# Patient Record
Sex: MALE | Race: WHITE | Employment: OTHER | URBAN - METROPOLITAN AREA
[De-identification: names, ages, dates, MRNs, and addresses within clinical notes are randomized per-mention and may not be internally consistent; named-entity substitution may affect disease eponyms.]

---

## 2018-06-29 ENCOUNTER — HOSPITAL ENCOUNTER (OUTPATIENT)
Dept: SURGERY | Age: 53
Discharge: HOME | End: 2018-06-29
Attending: INTERNAL MEDICINE | Admitting: INTERNAL MEDICINE

## 2018-06-29 VITALS
RESPIRATION RATE: 20 BRPM | HEART RATE: 99 BPM | WEIGHT: 152 LBS | TEMPERATURE: 97 F | HEIGHT: 60 IN | OXYGEN SATURATION: 100 % | SYSTOLIC BLOOD PRESSURE: 152 MMHG | BODY MASS INDEX: 29.84 KG/M2 | DIASTOLIC BLOOD PRESSURE: 69 MMHG

## 2018-06-29 DIAGNOSIS — R19.5 OTHER FECAL ABNORMALITIES: ICD-10-CM

## 2018-06-29 RX ORDER — MORPHINE SULFATE 2 MG/ML
1 INJECTION, SOLUTION INTRAMUSCULAR; INTRAVENOUS EVERY 5 MIN PRN
Status: DISCONTINUED | OUTPATIENT
Start: 2018-06-29 | End: 2018-06-30 | Stop reason: HOSPADM

## 2018-06-29 RX ORDER — MORPHINE SULFATE 2 MG/ML
2 INJECTION, SOLUTION INTRAMUSCULAR; INTRAVENOUS EVERY 5 MIN PRN
Status: DISCONTINUED | OUTPATIENT
Start: 2018-06-29 | End: 2018-06-30 | Stop reason: HOSPADM

## 2018-06-29 RX ORDER — ONDANSETRON 2 MG/ML
4 INJECTION INTRAMUSCULAR; INTRAVENOUS
Status: ACTIVE | OUTPATIENT
Start: 2018-06-29 | End: 2018-06-29

## 2018-06-29 RX ORDER — DIPHENHYDRAMINE HYDROCHLORIDE 50 MG/ML
12.5 INJECTION INTRAMUSCULAR; INTRAVENOUS
Status: ACTIVE | OUTPATIENT
Start: 2018-06-29 | End: 2018-06-29

## 2018-06-29 RX ORDER — LABETALOL HYDROCHLORIDE 5 MG/ML
5 INJECTION, SOLUTION INTRAVENOUS EVERY 10 MIN PRN
Status: DISCONTINUED | OUTPATIENT
Start: 2018-06-29 | End: 2018-06-30 | Stop reason: HOSPADM

## 2018-06-29 RX ORDER — LIDOCAINE HYDROCHLORIDE 10 MG/ML
0.1 INJECTION, SOLUTION EPIDURAL; INFILTRATION; INTRACAUDAL; PERINEURAL ONCE
Status: DISCONTINUED | OUTPATIENT
Start: 2018-06-29 | End: 2018-06-30 | Stop reason: HOSPADM

## 2018-06-29 RX ORDER — MEPERIDINE HYDROCHLORIDE 50 MG/ML
12.5 INJECTION INTRAMUSCULAR; INTRAVENOUS; SUBCUTANEOUS EVERY 5 MIN PRN
Status: DISCONTINUED | OUTPATIENT
Start: 2018-06-29 | End: 2018-06-30 | Stop reason: HOSPADM

## 2018-06-29 RX ORDER — OXYCODONE HYDROCHLORIDE AND ACETAMINOPHEN 5; 325 MG/1; MG/1
2 TABLET ORAL PRN
Status: ACTIVE | OUTPATIENT
Start: 2018-06-29 | End: 2018-06-29

## 2018-06-29 RX ORDER — OXYCODONE HYDROCHLORIDE AND ACETAMINOPHEN 5; 325 MG/1; MG/1
1 TABLET ORAL PRN
Status: ACTIVE | OUTPATIENT
Start: 2018-06-29 | End: 2018-06-29

## 2018-06-29 RX ORDER — SODIUM CHLORIDE, SODIUM LACTATE, POTASSIUM CHLORIDE, CALCIUM CHLORIDE 600; 310; 30; 20 MG/100ML; MG/100ML; MG/100ML; MG/100ML
INJECTION, SOLUTION INTRAVENOUS CONTINUOUS
Status: DISCONTINUED | OUTPATIENT
Start: 2018-06-29 | End: 2018-06-30 | Stop reason: HOSPADM

## 2018-06-29 RX ORDER — IPRATROPIUM BROMIDE AND ALBUTEROL SULFATE 2.5; .5 MG/3ML; MG/3ML
SOLUTION RESPIRATORY (INHALATION)
Status: COMPLETED
Start: 2018-06-29 | End: 2018-06-29

## 2018-06-29 RX ORDER — IPRATROPIUM BROMIDE AND ALBUTEROL SULFATE 2.5; .5 MG/3ML; MG/3ML
1 SOLUTION RESPIRATORY (INHALATION) ONCE
Status: COMPLETED | OUTPATIENT
Start: 2018-06-29 | End: 2018-06-29

## 2018-06-29 RX ORDER — HYDRALAZINE HYDROCHLORIDE 20 MG/ML
5 INJECTION INTRAMUSCULAR; INTRAVENOUS EVERY 10 MIN PRN
Status: DISCONTINUED | OUTPATIENT
Start: 2018-06-29 | End: 2018-06-30 | Stop reason: HOSPADM

## 2018-06-29 RX ORDER — PROMETHAZINE HYDROCHLORIDE 25 MG/ML
6.25 INJECTION, SOLUTION INTRAMUSCULAR; INTRAVENOUS
Status: ACTIVE | OUTPATIENT
Start: 2018-06-29 | End: 2018-06-29

## 2018-06-29 RX ADMIN — SODIUM CHLORIDE, SODIUM LACTATE, POTASSIUM CHLORIDE, CALCIUM CHLORIDE: 600; 310; 30; 20 INJECTION, SOLUTION INTRAVENOUS at 11:17

## 2018-06-29 RX ADMIN — IPRATROPIUM BROMIDE AND ALBUTEROL SULFATE 1 AMPULE: 2.5; .5 SOLUTION RESPIRATORY (INHALATION) at 13:03

## 2018-06-29 ASSESSMENT — PAIN - FUNCTIONAL ASSESSMENT: PAIN_FUNCTIONAL_ASSESSMENT: 0-10

## 2019-01-01 DIAGNOSIS — M89.8X5 PAIN OF LEFT FEMUR: Primary | ICD-10-CM

## 2019-01-01 DIAGNOSIS — M79.605 LEFT LEG PAIN: Primary | ICD-10-CM

## 2019-09-03 ENCOUNTER — HOSPITAL ENCOUNTER (EMERGENCY)
Age: 54
Discharge: ANOTHER HOSPITAL | End: 2019-09-03
Attending: EMERGENCY MEDICINE
Payer: MEDICARE

## 2019-09-03 ENCOUNTER — APPOINTMENT (OUTPATIENT)
Dept: GENERAL RADIOLOGY | Age: 54
End: 2019-09-03
Payer: MEDICARE

## 2019-09-03 ENCOUNTER — HOSPITAL ENCOUNTER (INPATIENT)
Age: 54
LOS: 2 days | Discharge: NURSING, REHAB OR LONG-TERM CARE | DRG: 481 | End: 2019-09-05
Attending: ORTHOPAEDIC SURGERY | Admitting: ORTHOPAEDIC SURGERY
Payer: MEDICARE

## 2019-09-03 ENCOUNTER — ANESTHESIA EVENT (OUTPATIENT)
Dept: OPERATING ROOM | Age: 54
DRG: 481 | End: 2019-09-03
Payer: MEDICARE

## 2019-09-03 ENCOUNTER — ANESTHESIA (OUTPATIENT)
Dept: OPERATING ROOM | Age: 54
DRG: 481 | End: 2019-09-03
Payer: MEDICARE

## 2019-09-03 ENCOUNTER — APPOINTMENT (OUTPATIENT)
Dept: GENERAL RADIOLOGY | Age: 54
DRG: 481 | End: 2019-09-03
Attending: ORTHOPAEDIC SURGERY
Payer: MEDICARE

## 2019-09-03 VITALS
SYSTOLIC BLOOD PRESSURE: 151 MMHG | RESPIRATION RATE: 4 BRPM | OXYGEN SATURATION: 100 % | DIASTOLIC BLOOD PRESSURE: 79 MMHG

## 2019-09-03 VITALS
RESPIRATION RATE: 20 BRPM | DIASTOLIC BLOOD PRESSURE: 74 MMHG | HEIGHT: 60 IN | BODY MASS INDEX: 29.84 KG/M2 | HEART RATE: 121 BPM | WEIGHT: 152 LBS | TEMPERATURE: 97.6 F | OXYGEN SATURATION: 92 % | SYSTOLIC BLOOD PRESSURE: 146 MMHG

## 2019-09-03 DIAGNOSIS — S72.142A CLOSED DISPLACED INTERTROCHANTERIC FRACTURE OF LEFT FEMUR, INITIAL ENCOUNTER (HCC): Primary | ICD-10-CM

## 2019-09-03 LAB
A/G RATIO: 1.1 (ref 1.1–2.2)
ALBUMIN SERPL-MCNC: 4.1 G/DL (ref 3.4–5)
ALP BLD-CCNC: 86 U/L (ref 40–129)
ALT SERPL-CCNC: 32 U/L (ref 10–40)
AMORPHOUS: ABNORMAL /HPF
ANION GAP SERPL CALCULATED.3IONS-SCNC: 11 MMOL/L (ref 3–16)
APTT: 30.4 SEC (ref 26–36)
AST SERPL-CCNC: 26 U/L (ref 15–37)
BASOPHILS ABSOLUTE: 0.1 K/UL (ref 0–0.2)
BASOPHILS RELATIVE PERCENT: 1 %
BILIRUB SERPL-MCNC: 0.3 MG/DL (ref 0–1)
BILIRUBIN URINE: NEGATIVE
BLOOD, URINE: ABNORMAL
BUN BLDV-MCNC: 25 MG/DL (ref 7–20)
CALCIUM SERPL-MCNC: 9.8 MG/DL (ref 8.3–10.6)
CASTS: ABNORMAL /LPF
CHLORIDE BLD-SCNC: 98 MMOL/L (ref 99–110)
CLARITY: CLEAR
CO2: 30 MMOL/L (ref 21–32)
COLOR: YELLOW
CREAT SERPL-MCNC: 0.8 MG/DL (ref 0.9–1.3)
EOSINOPHILS ABSOLUTE: 0 K/UL (ref 0–0.6)
EOSINOPHILS RELATIVE PERCENT: 0 %
EPITHELIAL CELLS, UA: ABNORMAL /HPF
GFR AFRICAN AMERICAN: >60
GFR NON-AFRICAN AMERICAN: >60
GLOBULIN: 3.8 G/DL
GLUCOSE BLD-MCNC: 155 MG/DL (ref 70–99)
GLUCOSE URINE: NEGATIVE MG/DL
HCT VFR BLD CALC: 40.5 % (ref 40.5–52.5)
HEMOGLOBIN: 13.4 G/DL (ref 13.5–17.5)
INR BLD: 1.06 (ref 0.86–1.14)
KETONES, URINE: NEGATIVE MG/DL
LEUKOCYTE ESTERASE, URINE: NEGATIVE
LYMPHOCYTES ABSOLUTE: 0.8 K/UL (ref 1–5.1)
LYMPHOCYTES RELATIVE PERCENT: 5.4 %
MCH RBC QN AUTO: 31.4 PG (ref 26–34)
MCHC RBC AUTO-ENTMCNC: 33.1 G/DL (ref 31–36)
MCV RBC AUTO: 94.9 FL (ref 80–100)
MICROSCOPIC EXAMINATION: YES
MONOCYTES ABSOLUTE: 0.6 K/UL (ref 0–1.3)
MONOCYTES RELATIVE PERCENT: 4.5 %
MUCUS: ABNORMAL /LPF
NEUTROPHILS ABSOLUTE: 12.7 K/UL (ref 1.7–7.7)
NEUTROPHILS RELATIVE PERCENT: 89.1 %
NITRITE, URINE: NEGATIVE
PDW BLD-RTO: 14.4 % (ref 12.4–15.4)
PH UA: 6 (ref 5–8)
PLATELET # BLD: 285 K/UL (ref 135–450)
PMV BLD AUTO: 7.9 FL (ref 5–10.5)
POTASSIUM SERPL-SCNC: 4.5 MMOL/L (ref 3.5–5.1)
PROTEIN UA: ABNORMAL MG/DL
PROTHROMBIN TIME: 12.1 SEC (ref 9.8–13)
RBC # BLD: 4.27 M/UL (ref 4.2–5.9)
RBC UA: ABNORMAL /HPF (ref 0–2)
RENAL EPITHELIAL, UA: ABNORMAL /HPF
SODIUM BLD-SCNC: 139 MMOL/L (ref 136–145)
SPECIFIC GRAVITY UA: 1.02 (ref 1–1.03)
TOTAL PROTEIN: 7.9 G/DL (ref 6.4–8.2)
URINE TYPE: ABNORMAL
UROBILINOGEN, URINE: 0.2 E.U./DL
WBC # BLD: 14.2 K/UL (ref 4–11)
WBC UA: ABNORMAL /HPF (ref 0–5)

## 2019-09-03 PROCEDURE — 2700000000 HC OXYGEN THERAPY PER DAY

## 2019-09-03 PROCEDURE — 6370000000 HC RX 637 (ALT 250 FOR IP): Performed by: ORTHOPAEDIC SURGERY

## 2019-09-03 PROCEDURE — 3600000014 HC SURGERY LEVEL 4 ADDTL 15MIN: Performed by: ORTHOPAEDIC SURGERY

## 2019-09-03 PROCEDURE — 2500000003 HC RX 250 WO HCPCS: Performed by: ORTHOPAEDIC SURGERY

## 2019-09-03 PROCEDURE — 7100000001 HC PACU RECOVERY - ADDTL 15 MIN: Performed by: ORTHOPAEDIC SURGERY

## 2019-09-03 PROCEDURE — 2580000003 HC RX 258: Performed by: ORTHOPAEDIC SURGERY

## 2019-09-03 PROCEDURE — 2709999900 HC NON-CHARGEABLE SUPPLY: Performed by: ORTHOPAEDIC SURGERY

## 2019-09-03 PROCEDURE — 3700000001 HC ADD 15 MINUTES (ANESTHESIA): Performed by: ORTHOPAEDIC SURGERY

## 2019-09-03 PROCEDURE — C1713 ANCHOR/SCREW BN/BN,TIS/BN: HCPCS | Performed by: ORTHOPAEDIC SURGERY

## 2019-09-03 PROCEDURE — 3209999900 FLUORO FOR SURGICAL PROCEDURES

## 2019-09-03 PROCEDURE — 96361 HYDRATE IV INFUSION ADD-ON: CPT

## 2019-09-03 PROCEDURE — 6360000002 HC RX W HCPCS: Performed by: EMERGENCY MEDICINE

## 2019-09-03 PROCEDURE — 6360000002 HC RX W HCPCS: Performed by: ANESTHESIOLOGY

## 2019-09-03 PROCEDURE — 80053 COMPREHEN METABOLIC PANEL: CPT

## 2019-09-03 PROCEDURE — 3600000004 HC SURGERY LEVEL 4 BASE: Performed by: ORTHOPAEDIC SURGERY

## 2019-09-03 PROCEDURE — 2720000010 HC SURG SUPPLY STERILE: Performed by: ORTHOPAEDIC SURGERY

## 2019-09-03 PROCEDURE — 3700000000 HC ANESTHESIA ATTENDED CARE: Performed by: ORTHOPAEDIC SURGERY

## 2019-09-03 PROCEDURE — 81001 URINALYSIS AUTO W/SCOPE: CPT

## 2019-09-03 PROCEDURE — 2580000003 HC RX 258: Performed by: NURSE ANESTHETIST, CERTIFIED REGISTERED

## 2019-09-03 PROCEDURE — 87086 URINE CULTURE/COLONY COUNT: CPT

## 2019-09-03 PROCEDURE — APPSS45 APP SPLIT SHARED TIME 31-45 MINUTES: Performed by: PHYSICIAN ASSISTANT

## 2019-09-03 PROCEDURE — 6360000002 HC RX W HCPCS: Performed by: PHYSICIAN ASSISTANT

## 2019-09-03 PROCEDURE — 94761 N-INVAS EAR/PLS OXIMETRY MLT: CPT

## 2019-09-03 PROCEDURE — 99283 EMERGENCY DEPT VISIT LOW MDM: CPT

## 2019-09-03 PROCEDURE — 85610 PROTHROMBIN TIME: CPT

## 2019-09-03 PROCEDURE — 2500000003 HC RX 250 WO HCPCS: Performed by: NURSE ANESTHETIST, CERTIFIED REGISTERED

## 2019-09-03 PROCEDURE — 85730 THROMBOPLASTIN TIME PARTIAL: CPT

## 2019-09-03 PROCEDURE — 73552 X-RAY EXAM OF FEMUR 2/>: CPT

## 2019-09-03 PROCEDURE — 96374 THER/PROPH/DIAG INJ IV PUSH: CPT

## 2019-09-03 PROCEDURE — 93005 ELECTROCARDIOGRAM TRACING: CPT | Performed by: FAMILY MEDICINE

## 2019-09-03 PROCEDURE — 87040 BLOOD CULTURE FOR BACTERIA: CPT

## 2019-09-03 PROCEDURE — C1769 GUIDE WIRE: HCPCS | Performed by: ORTHOPAEDIC SURGERY

## 2019-09-03 PROCEDURE — 73502 X-RAY EXAM HIP UNI 2-3 VIEWS: CPT

## 2019-09-03 PROCEDURE — 85025 COMPLETE CBC W/AUTO DIFF WBC: CPT

## 2019-09-03 PROCEDURE — 7100000000 HC PACU RECOVERY - FIRST 15 MIN: Performed by: ORTHOPAEDIC SURGERY

## 2019-09-03 PROCEDURE — 0QS934Z REPOSITION LEFT FEMORAL SHAFT WITH INTERNAL FIXATION DEVICE, PERCUTANEOUS APPROACH: ICD-10-PCS | Performed by: ORTHOPAEDIC SURGERY

## 2019-09-03 PROCEDURE — 2580000003 HC RX 258: Performed by: EMERGENCY MEDICINE

## 2019-09-03 PROCEDURE — 6360000002 HC RX W HCPCS: Performed by: NURSE ANESTHETIST, CERTIFIED REGISTERED

## 2019-09-03 PROCEDURE — 1200000000 HC SEMI PRIVATE

## 2019-09-03 DEVICE — SCREW LK TI 5.0MM X 32MM: Type: IMPLANTABLE DEVICE | Status: FUNCTIONAL

## 2019-09-03 DEVICE — IMPLANTABLE DEVICE: Type: IMPLANTABLE DEVICE | Status: FUNCTIONAL

## 2019-09-03 RX ORDER — OXYBUTYNIN CHLORIDE 5 MG/1
2.5 TABLET ORAL NIGHTLY
COMMUNITY

## 2019-09-03 RX ORDER — 0.9 % SODIUM CHLORIDE 0.9 %
1000 INTRAVENOUS SOLUTION INTRAVENOUS ONCE
Status: COMPLETED | OUTPATIENT
Start: 2019-09-03 | End: 2019-09-03

## 2019-09-03 RX ORDER — ACETAMINOPHEN 325 MG/1
650 TABLET ORAL EVERY 4 HOURS PRN
Status: DISCONTINUED | OUTPATIENT
Start: 2019-09-03 | End: 2019-09-05 | Stop reason: HOSPADM

## 2019-09-03 RX ORDER — ONDANSETRON 2 MG/ML
4 INJECTION INTRAMUSCULAR; INTRAVENOUS EVERY 6 HOURS PRN
Status: DISCONTINUED | OUTPATIENT
Start: 2019-09-03 | End: 2019-09-05 | Stop reason: HOSPADM

## 2019-09-03 RX ORDER — MAGNESIUM HYDROXIDE 1200 MG/15ML
LIQUID ORAL CONTINUOUS PRN
Status: COMPLETED | OUTPATIENT
Start: 2019-09-03 | End: 2019-09-03

## 2019-09-03 RX ORDER — HYDRALAZINE HYDROCHLORIDE 20 MG/ML
5 INJECTION INTRAMUSCULAR; INTRAVENOUS EVERY 10 MIN PRN
Status: DISCONTINUED | OUTPATIENT
Start: 2019-09-03 | End: 2019-09-03 | Stop reason: HOSPADM

## 2019-09-03 RX ORDER — IPRATROPIUM BROMIDE AND ALBUTEROL SULFATE 2.5; .5 MG/3ML; MG/3ML
1 SOLUTION RESPIRATORY (INHALATION)
Status: DISCONTINUED | OUTPATIENT
Start: 2019-09-03 | End: 2019-09-04

## 2019-09-03 RX ORDER — POLYETHYLENE GLYCOL 3350 17 G/17G
17 POWDER, FOR SOLUTION ORAL 2 TIMES DAILY
Status: DISCONTINUED | OUTPATIENT
Start: 2019-09-03 | End: 2019-09-05 | Stop reason: HOSPADM

## 2019-09-03 RX ORDER — MORPHINE SULFATE 2 MG/ML
2 INJECTION, SOLUTION INTRAMUSCULAR; INTRAVENOUS EVERY 5 MIN PRN
Status: DISCONTINUED | OUTPATIENT
Start: 2019-09-03 | End: 2019-09-03 | Stop reason: HOSPADM

## 2019-09-03 RX ORDER — SODIUM PHOSPHATE,MONO-DIBASIC 19G-7G/118
1 ENEMA (ML) RECTAL DAILY PRN
Status: DISCONTINUED | OUTPATIENT
Start: 2019-09-03 | End: 2019-09-05 | Stop reason: HOSPADM

## 2019-09-03 RX ORDER — DOCUSATE SODIUM 100 MG/1
100 CAPSULE, LIQUID FILLED ORAL 2 TIMES DAILY
Status: DISCONTINUED | OUTPATIENT
Start: 2019-09-03 | End: 2019-09-05 | Stop reason: HOSPADM

## 2019-09-03 RX ORDER — OXYCODONE HYDROCHLORIDE AND ACETAMINOPHEN 5; 325 MG/1; MG/1
1 TABLET ORAL PRN
Status: DISCONTINUED | OUTPATIENT
Start: 2019-09-03 | End: 2019-09-03 | Stop reason: HOSPADM

## 2019-09-03 RX ORDER — OXYCODONE HYDROCHLORIDE 5 MG/1
10 TABLET ORAL EVERY 4 HOURS PRN
Status: DISCONTINUED | OUTPATIENT
Start: 2019-09-03 | End: 2019-09-05 | Stop reason: HOSPADM

## 2019-09-03 RX ORDER — SODIUM CHLORIDE 0.9 % (FLUSH) 0.9 %
10 SYRINGE (ML) INJECTION EVERY 12 HOURS SCHEDULED
Status: DISCONTINUED | OUTPATIENT
Start: 2019-09-03 | End: 2019-09-05 | Stop reason: HOSPADM

## 2019-09-03 RX ORDER — SUCCINYLCHOLINE/SOD CL,ISO/PF 100 MG/5ML
SYRINGE (ML) INTRAVENOUS PRN
Status: DISCONTINUED | OUTPATIENT
Start: 2019-09-03 | End: 2019-09-03 | Stop reason: SDUPTHER

## 2019-09-03 RX ORDER — FENTANYL CITRATE 50 UG/ML
INJECTION, SOLUTION INTRAMUSCULAR; INTRAVENOUS PRN
Status: DISCONTINUED | OUTPATIENT
Start: 2019-09-03 | End: 2019-09-03 | Stop reason: SDUPTHER

## 2019-09-03 RX ORDER — CLINDAMYCIN PHOSPHATE 10 MG/ML
SOLUTION TOPICAL EVERY 8 HOURS
COMMUNITY

## 2019-09-03 RX ORDER — MEPERIDINE HYDROCHLORIDE 50 MG/ML
12.5 INJECTION INTRAMUSCULAR; INTRAVENOUS; SUBCUTANEOUS EVERY 5 MIN PRN
Status: DISCONTINUED | OUTPATIENT
Start: 2019-09-03 | End: 2019-09-03 | Stop reason: HOSPADM

## 2019-09-03 RX ORDER — FUROSEMIDE 20 MG/1
20 TABLET ORAL 2 TIMES DAILY
Status: DISCONTINUED | OUTPATIENT
Start: 2019-09-03 | End: 2019-09-05 | Stop reason: HOSPADM

## 2019-09-03 RX ORDER — MORPHINE SULFATE 2 MG/ML
1 INJECTION, SOLUTION INTRAMUSCULAR; INTRAVENOUS EVERY 5 MIN PRN
Status: DISCONTINUED | OUTPATIENT
Start: 2019-09-03 | End: 2019-09-03 | Stop reason: HOSPADM

## 2019-09-03 RX ORDER — MORPHINE SULFATE 4 MG/ML
4 INJECTION, SOLUTION INTRAMUSCULAR; INTRAVENOUS ONCE
Status: COMPLETED | OUTPATIENT
Start: 2019-09-03 | End: 2019-09-03

## 2019-09-03 RX ORDER — DIPHENHYDRAMINE HYDROCHLORIDE 50 MG/ML
12.5 INJECTION INTRAMUSCULAR; INTRAVENOUS
Status: DISCONTINUED | OUTPATIENT
Start: 2019-09-03 | End: 2019-09-03 | Stop reason: HOSPADM

## 2019-09-03 RX ORDER — DOXYCYCLINE HYCLATE 100 MG
100 TABLET ORAL 2 TIMES DAILY
Status: DISCONTINUED | OUTPATIENT
Start: 2019-09-03 | End: 2019-09-03

## 2019-09-03 RX ORDER — CALCIUM CARBONATE 200(500)MG
2 TABLET,CHEWABLE ORAL EVERY 6 HOURS PRN
Status: DISCONTINUED | OUTPATIENT
Start: 2019-09-03 | End: 2019-09-05 | Stop reason: HOSPADM

## 2019-09-03 RX ORDER — PROPOFOL 10 MG/ML
INJECTION, EMULSION INTRAVENOUS PRN
Status: DISCONTINUED | OUTPATIENT
Start: 2019-09-03 | End: 2019-09-03 | Stop reason: SDUPTHER

## 2019-09-03 RX ORDER — ONDANSETRON 2 MG/ML
4 INJECTION INTRAMUSCULAR; INTRAVENOUS
Status: DISCONTINUED | OUTPATIENT
Start: 2019-09-03 | End: 2019-09-03 | Stop reason: HOSPADM

## 2019-09-03 RX ORDER — PANTOPRAZOLE SODIUM 40 MG/1
40 TABLET, DELAYED RELEASE ORAL
Status: DISCONTINUED | OUTPATIENT
Start: 2019-09-04 | End: 2019-09-05 | Stop reason: HOSPADM

## 2019-09-03 RX ORDER — MORPHINE SULFATE 4 MG/ML
4 INJECTION, SOLUTION INTRAMUSCULAR; INTRAVENOUS
Status: DISCONTINUED | OUTPATIENT
Start: 2019-09-03 | End: 2019-09-05 | Stop reason: HOSPADM

## 2019-09-03 RX ORDER — IPRATROPIUM BROMIDE AND ALBUTEROL SULFATE 2.5; .5 MG/3ML; MG/3ML
1 SOLUTION RESPIRATORY (INHALATION) 3 TIMES DAILY
Status: DISCONTINUED | OUTPATIENT
Start: 2019-09-03 | End: 2019-09-03

## 2019-09-03 RX ORDER — DOXYCYCLINE HYCLATE 100 MG
100 TABLET ORAL 2 TIMES DAILY
Status: ON HOLD | COMMUNITY
End: 2019-09-03 | Stop reason: ALTCHOICE

## 2019-09-03 RX ORDER — LABETALOL HYDROCHLORIDE 5 MG/ML
5 INJECTION, SOLUTION INTRAVENOUS EVERY 10 MIN PRN
Status: DISCONTINUED | OUTPATIENT
Start: 2019-09-03 | End: 2019-09-03 | Stop reason: HOSPADM

## 2019-09-03 RX ORDER — OXYCODONE HYDROCHLORIDE 5 MG/1
5 TABLET ORAL EVERY 4 HOURS PRN
Status: DISCONTINUED | OUTPATIENT
Start: 2019-09-03 | End: 2019-09-05 | Stop reason: HOSPADM

## 2019-09-03 RX ORDER — SODIUM CHLORIDE 0.9 % (FLUSH) 0.9 %
10 SYRINGE (ML) INJECTION PRN
Status: DISCONTINUED | OUTPATIENT
Start: 2019-09-03 | End: 2019-09-05

## 2019-09-03 RX ORDER — MORPHINE SULFATE 2 MG/ML
2 INJECTION, SOLUTION INTRAMUSCULAR; INTRAVENOUS
Status: DISCONTINUED | OUTPATIENT
Start: 2019-09-03 | End: 2019-09-05 | Stop reason: HOSPADM

## 2019-09-03 RX ORDER — GUAIFENESIN 600 MG/1
600 TABLET, EXTENDED RELEASE ORAL 2 TIMES DAILY
Status: DISCONTINUED | OUTPATIENT
Start: 2019-09-03 | End: 2019-09-05 | Stop reason: HOSPADM

## 2019-09-03 RX ORDER — LISINOPRIL 10 MG/1
10 TABLET ORAL NIGHTLY
Status: DISCONTINUED | OUTPATIENT
Start: 2019-09-03 | End: 2019-09-05 | Stop reason: HOSPADM

## 2019-09-03 RX ORDER — LOPERAMIDE HYDROCHLORIDE 2 MG/1
2 CAPSULE ORAL EVERY 4 HOURS PRN
Status: DISCONTINUED | OUTPATIENT
Start: 2019-09-03 | End: 2019-09-05 | Stop reason: HOSPADM

## 2019-09-03 RX ORDER — BISACODYL 10 MG
10 SUPPOSITORY, RECTAL RECTAL DAILY PRN
Status: DISCONTINUED | OUTPATIENT
Start: 2019-09-03 | End: 2019-09-05 | Stop reason: HOSPADM

## 2019-09-03 RX ORDER — ROCURONIUM BROMIDE 10 MG/ML
INJECTION, SOLUTION INTRAVENOUS PRN
Status: DISCONTINUED | OUTPATIENT
Start: 2019-09-03 | End: 2019-09-03 | Stop reason: SDUPTHER

## 2019-09-03 RX ORDER — IPRATROPIUM BROMIDE AND ALBUTEROL SULFATE 2.5; .5 MG/3ML; MG/3ML
1 SOLUTION RESPIRATORY (INHALATION) EVERY 4 HOURS PRN
Status: DISCONTINUED | OUTPATIENT
Start: 2019-09-03 | End: 2019-09-04

## 2019-09-03 RX ORDER — OXYCODONE HYDROCHLORIDE AND ACETAMINOPHEN 5; 325 MG/1; MG/1
2 TABLET ORAL PRN
Status: DISCONTINUED | OUTPATIENT
Start: 2019-09-03 | End: 2019-09-03 | Stop reason: HOSPADM

## 2019-09-03 RX ORDER — SODIUM CHLORIDE, SODIUM LACTATE, POTASSIUM CHLORIDE, CALCIUM CHLORIDE 600; 310; 30; 20 MG/100ML; MG/100ML; MG/100ML; MG/100ML
INJECTION, SOLUTION INTRAVENOUS CONTINUOUS PRN
Status: DISCONTINUED | OUTPATIENT
Start: 2019-09-03 | End: 2019-09-03 | Stop reason: SDUPTHER

## 2019-09-03 RX ORDER — M-VIT,TX,IRON,MINS/CALC/FOLIC 27MG-0.4MG
1 TABLET ORAL DAILY
Status: DISCONTINUED | OUTPATIENT
Start: 2019-09-03 | End: 2019-09-05 | Stop reason: HOSPADM

## 2019-09-03 RX ORDER — IPRATROPIUM BROMIDE AND ALBUTEROL SULFATE 2.5; .5 MG/3ML; MG/3ML
1 SOLUTION RESPIRATORY (INHALATION) EVERY 4 HOURS PRN
COMMUNITY
End: 2020-01-01 | Stop reason: ALTCHOICE

## 2019-09-03 RX ORDER — OXYBUTYNIN CHLORIDE 5 MG/1
2.5 TABLET ORAL NIGHTLY
Status: DISCONTINUED | OUTPATIENT
Start: 2019-09-03 | End: 2019-09-05 | Stop reason: HOSPADM

## 2019-09-03 RX ORDER — CLONIDINE HYDROCHLORIDE 0.1 MG/1
0.1 TABLET ORAL EVERY 6 HOURS PRN
Status: DISCONTINUED | OUTPATIENT
Start: 2019-09-03 | End: 2019-09-05 | Stop reason: HOSPADM

## 2019-09-03 RX ORDER — LIDOCAINE HYDROCHLORIDE 20 MG/ML
INJECTION, SOLUTION INFILTRATION; PERINEURAL PRN
Status: DISCONTINUED | OUTPATIENT
Start: 2019-09-03 | End: 2019-09-03 | Stop reason: SDUPTHER

## 2019-09-03 RX ORDER — DEXTROSE, SODIUM CHLORIDE, AND POTASSIUM CHLORIDE 5; .45; .15 G/100ML; G/100ML; G/100ML
1000 INJECTION INTRAVENOUS CONTINUOUS
Status: DISPENSED | OUTPATIENT
Start: 2019-09-03 | End: 2019-09-04

## 2019-09-03 RX ORDER — SODIUM CHLORIDE 0.9 % (FLUSH) 0.9 %
10 SYRINGE (ML) INJECTION PRN
Status: DISCONTINUED | OUTPATIENT
Start: 2019-09-03 | End: 2019-09-05 | Stop reason: HOSPADM

## 2019-09-03 RX ADMIN — SODIUM CHLORIDE, POTASSIUM CHLORIDE, SODIUM LACTATE AND CALCIUM CHLORIDE: 600; 310; 30; 20 INJECTION, SOLUTION INTRAVENOUS at 17:53

## 2019-09-03 RX ADMIN — SODIUM CHLORIDE 1000 ML: 9 INJECTION, SOLUTION INTRAVENOUS at 03:45

## 2019-09-03 RX ADMIN — POTASSIUM CHLORIDE, DEXTROSE MONOHYDRATE AND SODIUM CHLORIDE 1000 ML: 150; 5; 450 INJECTION, SOLUTION INTRAVENOUS at 22:45

## 2019-09-03 RX ADMIN — LISINOPRIL 10 MG: 10 TABLET ORAL at 22:40

## 2019-09-03 RX ADMIN — DOCUSATE SODIUM 100 MG: 100 CAPSULE, LIQUID FILLED ORAL at 22:40

## 2019-09-03 RX ADMIN — ROCURONIUM BROMIDE 5 MG: 10 SOLUTION INTRAVENOUS at 18:01

## 2019-09-03 RX ADMIN — MORPHINE SULFATE 4 MG: 4 INJECTION INTRAVENOUS at 03:45

## 2019-09-03 RX ADMIN — HYDROMORPHONE HYDROCHLORIDE 0.5 MG: 1 INJECTION, SOLUTION INTRAMUSCULAR; INTRAVENOUS; SUBCUTANEOUS at 19:47

## 2019-09-03 RX ADMIN — OXYBUTYNIN CHLORIDE 2.5 MG: 5 TABLET ORAL at 22:40

## 2019-09-03 RX ADMIN — GUAIFENESIN 600 MG: 600 TABLET, EXTENDED RELEASE ORAL at 22:40

## 2019-09-03 RX ADMIN — FUROSEMIDE 20 MG: 20 TABLET ORAL at 22:40

## 2019-09-03 RX ADMIN — Medication 2 G: at 18:06

## 2019-09-03 RX ADMIN — ROCURONIUM BROMIDE 45 MG: 10 SOLUTION INTRAVENOUS at 18:08

## 2019-09-03 RX ADMIN — PROPOFOL 130 MG: 10 INJECTION, EMULSION INTRAVENOUS at 18:02

## 2019-09-03 RX ADMIN — Medication 140 MG: at 18:02

## 2019-09-03 RX ADMIN — SUGAMMADEX 200 MG: 100 INJECTION, SOLUTION INTRAVENOUS at 18:43

## 2019-09-03 RX ADMIN — FENTANYL CITRATE 100 MCG: 50 INJECTION INTRAMUSCULAR; INTRAVENOUS at 18:07

## 2019-09-03 RX ADMIN — SODIUM CHLORIDE 1000 ML: 9 INJECTION, SOLUTION INTRAVENOUS at 05:05

## 2019-09-03 RX ADMIN — LIDOCAINE HYDROCHLORIDE 60 MG: 20 INJECTION, SOLUTION INFILTRATION; PERINEURAL at 18:02

## 2019-09-03 ASSESSMENT — PULMONARY FUNCTION TESTS
PIF_VALUE: 1
PIF_VALUE: 1
PIF_VALUE: 25
PIF_VALUE: 32
PIF_VALUE: 34
PIF_VALUE: 32
PIF_VALUE: 1
PIF_VALUE: 38
PIF_VALUE: 33
PIF_VALUE: 33
PIF_VALUE: 32
PIF_VALUE: 36
PIF_VALUE: 3
PIF_VALUE: 32
PIF_VALUE: 36
PIF_VALUE: 25
PIF_VALUE: 2
PIF_VALUE: 1
PIF_VALUE: 1
PIF_VALUE: 33
PIF_VALUE: 32
PIF_VALUE: 36
PIF_VALUE: 0
PIF_VALUE: 35
PIF_VALUE: 3
PIF_VALUE: 3
PIF_VALUE: 32
PIF_VALUE: 1
PIF_VALUE: 25
PIF_VALUE: 1
PIF_VALUE: 36
PIF_VALUE: 34
PIF_VALUE: 2
PIF_VALUE: 32
PIF_VALUE: 1
PIF_VALUE: 1
PIF_VALUE: 32
PIF_VALUE: 33
PIF_VALUE: 38
PIF_VALUE: 32
PIF_VALUE: 1
PIF_VALUE: 25
PIF_VALUE: 33
PIF_VALUE: 2
PIF_VALUE: 1
PIF_VALUE: 2
PIF_VALUE: 38
PIF_VALUE: 3
PIF_VALUE: 3
PIF_VALUE: 32
PIF_VALUE: 32
PIF_VALUE: 36
PIF_VALUE: 5
PIF_VALUE: 33
PIF_VALUE: 33
PIF_VALUE: 25
PIF_VALUE: 32
PIF_VALUE: 34
PIF_VALUE: 32
PIF_VALUE: 5
PIF_VALUE: 32
PIF_VALUE: 8
PIF_VALUE: 32
PIF_VALUE: 10
PIF_VALUE: 0
PIF_VALUE: 34
PIF_VALUE: 5
PIF_VALUE: 32
PIF_VALUE: 33
PIF_VALUE: 36
PIF_VALUE: 25
PIF_VALUE: 25
PIF_VALUE: 1
PIF_VALUE: 1
PIF_VALUE: 25
PIF_VALUE: 25
PIF_VALUE: 3
PIF_VALUE: 36
PIF_VALUE: 32
PIF_VALUE: 38
PIF_VALUE: 1
PIF_VALUE: 24
PIF_VALUE: 1

## 2019-09-03 ASSESSMENT — PAIN DESCRIPTION - PAIN TYPE: TYPE: SURGICAL PAIN

## 2019-09-03 ASSESSMENT — PAIN DESCRIPTION - DESCRIPTORS: DESCRIPTORS: OTHER (COMMENT)

## 2019-09-03 ASSESSMENT — PAIN SCALES - GENERAL
PAINLEVEL_OUTOF10: 0
PAINLEVEL_OUTOF10: 8
PAINLEVEL_OUTOF10: 0
PAINLEVEL_OUTOF10: 4

## 2019-09-03 ASSESSMENT — PAIN DESCRIPTION - ONSET: ONSET: OTHER (COMMENT)

## 2019-09-03 ASSESSMENT — PAIN DESCRIPTION - FREQUENCY: FREQUENCY: CONTINUOUS

## 2019-09-03 ASSESSMENT — PAIN DESCRIPTION - LOCATION: LOCATION: HIP

## 2019-09-03 ASSESSMENT — PAIN DESCRIPTION - ORIENTATION: ORIENTATION: LEFT

## 2019-09-03 NOTE — H&P
negative  MUSCULOSKELETAL:  positive for  Pain  Cardiac: no chest pain  Resp: No SOB    PHYSICAL EXAM:  Admission weight: 162 lb 11.2 oz (73.8 kg)     VITALS:  BP (!) 145/88   Pulse 120   Temp 98.3 °F (36.8 °C) (Oral)   Resp 18   Wt 162 lb 11.2 oz (73.8 kg)   SpO2 91%   BMI 36.48 kg/m²     CONSTITUTIONAL:  awake, alert, cooperative, no apparent distress, and appears stated age    MUSCULOSKELETAL:  there is no redness, warmth, or swelling of the joints  full range of motion noted  motor strength is 5 out of 5 all extremities bilaterally  tone is normal  with exception of the left leg. No open wounds. Mild swelling in the thigh. Distal pulses intact. Sensation intact. Pain with log roll of the leg.     DATA:  CBC:   Lab Results   Component Value Date    WBC 14.2 09/03/2019    RBC 4.27 09/03/2019    HGB 13.4 09/03/2019    HCT 40.5 09/03/2019    MCV 94.9 09/03/2019    MCH 31.4 09/03/2019    MCHC 33.1 09/03/2019    RDW 14.4 09/03/2019     09/03/2019    MPV 7.9 09/03/2019     BMP:    Lab Results   Component Value Date     09/03/2019    K 4.5 09/03/2019    CL 98 09/03/2019    CO2 30 09/03/2019    BUN 25 09/03/2019    LABALBU 4.1 09/03/2019    CREATININE 0.8 09/03/2019    CALCIUM 9.8 09/03/2019    GFRAA >60 09/03/2019    LABGLOM >60 09/03/2019    GLUCOSE 155 09/03/2019     PT/INR:    Lab Results   Component Value Date    PROTIME 12.1 09/03/2019    INR 1.06 09/03/2019       Radiology:  No orders to display         ASSESSMENT: left femur fracture    PLAN:  1)  Keep NPO for surgery tonight with Dr Collins Head  2)  Consent obtained from family  3)  Pain medication, Ancef on call      Xochitl Lorenzana

## 2019-09-03 NOTE — ED NOTES
Call placed to [facility] for hospitalitis @ [address]  09/03/19 1834    [facility] returned the call with Waldo Choudhary @ [place]  09/03/19 5952

## 2019-09-03 NOTE — ED NOTES
Patient unable to provide urine sample. Using sterile technique, straight cath performed. 125ml yellow urine obtained.       Al Luna RN  09/03/19 3776

## 2019-09-03 NOTE — ED PROVIDER NOTES
1.3 K/uL    Eosinophils Absolute 0.0 0.0 - 0.6 K/uL    Basophils Absolute 0.1 0.0 - 0.2 K/uL         PROCEDURES      MEDICAL DECISION MAKING  On arrival to the emergency department, patient tachycardic but afebrile with otherwise normal vital signs. No neurological deficit appreciated physical exam.  Patient neurovascularly intact. Patient with left intertrochanteric femur fracture on x-ray here in the emergency department. CBC, CMP obtained demonstrating leukocytosis of 14 but otherwise no other concerning acute abnormalities. Urinalysis pending. Patient hemodynamically stable. Patient was given IV fluid bolus of normal saline. Orthopedics team consulted. Dr. Liyah Kramer recommended transfer to [facility] for further management of current symptoms. Discussed case with hospitalist team at [facility] who accepts patient's transfer to their facility for further care. Final diagnoses:   Closed displaced intertrochanteric fracture of left femur, initial encounter ([facility] [address]         Disposition  Pt is in stable condition upon Transfer to [facility]. This chart was generated using the [address] [place] [address] dictation system. I created this record but it may contain dictation errors.             Corby Cheung MD  09/03/19 9014

## 2019-09-03 NOTE — CONSULTS
tablet Take 650 mg by mouth every 4 hours as needed for Pain or Fever    Historical Provider, MD   bisacodyl (DULCOLAX) 10 MG suppository Place 10 mg rectally daily as needed for Constipation    Historical Provider, MD   calcium carbonate (MARIBEL-GEST ANTACID) 500 MG chewable tablet Take 2 tablets by mouth every 6 hours as needed for Heartburn    Historical Provider, MD   cloNIDine (CATAPRES) 0.1 MG tablet Take 0.1 mg by mouth every 6 hours as needed for High Blood Pressure Systolic over 611 , diastolic 0ver 90    Historical Provider, MD   ipratropium-albuterol (DUONEB) 0.5-2.5 (3) MG/3ML SOLN nebulizer solution Inhale 1 vial into the lungs 3 times daily     Historical Provider, MD   Sodium Phosphates (FLEET) 7-19 GM/118ML Place 1 enema rectally daily as needed    Historical Provider, MD   furosemide (LASIX) 20 MG tablet Take 20 mg by mouth 2 times daily     Historical Provider, MD   lisinopril (PRINIVIL;ZESTRIL) 10 MG tablet Take 10 mg by mouth nightly    Historical Provider, MD   loperamide (IMODIUM) 2 MG capsule Take 2 mg by mouth every 4 hours as needed for Diarrhea    Historical Provider, MD   magnesium hydroxide (MILK OF MAGNESIA) 400 MG/5ML suspension Take 30 mLs by mouth daily as needed for Constipation    Historical Provider, MD   omeprazole (PRILOSEC) 20 MG delayed release capsule Take 20 mg by mouth nightly     Historical Provider, MD   Multiple Vitamins-Minerals (MULTIVITAMIN PO) Take by mouth daily    Historical Provider, MD       Allergies:  Chocolate; Eggs [egg white]; Food; Orange juice [orange oil]; Peanut butter flavor [flavoring agent]; Penicillins; Strawberry (diagnostic); and Tape [adhesive tape]    Social History:      The patient currently lives at [facility]. TOBACCO:   reports that he has never smoked. He has never used smokeless tobacco.  ETOH:   reports that he does not drink alcohol.       Family History:     Positive as follows:        Problem Relation Age of Onset    Stroke Mother    Megha Jordan Blood Pressure Father     Diabetes Brother         IDDM       REVIEW OF SYSTEMS:   Pertinent positives as noted in the HPI. All other systems reviewed and negative. PHYSICAL EXAM PERFORMED:  BP (!) 145/88   Pulse 120   Temp 98.3 °F (36.8 °C) (Oral)   Resp 18   Wt 162 lb 11.2 oz (73.8 kg)   SpO2 91%   BMI 36.48 kg/m²   General appearance: No apparent distress, appears stated age and cooperative. Shortened stature. HEENT: Normal cephalic, atraumatic without obvious deformity. Pupils equal, round, and reactive to light. Extra ocular muscles intact. Conjunctivae/corneas clear. Neck: Supple, with full range of motion. No jugular venous distention. Trachea midline. Respiratory:  Normal respiratory effort. Clear to auscultation, bilaterally without Rales/Wheezes/Rhonchi. Cardiovascular: Regular rate and rhythm with normal S1/S2 without murmurs, rubs or gallops. Abdomen: Soft, non-tender, non-distended with normal bowel sounds. Musculoskeletal: Left hip tender. Skin: Skin color, texture, turgor normal.  No rashes or lesions. Neurologic:  Neurovascularly intact without any focal sensory/motor deficits. Cranial nerves: II-XII intact, grossly non-focal.  Psychiatric: Alert and limited verbal ability. Capillary Refill: Brisk,< 3 seconds   Peripheral Pulses: +2 palpable, equal bilaterally     Labs:     Recent Labs     09/03/19  0320   WBC 14.2*   HGB 13.4*   HCT 40.5        Recent Labs     09/03/19  0320      K 4.5   CL 98*   CO2 30   BUN 25*   CREATININE 0.8*   CALCIUM 9.8     Recent Labs     09/03/19  0320   AST 26   ALT 32   BILITOT 0.3   ALKPHOS 86     Recent Labs     09/03/19  0320   INR 1.06     No results for input(s): Michael Nullmanisha in the last 72 hours.     Urinalysis:    Lab Results   Component Value Date    NITRU Negative 09/03/2019    WBCUA 0-2 09/03/2019    RBCUA 10-20 09/03/2019    BLOODU MODERATE 09/03/2019    SPECGRAV 1.025 09/03/2019    GLUCOSEU Negative 09/03/2019

## 2019-09-03 NOTE — ED NOTES
Patient attends changed, cleaned and urinated. Patient wants to go back to sleep and denies a breakfast tray at this time. Will continue to monitor. Call light in reach.      Moses Ordaz RN  09/03/19 8118

## 2019-09-03 NOTE — ED NOTES
[facility] called for update.  Informed of plan to transfer to [facility] for tanvir Victoria RN  09/03/19 1111

## 2019-09-03 NOTE — ANESTHESIA PRE PROCEDURE
MD   loperamide (IMODIUM) 2 MG capsule Take 2 mg by mouth every 4 hours as needed for Diarrhea    Historical Provider, MD   magnesium hydroxide (MILK OF MAGNESIA) 400 MG/5ML suspension Take 30 mLs by mouth daily as needed for Constipation    Historical Provider, MD   omeprazole (PRILOSEC) 20 MG delayed release capsule Take 20 mg by mouth nightly     Historical Provider, MD   Multiple Vitamins-Minerals (MULTIVITAMIN PO) Take by mouth daily    Historical Provider, MD       Current medications:    Current Facility-Administered Medications   Medication Dose Route Frequency Provider Last Rate Last Dose    bisacodyl (DULCOLAX) suppository 10 mg  10 mg Rectal Daily PRN YESSI Capellan        calcium carbonate (TUMS) chewable tablet 1,000 mg  2 tablet Oral Q6H PRN YESSI Capellan        cloNIDine (CATAPRES) tablet 0.1 mg  0.1 mg Oral Q6H PRN YESSI Capellan        furosemide (LASIX) tablet 20 mg  20 mg Oral BID YESSI Capellan        guaiFENesin [facility]) extended release tablet 600 mg  600 mg Oral BID YESSI Capellan        ipratropium-albuterol (DUONEB) nebulizer solution 3 mL  1 vial Inhalation Q4H PRN YESSI Capellan        lisinopril (PRINIVIL;ZESTRIL) tablet 10 mg  10 mg Oral Nightly Marcy Capellan        loperamide (IMODIUM) capsule 2 mg  2 mg Oral Q4H PRN YESSI Capellan        magnesium hydroxide (MILK OF MAGNESIA) 400 MG/5ML suspension 30 mL  30 mL Oral Daily PRN YESSI Capellan        therapeutic multivitamin-minerals 1 tablet  1 tablet Oral Daily Marcy Capellan        [START ON 9/4/2019] pantoprazole (PROTONIX) tablet 40 mg  40 mg Oral QAM AC Marcy Capellan        oxybutynin (DITROPAN) tablet 2.5 mg  2.5 mg Oral Nightly Marcy Capellan        sodium phosphate (FLEET) enema 1 enema  1 enema Rectal Daily PRN YESSI Capellan        sodium chloride flush 0.9 % injection 10 mL  10 mL Intravenous 2 times per day YESSI Capellan        sodium chloride flush Cardiovascular:    (+) hypertension:, CHF:,                   Neuro/Psych:                ROS comment: MRDD GI/Hepatic/Renal:   (+) GERD:,           Endo/Other: Negative Endo/Other ROS                    Abdominal:           Vascular:   + PVD, aortic or cerebral, . Pre-Operative Diagnosis: Closed nondisplaced intertrochanteric fracture of left femur, [facility] [S72.145A]    62 y.o.   BMI:  Body mass index is 36.48 kg/m². Vitals:    09/03/19 1403 09/03/19 1430   BP: (!) 145/88    Pulse: 120    Resp: 18    Temp: 98.3 °F (36.8 °C)    TempSrc: Oral    SpO2: 91%    Weight:  162 lb 11.2 oz (73.8 kg)       Allergies   Allergen Reactions    Chocolate     Eggs [Egg White] Hives    Food      Orange juice, peanut butter, coke    Orange Juice [Orange Oil]     Peanut Butter Flavor [Flavoring Agent]     Penicillins Hives    Strawberry (Diagnostic)     Tape Mac Sera Tape]      Eco-tape       Social History     Tobacco Use    Smoking status: Never Smoker    Smokeless tobacco: Never Used   Substance Use Topics    Alcohol use: No       LABS:    CBC  Lab Results   Component Value Date/Time    WBC 14.2 (H) 09/03/2019 03:20 AM    HGB 13.4 (L) 09/03/2019 03:20 AM    HCT 40.5 09/03/2019 03:20 AM     09/03/2019 03:20 AM     RENAL  Lab Results   Component Value Date/Time     09/03/2019 03:20 AM    K 4.5 09/03/2019 03:20 AM    CL 98 (L) 09/03/2019 03:20 AM    CO2 30 09/03/2019 03:20 AM    BUN 25 (H) 09/03/2019 03:20 AM    CREATININE 0.8 (L) 09/03/2019 03:20 AM    GLUCOSE 155 (H) 09/03/2019 03:20 AM     COAGS  Lab Results   Component Value Date/Time    PROTIME 12.1 09/03/2019 03:20 AM    INR 1.06 09/03/2019 03:20 AM    APTT 30.4 09/03/2019 03:20 AM        Anesthesia Plan      general     ASA 3     (Pt agrees to risks, benefits and alternatives of GETA. Questions answered. Willing to proceed with plan.)  Induction: intravenous.       Anesthetic plan and risks

## 2019-09-03 NOTE — PLAN OF CARE
Ortho POC Note:    Pt presented to [facility] for left leg pain after mechanical fall at [facility]. Imaging reveals a left hip IT fracture. Dr. Macrina Adams recommends transfer to [facility] for surgical treatment today.       Keep NPO for possible OR today if cleared    Michelle Mccurdy PA-C

## 2019-09-04 LAB
ANION GAP SERPL CALCULATED.3IONS-SCNC: 10 MMOL/L (ref 3–16)
BUN BLDV-MCNC: 17 MG/DL (ref 7–20)
CALCIUM SERPL-MCNC: 8.7 MG/DL (ref 8.3–10.6)
CHLORIDE BLD-SCNC: 101 MMOL/L (ref 99–110)
CO2: 29 MMOL/L (ref 21–32)
CREAT SERPL-MCNC: 0.7 MG/DL (ref 0.9–1.3)
EKG ATRIAL RATE: 123 BPM
EKG DIAGNOSIS: NORMAL
EKG P AXIS: 59 DEGREES
EKG P-R INTERVAL: 142 MS
EKG Q-T INTERVAL: 316 MS
EKG QRS DURATION: 74 MS
EKG QTC CALCULATION (BAZETT): 452 MS
EKG R AXIS: 25 DEGREES
EKG T AXIS: 73 DEGREES
EKG VENTRICULAR RATE: 123 BPM
GFR AFRICAN AMERICAN: >60
GFR NON-AFRICAN AMERICAN: >60
GLUCOSE BLD-MCNC: 152 MG/DL (ref 70–99)
HCT VFR BLD CALC: 32.5 % (ref 40.5–52.5)
HEMOGLOBIN: 10.9 G/DL (ref 13.5–17.5)
MCH RBC QN AUTO: 32.1 PG (ref 26–34)
MCHC RBC AUTO-ENTMCNC: 33.6 G/DL (ref 31–36)
MCV RBC AUTO: 95.6 FL (ref 80–100)
PDW BLD-RTO: 14.7 % (ref 12.4–15.4)
PLATELET # BLD: 254 K/UL (ref 135–450)
PMV BLD AUTO: 7.6 FL (ref 5–10.5)
POTASSIUM REFLEX MAGNESIUM: 4 MMOL/L (ref 3.5–5.1)
RBC # BLD: 3.4 M/UL (ref 4.2–5.9)
SODIUM BLD-SCNC: 140 MMOL/L (ref 136–145)
TSH REFLEX: 2.66 UIU/ML (ref 0.27–4.2)
WBC # BLD: 10.2 K/UL (ref 4–11)

## 2019-09-04 PROCEDURE — 94761 N-INVAS EAR/PLS OXIMETRY MLT: CPT

## 2019-09-04 PROCEDURE — 1200000000 HC SEMI PRIVATE

## 2019-09-04 PROCEDURE — 6360000002 HC RX W HCPCS: Performed by: ORTHOPAEDIC SURGERY

## 2019-09-04 PROCEDURE — 80048 BASIC METABOLIC PNL TOTAL CA: CPT

## 2019-09-04 PROCEDURE — 85027 COMPLETE CBC AUTOMATED: CPT

## 2019-09-04 PROCEDURE — 97530 THERAPEUTIC ACTIVITIES: CPT

## 2019-09-04 PROCEDURE — 97162 PT EVAL MOD COMPLEX 30 MIN: CPT

## 2019-09-04 PROCEDURE — 36415 COLL VENOUS BLD VENIPUNCTURE: CPT

## 2019-09-04 PROCEDURE — 97166 OT EVAL MOD COMPLEX 45 MIN: CPT

## 2019-09-04 PROCEDURE — APPSS45 APP SPLIT SHARED TIME 31-45 MINUTES: Performed by: PHYSICIAN ASSISTANT

## 2019-09-04 PROCEDURE — 51798 US URINE CAPACITY MEASURE: CPT

## 2019-09-04 PROCEDURE — 2700000000 HC OXYGEN THERAPY PER DAY

## 2019-09-04 PROCEDURE — 6370000000 HC RX 637 (ALT 250 FOR IP): Performed by: ORTHOPAEDIC SURGERY

## 2019-09-04 PROCEDURE — 84443 ASSAY THYROID STIM HORMONE: CPT

## 2019-09-04 PROCEDURE — 93010 ELECTROCARDIOGRAM REPORT: CPT | Performed by: INTERNAL MEDICINE

## 2019-09-04 PROCEDURE — APPNB30 APP NON BILLABLE TIME 0-30 MINS: Performed by: PHYSICIAN ASSISTANT

## 2019-09-04 PROCEDURE — 97110 THERAPEUTIC EXERCISES: CPT

## 2019-09-04 PROCEDURE — 2580000003 HC RX 258: Performed by: ORTHOPAEDIC SURGERY

## 2019-09-04 RX ORDER — IPRATROPIUM BROMIDE AND ALBUTEROL SULFATE 2.5; .5 MG/3ML; MG/3ML
1 SOLUTION RESPIRATORY (INHALATION) EVERY 4 HOURS PRN
Status: DISCONTINUED | OUTPATIENT
Start: 2019-09-04 | End: 2019-09-05 | Stop reason: HOSPADM

## 2019-09-04 RX ADMIN — FUROSEMIDE 20 MG: 20 TABLET ORAL at 20:44

## 2019-09-04 RX ADMIN — OXYCODONE HYDROCHLORIDE 5 MG: 5 TABLET ORAL at 16:51

## 2019-09-04 RX ADMIN — DOCUSATE SODIUM 100 MG: 100 CAPSULE, LIQUID FILLED ORAL at 20:44

## 2019-09-04 RX ADMIN — Medication 10 ML: at 20:48

## 2019-09-04 RX ADMIN — Medication 1 TABLET: at 09:46

## 2019-09-04 RX ADMIN — Medication 2 G: at 02:20

## 2019-09-04 RX ADMIN — LISINOPRIL 10 MG: 10 TABLET ORAL at 20:45

## 2019-09-04 RX ADMIN — OXYBUTYNIN CHLORIDE 2.5 MG: 5 TABLET ORAL at 20:45

## 2019-09-04 RX ADMIN — POLYETHYLENE GLYCOL 3350 17 G: 17 POWDER, FOR SOLUTION ORAL at 09:46

## 2019-09-04 RX ADMIN — FUROSEMIDE 20 MG: 20 TABLET ORAL at 09:46

## 2019-09-04 RX ADMIN — Medication 2 G: at 09:46

## 2019-09-04 RX ADMIN — ENOXAPARIN SODIUM 40 MG: 40 INJECTION SUBCUTANEOUS at 09:46

## 2019-09-04 RX ADMIN — GUAIFENESIN 600 MG: 600 TABLET, EXTENDED RELEASE ORAL at 20:45

## 2019-09-04 RX ADMIN — DOCUSATE SODIUM 100 MG: 100 CAPSULE, LIQUID FILLED ORAL at 09:46

## 2019-09-04 RX ADMIN — GUAIFENESIN 600 MG: 600 TABLET, EXTENDED RELEASE ORAL at 09:46

## 2019-09-04 RX ADMIN — PANTOPRAZOLE SODIUM 40 MG: 40 TABLET, DELAYED RELEASE ORAL at 09:46

## 2019-09-04 RX ADMIN — POLYETHYLENE GLYCOL 3350 17 G: 17 POWDER, FOR SOLUTION ORAL at 20:47

## 2019-09-04 NOTE — CONSULTS
Nutrition Assessment    Type and Reason for Visit: Initial, Consult(Nutritional assessment for Omid Score)    Nutrition Recommendations:   Continue dysphagia soft and bite sized diet  Add Ensure High Protein daily - monitor acceptance  Monitor po intakes, nutrition adequacy, weights, pertinent labs, BMs    Nutrition Assessment: Pt admitted with femur fracture. Pt at risk for nutritional compromise d/t increased needs from surgical wound. Pt with MRDD and unable to provide Hx, only able to answer yes or no occassionally. Pt endorses that his appetite is okay and that he is tolerating his diet well so far. Will add Ensure High Protein to help pt meet protein needs for wound healing. Malnutrition Assessment:  · Malnutrition Status: At risk for malnutrition  · Context: Acute illness or injury  · Findings of the 6 clinical characteristics of malnutrition (Minimum of 2 out of 6 clinical characteristics is required to make the diagnosis of moderate or severe Protein Calorie Malnutrition based on AND/ASPEN Guidelines):  1. Energy Intake-Unable to assess,      2. Weight Loss-Unable to assess,    3. Fat Loss-No significant subcutaneous fat loss,    4. Muscle Loss-No significant muscle mass loss,    5. Fluid Accumulation-Mild fluid accumulation, Extremities  6.  Strength-Not measured    Nutrition Risk Level: Moderate    Nutrient Needs:  · Estimated Daily Total Kcal: 1152-8893(59-68 g/kg)  · Estimated Daily Protein (g): 59-74(0.8-1.0 kg)  · Estimated Daily Total Fluid (ml/day): 1 ml/kcal    Nutrition Diagnosis:   · Problem: Increased nutrient needs  · Etiology: related to Increased demand for energy/nutrients     Signs and symptoms:  as evidenced by Presence of wounds    Objective Information:  · Nutrition-Focused Physical Findings: +1 LLE edema.  Pt non-verbal.  · Wound Type: Surgical Wound(Incision on hip)  · Current Nutrition Therapies:  · Oral Diet Orders: Dysphagia  Soft and Bite-Sized (Dysphagia 3)   · Oral

## 2019-09-04 NOTE — CARE COORDINATION
CASE MANAGEMENT INITIAL ASSESSMENT      Reviewed chart and met with patient today, re: potential dc planning needs  Explained Case Management role/services. Family present: none  Primary contact information: Rossana Snowden, father [phone]    Admit date/status: 09/03/2019  Diagnosis: left hip IM nailing    Insurance: [facility] required for SNF - Y       3 night stay required - N    Living arrangements, Adls, care needs, prior to admission: [facility] LTC @ [facility] per nursing staff. Pt is MRDD. Transportation: per facility and family    [address] [place] [address] at home: Walker__Cane__RTS__ BSC__Shower Chair__  02__ HHN__ CPAP__  BiPap__  Hospital Bed__ W/C___ Other__________    Services in the home and/or outpatient, prior to admission: [facility]TC @ [facility]. Dialysis Facility (if applicable) n/a  · Name:  · Address:  · Dialysis Schedule:  · Phone:  · Fax:    PT/OT recs: PT recs SNF. Hospital Exemption Notification (HEN): needed for SNF placement. Barriers to discharge: none identified at this time    Plan/comments: VM left for Pt's dad to call this RN CM back to complete assessment.     ECOC on chart for MD signature

## 2019-09-05 VITALS
BODY MASS INDEX: 31.94 KG/M2 | DIASTOLIC BLOOD PRESSURE: 79 MMHG | TEMPERATURE: 98 F | WEIGHT: 162.7 LBS | HEART RATE: 110 BPM | OXYGEN SATURATION: 92 % | RESPIRATION RATE: 15 BRPM | HEIGHT: 60 IN | SYSTOLIC BLOOD PRESSURE: 119 MMHG

## 2019-09-05 LAB
HCT VFR BLD CALC: 32.2 % (ref 40.5–52.5)
HEMOGLOBIN: 10.9 G/DL (ref 13.5–17.5)
LV EF: 58 %
LVEF MODALITY: NORMAL
MCH RBC QN AUTO: 31.9 PG (ref 26–34)
MCHC RBC AUTO-ENTMCNC: 33.8 G/DL (ref 31–36)
MCV RBC AUTO: 94.4 FL (ref 80–100)
PDW BLD-RTO: 14.4 % (ref 12.4–15.4)
PLATELET # BLD: 303 K/UL (ref 135–450)
PMV BLD AUTO: 7.8 FL (ref 5–10.5)
RBC # BLD: 3.41 M/UL (ref 4.2–5.9)
URINE CULTURE, ROUTINE: NORMAL
WBC # BLD: 13.8 K/UL (ref 4–11)

## 2019-09-05 PROCEDURE — 2700000000 HC OXYGEN THERAPY PER DAY

## 2019-09-05 PROCEDURE — 97535 SELF CARE MNGMENT TRAINING: CPT

## 2019-09-05 PROCEDURE — 97530 THERAPEUTIC ACTIVITIES: CPT

## 2019-09-05 PROCEDURE — 6360000002 HC RX W HCPCS: Performed by: ORTHOPAEDIC SURGERY

## 2019-09-05 PROCEDURE — APPSS45 APP SPLIT SHARED TIME 31-45 MINUTES: Performed by: PHYSICIAN ASSISTANT

## 2019-09-05 PROCEDURE — 6370000000 HC RX 637 (ALT 250 FOR IP): Performed by: ORTHOPAEDIC SURGERY

## 2019-09-05 PROCEDURE — 94761 N-INVAS EAR/PLS OXIMETRY MLT: CPT

## 2019-09-05 PROCEDURE — 6360000004 HC RX CONTRAST MEDICATION: Performed by: FAMILY MEDICINE

## 2019-09-05 PROCEDURE — 97110 THERAPEUTIC EXERCISES: CPT

## 2019-09-05 PROCEDURE — 51798 US URINE CAPACITY MEASURE: CPT

## 2019-09-05 PROCEDURE — C8929 TTE W OR WO FOL WCON,DOPPLER: HCPCS

## 2019-09-05 PROCEDURE — 36415 COLL VENOUS BLD VENIPUNCTURE: CPT

## 2019-09-05 PROCEDURE — 85027 COMPLETE CBC AUTOMATED: CPT

## 2019-09-05 RX ORDER — OXYCODONE HYDROCHLORIDE 5 MG/1
5-10 TABLET ORAL
Qty: 20 TABLET | Refills: 0 | Status: SHIPPED | OUTPATIENT
Start: 2019-09-05 | End: 2019-09-12

## 2019-09-05 RX ADMIN — OXYCODONE HYDROCHLORIDE 5 MG: 5 TABLET ORAL at 08:29

## 2019-09-05 RX ADMIN — ENOXAPARIN SODIUM 40 MG: 40 INJECTION SUBCUTANEOUS at 08:55

## 2019-09-05 RX ADMIN — FUROSEMIDE 20 MG: 20 TABLET ORAL at 08:55

## 2019-09-05 RX ADMIN — OXYCODONE HYDROCHLORIDE 5 MG: 5 TABLET ORAL at 13:54

## 2019-09-05 RX ADMIN — Medication 1 TABLET: at 08:55

## 2019-09-05 RX ADMIN — PERFLUTREN 1.21 MG: 6.52 INJECTION, SUSPENSION INTRAVENOUS at 09:17

## 2019-09-05 RX ADMIN — GUAIFENESIN 600 MG: 600 TABLET, EXTENDED RELEASE ORAL at 08:55

## 2019-09-05 RX ADMIN — DOCUSATE SODIUM 100 MG: 100 CAPSULE, LIQUID FILLED ORAL at 08:55

## 2019-09-05 RX ADMIN — PANTOPRAZOLE SODIUM 40 MG: 40 TABLET, DELAYED RELEASE ORAL at 08:55

## 2019-09-05 RX ADMIN — POLYETHYLENE GLYCOL 3350 17 G: 17 POWDER, FOR SOLUTION ORAL at 08:55

## 2019-09-05 ASSESSMENT — PAIN DESCRIPTION - PAIN TYPE: TYPE: SURGICAL PAIN

## 2019-09-05 ASSESSMENT — PAIN SCALES - WONG BAKER: WONGBAKER_NUMERICALRESPONSE: 6

## 2019-09-05 ASSESSMENT — PAIN DESCRIPTION - ORIENTATION: ORIENTATION: LEFT

## 2019-09-05 ASSESSMENT — PAIN DESCRIPTION - LOCATION: LOCATION: HIP

## 2019-09-05 ASSESSMENT — PAIN SCALES - GENERAL: PAINLEVEL_OUTOF10: 4

## 2019-09-05 NOTE — PROGRESS NOTES
Consult has been called to [facility] 4  9/3/2019
Department of Orthopedic Surgery  Physician Assistant   Progress Note    Subjective:       Systemic or Specific Complaints:Pain Control    Objective:     Patient Vitals for the past 24 hrs:   BP Temp Temp src Pulse Resp SpO2   09/05/19 0904 104/61 98 °F (36.7 °C) Oral 117 16 91 %   09/05/19 0326 108/66 98.8 °F (37.1 °C) Oral 111 16 98 %   09/04/19 2252 128/83 98.3 °F (36.8 °C) Oral 111 14 94 %   09/04/19 1948 102/72 98.1 °F (36.7 °C) Oral 120 14 91 %   09/04/19 1435 104/73 98.4 °F (36.9 °C) Oral 115 16 95 %       General: alert, appears stated age and cooperative   Wound: Wound clean and dry no evidence of infection. , Wound Intact and Positive for Edema   Motion: Painful range of Motion in affected extremity   DVT Exam: No evidence of DVT seen on physical exam.     Additional exam:     Data Review  CBC:   Lab Results   Component Value Date    WBC 13.8 09/05/2019    RBC 3.41 09/05/2019    HGB 10.9 09/05/2019    HCT 32.2 09/05/2019     09/05/2019       Renal:   Lab Results   Component Value Date     09/04/2019    K 4.0 09/04/2019     09/04/2019    CO2 29 09/04/2019    BUN 17 09/04/2019    CREATININE 0.7 09/04/2019    GLUCOSE 152 09/04/2019    CALCIUM 8.7 09/04/2019            Assessment:      left hip IM nail. Plan:      1:  PT/OT as able.   Okay to d/c today if okay with IM team.  2:  Continue Deep venous thrombosis prophylaxis  3:  Continue Pain Control    Giovanny Johnson
Department of Orthopedic Surgery  Physician Assistant   Progress Note    Subjective:       Systemic or Specific Complaints:Pain Control    Objective:     Patient Vitals for the past 24 hrs:   BP Temp Temp src Pulse Resp SpO2 Weight   09/04/19 0840 113/83 99.3 °F (37.4 °C) Oral 113 16 98 % --   09/04/19 0201 112/73 98.5 °F (36.9 °C) Oral 118 16 98 % --   09/03/19 2306 (!) 135/96 98.2 °F (36.8 °C) Oral 121 16 90 % --   09/03/19 2145 129/80 98.4 °F (36.9 °C) Axillary 122 14 98 % --   09/03/19 2004 137/75 98.1 °F (36.7 °C) Temporal 119 14 96 % --   09/03/19 2000 -- -- -- 118 12 95 % --   09/03/19 1959 -- -- -- 118 12 95 % --   09/03/19 1958 -- -- -- 118 12 95 % --   09/03/19 1957 -- -- -- 118 16 94 % --   09/03/19 1956 123/84 -- -- 118 12 94 % --   09/03/19 1955 -- -- -- 118 10 94 % --   09/03/19 1954 -- -- -- 118 (!) 7 93 % --   09/03/19 1953 -- -- -- 118 16 93 % --   09/03/19 1952 -- -- -- 119 17 91 % --   09/03/19 1951 (!) 136/95 -- -- 119 13 (!) 89 % --   09/03/19 1950 -- -- -- 120 16 90 % --   09/03/19 1949 -- -- -- 119 20 92 % --   09/03/19 1948 -- -- -- 120 24 93 % --   09/03/19 1947 (!) 161/91 -- -- 121 22 95 % --   09/03/19 1946 -- -- -- 120 22 93 % --   09/03/19 1945 -- -- -- 120 23 92 % --   09/03/19 1944 -- -- -- 121 28 94 % --   09/03/19 1943 -- -- -- 120 (!) 36 94 % --   09/03/19 1942 -- -- -- 120 20 93 % --   09/03/19 1941 (!) 158/77 -- -- 119 23 92 % --   09/03/19 1940 -- -- -- 120 28 96 % --   09/03/19 1939 -- -- -- 118 19 94 % --   09/03/19 1928 -- -- -- 119 20 90 % --   09/03/19 1927 -- -- -- 119 25 90 % --   09/03/19 1926 139/63 -- -- 117 24 90 % --   09/03/19 1925 -- -- -- 118 19 (!) 87 % --   09/03/19 1924 -- -- -- 117 27 91 % --   09/03/19 1923 -- -- -- 119 18 (!) 89 % --   09/03/19 1922 (!) 157/85 -- -- 118 (!) 37 90 % --   09/03/19 1921 -- -- -- -- -- 92 % --   09/03/19 1920 139/63 97.7 °F (36.5 °C) Temporal 118 21 90 % --   09/03/19 1430 -- -- -- -- -- -- 162 lb 11.2 oz (73.8 kg)   09/03/19
Noticed an order for an echo (2D). Called tech without success, no answer.
Caregiver Present: Yes( asleep on couch)  Referring Practitioner: Caio Olguin MD  Subjective  Subjective: Pt agrees to PT  General Comment  Comments: cleared by nursing  Pain Screening  Patient Currently in Pain: Yes  Pain Assessment  Pain Assessment: Faces(shakes head  to indicate pain)  Non-Pharmaceutical Pain Intervention(s): Ambulation/Increased Activity;Repositioned(RN gave pt meds prior to activities w/ PT)  Vital Signs  Patient Currently in Pain: Yes       Orientation  Orientation  Overall Orientation Status: Impaired  Orientation Level: Unable to assess(MRDD, non-verbal)  Cognition      Objective   Bed mobility  Supine to Sit: 2 Person assistance; Moderate assistance;Maximum assistance  Transfers  Sit to Stand: 2 Person Assistance; Moderate Assistance(enrique stedy)  Stand to sit: 2 Person Assistance; Moderate Assistance  Bed to Chair: Dependent/Total(enrique stedy)  Ambulation  Ambulation?: (unsafe to attempt)  WB Status: TTWB L LE     Balance  Posture: Fair  Sitting - Static: Fair  Sitting - Dynamic: Fair  Standing - Static: Poor  Exercises  Hip Flexion: AAROM 10 B  Hip Abduction: AAROM 10 B  Knee Long Arc Quad: AAROM 10 B  Ankle Pumps: AAROM 10 B  Comments: Limited ROM throughout BLEs         Comment: Toilet transfers mod/max 2 enrique stedy     AM-PAC Score  AM-PAC Inpatient Mobility Raw Score : 9 (09/05/19 0944)  AM-PAC Inpatient T-Scale Score : 30.55 (09/05/19 0944)  Mobility Inpatient CMS 0-100% Score: 81.38 (09/05/19 0944)  Mobility Inpatient CMS G-Code Modifier : CM (09/05/19 0944)          Goals  Short term goals  Time Frame for Short term goals: 9/8  Short term goal 1: Pt will complete B LE exercises to improve mobility by 9/5  Short term goal 2: Pt will transfer supine to sit with mod A x 1  Short term goal 3: Pt will sit to stand with Mod A x 1  Patient Goals   Patient goals : none expressed    Plan    Plan  Times per week: 5-7x/week   Times per day: Daily  Current Treatment Recommendations:
except can answer some \"yes or no\" questions, points to wants/needs. Type of ROM/Therapeutic Exercise  Comment: Pt refused exercises, wanting to eat breakfast instead.      Plan   Plan  Times per week: 4-6x's a week while in acute care    AM-PAC Score  AM-[facility] Inpatient Daily Activity Raw Score: 13 (09/05/19 1158)  AM-PAC Inpatient ADL T-Scale Score : 32.03 (09/05/19 1158)  ADL Inpatient CMS 0-100% Score: 63.03 (09/05/19 1158)  ADL Inpatient CMS G-Code Modifier : CL (09/05/19 1158)    Goals  Short term goals  Time Frame for Short term goals: 1 week unless otherwise specified 9/11  Short term goal 1: Pt will complete BSC transfer with modA of 2  Short term goal 2: Pt will complete sit to stand for functional transfers including toilet transfers with Isis of 2  Short term goal 3: Pt will complete grooming task with supervision  Short term goal 4: Pt chay complete 10-12 reps of BUE AROM exercises to increase strength for ADLs/transfers  Patient Goals   Patient goals : Pt unable to states     Therapy Time   Individual Concurrent Group Co-treatment   Time In 0827         Time Out 0850         Minutes [address], POWERS/L
Code Treatment Minutes: [address] [place], [place]

## 2019-09-05 NOTE — DISCHARGE SUMMARY
the pain medication will be re-evaluated at her post op visit in 2 weeks. Patient was educated on dosing expectations and limits of prescribing as a result of the new [facility] Board rules enacted August 31, 2017. Please also note that this is not the initial opoid prescription issued to this patient but a continuation of medication utilized during the hospital admission as noted in the medical record. [place]AR[place] report has also been utilized to screen for any abuse history or suspicious activity as outlined in [place]. All efforts have been taken to prevent abuse potential and misuse of opioid medications including education, screening, and close clinical follow up.

## 2019-09-08 LAB — BLOOD CULTURE, ROUTINE: NORMAL

## 2019-09-17 DIAGNOSIS — M89.8X5 PAIN OF LEFT FEMUR: Primary | ICD-10-CM

## 2019-09-25 NOTE — PROGRESS NOTES
mother. Home Medications:  Prior to Admission medications    Medication Sig Start Date End Date Taking?  Authorizing Provider   enoxaparin (LOVENOX) 40 MG/0.4ML injection Inject 0.4 mLs into the skin daily for 20 days 9/6/19 9/26/19 Yes YESSI Parker   CLINDAMYCIN PHOSPHATE,TOPICAL, 1 % SWAB Apply topically every 8 hours apply to face topically every shift   Yes Historical Provider, MD   GUAIFENESIN ER PO Take 800 mg by mouth 2 times daily   Yes Historical Provider, MD   ipratropium-albuterol (DUONEB) 0.5-2.5 (3) MG/3ML SOLN nebulizer solution Inhale 1 vial into the lungs every 4 hours as needed for Shortness of Breath   Yes Historical Provider, MD   oxybutynin (DITROPAN) 5 MG tablet Take 2.5 mg by mouth nightly   Yes Historical Provider, MD   acetaminophen (TYLENOL) 325 MG tablet Take 650 mg by mouth every 4 hours as needed for Pain or Fever   Yes Historical Provider, MD   bisacodyl (DULCOLAX) 10 MG suppository Place 10 mg rectally daily as needed for Constipation   Yes Historical Provider, MD   calcium carbonate (MARIBEL-GEST ANTACID) 500 MG chewable tablet Take 2 tablets by mouth every 6 hours as needed for Heartburn   Yes Historical Provider, MD   cloNIDine (CATAPRES) 0.1 MG tablet Take 0.1 mg by mouth every 6 hours as needed for High Blood Pressure Systolic over 227 , diastolic 0ver 90   Yes Historical Provider, MD   ipratropium-albuterol (DUONEB) 0.5-2.5 (3) MG/3ML SOLN nebulizer solution Inhale 1 vial into the lungs 3 times daily    Yes Historical Provider, MD   Sodium Phosphates (FLEET) 7-19 GM/118ML Place 1 enema rectally daily as needed   Yes Historical Provider, MD   furosemide (LASIX) 20 MG tablet Take 20 mg by mouth 2 times daily    Yes Historical Provider, MD   lisinopril (PRINIVIL;ZESTRIL) 10 MG tablet Take 10 mg by mouth nightly   Yes Historical Provider, MD   loperamide (IMODIUM) 2 MG capsule Take 2 mg by mouth every 4 hours as needed for Diarrhea   Yes Historical Provider, MD   magnesium Eliza Ram M.D., [facility] [place]

## 2019-09-26 ENCOUNTER — OFFICE VISIT (OUTPATIENT)
Dept: CARDIOLOGY CLINIC | Age: 54
End: 2019-09-26
Payer: MEDICARE

## 2019-09-26 VITALS
HEART RATE: 106 BPM | OXYGEN SATURATION: 94 % | BODY MASS INDEX: 31.8 KG/M2 | HEIGHT: 60 IN | WEIGHT: 162 LBS | DIASTOLIC BLOOD PRESSURE: 50 MMHG | SYSTOLIC BLOOD PRESSURE: 84 MMHG

## 2019-09-26 DIAGNOSIS — R93.1 ABNORMAL ECHOCARDIOGRAM: ICD-10-CM

## 2019-09-26 PROCEDURE — G8417 CALC BMI ABV UP PARAM F/U: HCPCS | Performed by: INTERNAL MEDICINE

## 2019-09-26 PROCEDURE — 99203 OFFICE O/P NEW LOW 30 MIN: CPT | Performed by: INTERNAL MEDICINE

## 2019-09-26 PROCEDURE — G8427 DOCREV CUR MEDS BY ELIG CLIN: HCPCS | Performed by: INTERNAL MEDICINE

## 2019-09-26 RX ORDER — FUROSEMIDE 20 MG/1
20 TABLET ORAL DAILY
Qty: 30 TABLET | Refills: 5 | Status: SHIPPED
Start: 2019-09-26

## 2020-01-01 ENCOUNTER — APPOINTMENT (OUTPATIENT)
Dept: GENERAL RADIOLOGY | Age: 55
DRG: 870 | End: 2020-01-01
Attending: INTERNAL MEDICINE
Payer: MEDICARE

## 2020-01-01 ENCOUNTER — APPOINTMENT (OUTPATIENT)
Dept: GENERAL RADIOLOGY | Age: 55
End: 2020-01-01
Payer: MEDICARE

## 2020-01-01 ENCOUNTER — APPOINTMENT (OUTPATIENT)
Dept: INTERVENTIONAL RADIOLOGY/VASCULAR | Age: 55
End: 2020-01-01
Payer: MEDICARE

## 2020-01-01 ENCOUNTER — APPOINTMENT (OUTPATIENT)
Dept: CT IMAGING | Age: 55
End: 2020-01-01
Payer: MEDICARE

## 2020-01-01 ENCOUNTER — APPOINTMENT (OUTPATIENT)
Dept: ULTRASOUND IMAGING | Age: 55
DRG: 870 | End: 2020-01-01
Attending: INTERNAL MEDICINE
Payer: MEDICARE

## 2020-01-01 ENCOUNTER — HOSPITAL ENCOUNTER (EMERGENCY)
Age: 55
Discharge: ANOTHER HOSPITAL | End: 2020-09-22
Attending: STUDENT IN AN ORGANIZED HEALTH CARE EDUCATION/TRAINING PROGRAM
Payer: MEDICARE

## 2020-01-01 ENCOUNTER — HOSPITAL ENCOUNTER (INPATIENT)
Age: 55
LOS: 15 days | DRG: 870 | End: 2020-10-07
Attending: INTERNAL MEDICINE | Admitting: INTERNAL MEDICINE
Payer: MEDICARE

## 2020-01-01 VITALS
WEIGHT: 162 LBS | HEIGHT: 60 IN | OXYGEN SATURATION: 3 % | TEMPERATURE: 96.1 F | RESPIRATION RATE: 30 BRPM | DIASTOLIC BLOOD PRESSURE: 53 MMHG | BODY MASS INDEX: 31.8 KG/M2 | HEART RATE: 115 BPM | SYSTOLIC BLOOD PRESSURE: 113 MMHG

## 2020-01-01 VITALS
OXYGEN SATURATION: 65 % | DIASTOLIC BLOOD PRESSURE: 55 MMHG | BODY MASS INDEX: 32.2 KG/M2 | TEMPERATURE: 96.4 F | SYSTOLIC BLOOD PRESSURE: 119 MMHG | HEIGHT: 60 IN | WEIGHT: 164.02 LBS

## 2020-01-01 LAB
A/G RATIO: 0.7 (ref 1.1–2.2)
A/G RATIO: 0.7 (ref 1.1–2.2)
A/G RATIO: 0.8 (ref 1.1–2.2)
A/G RATIO: 0.9 (ref 1.1–2.2)
A/G RATIO: 1.2 (ref 1.1–2.2)
A/G RATIO: 1.2 (ref 1.1–2.2)
ABO/RH: NORMAL
ALBUMIN SERPL-MCNC: 2.1 G/DL (ref 3.4–5)
ALBUMIN SERPL-MCNC: 2.2 G/DL (ref 3.4–5)
ALBUMIN SERPL-MCNC: 2.4 G/DL (ref 3.4–5)
ALBUMIN SERPL-MCNC: 2.4 G/DL (ref 3.4–5)
ALBUMIN SERPL-MCNC: 2.5 G/DL (ref 3.4–5)
ALBUMIN SERPL-MCNC: 2.5 G/DL (ref 3.4–5)
ALBUMIN SERPL-MCNC: 2.6 G/DL (ref 3.4–5)
ALBUMIN SERPL-MCNC: 2.8 G/DL (ref 3.4–5)
ALBUMIN SERPL-MCNC: 2.9 G/DL (ref 3.4–5)
ALBUMIN SERPL-MCNC: 3.5 G/DL (ref 3.4–5)
ALBUMIN SERPL-MCNC: 3.8 G/DL (ref 3.4–5)
ALP BLD-CCNC: 106 U/L (ref 40–129)
ALP BLD-CCNC: 113 U/L (ref 40–129)
ALP BLD-CCNC: 126 U/L (ref 40–129)
ALP BLD-CCNC: 127 U/L (ref 40–129)
ALP BLD-CCNC: 144 U/L (ref 40–129)
ALP BLD-CCNC: 147 U/L (ref 40–129)
ALP BLD-CCNC: 162 U/L (ref 40–129)
ALP BLD-CCNC: 170 U/L (ref 40–129)
ALP BLD-CCNC: 187 U/L (ref 40–129)
ALT SERPL-CCNC: 20 U/L (ref 10–40)
ALT SERPL-CCNC: 27 U/L (ref 10–40)
ALT SERPL-CCNC: 31 U/L (ref 10–40)
ALT SERPL-CCNC: 31 U/L (ref 10–40)
ALT SERPL-CCNC: 32 U/L (ref 10–40)
ALT SERPL-CCNC: 33 U/L (ref 10–40)
ALT SERPL-CCNC: 50 U/L (ref 10–40)
ALT SERPL-CCNC: 70 U/L (ref 10–40)
ALT SERPL-CCNC: 77 U/L (ref 10–40)
AMMONIA: 34 UMOL/L (ref 16–60)
AMORPHOUS: ABNORMAL /HPF
ANION GAP SERPL CALCULATED.3IONS-SCNC: 10 MMOL/L (ref 3–16)
ANION GAP SERPL CALCULATED.3IONS-SCNC: 12 MMOL/L (ref 3–16)
ANION GAP SERPL CALCULATED.3IONS-SCNC: 12 MMOL/L (ref 3–16)
ANION GAP SERPL CALCULATED.3IONS-SCNC: 13 MMOL/L (ref 3–16)
ANION GAP SERPL CALCULATED.3IONS-SCNC: 13 MMOL/L (ref 3–16)
ANION GAP SERPL CALCULATED.3IONS-SCNC: 14 MMOL/L (ref 3–16)
ANION GAP SERPL CALCULATED.3IONS-SCNC: 14 MMOL/L (ref 3–16)
ANION GAP SERPL CALCULATED.3IONS-SCNC: 6 MMOL/L (ref 3–16)
ANION GAP SERPL CALCULATED.3IONS-SCNC: 6 MMOL/L (ref 3–16)
ANION GAP SERPL CALCULATED.3IONS-SCNC: 7 MMOL/L (ref 3–16)
ANION GAP SERPL CALCULATED.3IONS-SCNC: 7 MMOL/L (ref 3–16)
ANION GAP SERPL CALCULATED.3IONS-SCNC: 8 MMOL/L (ref 3–16)
ANION GAP SERPL CALCULATED.3IONS-SCNC: 8 MMOL/L (ref 3–16)
ANION GAP SERPL CALCULATED.3IONS-SCNC: 9 MMOL/L (ref 3–16)
ANISOCYTOSIS: ABNORMAL
ANTIBODY SCREEN: NORMAL
APTT: 44.4 SEC (ref 24.2–36.2)
AST SERPL-CCNC: 11 U/L (ref 15–37)
AST SERPL-CCNC: 17 U/L (ref 15–37)
AST SERPL-CCNC: 28 U/L (ref 15–37)
AST SERPL-CCNC: 29 U/L (ref 15–37)
AST SERPL-CCNC: 29 U/L (ref 15–37)
AST SERPL-CCNC: 32 U/L (ref 15–37)
AST SERPL-CCNC: 46 U/L (ref 15–37)
AST SERPL-CCNC: 71 U/L (ref 15–37)
AST SERPL-CCNC: 79 U/L (ref 15–37)
BACTERIA: ABNORMAL /HPF
BANDED NEUTROPHILS RELATIVE PERCENT: 13 % (ref 0–7)
BANDED NEUTROPHILS RELATIVE PERCENT: 19 % (ref 0–7)
BANDED NEUTROPHILS RELATIVE PERCENT: 27 % (ref 0–7)
BANDED NEUTROPHILS RELATIVE PERCENT: 27 % (ref 0–7)
BANDED NEUTROPHILS RELATIVE PERCENT: 30 % (ref 0–7)
BASE EXCESS ARTERIAL: -1.5 MMOL/L (ref -3–3)
BASE EXCESS ARTERIAL: -4 MMOL/L (ref -3–3)
BASE EXCESS ARTERIAL: -4.5 MMOL/L (ref -3–3)
BASE EXCESS ARTERIAL: -6.5 MMOL/L (ref -3–3)
BASE EXCESS ARTERIAL: 1 (ref -3–3)
BASE EXCESS ARTERIAL: 1.5 MMOL/L (ref -3–3)
BASE EXCESS ARTERIAL: 11 (ref -3–3)
BASE EXCESS ARTERIAL: 12 (ref -3–3)
BASE EXCESS ARTERIAL: 2.5 MMOL/L (ref -3–3)
BASE EXCESS ARTERIAL: 3.3 MMOL/L (ref -3–3)
BASE EXCESS ARTERIAL: 3.7 MMOL/L (ref -3–3)
BASE EXCESS ARTERIAL: 4.9 MMOL/L (ref -3–3)
BASE EXCESS ARTERIAL: 5.2 MMOL/L (ref -3–3)
BASE EXCESS ARTERIAL: 5.3 MMOL/L (ref -3–3)
BASE EXCESS ARTERIAL: 5.4 MMOL/L (ref -3–3)
BASE EXCESS ARTERIAL: 5.4 MMOL/L (ref -3–3)
BASE EXCESS ARTERIAL: 6 (ref -3–3)
BASE EXCESS VENOUS: -2.8 MMOL/L (ref -3–3)
BASOPHILIC STIPPLING: ABNORMAL
BASOPHILS ABSOLUTE: 0 K/UL (ref 0–0.2)
BASOPHILS RELATIVE PERCENT: 0 %
BASOPHILS RELATIVE PERCENT: 0.1 %
BASOPHILS RELATIVE PERCENT: 0.2 %
BASOPHILS RELATIVE PERCENT: 0.3 %
BASOPHILS RELATIVE PERCENT: 0.4 %
BILIRUB SERPL-MCNC: 0.3 MG/DL (ref 0–1)
BILIRUB SERPL-MCNC: 0.5 MG/DL (ref 0–1)
BILIRUB SERPL-MCNC: 0.7 MG/DL (ref 0–1)
BILIRUB SERPL-MCNC: 0.7 MG/DL (ref 0–1)
BILIRUB SERPL-MCNC: <0.2 MG/DL (ref 0–1)
BILIRUB SERPL-MCNC: <0.2 MG/DL (ref 0–1)
BILIRUBIN DIRECT: <0.2 MG/DL (ref 0–0.3)
BILIRUBIN URINE: NEGATIVE
BILIRUBIN URINE: NEGATIVE
BILIRUBIN, INDIRECT: ABNORMAL MG/DL (ref 0–1)
BLOOD CULTURE, ROUTINE: ABNORMAL
BLOOD CULTURE, ROUTINE: ABNORMAL
BLOOD CULTURE, ROUTINE: NORMAL
BLOOD CULTURE, ROUTINE: NORMAL
BLOOD, URINE: ABNORMAL
BLOOD, URINE: NEGATIVE
BUN BLDV-MCNC: 18 MG/DL (ref 7–20)
BUN BLDV-MCNC: 19 MG/DL (ref 7–20)
BUN BLDV-MCNC: 19 MG/DL (ref 7–20)
BUN BLDV-MCNC: 20 MG/DL (ref 7–20)
BUN BLDV-MCNC: 20 MG/DL (ref 7–20)
BUN BLDV-MCNC: 23 MG/DL (ref 7–20)
BUN BLDV-MCNC: 26 MG/DL (ref 7–20)
BUN BLDV-MCNC: 27 MG/DL (ref 7–20)
BUN BLDV-MCNC: 34 MG/DL (ref 7–20)
BUN BLDV-MCNC: 36 MG/DL (ref 7–20)
BUN BLDV-MCNC: 40 MG/DL (ref 7–20)
BUN BLDV-MCNC: 42 MG/DL (ref 7–20)
BUN BLDV-MCNC: 44 MG/DL (ref 7–20)
BUN BLDV-MCNC: 45 MG/DL (ref 7–20)
BUN BLDV-MCNC: 46 MG/DL (ref 7–20)
BUN BLDV-MCNC: 51 MG/DL (ref 7–20)
BUN BLDV-MCNC: 57 MG/DL (ref 7–20)
BUN BLDV-MCNC: 62 MG/DL (ref 7–20)
BUN BLDV-MCNC: 64 MG/DL (ref 7–20)
CALCIUM IONIZED: 1.2 MMOL/L (ref 1.12–1.32)
CALCIUM SERPL-MCNC: 7.6 MG/DL (ref 8.3–10.6)
CALCIUM SERPL-MCNC: 7.6 MG/DL (ref 8.3–10.6)
CALCIUM SERPL-MCNC: 7.7 MG/DL (ref 8.3–10.6)
CALCIUM SERPL-MCNC: 8 MG/DL (ref 8.3–10.6)
CALCIUM SERPL-MCNC: 8.1 MG/DL (ref 8.3–10.6)
CALCIUM SERPL-MCNC: 8.2 MG/DL (ref 8.3–10.6)
CALCIUM SERPL-MCNC: 8.3 MG/DL (ref 8.3–10.6)
CALCIUM SERPL-MCNC: 8.4 MG/DL (ref 8.3–10.6)
CALCIUM SERPL-MCNC: 8.5 MG/DL (ref 8.3–10.6)
CALCIUM SERPL-MCNC: 8.6 MG/DL (ref 8.3–10.6)
CALCIUM SERPL-MCNC: 8.7 MG/DL (ref 8.3–10.6)
CALCIUM SERPL-MCNC: 8.8 MG/DL (ref 8.3–10.6)
CALCIUM SERPL-MCNC: 9.3 MG/DL (ref 8.3–10.6)
CALCIUM SERPL-MCNC: 9.4 MG/DL (ref 8.3–10.6)
CARBOXYHEMOGLOBIN ARTERIAL: 0.1 % (ref 0–1.5)
CARBOXYHEMOGLOBIN ARTERIAL: 0.2 % (ref 0–1.5)
CARBOXYHEMOGLOBIN ARTERIAL: 0.3 % (ref 0–1.5)
CARBOXYHEMOGLOBIN ARTERIAL: 0.4 % (ref 0–1.5)
CARBOXYHEMOGLOBIN ARTERIAL: 0.5 % (ref 0–1.5)
CARBOXYHEMOGLOBIN ARTERIAL: 0.5 % (ref 0–1.5)
CARBOXYHEMOGLOBIN ARTERIAL: 0.7 % (ref 0–1.5)
CARBOXYHEMOGLOBIN ARTERIAL: 0.9 % (ref 0–1.5)
CARBOXYHEMOGLOBIN: 2 % (ref 0–1.5)
CHLORIDE BLD-SCNC: 100 MMOL/L (ref 99–110)
CHLORIDE BLD-SCNC: 101 MMOL/L (ref 99–110)
CHLORIDE BLD-SCNC: 102 MMOL/L (ref 99–110)
CHLORIDE BLD-SCNC: 104 MMOL/L (ref 99–110)
CHLORIDE BLD-SCNC: 104 MMOL/L (ref 99–110)
CHLORIDE BLD-SCNC: 106 MMOL/L (ref 99–110)
CHLORIDE BLD-SCNC: 109 MMOL/L (ref 99–110)
CHLORIDE BLD-SCNC: 110 MMOL/L (ref 99–110)
CHLORIDE BLD-SCNC: 114 MMOL/L (ref 99–110)
CHLORIDE BLD-SCNC: 115 MMOL/L (ref 99–110)
CHLORIDE BLD-SCNC: 118 MMOL/L (ref 99–110)
CHLORIDE BLD-SCNC: 122 MMOL/L (ref 99–110)
CHLORIDE BLD-SCNC: 98 MMOL/L (ref 99–110)
CHP ED QC CHECK: YES
CLARITY: CLEAR
CLARITY: CLEAR
CO2: 20 MMOL/L (ref 21–32)
CO2: 20 MMOL/L (ref 21–32)
CO2: 21 MMOL/L (ref 21–32)
CO2: 22 MMOL/L (ref 21–32)
CO2: 25 MMOL/L (ref 21–32)
CO2: 26 MMOL/L (ref 21–32)
CO2: 27 MMOL/L (ref 21–32)
CO2: 28 MMOL/L (ref 21–32)
CO2: 29 MMOL/L (ref 21–32)
CO2: 30 MMOL/L (ref 21–32)
CO2: 33 MMOL/L (ref 21–32)
CO2: 33 MMOL/L (ref 21–32)
COLOR: ABNORMAL
COLOR: YELLOW
CORTISOL FREE, SERUM: 2.19 UG/DL
CORTISOL TOTAL: 17.3 UG/DL
CREAT SERPL-MCNC: 0.9 MG/DL (ref 0.9–1.3)
CREAT SERPL-MCNC: 1 MG/DL (ref 0.9–1.3)
CREAT SERPL-MCNC: 1.1 MG/DL (ref 0.9–1.3)
CREAT SERPL-MCNC: 1.1 MG/DL (ref 0.9–1.3)
CREAT SERPL-MCNC: 1.2 MG/DL (ref 0.9–1.3)
CREAT SERPL-MCNC: 1.4 MG/DL (ref 0.9–1.3)
CREAT SERPL-MCNC: 1.5 MG/DL (ref 0.9–1.3)
CREAT SERPL-MCNC: 1.6 MG/DL (ref 0.9–1.3)
CREAT SERPL-MCNC: 1.7 MG/DL (ref 0.9–1.3)
CREAT SERPL-MCNC: 1.9 MG/DL (ref 0.9–1.3)
CULTURE, BLOOD 2: NORMAL
CULTURE, RESPIRATORY: ABNORMAL
CULTURE, RESPIRATORY: ABNORMAL
EKG ATRIAL RATE: 134 BPM
EKG ATRIAL RATE: 63 BPM
EKG DIAGNOSIS: NORMAL
EKG DIAGNOSIS: NORMAL
EKG P AXIS: 45 DEGREES
EKG P-R INTERVAL: 140 MS
EKG P-R INTERVAL: 188 MS
EKG Q-T INTERVAL: 288 MS
EKG Q-T INTERVAL: 428 MS
EKG QRS DURATION: 86 MS
EKG QRS DURATION: 88 MS
EKG QTC CALCULATION (BAZETT): 430 MS
EKG QTC CALCULATION (BAZETT): 437 MS
EKG R AXIS: 160 DEGREES
EKG R AXIS: 17 DEGREES
EKG T AXIS: 77 DEGREES
EKG T AXIS: 91 DEGREES
EKG VENTRICULAR RATE: 134 BPM
EKG VENTRICULAR RATE: 63 BPM
EOSINOPHILS ABSOLUTE: 0 K/UL (ref 0–0.6)
EOSINOPHILS ABSOLUTE: 0.1 K/UL (ref 0–0.6)
EOSINOPHILS ABSOLUTE: 0.2 K/UL (ref 0–0.6)
EOSINOPHILS RELATIVE PERCENT: 0 %
EOSINOPHILS RELATIVE PERCENT: 0.1 %
EOSINOPHILS RELATIVE PERCENT: 0.1 %
EOSINOPHILS RELATIVE PERCENT: 0.4 %
EOSINOPHILS RELATIVE PERCENT: 0.7 %
EOSINOPHILS RELATIVE PERCENT: 0.7 %
EOSINOPHILS RELATIVE PERCENT: 1 %
EOSINOPHILS RELATIVE PERCENT: 1 %
GFR AFRICAN AMERICAN: 44
GFR AFRICAN AMERICAN: 50
GFR AFRICAN AMERICAN: 54
GFR AFRICAN AMERICAN: 58
GFR AFRICAN AMERICAN: >60
GFR NON-AFRICAN AMERICAN: 37
GFR NON-AFRICAN AMERICAN: 42
GFR NON-AFRICAN AMERICAN: 45
GFR NON-AFRICAN AMERICAN: 48
GFR NON-AFRICAN AMERICAN: 52
GFR NON-AFRICAN AMERICAN: >60
GLOBULIN: 2.9 G/DL
GLOBULIN: 3 G/DL
GLOBULIN: 3 G/DL
GLOBULIN: 3.1 G/DL
GLOBULIN: 3.2 G/DL
GLOBULIN: 3.3 G/DL
GLUCOSE BLD-MCNC: 100 MG/DL (ref 70–99)
GLUCOSE BLD-MCNC: 101 MG/DL (ref 70–99)
GLUCOSE BLD-MCNC: 101 MG/DL (ref 70–99)
GLUCOSE BLD-MCNC: 102 MG/DL (ref 70–99)
GLUCOSE BLD-MCNC: 104 MG/DL (ref 70–99)
GLUCOSE BLD-MCNC: 105 MG/DL (ref 70–99)
GLUCOSE BLD-MCNC: 105 MG/DL (ref 70–99)
GLUCOSE BLD-MCNC: 106 MG/DL (ref 70–99)
GLUCOSE BLD-MCNC: 108 MG/DL (ref 70–99)
GLUCOSE BLD-MCNC: 108 MG/DL (ref 70–99)
GLUCOSE BLD-MCNC: 109 MG/DL (ref 70–99)
GLUCOSE BLD-MCNC: 110 MG/DL (ref 70–99)
GLUCOSE BLD-MCNC: 110 MG/DL (ref 70–99)
GLUCOSE BLD-MCNC: 111 MG/DL (ref 70–99)
GLUCOSE BLD-MCNC: 111 MG/DL (ref 70–99)
GLUCOSE BLD-MCNC: 112 MG/DL (ref 70–99)
GLUCOSE BLD-MCNC: 112 MG/DL (ref 70–99)
GLUCOSE BLD-MCNC: 113 MG/DL (ref 70–99)
GLUCOSE BLD-MCNC: 115 MG/DL (ref 70–99)
GLUCOSE BLD-MCNC: 115 MG/DL (ref 70–99)
GLUCOSE BLD-MCNC: 116 MG/DL (ref 70–99)
GLUCOSE BLD-MCNC: 117 MG/DL (ref 70–99)
GLUCOSE BLD-MCNC: 118 MG/DL (ref 70–99)
GLUCOSE BLD-MCNC: 118 MG/DL (ref 70–99)
GLUCOSE BLD-MCNC: 120 MG/DL (ref 70–99)
GLUCOSE BLD-MCNC: 121 MG/DL (ref 70–99)
GLUCOSE BLD-MCNC: 122 MG/DL (ref 70–99)
GLUCOSE BLD-MCNC: 123 MG/DL (ref 70–99)
GLUCOSE BLD-MCNC: 124 MG/DL (ref 70–99)
GLUCOSE BLD-MCNC: 125 MG/DL (ref 70–99)
GLUCOSE BLD-MCNC: 126 MG/DL (ref 70–99)
GLUCOSE BLD-MCNC: 128 MG/DL (ref 70–99)
GLUCOSE BLD-MCNC: 129 MG/DL (ref 70–99)
GLUCOSE BLD-MCNC: 131 MG/DL (ref 70–99)
GLUCOSE BLD-MCNC: 133 MG/DL (ref 70–99)
GLUCOSE BLD-MCNC: 135 MG/DL (ref 70–99)
GLUCOSE BLD-MCNC: 137 MG/DL (ref 70–99)
GLUCOSE BLD-MCNC: 139 MG/DL (ref 70–99)
GLUCOSE BLD-MCNC: 140 MG/DL (ref 70–99)
GLUCOSE BLD-MCNC: 144 MG/DL (ref 70–99)
GLUCOSE BLD-MCNC: 151 MG/DL (ref 70–99)
GLUCOSE BLD-MCNC: 156 MG/DL (ref 70–99)
GLUCOSE BLD-MCNC: 158 MG/DL (ref 70–99)
GLUCOSE BLD-MCNC: 161 MG/DL (ref 70–99)
GLUCOSE BLD-MCNC: 161 MG/DL (ref 70–99)
GLUCOSE BLD-MCNC: 166 MG/DL (ref 70–99)
GLUCOSE BLD-MCNC: 170 MG/DL (ref 70–99)
GLUCOSE BLD-MCNC: 211 MG/DL (ref 70–99)
GLUCOSE BLD-MCNC: 254 MG/DL (ref 70–99)
GLUCOSE BLD-MCNC: 55 MG/DL (ref 70–99)
GLUCOSE BLD-MCNC: 57 MG/DL (ref 70–99)
GLUCOSE BLD-MCNC: 64 MG/DL (ref 70–99)
GLUCOSE BLD-MCNC: 64 MG/DL (ref 70–99)
GLUCOSE BLD-MCNC: 65 MG/DL (ref 70–99)
GLUCOSE BLD-MCNC: 69 MG/DL (ref 70–99)
GLUCOSE BLD-MCNC: 69 MG/DL (ref 70–99)
GLUCOSE BLD-MCNC: 71 MG/DL (ref 70–99)
GLUCOSE BLD-MCNC: 73 MG/DL (ref 70–99)
GLUCOSE BLD-MCNC: 74 MG/DL (ref 70–99)
GLUCOSE BLD-MCNC: 75 MG/DL (ref 70–99)
GLUCOSE BLD-MCNC: 77 MG/DL (ref 70–99)
GLUCOSE BLD-MCNC: 78 MG/DL (ref 70–99)
GLUCOSE BLD-MCNC: 79 MG/DL (ref 70–99)
GLUCOSE BLD-MCNC: 81 MG/DL (ref 70–99)
GLUCOSE BLD-MCNC: 82 MG/DL (ref 70–99)
GLUCOSE BLD-MCNC: 83 MG/DL (ref 70–99)
GLUCOSE BLD-MCNC: 84 MG/DL (ref 70–99)
GLUCOSE BLD-MCNC: 85 MG/DL (ref 70–99)
GLUCOSE BLD-MCNC: 86 MG/DL (ref 70–99)
GLUCOSE BLD-MCNC: 86 MG/DL (ref 70–99)
GLUCOSE BLD-MCNC: 87 MG/DL (ref 70–99)
GLUCOSE BLD-MCNC: 88 MG/DL (ref 70–99)
GLUCOSE BLD-MCNC: 89 MG/DL (ref 70–99)
GLUCOSE BLD-MCNC: 90 MG/DL (ref 70–99)
GLUCOSE BLD-MCNC: 90 MG/DL (ref 70–99)
GLUCOSE BLD-MCNC: 91 MG/DL (ref 70–99)
GLUCOSE BLD-MCNC: 93 MG/DL
GLUCOSE BLD-MCNC: 93 MG/DL (ref 70–99)
GLUCOSE BLD-MCNC: 94 MG/DL (ref 70–99)
GLUCOSE BLD-MCNC: 94 MG/DL (ref 70–99)
GLUCOSE BLD-MCNC: 95 MG/DL (ref 70–99)
GLUCOSE BLD-MCNC: 97 MG/DL (ref 70–99)
GLUCOSE URINE: NEGATIVE MG/DL
GLUCOSE URINE: NEGATIVE MG/DL
GRAM STAIN RESULT: ABNORMAL
HCO3 ARTERIAL: 19.2 MMOL/L (ref 21–29)
HCO3 ARTERIAL: 21.7 MMOL/L (ref 21–29)
HCO3 ARTERIAL: 22.6 MMOL/L (ref 21–29)
HCO3 ARTERIAL: 26.6 MMOL/L (ref 21–29)
HCO3 ARTERIAL: 28.3 MMOL/L (ref 21–29)
HCO3 ARTERIAL: 28.7 MMOL/L (ref 21–29)
HCO3 ARTERIAL: 29 MMOL/L (ref 21–29)
HCO3 ARTERIAL: 30.3 MMOL/L (ref 21–29)
HCO3 ARTERIAL: 30.5 MMOL/L (ref 21–29)
HCO3 ARTERIAL: 31.5 MMOL/L (ref 21–29)
HCO3 ARTERIAL: 31.6 MMOL/L (ref 21–29)
HCO3 ARTERIAL: 31.9 MMOL/L (ref 21–29)
HCO3 ARTERIAL: 32.2 MMOL/L (ref 21–29)
HCO3 ARTERIAL: 32.9 MMOL/L (ref 21–29)
HCO3 ARTERIAL: 33.3 MMOL/L (ref 21–29)
HCO3 ARTERIAL: 33.8 MMOL/L (ref 21–29)
HCO3 ARTERIAL: 41.8 MMOL/L (ref 21–29)
HCO3 VENOUS: 23.9 MMOL/L (ref 23–29)
HCT VFR BLD CALC: 19.6 % (ref 40.5–52.5)
HCT VFR BLD CALC: 20.4 % (ref 40.5–52.5)
HCT VFR BLD CALC: 20.6 % (ref 40.5–52.5)
HCT VFR BLD CALC: 21.7 % (ref 40.5–52.5)
HCT VFR BLD CALC: 23.9 % (ref 40.5–52.5)
HCT VFR BLD CALC: 24.5 % (ref 40.5–52.5)
HCT VFR BLD CALC: 25.4 % (ref 40.5–52.5)
HCT VFR BLD CALC: 25.8 % (ref 40.5–52.5)
HCT VFR BLD CALC: 26.9 % (ref 40.5–52.5)
HCT VFR BLD CALC: 27.4 % (ref 40.5–52.5)
HCT VFR BLD CALC: 28.5 % (ref 40.5–52.5)
HCT VFR BLD CALC: 30.5 % (ref 40.5–52.5)
HCT VFR BLD CALC: 31 % (ref 40.5–52.5)
HCT VFR BLD CALC: 35.6 % (ref 40.5–52.5)
HCT VFR BLD CALC: 40.3 % (ref 40.5–52.5)
HEMATOLOGY PATH CONSULT: NO
HEMATOLOGY PATH CONSULT: NORMAL
HEMATOLOGY PATH CONSULT: YES
HEMOGLOBIN, ART, EXTENDED: 10.2 G/DL (ref 13.5–17.5)
HEMOGLOBIN, ART, EXTENDED: 10.8 G/DL (ref 13.5–17.5)
HEMOGLOBIN, ART, EXTENDED: 12.1 G/DL (ref 13.5–17.5)
HEMOGLOBIN, ART, EXTENDED: 12.8 G/DL (ref 13.5–17.5)
HEMOGLOBIN, ART, EXTENDED: 8 G/DL (ref 13.5–17.5)
HEMOGLOBIN, ART, EXTENDED: 8.5 G/DL (ref 13.5–17.5)
HEMOGLOBIN, ART, EXTENDED: 8.7 G/DL (ref 13.5–17.5)
HEMOGLOBIN, ART, EXTENDED: 8.8 G/DL (ref 13.5–17.5)
HEMOGLOBIN, ART, EXTENDED: 9 G/DL (ref 13.5–17.5)
HEMOGLOBIN, ART, EXTENDED: 9.1 G/DL (ref 13.5–17.5)
HEMOGLOBIN, ART, EXTENDED: 9.5 G/DL (ref 13.5–17.5)
HEMOGLOBIN, ART, EXTENDED: 9.7 G/DL (ref 13.5–17.5)
HEMOGLOBIN, ART, EXTENDED: 9.7 G/DL (ref 13.5–17.5)
HEMOGLOBIN: 10 G/DL (ref 13.5–17.5)
HEMOGLOBIN: 10.2 G/DL (ref 13.5–17.5)
HEMOGLOBIN: 10.8 GM/DL (ref 13.5–17.5)
HEMOGLOBIN: 11.9 G/DL (ref 13.5–17.5)
HEMOGLOBIN: 13.4 G/DL (ref 13.5–17.5)
HEMOGLOBIN: 6.6 G/DL (ref 13.5–17.5)
HEMOGLOBIN: 6.9 G/DL (ref 13.5–17.5)
HEMOGLOBIN: 6.9 G/DL (ref 13.5–17.5)
HEMOGLOBIN: 7.2 G/DL (ref 13.5–17.5)
HEMOGLOBIN: 7.9 G/DL (ref 13.5–17.5)
HEMOGLOBIN: 8.2 G/DL (ref 13.5–17.5)
HEMOGLOBIN: 8.3 G/DL (ref 13.5–17.5)
HEMOGLOBIN: 8.7 G/DL (ref 13.5–17.5)
HEMOGLOBIN: 9 G/DL (ref 13.5–17.5)
HEMOGLOBIN: 9.1 G/DL (ref 13.5–17.5)
HEMOGLOBIN: 9.1 G/DL (ref 13.5–17.5)
HEPARIN INDUCED PLATELET ANTIBODY: NEGATIVE
HYALINE CASTS: ABNORMAL /LPF (ref 0–2)
HYPOCHROMIA: ABNORMAL
HYPOCHROMIA: ABNORMAL
IGG 4: 15 MG/DL (ref 1–123)
INR BLD: 0.97 (ref 0.86–1.14)
KETONES, URINE: NEGATIVE MG/DL
KETONES, URINE: NEGATIVE MG/DL
LACTATE: 1.8 MMOL/L (ref 0.4–2)
LACTATE: 3.56 MMOL/L (ref 0.4–2)
LACTIC ACID: 0.9 MMOL/L (ref 0.4–2)
LACTIC ACID: 1 MMOL/L (ref 0.4–2)
LACTIC ACID: 1.5 MMOL/L (ref 0.4–2)
LACTIC ACID: 6.8 MMOL/L (ref 0.4–2)
LEUKOCYTE ESTERASE, URINE: NEGATIVE
LEUKOCYTE ESTERASE, URINE: NEGATIVE
LIPASE: 1471 U/L (ref 13–60)
LIPASE: 373 U/L (ref 13–60)
LV EF: 58 %
LVEF MODALITY: NORMAL
LYMPHOCYTES ABSOLUTE: 0.4 K/UL (ref 1–5.1)
LYMPHOCYTES ABSOLUTE: 0.5 K/UL (ref 1–5.1)
LYMPHOCYTES ABSOLUTE: 0.6 K/UL (ref 1–5.1)
LYMPHOCYTES ABSOLUTE: 0.7 K/UL (ref 1–5.1)
LYMPHOCYTES ABSOLUTE: 0.8 K/UL (ref 1–5.1)
LYMPHOCYTES ABSOLUTE: 1.2 K/UL (ref 1–5.1)
LYMPHOCYTES ABSOLUTE: 5.3 K/UL (ref 1–5.1)
LYMPHOCYTES RELATIVE PERCENT: 10 %
LYMPHOCYTES RELATIVE PERCENT: 12 %
LYMPHOCYTES RELATIVE PERCENT: 2 %
LYMPHOCYTES RELATIVE PERCENT: 2 %
LYMPHOCYTES RELATIVE PERCENT: 2.2 %
LYMPHOCYTES RELATIVE PERCENT: 25 %
LYMPHOCYTES RELATIVE PERCENT: 3.2 %
LYMPHOCYTES RELATIVE PERCENT: 4.2 %
LYMPHOCYTES RELATIVE PERCENT: 4.2 %
LYMPHOCYTES RELATIVE PERCENT: 5 %
LYMPHOCYTES RELATIVE PERCENT: 5 %
LYMPHOCYTES RELATIVE PERCENT: 5.4 %
LYMPHOCYTES RELATIVE PERCENT: 5.7 %
LYMPHOCYTES RELATIVE PERCENT: 7.5 %
LYMPHOCYTES RELATIVE PERCENT: 7.5 %
MAGNESIUM: 1.7 MG/DL (ref 1.8–2.4)
MAGNESIUM: 1.8 MG/DL (ref 1.8–2.4)
MAGNESIUM: 1.9 MG/DL (ref 1.8–2.4)
MAGNESIUM: 2 MG/DL (ref 1.8–2.4)
MAGNESIUM: 2 MG/DL (ref 1.8–2.4)
MAGNESIUM: 2.1 MG/DL (ref 1.8–2.4)
MAGNESIUM: 2.2 MG/DL (ref 1.8–2.4)
MCH RBC QN AUTO: 31 PG (ref 26–34)
MCH RBC QN AUTO: 31.3 PG (ref 26–34)
MCH RBC QN AUTO: 31.5 PG (ref 26–34)
MCH RBC QN AUTO: 31.5 PG (ref 26–34)
MCH RBC QN AUTO: 31.6 PG (ref 26–34)
MCH RBC QN AUTO: 31.7 PG (ref 26–34)
MCH RBC QN AUTO: 31.8 PG (ref 26–34)
MCH RBC QN AUTO: 31.9 PG (ref 26–34)
MCH RBC QN AUTO: 31.9 PG (ref 26–34)
MCH RBC QN AUTO: 32.3 PG (ref 26–34)
MCHC RBC AUTO-ENTMCNC: 31.5 G/DL (ref 31–36)
MCHC RBC AUTO-ENTMCNC: 32.7 G/DL (ref 31–36)
MCHC RBC AUTO-ENTMCNC: 32.8 G/DL (ref 31–36)
MCHC RBC AUTO-ENTMCNC: 32.8 G/DL (ref 31–36)
MCHC RBC AUTO-ENTMCNC: 33.1 G/DL (ref 31–36)
MCHC RBC AUTO-ENTMCNC: 33.2 G/DL (ref 31–36)
MCHC RBC AUTO-ENTMCNC: 33.3 G/DL (ref 31–36)
MCHC RBC AUTO-ENTMCNC: 33.3 G/DL (ref 31–36)
MCHC RBC AUTO-ENTMCNC: 33.4 G/DL (ref 31–36)
MCHC RBC AUTO-ENTMCNC: 33.5 G/DL (ref 31–36)
MCHC RBC AUTO-ENTMCNC: 33.6 G/DL (ref 31–36)
MCHC RBC AUTO-ENTMCNC: 33.7 G/DL (ref 31–36)
MCHC RBC AUTO-ENTMCNC: 33.8 G/DL (ref 31–36)
MCV RBC AUTO: 93.8 FL (ref 80–100)
MCV RBC AUTO: 94 FL (ref 80–100)
MCV RBC AUTO: 94 FL (ref 80–100)
MCV RBC AUTO: 94.8 FL (ref 80–100)
MCV RBC AUTO: 94.9 FL (ref 80–100)
MCV RBC AUTO: 95 FL (ref 80–100)
MCV RBC AUTO: 95.5 FL (ref 80–100)
MCV RBC AUTO: 95.6 FL (ref 80–100)
MCV RBC AUTO: 95.6 FL (ref 80–100)
MCV RBC AUTO: 96.1 FL (ref 80–100)
MCV RBC AUTO: 96.5 FL (ref 80–100)
MCV RBC AUTO: 96.8 FL (ref 80–100)
MCV RBC AUTO: 98.6 FL (ref 80–100)
METHEMOGLOBIN ARTERIAL: 0.1 %
METHEMOGLOBIN ARTERIAL: 0.2 %
METHEMOGLOBIN ARTERIAL: 0.4 %
METHEMOGLOBIN ARTERIAL: 0.5 %
METHEMOGLOBIN ARTERIAL: 0.5 %
METHEMOGLOBIN ARTERIAL: 0.6 %
METHEMOGLOBIN ARTERIAL: 1.3 %
METHEMOGLOBIN VENOUS: 0.3 %
MICROCYTES: ABNORMAL
MICROSCOPIC EXAMINATION: NORMAL
MICROSCOPIC EXAMINATION: YES
MONOCYTES ABSOLUTE: 0.2 K/UL (ref 0–1.3)
MONOCYTES ABSOLUTE: 0.3 K/UL (ref 0–1.3)
MONOCYTES ABSOLUTE: 0.4 K/UL (ref 0–1.3)
MONOCYTES ABSOLUTE: 0.4 K/UL (ref 0–1.3)
MONOCYTES ABSOLUTE: 0.5 K/UL (ref 0–1.3)
MONOCYTES ABSOLUTE: 0.5 K/UL (ref 0–1.3)
MONOCYTES ABSOLUTE: 0.6 K/UL (ref 0–1.3)
MONOCYTES ABSOLUTE: 0.6 K/UL (ref 0–1.3)
MONOCYTES ABSOLUTE: 0.7 K/UL (ref 0–1.3)
MONOCYTES ABSOLUTE: 0.7 K/UL (ref 0–1.3)
MONOCYTES ABSOLUTE: 0.8 K/UL (ref 0–1.3)
MONOCYTES ABSOLUTE: 1.1 K/UL (ref 0–1.3)
MONOCYTES ABSOLUTE: 1.5 K/UL (ref 0–1.3)
MONOCYTES RELATIVE PERCENT: 1.9 %
MONOCYTES RELATIVE PERCENT: 2.4 %
MONOCYTES RELATIVE PERCENT: 3 %
MONOCYTES RELATIVE PERCENT: 3.2 %
MONOCYTES RELATIVE PERCENT: 4 %
MONOCYTES RELATIVE PERCENT: 4.4 %
MONOCYTES RELATIVE PERCENT: 5 %
MONOCYTES RELATIVE PERCENT: 5.7 %
MONOCYTES RELATIVE PERCENT: 5.7 %
MONOCYTES RELATIVE PERCENT: 5.8 %
MONOCYTES RELATIVE PERCENT: 6 %
MONOCYTES RELATIVE PERCENT: 6.8 %
MONOCYTES RELATIVE PERCENT: 7 %
MUCUS: ABNORMAL /LPF
NEUTROPHILS ABSOLUTE: 10 K/UL (ref 1.7–7.7)
NEUTROPHILS ABSOLUTE: 10 K/UL (ref 1.7–7.7)
NEUTROPHILS ABSOLUTE: 10.5 K/UL (ref 1.7–7.7)
NEUTROPHILS ABSOLUTE: 12.5 K/UL (ref 1.7–7.7)
NEUTROPHILS ABSOLUTE: 12.6 K/UL (ref 1.7–7.7)
NEUTROPHILS ABSOLUTE: 14.1 K/UL (ref 1.7–7.7)
NEUTROPHILS ABSOLUTE: 19.3 K/UL (ref 1.7–7.7)
NEUTROPHILS ABSOLUTE: 20.1 K/UL (ref 1.7–7.7)
NEUTROPHILS ABSOLUTE: 20.5 K/UL (ref 1.7–7.7)
NEUTROPHILS ABSOLUTE: 4.8 K/UL (ref 1.7–7.7)
NEUTROPHILS ABSOLUTE: 5.8 K/UL (ref 1.7–7.7)
NEUTROPHILS ABSOLUTE: 7.1 K/UL (ref 1.7–7.7)
NEUTROPHILS ABSOLUTE: 8.5 K/UL (ref 1.7–7.7)
NEUTROPHILS ABSOLUTE: 9 K/UL (ref 1.7–7.7)
NEUTROPHILS ABSOLUTE: 9.3 K/UL (ref 1.7–7.7)
NEUTROPHILS RELATIVE PERCENT: 54 %
NEUTROPHILS RELATIVE PERCENT: 55 %
NEUTROPHILS RELATIVE PERCENT: 65 %
NEUTROPHILS RELATIVE PERCENT: 66 %
NEUTROPHILS RELATIVE PERCENT: 68 %
NEUTROPHILS RELATIVE PERCENT: 87 %
NEUTROPHILS RELATIVE PERCENT: 87.5 %
NEUTROPHILS RELATIVE PERCENT: 88 %
NEUTROPHILS RELATIVE PERCENT: 88.8 %
NEUTROPHILS RELATIVE PERCENT: 88.9 %
NEUTROPHILS RELATIVE PERCENT: 89.1 %
NEUTROPHILS RELATIVE PERCENT: 89.2 %
NEUTROPHILS RELATIVE PERCENT: 92.5 %
NEUTROPHILS RELATIVE PERCENT: 95.4 %
NEUTROPHILS RELATIVE PERCENT: 95.8 %
NITRITE, URINE: NEGATIVE
NITRITE, URINE: NEGATIVE
O2 CONTENT ARTERIAL: 10 ML/DL
O2 CONTENT ARTERIAL: 11 ML/DL
O2 CONTENT ARTERIAL: 12 ML/DL
O2 CONTENT ARTERIAL: 13 ML/DL
O2 CONTENT ARTERIAL: 14 ML/DL
O2 CONTENT ARTERIAL: 15 ML/DL
O2 CONTENT ARTERIAL: 16 ML/DL
O2 CONTENT, VEN: 19 VOL %
O2 SAT, ARTERIAL: 100 % (ref 93–100)
O2 SAT, ARTERIAL: 100 % (ref 93–100)
O2 SAT, ARTERIAL: 66 % (ref 93–100)
O2 SAT, ARTERIAL: 72.6 %
O2 SAT, ARTERIAL: 77 % (ref 93–100)
O2 SAT, ARTERIAL: 80.8 %
O2 SAT, ARTERIAL: 83.2 %
O2 SAT, ARTERIAL: 88.9 %
O2 SAT, ARTERIAL: 94.2 %
O2 SAT, ARTERIAL: 95.4 %
O2 SAT, ARTERIAL: 95.7 %
O2 SAT, ARTERIAL: 95.7 %
O2 SAT, ARTERIAL: 96.2 %
O2 SAT, ARTERIAL: 96.7 %
O2 SAT, ARTERIAL: 96.7 %
O2 SAT, ARTERIAL: 97.5 %
O2 SAT, ARTERIAL: 98.2 %
O2 SAT, VEN: 99 %
O2 THERAPY: ABNORMAL
ORGANISM: ABNORMAL
PCO2 ARTERIAL: 132 MM HG (ref 35–45)
PCO2 ARTERIAL: 33.9 MM HG (ref 35–45)
PCO2 ARTERIAL: 35 MMHG (ref 35–45)
PCO2 ARTERIAL: 35.1 MMHG (ref 35–45)
PCO2 ARTERIAL: 38.9 MMHG (ref 35–45)
PCO2 ARTERIAL: 44.8 MMHG (ref 35–45)
PCO2 ARTERIAL: 48.5 MMHG (ref 35–45)
PCO2 ARTERIAL: 49.1 MMHG (ref 35–45)
PCO2 ARTERIAL: 57 MMHG (ref 35–45)
PCO2 ARTERIAL: 62.1 MMHG (ref 35–45)
PCO2 ARTERIAL: 66.3 MM HG (ref 35–45)
PCO2 ARTERIAL: 67.3 MMHG (ref 35–45)
PCO2 ARTERIAL: 67.4 MMHG (ref 35–45)
PCO2 ARTERIAL: 72.4 MM HG (ref 35–45)
PCO2 ARTERIAL: 74.5 MMHG (ref 35–45)
PCO2 ARTERIAL: 82 MMHG (ref 35–45)
PCO2 ARTERIAL: 95.3 MMHG (ref 35–45)
PCO2, VEN: 48.9 MMHG (ref 40–50)
PDW BLD-RTO: 15.9 % (ref 12.4–15.4)
PDW BLD-RTO: 16.1 % (ref 12.4–15.4)
PDW BLD-RTO: 16.3 % (ref 12.4–15.4)
PDW BLD-RTO: 16.5 % (ref 12.4–15.4)
PDW BLD-RTO: 16.9 % (ref 12.4–15.4)
PDW BLD-RTO: 17.1 % (ref 12.4–15.4)
PDW BLD-RTO: 17.3 % (ref 12.4–15.4)
PDW BLD-RTO: 17.5 % (ref 12.4–15.4)
PDW BLD-RTO: 17.5 % (ref 12.4–15.4)
PDW BLD-RTO: 17.8 % (ref 12.4–15.4)
PDW BLD-RTO: 18.2 % (ref 12.4–15.4)
PERFORMED ON: ABNORMAL
PERFORMED ON: NORMAL
PH ARTERIAL: 7.11 (ref 7.35–7.45)
PH ARTERIAL: 7.15 (ref 7.35–7.45)
PH ARTERIAL: 7.17 (ref 7.35–7.45)
PH ARTERIAL: 7.24 (ref 7.35–7.45)
PH ARTERIAL: 7.27 (ref 7.35–7.45)
PH ARTERIAL: 7.29 (ref 7.35–7.45)
PH ARTERIAL: 7.29 (ref 7.35–7.45)
PH ARTERIAL: 7.3 (ref 7.35–7.45)
PH ARTERIAL: 7.31 (ref 7.35–7.45)
PH ARTERIAL: 7.33 (ref 7.35–7.45)
PH ARTERIAL: 7.36 (ref 7.35–7.45)
PH ARTERIAL: 7.41 (ref 7.35–7.45)
PH ARTERIAL: 7.5 (ref 7.35–7.45)
PH ARTERIAL: 7.53 (ref 7.35–7.45)
PH ARTERIAL: 7.59 (ref 7.35–7.45)
PH UA: 5 (ref 5–8)
PH UA: 5.5 (ref 5–8)
PH VENOUS: 7.31 (ref 7.35–7.45)
PHOSPHORUS: 2.2 MG/DL (ref 2.5–4.9)
PHOSPHORUS: 2.3 MG/DL (ref 2.5–4.9)
PHOSPHORUS: 2.6 MG/DL (ref 2.5–4.9)
PHOSPHORUS: 2.6 MG/DL (ref 2.5–4.9)
PHOSPHORUS: 2.7 MG/DL (ref 2.5–4.9)
PHOSPHORUS: 2.9 MG/DL (ref 2.5–4.9)
PHOSPHORUS: 3 MG/DL (ref 2.5–4.9)
PHOSPHORUS: 3.3 MG/DL (ref 2.5–4.9)
PHOSPHORUS: 3.4 MG/DL (ref 2.5–4.9)
PHOSPHORUS: 3.5 MG/DL (ref 2.5–4.9)
PHOSPHORUS: 3.6 MG/DL (ref 2.5–4.9)
PHOSPHORUS: 3.9 MG/DL (ref 2.5–4.9)
PHOSPHORUS: 3.9 MG/DL (ref 2.5–4.9)
PHOSPHORUS: 4.7 MG/DL (ref 2.5–4.9)
PLATELET # BLD: 101 K/UL (ref 135–450)
PLATELET # BLD: 114 K/UL (ref 135–450)
PLATELET # BLD: 162 K/UL (ref 135–450)
PLATELET # BLD: 201 K/UL (ref 135–450)
PLATELET # BLD: 209 K/UL (ref 135–450)
PLATELET # BLD: 231 K/UL (ref 135–450)
PLATELET # BLD: 238 K/UL (ref 135–450)
PLATELET # BLD: 263 K/UL (ref 135–450)
PLATELET # BLD: 311 K/UL (ref 135–450)
PLATELET # BLD: 402 K/UL (ref 135–450)
PLATELET # BLD: 67 K/UL (ref 135–450)
PLATELET # BLD: 74 K/UL (ref 135–450)
PLATELET # BLD: 81 K/UL (ref 135–450)
PLATELET # BLD: 82 K/UL (ref 135–450)
PLATELET # BLD: 95 K/UL (ref 135–450)
PLATELET SLIDE REVIEW: ABNORMAL
PLATELET SLIDE REVIEW: ADEQUATE
PLATELET SLIDE REVIEW: ADEQUATE
PMV BLD AUTO: 6.4 FL (ref 5–10.5)
PMV BLD AUTO: 7.4 FL (ref 5–10.5)
PMV BLD AUTO: 7.5 FL (ref 5–10.5)
PMV BLD AUTO: 7.7 FL (ref 5–10.5)
PMV BLD AUTO: 8 FL (ref 5–10.5)
PMV BLD AUTO: 8.1 FL (ref 5–10.5)
PMV BLD AUTO: 8.1 FL (ref 5–10.5)
PMV BLD AUTO: 8.2 FL (ref 5–10.5)
PMV BLD AUTO: 8.3 FL (ref 5–10.5)
PMV BLD AUTO: 8.5 FL (ref 5–10.5)
PMV BLD AUTO: 8.6 FL (ref 5–10.5)
PMV BLD AUTO: 9.1 FL (ref 5–10.5)
PMV BLD AUTO: 9.6 FL (ref 5–10.5)
PO2 ARTERIAL: 121.2 MMHG (ref 75–108)
PO2 ARTERIAL: 171.1 MMHG (ref 75–108)
PO2 ARTERIAL: 198.8 MM HG (ref 75–108)
PO2 ARTERIAL: 336 MM HG (ref 75–108)
PO2 ARTERIAL: 43.8 MMHG (ref 75–108)
PO2 ARTERIAL: 47.2 MMHG (ref 75–108)
PO2 ARTERIAL: 49 MM HG (ref 75–108)
PO2 ARTERIAL: 50 MM HG (ref 75–108)
PO2 ARTERIAL: 51.4 MMHG (ref 75–108)
PO2 ARTERIAL: 67.7 MMHG (ref 75–108)
PO2 ARTERIAL: 78.3 MMHG (ref 75–108)
PO2 ARTERIAL: 82.5 MMHG (ref 75–108)
PO2 ARTERIAL: 85.6 MMHG (ref 75–108)
PO2 ARTERIAL: 87.5 MMHG (ref 75–108)
PO2 ARTERIAL: 88.9 MMHG (ref 75–108)
PO2 ARTERIAL: 94.6 MMHG (ref 75–108)
PO2 ARTERIAL: 96.9 MMHG (ref 75–108)
PO2, VEN: 152.1 MMHG (ref 25–40)
POC HEMATOCRIT: 32 % (ref 40.5–52.5)
POC POTASSIUM: 6.2 MMOL/L (ref 3.5–5.1)
POC SAMPLE TYPE: ABNORMAL
POC SODIUM: 140 MMOL/L (ref 136–145)
POIKILOCYTES: ABNORMAL
POTASSIUM REFLEX MAGNESIUM: 3.5 MMOL/L (ref 3.5–5.1)
POTASSIUM REFLEX MAGNESIUM: 3.7 MMOL/L (ref 3.5–5.1)
POTASSIUM REFLEX MAGNESIUM: 3.7 MMOL/L (ref 3.5–5.1)
POTASSIUM REFLEX MAGNESIUM: 3.8 MMOL/L (ref 3.5–5.1)
POTASSIUM REFLEX MAGNESIUM: 3.8 MMOL/L (ref 3.5–5.1)
POTASSIUM REFLEX MAGNESIUM: 4 MMOL/L (ref 3.5–5.1)
POTASSIUM REFLEX MAGNESIUM: 4.7 MMOL/L (ref 3.5–5.1)
POTASSIUM REFLEX MAGNESIUM: 4.7 MMOL/L (ref 3.5–5.1)
POTASSIUM REFLEX MAGNESIUM: 4.9 MMOL/L (ref 3.5–5.1)
POTASSIUM REFLEX MAGNESIUM: 5.4 MMOL/L (ref 3.5–5.1)
POTASSIUM REFLEX MAGNESIUM: 7.1 MMOL/L (ref 3.5–5.1)
POTASSIUM SERPL-SCNC: 3 MMOL/L (ref 3.5–5.1)
POTASSIUM SERPL-SCNC: 3.3 MMOL/L (ref 3.5–5.1)
POTASSIUM SERPL-SCNC: 3.5 MMOL/L (ref 3.5–5.1)
POTASSIUM SERPL-SCNC: 3.6 MMOL/L (ref 3.5–5.1)
POTASSIUM SERPL-SCNC: 3.7 MMOL/L (ref 3.5–5.1)
POTASSIUM SERPL-SCNC: 3.8 MMOL/L (ref 3.5–5.1)
POTASSIUM SERPL-SCNC: 3.9 MMOL/L (ref 3.5–5.1)
POTASSIUM SERPL-SCNC: 4 MMOL/L (ref 3.5–5.1)
POTASSIUM SERPL-SCNC: 4.7 MMOL/L (ref 3.5–5.1)
POTASSIUM SERPL-SCNC: 4.7 MMOL/L (ref 3.5–5.1)
POTASSIUM SERPL-SCNC: 5.4 MMOL/L (ref 3.5–5.1)
POTASSIUM SERPL-SCNC: 6.3 MMOL/L (ref 3.5–5.1)
PRO-BNP: 302 PG/ML (ref 0–124)
PRO-BNP: 72 PG/ML (ref 0–124)
PRO-BNP: 8377 PG/ML (ref 0–124)
PROTEIN UA: NEGATIVE MG/DL
PROTEIN UA: NEGATIVE MG/DL
PROTHROMBIN TIME: 11.2 SEC (ref 10–13.2)
RBC # BLD: 2.06 M/UL (ref 4.2–5.9)
RBC # BLD: 2.18 M/UL (ref 4.2–5.9)
RBC # BLD: 2.19 M/UL (ref 4.2–5.9)
RBC # BLD: 2.31 M/UL (ref 4.2–5.9)
RBC # BLD: 2.5 M/UL (ref 4.2–5.9)
RBC # BLD: 2.57 M/UL (ref 4.2–5.9)
RBC # BLD: 2.64 M/UL (ref 4.2–5.9)
RBC # BLD: 2.72 M/UL (ref 4.2–5.9)
RBC # BLD: 2.81 M/UL (ref 4.2–5.9)
RBC # BLD: 2.89 M/UL (ref 4.2–5.9)
RBC # BLD: 2.89 M/UL (ref 4.2–5.9)
RBC # BLD: 3.16 M/UL (ref 4.2–5.9)
RBC # BLD: 3.22 M/UL (ref 4.2–5.9)
RBC # BLD: 3.73 M/UL (ref 4.2–5.9)
RBC # BLD: 4.21 M/UL (ref 4.2–5.9)
RBC # BLD: NORMAL 10*6/UL
RBC UA: ABNORMAL /HPF (ref 0–4)
REPORT: NORMAL
SARS-COV-2, NAAT: NOT DETECTED
SLIDE REVIEW: ABNORMAL
SODIUM BLD-SCNC: 136 MMOL/L (ref 136–145)
SODIUM BLD-SCNC: 139 MMOL/L (ref 136–145)
SODIUM BLD-SCNC: 139 MMOL/L (ref 136–145)
SODIUM BLD-SCNC: 140 MMOL/L (ref 136–145)
SODIUM BLD-SCNC: 140 MMOL/L (ref 136–145)
SODIUM BLD-SCNC: 141 MMOL/L (ref 136–145)
SODIUM BLD-SCNC: 141 MMOL/L (ref 136–145)
SODIUM BLD-SCNC: 142 MMOL/L (ref 136–145)
SODIUM BLD-SCNC: 143 MMOL/L (ref 136–145)
SODIUM BLD-SCNC: 144 MMOL/L (ref 136–145)
SODIUM BLD-SCNC: 144 MMOL/L (ref 136–145)
SODIUM BLD-SCNC: 147 MMOL/L (ref 136–145)
SODIUM BLD-SCNC: 150 MMOL/L (ref 136–145)
SPECIFIC GRAVITY UA: 1.01 (ref 1–1.03)
SPECIFIC GRAVITY UA: 1.02 (ref 1–1.03)
SPECIMEN STATUS: NORMAL
SPHEROCYTES: ABNORMAL
STOMATOCYTES: ABNORMAL
T3 FREE: 2.4 PG/ML (ref 2.3–4.2)
T4 FREE: 1 NG/DL (ref 0.9–1.8)
T4 FREE: 1.8 NG/DL (ref 0.9–1.8)
TCO2 ARTERIAL: 20.4 MMOL/L
TCO2 ARTERIAL: 23.1 MMOL/L
TCO2 ARTERIAL: 24.1 MMOL/L
TCO2 ARTERIAL: 27.7 MMOL/L
TCO2 ARTERIAL: 29.4 MMOL/L
TCO2 ARTERIAL: 31 MMOL/L
TCO2 ARTERIAL: 31.6 MMOL/L
TCO2 ARTERIAL: 32 MMOL/L
TCO2 ARTERIAL: 32.3 MMOL/L
TCO2 ARTERIAL: 33.3 MMOL/L
TCO2 ARTERIAL: 33.5 MMOL/L
TCO2 ARTERIAL: 33.8 MMOL/L
TCO2 ARTERIAL: 34 MMOL/L
TCO2 ARTERIAL: 35.2 MMOL/L
TCO2 ARTERIAL: 36 MMOL/L
TCO2 ARTERIAL: 36 MMOL/L
TCO2 ARTERIAL: 46 MMOL/L
TCO2 CALC VENOUS: 25 MMOL/L
TOTAL CK: 56 U/L (ref 39–308)
TOTAL PROTEIN: 5.1 G/DL (ref 6.4–8.2)
TOTAL PROTEIN: 5.5 G/DL (ref 6.4–8.2)
TOTAL PROTEIN: 5.7 G/DL (ref 6.4–8.2)
TOTAL PROTEIN: 5.8 G/DL (ref 6.4–8.2)
TOTAL PROTEIN: 5.8 G/DL (ref 6.4–8.2)
TOTAL PROTEIN: 6.4 G/DL (ref 6.4–8.2)
TOTAL PROTEIN: 7.1 G/DL (ref 6.4–8.2)
TOXIC GRANULATION: PRESENT
TRIGL SERPL-MCNC: 52 MG/DL (ref 0–150)
TROPONIN: <0.01 NG/ML
TSH REFLEX: 7.48 UIU/ML (ref 0.27–4.2)
URIC ACID, SERUM: 7.8 MG/DL (ref 3.5–7.2)
URIC ACID, SERUM: 9.7 MG/DL (ref 3.5–7.2)
URINE CULTURE, ROUTINE: NORMAL
URINE TYPE: ABNORMAL
URINE TYPE: NORMAL
UROBILINOGEN, URINE: 0.2 E.U./DL
UROBILINOGEN, URINE: 0.2 E.U./DL
VANCOMYCIN RANDOM: 12.9 UG/ML
VANCOMYCIN RANDOM: 16.7 UG/ML
VANCOMYCIN RANDOM: 7.4 UG/ML
WBC # BLD: 10 K/UL (ref 4–11)
WBC # BLD: 10.1 K/UL (ref 4–11)
WBC # BLD: 10.5 K/UL (ref 4–11)
WBC # BLD: 11.3 K/UL (ref 4–11)
WBC # BLD: 11.4 K/UL (ref 4–11)
WBC # BLD: 11.7 K/UL (ref 4–11)
WBC # BLD: 13.5 K/UL (ref 4–11)
WBC # BLD: 13.7 K/UL (ref 4–11)
WBC # BLD: 20.2 K/UL (ref 4–11)
WBC # BLD: 21 K/UL (ref 4–11)
WBC # BLD: 21 K/UL (ref 4–11)
WBC # BLD: 22 K/UL (ref 4–11)
WBC # BLD: 5.5 K/UL (ref 4–11)
WBC # BLD: 6.5 K/UL (ref 4–11)
WBC # BLD: 8.2 K/UL (ref 4–11)
WBC UA: ABNORMAL /HPF (ref 0–5)

## 2020-01-01 PROCEDURE — 6360000002 HC RX W HCPCS: Performed by: INTERNAL MEDICINE

## 2020-01-01 PROCEDURE — P9047 ALBUMIN (HUMAN), 25%, 50ML: HCPCS | Performed by: INTERNAL MEDICINE

## 2020-01-01 PROCEDURE — 93005 ELECTROCARDIOGRAM TRACING: CPT | Performed by: INTERNAL MEDICINE

## 2020-01-01 PROCEDURE — C1751 CATH, INF, PER/CENT/MIDLINE: HCPCS

## 2020-01-01 PROCEDURE — 82803 BLOOD GASES ANY COMBINATION: CPT

## 2020-01-01 PROCEDURE — 94750 HC PULMONARY COMPLIANCE STUDY: CPT

## 2020-01-01 PROCEDURE — 6370000000 HC RX 637 (ALT 250 FOR IP): Performed by: INTERNAL MEDICINE

## 2020-01-01 PROCEDURE — 2000000000 HC ICU R&B

## 2020-01-01 PROCEDURE — 36592 COLLECT BLOOD FROM PICC: CPT

## 2020-01-01 PROCEDURE — 93010 ELECTROCARDIOGRAM REPORT: CPT | Performed by: INTERNAL MEDICINE

## 2020-01-01 PROCEDURE — 99291 CRITICAL CARE FIRST HOUR: CPT | Performed by: INTERNAL MEDICINE

## 2020-01-01 PROCEDURE — 97110 THERAPEUTIC EXERCISES: CPT

## 2020-01-01 PROCEDURE — 85025 COMPLETE CBC W/AUTO DIFF WBC: CPT

## 2020-01-01 PROCEDURE — 6370000000 HC RX 637 (ALT 250 FOR IP): Performed by: NURSE PRACTITIONER

## 2020-01-01 PROCEDURE — C9113 INJ PANTOPRAZOLE SODIUM, VIA: HCPCS | Performed by: INTERNAL MEDICINE

## 2020-01-01 PROCEDURE — 2500000003 HC RX 250 WO HCPCS: Performed by: INTERNAL MEDICINE

## 2020-01-01 PROCEDURE — 94660 CPAP INITIATION&MGMT: CPT

## 2020-01-01 PROCEDURE — 84443 ASSAY THYROID STIM HORMONE: CPT

## 2020-01-01 PROCEDURE — 2700000000 HC OXYGEN THERAPY PER DAY

## 2020-01-01 PROCEDURE — 36556 INSERT NON-TUNNEL CV CATH: CPT

## 2020-01-01 PROCEDURE — 83735 ASSAY OF MAGNESIUM: CPT

## 2020-01-01 PROCEDURE — 92526 ORAL FUNCTION THERAPY: CPT

## 2020-01-01 PROCEDURE — 2580000003 HC RX 258: Performed by: INTERNAL MEDICINE

## 2020-01-01 PROCEDURE — 6360000004 HC RX CONTRAST MEDICATION: Performed by: INTERNAL MEDICINE

## 2020-01-01 PROCEDURE — 94640 AIRWAY INHALATION TREATMENT: CPT

## 2020-01-01 PROCEDURE — 94770 HC ETCO2 MONITOR DAILY: CPT

## 2020-01-01 PROCEDURE — 94761 N-INVAS EAR/PLS OXIMETRY MLT: CPT

## 2020-01-01 PROCEDURE — 36415 COLL VENOUS BLD VENIPUNCTURE: CPT

## 2020-01-01 PROCEDURE — 81003 URINALYSIS AUTO W/O SCOPE: CPT

## 2020-01-01 PROCEDURE — 92610 EVALUATE SWALLOWING FUNCTION: CPT

## 2020-01-01 PROCEDURE — 0BC18ZZ EXTIRPATION OF MATTER FROM TRACHEA, VIA NATURAL OR ARTIFICIAL OPENING ENDOSCOPIC: ICD-10-PCS | Performed by: INTERNAL MEDICINE

## 2020-01-01 PROCEDURE — 80053 COMPREHEN METABOLIC PANEL: CPT

## 2020-01-01 PROCEDURE — 87186 SC STD MICRODIL/AGAR DIL: CPT

## 2020-01-01 PROCEDURE — 84100 ASSAY OF PHOSPHORUS: CPT

## 2020-01-01 PROCEDURE — 36571 INSERT PICVAD CATH: CPT

## 2020-01-01 PROCEDURE — 86850 RBC ANTIBODY SCREEN: CPT

## 2020-01-01 PROCEDURE — 6360000002 HC RX W HCPCS: Performed by: NURSE PRACTITIONER

## 2020-01-01 PROCEDURE — 83690 ASSAY OF LIPASE: CPT

## 2020-01-01 PROCEDURE — 80069 RENAL FUNCTION PANEL: CPT

## 2020-01-01 PROCEDURE — 82530 CORTISOL FREE: CPT

## 2020-01-01 PROCEDURE — 84132 ASSAY OF SERUM POTASSIUM: CPT

## 2020-01-01 PROCEDURE — 6370000000 HC RX 637 (ALT 250 FOR IP)

## 2020-01-01 PROCEDURE — 97530 THERAPEUTIC ACTIVITIES: CPT

## 2020-01-01 PROCEDURE — 2580000003 HC RX 258: Performed by: NURSE PRACTITIONER

## 2020-01-01 PROCEDURE — 99233 SBSQ HOSP IP/OBS HIGH 50: CPT | Performed by: PSYCHIATRY & NEUROLOGY

## 2020-01-01 PROCEDURE — 84484 ASSAY OF TROPONIN QUANT: CPT

## 2020-01-01 PROCEDURE — 2060000000 HC ICU INTERMEDIATE R&B

## 2020-01-01 PROCEDURE — 6360000002 HC RX W HCPCS

## 2020-01-01 PROCEDURE — C8929 TTE W OR WO FOL WCON,DOPPLER: HCPCS

## 2020-01-01 PROCEDURE — 99233 SBSQ HOSP IP/OBS HIGH 50: CPT | Performed by: INTERNAL MEDICINE

## 2020-01-01 PROCEDURE — 80202 ASSAY OF VANCOMYCIN: CPT

## 2020-01-01 PROCEDURE — 99232 SBSQ HOSP IP/OBS MODERATE 35: CPT | Performed by: PSYCHIATRY & NEUROLOGY

## 2020-01-01 PROCEDURE — 71045 X-RAY EXAM CHEST 1 VIEW: CPT

## 2020-01-01 PROCEDURE — 3609010900 HC BRONCHOSCOPY THERAPUTIC ASPIRATION INITIAL: Performed by: INTERNAL MEDICINE

## 2020-01-01 PROCEDURE — 3609011600 HC BRONCHOSCOPY FOREIGN BODY REMOVAL: Performed by: INTERNAL MEDICINE

## 2020-01-01 PROCEDURE — 83880 ASSAY OF NATRIURETIC PEPTIDE: CPT

## 2020-01-01 PROCEDURE — 94003 VENT MGMT INPAT SUBQ DAY: CPT

## 2020-01-01 PROCEDURE — 76937 US GUIDE VASCULAR ACCESS: CPT

## 2020-01-01 PROCEDURE — 51702 INSERT TEMP BLADDER CATH: CPT

## 2020-01-01 PROCEDURE — 99232 SBSQ HOSP IP/OBS MODERATE 35: CPT | Performed by: INTERNAL MEDICINE

## 2020-01-01 PROCEDURE — 83605 ASSAY OF LACTIC ACID: CPT

## 2020-01-01 PROCEDURE — 80048 BASIC METABOLIC PNL TOTAL CA: CPT

## 2020-01-01 PROCEDURE — 82947 ASSAY GLUCOSE BLOOD QUANT: CPT

## 2020-01-01 PROCEDURE — 2580000003 HC RX 258: Performed by: EMERGENCY MEDICINE

## 2020-01-01 PROCEDURE — 92611 MOTION FLUOROSCOPY/SWALLOW: CPT

## 2020-01-01 PROCEDURE — 99292 CRITICAL CARE ADDL 30 MIN: CPT | Performed by: INTERNAL MEDICINE

## 2020-01-01 PROCEDURE — 70450 CT HEAD/BRAIN W/O DYE: CPT

## 2020-01-01 PROCEDURE — 87070 CULTURE OTHR SPECIMN AEROBIC: CPT

## 2020-01-01 PROCEDURE — 2500000003 HC RX 250 WO HCPCS: Performed by: EMERGENCY MEDICINE

## 2020-01-01 PROCEDURE — 84439 ASSAY OF FREE THYROXINE: CPT

## 2020-01-01 PROCEDURE — 87150 DNA/RNA AMPLIFIED PROBE: CPT

## 2020-01-01 PROCEDURE — 2500000003 HC RX 250 WO HCPCS: Performed by: STUDENT IN AN ORGANIZED HEALTH CARE EDUCATION/TRAINING PROGRAM

## 2020-01-01 PROCEDURE — 6360000002 HC RX W HCPCS: Performed by: STUDENT IN AN ORGANIZED HEALTH CARE EDUCATION/TRAINING PROGRAM

## 2020-01-01 PROCEDURE — 87040 BLOOD CULTURE FOR BACTERIA: CPT

## 2020-01-01 PROCEDURE — 74018 RADEX ABDOMEN 1 VIEW: CPT

## 2020-01-01 PROCEDURE — 85610 PROTHROMBIN TIME: CPT

## 2020-01-01 PROCEDURE — 86022 PLATELET ANTIBODIES: CPT

## 2020-01-01 PROCEDURE — 99285 EMERGENCY DEPT VISIT HI MDM: CPT

## 2020-01-01 PROCEDURE — 96375 TX/PRO/DX INJ NEW DRUG ADDON: CPT

## 2020-01-01 PROCEDURE — 82787 IGG 1 2 3 OR 4 EACH: CPT

## 2020-01-01 PROCEDURE — 84550 ASSAY OF BLOOD/URIC ACID: CPT

## 2020-01-01 PROCEDURE — 74177 CT ABD & PELVIS W/CONTRAST: CPT

## 2020-01-01 PROCEDURE — 82330 ASSAY OF CALCIUM: CPT

## 2020-01-01 PROCEDURE — 93005 ELECTROCARDIOGRAM TRACING: CPT | Performed by: STUDENT IN AN ORGANIZED HEALTH CARE EDUCATION/TRAINING PROGRAM

## 2020-01-01 PROCEDURE — 96365 THER/PROPH/DIAG IV INF INIT: CPT

## 2020-01-01 PROCEDURE — 85014 HEMATOCRIT: CPT

## 2020-01-01 PROCEDURE — 76770 US EXAM ABDO BACK WALL COMP: CPT

## 2020-01-01 PROCEDURE — 97167 OT EVAL HIGH COMPLEX 60 MIN: CPT

## 2020-01-01 PROCEDURE — 0BH17EZ INSERTION OF ENDOTRACHEAL AIRWAY INTO TRACHEA, VIA NATURAL OR ARTIFICIAL OPENING: ICD-10-PCS | Performed by: INTERNAL MEDICINE

## 2020-01-01 PROCEDURE — 3609010800 HC BRONCHOSCOPY ALVEOLAR LAVAGE: Performed by: INTERNAL MEDICINE

## 2020-01-01 PROCEDURE — 81001 URINALYSIS AUTO W/SCOPE: CPT

## 2020-01-01 PROCEDURE — 02HV33Z INSERTION OF INFUSION DEVICE INTO SUPERIOR VENA CAVA, PERCUTANEOUS APPROACH: ICD-10-PCS | Performed by: INTERNAL MEDICINE

## 2020-01-01 PROCEDURE — 86923 COMPATIBILITY TEST ELECTRIC: CPT

## 2020-01-01 PROCEDURE — U0002 COVID-19 LAB TEST NON-CDC: HCPCS

## 2020-01-01 PROCEDURE — 74230 X-RAY XM SWLNG FUNCJ C+: CPT

## 2020-01-01 PROCEDURE — 36620 INSERTION CATHETER ARTERY: CPT

## 2020-01-01 PROCEDURE — 82140 ASSAY OF AMMONIA: CPT

## 2020-01-01 PROCEDURE — 80076 HEPATIC FUNCTION PANEL: CPT

## 2020-01-01 PROCEDURE — 0BC38ZZ EXTIRPATION OF MATTER FROM RIGHT MAIN BRONCHUS, VIA NATURAL OR ARTIFICIAL OPENING ENDOSCOPIC: ICD-10-PCS | Performed by: INTERNAL MEDICINE

## 2020-01-01 PROCEDURE — 70498 CT ANGIOGRAPHY NECK: CPT

## 2020-01-01 PROCEDURE — 6360000002 HC RX W HCPCS: Performed by: EMERGENCY MEDICINE

## 2020-01-01 PROCEDURE — 6360000004 HC RX CONTRAST MEDICATION: Performed by: STUDENT IN AN ORGANIZED HEALTH CARE EDUCATION/TRAINING PROGRAM

## 2020-01-01 PROCEDURE — 94002 VENT MGMT INPAT INIT DAY: CPT

## 2020-01-01 PROCEDURE — 36600 WITHDRAWAL OF ARTERIAL BLOOD: CPT

## 2020-01-01 PROCEDURE — 85730 THROMBOPLASTIN TIME PARTIAL: CPT

## 2020-01-01 PROCEDURE — 87205 SMEAR GRAM STAIN: CPT

## 2020-01-01 PROCEDURE — 6370000000 HC RX 637 (ALT 250 FOR IP): Performed by: EMERGENCY MEDICINE

## 2020-01-01 PROCEDURE — 82550 ASSAY OF CK (CPK): CPT

## 2020-01-01 PROCEDURE — 87077 CULTURE AEROBIC IDENTIFY: CPT

## 2020-01-01 PROCEDURE — 86900 BLOOD TYPING SEROLOGIC ABO: CPT

## 2020-01-01 PROCEDURE — 82533 TOTAL CORTISOL: CPT

## 2020-01-01 PROCEDURE — 86901 BLOOD TYPING SEROLOGIC RH(D): CPT

## 2020-01-01 PROCEDURE — 87086 URINE CULTURE/COLONY COUNT: CPT

## 2020-01-01 PROCEDURE — 84478 ASSAY OF TRIGLYCERIDES: CPT

## 2020-01-01 PROCEDURE — 84295 ASSAY OF SERUM SODIUM: CPT

## 2020-01-01 PROCEDURE — 2709999900 HC NON-CHARGEABLE SUPPLY: Performed by: INTERNAL MEDICINE

## 2020-01-01 PROCEDURE — 31645 BRNCHSC W/THER ASPIR 1ST: CPT | Performed by: INTERNAL MEDICINE

## 2020-01-01 PROCEDURE — 2580000003 HC RX 258: Performed by: STUDENT IN AN ORGANIZED HEALTH CARE EDUCATION/TRAINING PROGRAM

## 2020-01-01 PROCEDURE — 0B938ZX DRAINAGE OF RIGHT MAIN BRONCHUS, VIA NATURAL OR ARTIFICIAL OPENING ENDOSCOPIC, DIAGNOSTIC: ICD-10-PCS | Performed by: INTERNAL MEDICINE

## 2020-01-01 PROCEDURE — 5A1955Z RESPIRATORY VENTILATION, GREATER THAN 96 CONSECUTIVE HOURS: ICD-10-PCS | Performed by: INTERNAL MEDICINE

## 2020-01-01 PROCEDURE — 96366 THER/PROPH/DIAG IV INF ADDON: CPT

## 2020-01-01 PROCEDURE — 96367 TX/PROPH/DG ADDL SEQ IV INF: CPT

## 2020-01-01 PROCEDURE — 84481 FREE ASSAY (FT-3): CPT

## 2020-01-01 PROCEDURE — 97162 PT EVAL MOD COMPLEX 30 MIN: CPT

## 2020-01-01 PROCEDURE — 37799 UNLISTED PX VASCULAR SURGERY: CPT

## 2020-01-01 PROCEDURE — 99223 1ST HOSP IP/OBS HIGH 75: CPT | Performed by: PSYCHIATRY & NEUROLOGY

## 2020-01-01 RX ORDER — SODIUM CHLORIDE 450 MG/100ML
INJECTION, SOLUTION INTRAVENOUS CONTINUOUS
Status: DISCONTINUED | OUTPATIENT
Start: 2020-01-01 | End: 2020-01-01

## 2020-01-01 RX ORDER — LEVOTHYROXINE SODIUM ANHYDROUS 100 UG/5ML
200 INJECTION, POWDER, LYOPHILIZED, FOR SOLUTION INTRAVENOUS ONCE
Status: COMPLETED | OUTPATIENT
Start: 2020-01-01 | End: 2020-01-01

## 2020-01-01 RX ORDER — DEXTROSE MONOHYDRATE 50 MG/ML
INJECTION, SOLUTION INTRAVENOUS CONTINUOUS
Status: DISCONTINUED | OUTPATIENT
Start: 2020-01-01 | End: 2020-01-01 | Stop reason: HOSPADM

## 2020-01-01 RX ORDER — SODIUM CHLORIDE 9 MG/ML
1000 INJECTION, SOLUTION INTRAVENOUS CONTINUOUS
Status: DISCONTINUED | OUTPATIENT
Start: 2020-01-01 | End: 2020-01-01

## 2020-01-01 RX ORDER — MORPHINE SULFATE 2 MG/ML
2 INJECTION, SOLUTION INTRAMUSCULAR; INTRAVENOUS ONCE
Status: COMPLETED | OUTPATIENT
Start: 2020-01-01 | End: 2020-01-01

## 2020-01-01 RX ORDER — CALCIUM GLUCONATE 94 MG/ML
2 INJECTION, SOLUTION INTRAVENOUS ONCE
Status: COMPLETED | OUTPATIENT
Start: 2020-01-01 | End: 2020-01-01

## 2020-01-01 RX ORDER — IPRATROPIUM BROMIDE AND ALBUTEROL SULFATE 2.5; .5 MG/3ML; MG/3ML
1 SOLUTION RESPIRATORY (INHALATION) EVERY 6 HOURS PRN
Status: DISCONTINUED | OUTPATIENT
Start: 2020-01-01 | End: 2020-01-01

## 2020-01-01 RX ORDER — FUROSEMIDE 10 MG/ML
40 INJECTION INTRAMUSCULAR; INTRAVENOUS DAILY
Status: DISCONTINUED | OUTPATIENT
Start: 2020-01-01 | End: 2020-01-01

## 2020-01-01 RX ORDER — SODIUM CHLORIDE 0.9 % (FLUSH) 0.9 %
10 SYRINGE (ML) INJECTION PRN
Status: DISCONTINUED | OUTPATIENT
Start: 2020-01-01 | End: 2020-01-01

## 2020-01-01 RX ORDER — ACETAMINOPHEN 325 MG/1
650 TABLET ORAL EVERY 6 HOURS PRN
Status: DISCONTINUED | OUTPATIENT
Start: 2020-01-01 | End: 2020-01-01

## 2020-01-01 RX ORDER — FUROSEMIDE 10 MG/ML
40 INJECTION INTRAMUSCULAR; INTRAVENOUS ONCE
Status: COMPLETED | OUTPATIENT
Start: 2020-01-01 | End: 2020-01-01

## 2020-01-01 RX ORDER — FENTANYL CITRATE 50 UG/ML
INJECTION, SOLUTION INTRAMUSCULAR; INTRAVENOUS
Status: COMPLETED
Start: 2020-01-01 | End: 2020-01-01

## 2020-01-01 RX ORDER — CHLOROTHIAZIDE SODIUM 500 MG/1
500 INJECTION INTRAVENOUS ONCE
Status: COMPLETED | OUTPATIENT
Start: 2020-01-01 | End: 2020-01-01

## 2020-01-01 RX ORDER — ALBUMIN (HUMAN) 12.5 G/50ML
25 SOLUTION INTRAVENOUS ONCE
Status: COMPLETED | OUTPATIENT
Start: 2020-01-01 | End: 2020-01-01

## 2020-01-01 RX ORDER — DEXTROSE MONOHYDRATE 25 G/50ML
25 INJECTION, SOLUTION INTRAVENOUS ONCE
Status: COMPLETED | OUTPATIENT
Start: 2020-01-01 | End: 2020-01-01

## 2020-01-01 RX ORDER — MAGNESIUM SULFATE 1 G/100ML
1 INJECTION INTRAVENOUS PRN
Status: DISCONTINUED | OUTPATIENT
Start: 2020-01-01 | End: 2020-01-01 | Stop reason: SDUPTHER

## 2020-01-01 RX ORDER — BISACODYL 10 MG
10 SUPPOSITORY, RECTAL RECTAL DAILY PRN
Status: DISCONTINUED | OUTPATIENT
Start: 2020-01-01 | End: 2020-01-01 | Stop reason: HOSPADM

## 2020-01-01 RX ORDER — 0.9 % SODIUM CHLORIDE 0.9 %
1000 INTRAVENOUS SOLUTION INTRAVENOUS ONCE
Status: COMPLETED | OUTPATIENT
Start: 2020-01-01 | End: 2020-01-01

## 2020-01-01 RX ORDER — DEXTROSE MONOHYDRATE 25 G/50ML
12.5 INJECTION, SOLUTION INTRAVENOUS PRN
Status: DISCONTINUED | OUTPATIENT
Start: 2020-01-01 | End: 2020-01-01 | Stop reason: HOSPADM

## 2020-01-01 RX ORDER — NICOTINE POLACRILEX 4 MG
15 LOZENGE BUCCAL PRN
Status: DISCONTINUED | OUTPATIENT
Start: 2020-01-01 | End: 2020-01-01 | Stop reason: HOSPADM

## 2020-01-01 RX ORDER — DEXTROSE MONOHYDRATE 50 MG/ML
100 INJECTION, SOLUTION INTRAVENOUS PRN
Status: DISCONTINUED | OUTPATIENT
Start: 2020-01-01 | End: 2020-01-01

## 2020-01-01 RX ORDER — LEVOTHYROXINE SODIUM ANHYDROUS 100 UG/5ML
50 INJECTION, POWDER, LYOPHILIZED, FOR SOLUTION INTRAVENOUS DAILY
Status: DISCONTINUED | OUTPATIENT
Start: 2020-01-01 | End: 2020-01-01

## 2020-01-01 RX ORDER — FUROSEMIDE 10 MG/ML
40 INJECTION INTRAMUSCULAR; INTRAVENOUS 2 TIMES DAILY
Status: DISCONTINUED | OUTPATIENT
Start: 2020-01-01 | End: 2020-01-01 | Stop reason: HOSPADM

## 2020-01-01 RX ORDER — SODIUM CHLORIDE 9 MG/ML
1000 INJECTION, SOLUTION INTRAVENOUS CONTINUOUS
Status: DISCONTINUED | OUTPATIENT
Start: 2020-01-01 | End: 2020-01-01 | Stop reason: HOSPADM

## 2020-01-01 RX ORDER — DEXMEDETOMIDINE HYDROCHLORIDE 4 UG/ML
0.2 INJECTION, SOLUTION INTRAVENOUS
Status: DISCONTINUED | OUTPATIENT
Start: 2020-01-01 | End: 2020-01-01

## 2020-01-01 RX ORDER — ONDANSETRON 2 MG/ML
4 INJECTION INTRAMUSCULAR; INTRAVENOUS EVERY 6 HOURS PRN
Status: DISCONTINUED | OUTPATIENT
Start: 2020-01-01 | End: 2020-01-01 | Stop reason: HOSPADM

## 2020-01-01 RX ORDER — 0.9 % SODIUM CHLORIDE 0.9 %
1500 INTRAVENOUS SOLUTION INTRAVENOUS ONCE
Status: COMPLETED | OUTPATIENT
Start: 2020-01-01 | End: 2020-01-01

## 2020-01-01 RX ORDER — LIDOCAINE HYDROCHLORIDE 10 MG/ML
5 INJECTION, SOLUTION INFILTRATION; PERINEURAL ONCE
Status: COMPLETED | OUTPATIENT
Start: 2020-01-01 | End: 2020-01-01

## 2020-01-01 RX ORDER — SODIUM CHLORIDE 0.9 % (FLUSH) 0.9 %
10 SYRINGE (ML) INJECTION EVERY 12 HOURS SCHEDULED
Status: DISCONTINUED | OUTPATIENT
Start: 2020-01-01 | End: 2020-01-01 | Stop reason: HOSPADM

## 2020-01-01 RX ORDER — LIOTHYRONINE SODIUM 10 UG/ML
5 INJECTION, SOLUTION INTRAVENOUS ONCE
Status: DISCONTINUED | OUTPATIENT
Start: 2020-01-01 | End: 2020-01-01

## 2020-01-01 RX ORDER — HEPARIN SODIUM 5000 [USP'U]/ML
5000 INJECTION, SOLUTION INTRAVENOUS; SUBCUTANEOUS EVERY 8 HOURS SCHEDULED
Status: DISCONTINUED | OUTPATIENT
Start: 2020-01-01 | End: 2020-01-01

## 2020-01-01 RX ORDER — POTASSIUM CHLORIDE 7.45 MG/ML
10 INJECTION INTRAVENOUS ONCE
Status: COMPLETED | OUTPATIENT
Start: 2020-01-01 | End: 2020-01-01

## 2020-01-01 RX ORDER — LEVOTHYROXINE SODIUM 0.03 MG/1
25 TABLET ORAL DAILY
Status: DISCONTINUED | OUTPATIENT
Start: 2020-01-01 | End: 2020-01-01 | Stop reason: HOSPADM

## 2020-01-01 RX ORDER — POLYETHYLENE GLYCOL 3350 17 G/17G
17 POWDER, FOR SOLUTION ORAL DAILY PRN
Status: DISCONTINUED | OUTPATIENT
Start: 2020-01-01 | End: 2020-01-01

## 2020-01-01 RX ORDER — MIDAZOLAM HYDROCHLORIDE 1 MG/ML
2 INJECTION INTRAMUSCULAR; INTRAVENOUS ONCE
Status: DISCONTINUED | OUTPATIENT
Start: 2020-01-01 | End: 2020-01-01 | Stop reason: HOSPADM

## 2020-01-01 RX ORDER — DEXTROSE AND SODIUM CHLORIDE 5; .45 G/100ML; G/100ML
INJECTION, SOLUTION INTRAVENOUS CONTINUOUS
Status: DISCONTINUED | OUTPATIENT
Start: 2020-01-01 | End: 2020-01-01

## 2020-01-01 RX ORDER — POTASSIUM CHLORIDE 29.8 MG/ML
20 INJECTION INTRAVENOUS PRN
Status: DISCONTINUED | OUTPATIENT
Start: 2020-01-01 | End: 2020-01-01 | Stop reason: SDUPTHER

## 2020-01-01 RX ORDER — DEXTROSE MONOHYDRATE 25 G/50ML
25 INJECTION, SOLUTION INTRAVENOUS ONCE
Status: DISCONTINUED | OUTPATIENT
Start: 2020-01-01 | End: 2020-01-01

## 2020-01-01 RX ORDER — DEXTROSE MONOHYDRATE 25 G/50ML
12.5 INJECTION, SOLUTION INTRAVENOUS PRN
Status: DISCONTINUED | OUTPATIENT
Start: 2020-01-01 | End: 2020-01-01

## 2020-01-01 RX ORDER — DEXTROSE MONOHYDRATE 50 MG/ML
100 INJECTION, SOLUTION INTRAVENOUS PRN
Status: DISCONTINUED | OUTPATIENT
Start: 2020-01-01 | End: 2020-01-01 | Stop reason: HOSPADM

## 2020-01-01 RX ORDER — ONDANSETRON 4 MG/1
4 TABLET, ORALLY DISINTEGRATING ORAL EVERY 8 HOURS PRN
COMMUNITY

## 2020-01-01 RX ORDER — FUROSEMIDE 10 MG/ML
20 INJECTION INTRAMUSCULAR; INTRAVENOUS ONCE
Status: COMPLETED | OUTPATIENT
Start: 2020-01-01 | End: 2020-01-01

## 2020-01-01 RX ORDER — ACETAMINOPHEN 650 MG/1
650 SUPPOSITORY RECTAL EVERY 6 HOURS PRN
Status: DISCONTINUED | OUTPATIENT
Start: 2020-01-01 | End: 2020-01-01 | Stop reason: HOSPADM

## 2020-01-01 RX ORDER — LORAZEPAM 2 MG/ML
0.5 INJECTION INTRAMUSCULAR ONCE
Status: COMPLETED | OUTPATIENT
Start: 2020-01-01 | End: 2020-01-01

## 2020-01-01 RX ORDER — POTASSIUM CHLORIDE 29.8 MG/ML
20 INJECTION INTRAVENOUS PRN
Status: DISCONTINUED | OUTPATIENT
Start: 2020-01-01 | End: 2020-01-01 | Stop reason: HOSPADM

## 2020-01-01 RX ORDER — IPRATROPIUM BROMIDE AND ALBUTEROL SULFATE 2.5; .5 MG/3ML; MG/3ML
SOLUTION RESPIRATORY (INHALATION)
Status: COMPLETED
Start: 2020-01-01 | End: 2020-01-01

## 2020-01-01 RX ORDER — 0.9 % SODIUM CHLORIDE 0.9 %
20 INTRAVENOUS SOLUTION INTRAVENOUS ONCE
Status: DISCONTINUED | OUTPATIENT
Start: 2020-01-01 | End: 2020-01-01 | Stop reason: HOSPADM

## 2020-01-01 RX ORDER — GUAIFENESIN 400 MG/1
800 TABLET ORAL 2 TIMES DAILY PRN
COMMUNITY

## 2020-01-01 RX ORDER — POTASSIUM CHLORIDE 750 MG/1
10 TABLET, EXTENDED RELEASE ORAL DAILY
COMMUNITY

## 2020-01-01 RX ORDER — POTASSIUM CHLORIDE 7.45 MG/ML
10 INJECTION INTRAVENOUS
Status: DISPENSED | OUTPATIENT
Start: 2020-01-01 | End: 2020-01-01

## 2020-01-01 RX ORDER — PROMETHAZINE HYDROCHLORIDE 25 MG/1
12.5 TABLET ORAL EVERY 6 HOURS PRN
Status: DISCONTINUED | OUTPATIENT
Start: 2020-01-01 | End: 2020-01-01 | Stop reason: HOSPADM

## 2020-01-01 RX ORDER — SODIUM CHLORIDE 9 MG/ML
INJECTION, SOLUTION INTRAVENOUS CONTINUOUS
Status: DISCONTINUED | OUTPATIENT
Start: 2020-01-01 | End: 2020-01-01

## 2020-01-01 RX ORDER — IPRATROPIUM BROMIDE AND ALBUTEROL SULFATE 2.5; .5 MG/3ML; MG/3ML
1 SOLUTION RESPIRATORY (INHALATION)
Status: DISCONTINUED | OUTPATIENT
Start: 2020-01-01 | End: 2020-01-01

## 2020-01-01 RX ORDER — SODIUM CHLORIDE 0.9 % (FLUSH) 0.9 %
10 SYRINGE (ML) INJECTION PRN
Status: DISCONTINUED | OUTPATIENT
Start: 2020-01-01 | End: 2020-01-01 | Stop reason: HOSPADM

## 2020-01-01 RX ORDER — MIDAZOLAM HYDROCHLORIDE 1 MG/ML
2 INJECTION INTRAMUSCULAR; INTRAVENOUS EVERY 30 MIN PRN
Status: DISCONTINUED | OUTPATIENT
Start: 2020-01-01 | End: 2020-01-01 | Stop reason: HOSPADM

## 2020-01-01 RX ORDER — LOPERAMIDE HYDROCHLORIDE 2 MG/1
2 CAPSULE ORAL EVERY 4 HOURS PRN
Status: DISCONTINUED | OUTPATIENT
Start: 2020-01-01 | End: 2020-01-01

## 2020-01-01 RX ORDER — SODIUM CHLORIDE 0.9 % (FLUSH) 0.9 %
10 SYRINGE (ML) INJECTION EVERY 12 HOURS SCHEDULED
Status: DISCONTINUED | OUTPATIENT
Start: 2020-01-01 | End: 2020-01-01

## 2020-01-01 RX ORDER — IPRATROPIUM BROMIDE AND ALBUTEROL SULFATE 2.5; .5 MG/3ML; MG/3ML
1 SOLUTION RESPIRATORY (INHALATION) 4 TIMES DAILY
Status: DISCONTINUED | OUTPATIENT
Start: 2020-01-01 | End: 2020-01-01

## 2020-01-01 RX ORDER — PANTOPRAZOLE SODIUM 40 MG/10ML
40 INJECTION, POWDER, LYOPHILIZED, FOR SOLUTION INTRAVENOUS 2 TIMES DAILY
Status: DISCONTINUED | OUTPATIENT
Start: 2020-01-01 | End: 2020-01-01 | Stop reason: HOSPADM

## 2020-01-01 RX ORDER — DEXTROSE MONOHYDRATE 50 MG/ML
INJECTION, SOLUTION INTRAVENOUS CONTINUOUS
Status: DISCONTINUED | OUTPATIENT
Start: 2020-01-01 | End: 2020-01-01

## 2020-01-01 RX ORDER — IPRATROPIUM BROMIDE AND ALBUTEROL SULFATE 2.5; .5 MG/3ML; MG/3ML
1 SOLUTION RESPIRATORY (INHALATION) EVERY 4 HOURS PRN
Status: DISCONTINUED | OUTPATIENT
Start: 2020-01-01 | End: 2020-01-01 | Stop reason: HOSPADM

## 2020-01-01 RX ORDER — NICOTINE POLACRILEX 4 MG
15 LOZENGE BUCCAL PRN
Status: DISCONTINUED | OUTPATIENT
Start: 2020-01-01 | End: 2020-01-01

## 2020-01-01 RX ORDER — OXYBUTYNIN CHLORIDE 5 MG/1
2.5 TABLET ORAL NIGHTLY
Status: DISCONTINUED | OUTPATIENT
Start: 2020-01-01 | End: 2020-01-01

## 2020-01-01 RX ORDER — SENNA PLUS 8.6 MG/1
2 TABLET ORAL 2 TIMES DAILY
Status: DISCONTINUED | OUTPATIENT
Start: 2020-01-01 | End: 2020-01-01 | Stop reason: HOSPADM

## 2020-01-01 RX ORDER — MAGNESIUM SULFATE 1 G/100ML
1 INJECTION INTRAVENOUS PRN
Status: DISCONTINUED | OUTPATIENT
Start: 2020-01-01 | End: 2020-01-01 | Stop reason: HOSPADM

## 2020-01-01 RX ORDER — MIDAZOLAM HYDROCHLORIDE 1 MG/ML
2 INJECTION INTRAMUSCULAR; INTRAVENOUS ONCE
Status: COMPLETED | OUTPATIENT
Start: 2020-01-01 | End: 2020-01-01

## 2020-01-01 RX ORDER — CARBOXYMETHYLCELLULOSE SODIUM 10 MG/ML
1 GEL OPHTHALMIC
Status: DISCONTINUED | OUTPATIENT
Start: 2020-01-01 | End: 2020-01-01 | Stop reason: HOSPADM

## 2020-01-01 RX ORDER — IPRATROPIUM BROMIDE AND ALBUTEROL SULFATE 2.5; .5 MG/3ML; MG/3ML
1 SOLUTION RESPIRATORY (INHALATION)
Status: DISCONTINUED | OUTPATIENT
Start: 2020-01-01 | End: 2020-01-01 | Stop reason: HOSPADM

## 2020-01-01 RX ORDER — CHLORHEXIDINE GLUCONATE 0.12 MG/ML
15 RINSE ORAL 2 TIMES DAILY
Status: DISCONTINUED | OUTPATIENT
Start: 2020-01-01 | End: 2020-01-01 | Stop reason: HOSPADM

## 2020-01-01 RX ORDER — PROPOFOL 10 MG/ML
INJECTION, EMULSION INTRAVENOUS
Status: DISCONTINUED
Start: 2020-01-01 | End: 2020-01-01

## 2020-01-01 RX ORDER — PANTOPRAZOLE SODIUM 40 MG/10ML
40 INJECTION, POWDER, LYOPHILIZED, FOR SOLUTION INTRAVENOUS DAILY
Status: DISCONTINUED | OUTPATIENT
Start: 2020-01-01 | End: 2020-01-01

## 2020-01-01 RX ORDER — IPRATROPIUM BROMIDE AND ALBUTEROL SULFATE 2.5; .5 MG/3ML; MG/3ML
1 SOLUTION RESPIRATORY (INHALATION) EVERY 4 HOURS
Status: DISCONTINUED | OUTPATIENT
Start: 2020-01-01 | End: 2020-01-01

## 2020-01-01 RX ADMIN — DEXTROSE MONOHYDRATE 2 MCG/MIN: 50 INJECTION, SOLUTION INTRAVENOUS at 22:14

## 2020-01-01 RX ADMIN — IPRATROPIUM BROMIDE AND ALBUTEROL SULFATE 1 AMPULE: .5; 3 SOLUTION RESPIRATORY (INHALATION) at 23:46

## 2020-01-01 RX ADMIN — Medication 15 ML: at 08:49

## 2020-01-01 RX ADMIN — IPRATROPIUM BROMIDE AND ALBUTEROL SULFATE 1 AMPULE: .5; 3 SOLUTION RESPIRATORY (INHALATION) at 12:16

## 2020-01-01 RX ADMIN — FENTANYL CITRATE 25 MCG/HR: 50 INJECTION, SOLUTION INTRAMUSCULAR; INTRAVENOUS at 22:20

## 2020-01-01 RX ADMIN — IOPAMIDOL 85 ML: 755 INJECTION, SOLUTION INTRAVENOUS at 10:34

## 2020-01-01 RX ADMIN — ASCORBIC ACID 1500 MG: 500 INJECTION INTRAVENOUS at 15:32

## 2020-01-01 RX ADMIN — ALBUMIN (HUMAN) 25 G: 0.25 INJECTION, SOLUTION INTRAVENOUS at 13:58

## 2020-01-01 RX ADMIN — FENTANYL CITRATE 50 MCG/HR: 50 INJECTION, SOLUTION INTRAMUSCULAR; INTRAVENOUS at 00:12

## 2020-01-01 RX ADMIN — CARBOXYMETHYLCELLULOSE SODIUM 1 DROP: 10 GEL OPHTHALMIC at 06:11

## 2020-01-01 RX ADMIN — DEXTROSE MONOHYDRATE: 50 INJECTION, SOLUTION INTRAVENOUS at 13:26

## 2020-01-01 RX ADMIN — DEXTROSE MONOHYDRATE 12.5 G: 25 INJECTION, SOLUTION INTRAVENOUS at 08:12

## 2020-01-01 RX ADMIN — DEXTROSE MONOHYDRATE 11 MCG/MIN: 50 INJECTION, SOLUTION INTRAVENOUS at 02:12

## 2020-01-01 RX ADMIN — IPRATROPIUM BROMIDE AND ALBUTEROL SULFATE 1 AMPULE: .5; 3 SOLUTION RESPIRATORY (INHALATION) at 03:45

## 2020-01-01 RX ADMIN — IPRATROPIUM BROMIDE AND ALBUTEROL SULFATE 1 AMPULE: .5; 3 SOLUTION RESPIRATORY (INHALATION) at 19:45

## 2020-01-01 RX ADMIN — HYDROCORTISONE SODIUM SUCCINATE 50 MG: 100 INJECTION, POWDER, FOR SOLUTION INTRAMUSCULAR; INTRAVENOUS at 03:52

## 2020-01-01 RX ADMIN — Medication 15 ML: at 19:39

## 2020-01-01 RX ADMIN — CARBOXYMETHYLCELLULOSE SODIUM 1 DROP: 10 GEL OPHTHALMIC at 01:06

## 2020-01-01 RX ADMIN — SODIUM BICARBONATE 50 MEQ: 84 INJECTION, SOLUTION INTRAVENOUS at 07:40

## 2020-01-01 RX ADMIN — IPRATROPIUM BROMIDE AND ALBUTEROL SULFATE 1 AMPULE: .5; 3 SOLUTION RESPIRATORY (INHALATION) at 14:17

## 2020-01-01 RX ADMIN — OXYBUTYNIN CHLORIDE 2.5 MG: 5 TABLET ORAL at 20:51

## 2020-01-01 RX ADMIN — CALCIUM GLUCONATE 2 G: 98 INJECTION, SOLUTION INTRAVENOUS at 09:52

## 2020-01-01 RX ADMIN — DEXTROSE MONOHYDRATE 50 MCG/MIN: 50 INJECTION, SOLUTION INTRAVENOUS at 05:35

## 2020-01-01 RX ADMIN — SODIUM CHLORIDE 1000 ML: 9 INJECTION, SOLUTION INTRAVENOUS at 13:46

## 2020-01-01 RX ADMIN — ASCORBIC ACID 1500 MG: 500 INJECTION INTRAVENOUS at 03:22

## 2020-01-01 RX ADMIN — MEROPENEM 1 G: 1 INJECTION, POWDER, FOR SOLUTION INTRAVENOUS at 22:20

## 2020-01-01 RX ADMIN — CARBOXYMETHYLCELLULOSE SODIUM 1 DROP: 10 GEL OPHTHALMIC at 07:21

## 2020-01-01 RX ADMIN — Medication 10 ML: at 20:42

## 2020-01-01 RX ADMIN — CARBOXYMETHYLCELLULOSE SODIUM 1 DROP: 10 GEL OPHTHALMIC at 01:19

## 2020-01-01 RX ADMIN — MEROPENEM 1 G: 1 INJECTION, POWDER, FOR SOLUTION INTRAVENOUS at 21:50

## 2020-01-01 RX ADMIN — CARBOXYMETHYLCELLULOSE SODIUM 1 DROP: 10 GEL OPHTHALMIC at 16:58

## 2020-01-01 RX ADMIN — MUPIROCIN: 20 OINTMENT TOPICAL at 07:27

## 2020-01-01 RX ADMIN — ASCORBIC ACID 1500 MG: 500 INJECTION INTRAVENOUS at 02:38

## 2020-01-01 RX ADMIN — LIDOCAINE HYDROCHLORIDE 5 ML: 10 INJECTION, SOLUTION INFILTRATION; PERINEURAL at 09:57

## 2020-01-01 RX ADMIN — MEROPENEM 1 G: 1 INJECTION, POWDER, FOR SOLUTION INTRAVENOUS at 22:06

## 2020-01-01 RX ADMIN — MEROPENEM 1 G: 1 INJECTION, POWDER, FOR SOLUTION INTRAVENOUS at 20:36

## 2020-01-01 RX ADMIN — CARBOXYMETHYLCELLULOSE SODIUM 1 DROP: 10 GEL OPHTHALMIC at 11:41

## 2020-01-01 RX ADMIN — LEVOTHYROXINE SODIUM ANHYDROUS 200 MCG: 100 INJECTION, POWDER, LYOPHILIZED, FOR SOLUTION INTRAVENOUS at 09:53

## 2020-01-01 RX ADMIN — MUPIROCIN: 20 OINTMENT TOPICAL at 19:40

## 2020-01-01 RX ADMIN — FAMOTIDINE 20 MG: 10 INJECTION INTRAVENOUS at 08:52

## 2020-01-01 RX ADMIN — CARBOXYMETHYLCELLULOSE SODIUM 1 DROP: 10 GEL OPHTHALMIC at 12:45

## 2020-01-01 RX ADMIN — CARBOXYMETHYLCELLULOSE SODIUM 1 DROP: 10 GEL OPHTHALMIC at 08:59

## 2020-01-01 RX ADMIN — FENTANYL CITRATE 25 MCG: 50 INJECTION, SOLUTION INTRAMUSCULAR; INTRAVENOUS at 21:00

## 2020-01-01 RX ADMIN — DOCUSATE SODIUM 100 MG: 50 LIQUID ORAL at 09:27

## 2020-01-01 RX ADMIN — ASCORBIC ACID 1500 MG: 500 INJECTION INTRAVENOUS at 19:39

## 2020-01-01 RX ADMIN — PANTOPRAZOLE SODIUM 40 MG: 40 INJECTION, POWDER, FOR SOLUTION INTRAVENOUS at 09:18

## 2020-01-01 RX ADMIN — SODIUM CHLORIDE 1000 ML: 9 INJECTION, SOLUTION INTRAVENOUS at 22:14

## 2020-01-01 RX ADMIN — EPINEPHRINE 10 MCG/MIN: 1 INJECTION, SOLUTION, CONCENTRATE INTRAVENOUS at 07:39

## 2020-01-01 RX ADMIN — DEXTROSE MONOHYDRATE: 50 INJECTION, SOLUTION INTRAVENOUS at 11:07

## 2020-01-01 RX ADMIN — FENTANYL CITRATE 50 MCG/HR: 50 INJECTION, SOLUTION INTRAMUSCULAR; INTRAVENOUS at 01:13

## 2020-01-01 RX ADMIN — DEXTROSE MONOHYDRATE: 50 INJECTION, SOLUTION INTRAVENOUS at 15:22

## 2020-01-01 RX ADMIN — CARBOXYMETHYLCELLULOSE SODIUM 1 DROP: 10 GEL OPHTHALMIC at 07:27

## 2020-01-01 RX ADMIN — POTASSIUM PHOSPHATE, MONOBASIC AND POTASSIUM PHOSPHATE, DIBASIC 15 MMOL: 224; 236 INJECTION, SOLUTION, CONCENTRATE INTRAVENOUS at 09:05

## 2020-01-01 RX ADMIN — MEROPENEM 1 G: 1 INJECTION, POWDER, FOR SOLUTION INTRAVENOUS at 05:34

## 2020-01-01 RX ADMIN — HYDROCORTISONE SODIUM SUCCINATE 50 MG: 100 INJECTION, POWDER, FOR SOLUTION INTRAMUSCULAR; INTRAVENOUS at 13:25

## 2020-01-01 RX ADMIN — FUROSEMIDE 40 MG: 10 INJECTION, SOLUTION INTRAMUSCULAR; INTRAVENOUS at 13:37

## 2020-01-01 RX ADMIN — DEXTROSE AND SODIUM CHLORIDE: 5; 450 INJECTION, SOLUTION INTRAVENOUS at 16:15

## 2020-01-01 RX ADMIN — CARBOXYMETHYLCELLULOSE SODIUM 1 DROP: 10 GEL OPHTHALMIC at 11:58

## 2020-01-01 RX ADMIN — MUPIROCIN: 20 OINTMENT TOPICAL at 20:41

## 2020-01-01 RX ADMIN — LEVOTHYROXINE SODIUM 25 MCG: 25 TABLET ORAL at 05:03

## 2020-01-01 RX ADMIN — ASCORBIC ACID 1500 MG: 500 INJECTION INTRAVENOUS at 02:36

## 2020-01-01 RX ADMIN — MEROPENEM 1 G: 1 INJECTION, POWDER, FOR SOLUTION INTRAVENOUS at 22:51

## 2020-01-01 RX ADMIN — CARBOXYMETHYLCELLULOSE SODIUM 1 DROP: 10 GEL OPHTHALMIC at 08:46

## 2020-01-01 RX ADMIN — MUPIROCIN: 20 OINTMENT TOPICAL at 09:30

## 2020-01-01 RX ADMIN — Medication 15 ML: at 21:19

## 2020-01-01 RX ADMIN — FUROSEMIDE 40 MG: 10 INJECTION, SOLUTION INTRAMUSCULAR; INTRAVENOUS at 15:22

## 2020-01-01 RX ADMIN — LEVOTHYROXINE SODIUM 25 MCG: 25 TABLET ORAL at 09:32

## 2020-01-01 RX ADMIN — Medication 15 ML: at 08:47

## 2020-01-01 RX ADMIN — DEXTROSE MONOHYDRATE 12.5 G: 25 INJECTION, SOLUTION INTRAVENOUS at 04:46

## 2020-01-01 RX ADMIN — MUPIROCIN: 20 OINTMENT TOPICAL at 08:59

## 2020-01-01 RX ADMIN — ACETAMINOPHEN 650 MG: 650 SUPPOSITORY RECTAL at 20:47

## 2020-01-01 RX ADMIN — FAMOTIDINE 20 MG: 10 INJECTION INTRAVENOUS at 22:34

## 2020-01-01 RX ADMIN — IPRATROPIUM BROMIDE AND ALBUTEROL SULFATE 1 AMPULE: .5; 3 SOLUTION RESPIRATORY (INHALATION) at 09:19

## 2020-01-01 RX ADMIN — DEXTROSE MONOHYDRATE 12.5 G: 25 INJECTION, SOLUTION INTRAVENOUS at 00:18

## 2020-01-01 RX ADMIN — DEXTROSE MONOHYDRATE 12.5 G: 25 INJECTION, SOLUTION INTRAVENOUS at 22:47

## 2020-01-01 RX ADMIN — LEVOTHYROXINE SODIUM 25 MCG: 25 TABLET ORAL at 06:17

## 2020-01-01 RX ADMIN — LEVOTHYROXINE SODIUM 25 MCG: 25 TABLET ORAL at 05:32

## 2020-01-01 RX ADMIN — SODIUM BICARBONATE 50 MEQ: 84 INJECTION INTRAVENOUS at 09:53

## 2020-01-01 RX ADMIN — IPRATROPIUM BROMIDE AND ALBUTEROL SULFATE 1 AMPULE: .5; 3 SOLUTION RESPIRATORY (INHALATION) at 11:42

## 2020-01-01 RX ADMIN — ASCORBIC ACID 1500 MG: 500 INJECTION INTRAVENOUS at 03:52

## 2020-01-01 RX ADMIN — SODIUM CHLORIDE 1000 ML: 9 INJECTION, SOLUTION INTRAVENOUS at 11:33

## 2020-01-01 RX ADMIN — ASCORBIC ACID 1500 MG: 500 INJECTION INTRAVENOUS at 15:13

## 2020-01-01 RX ADMIN — VANCOMYCIN HYDROCHLORIDE 1250 MG: 10 INJECTION, POWDER, LYOPHILIZED, FOR SOLUTION INTRAVENOUS at 10:58

## 2020-01-01 RX ADMIN — PANTOPRAZOLE SODIUM 40 MG: 40 INJECTION, POWDER, FOR SOLUTION INTRAVENOUS at 08:56

## 2020-01-01 RX ADMIN — HEPARIN SODIUM 5000 UNITS: 5000 INJECTION INTRAVENOUS; SUBCUTANEOUS at 20:48

## 2020-01-01 RX ADMIN — HYDROCORTISONE SODIUM SUCCINATE 50 MG: 100 INJECTION, POWDER, FOR SOLUTION INTRAMUSCULAR; INTRAVENOUS at 21:17

## 2020-01-01 RX ADMIN — Medication 10 ML: at 21:19

## 2020-01-01 RX ADMIN — POTASSIUM CHLORIDE 10 MEQ: 10 INJECTION, SOLUTION INTRAVENOUS at 12:06

## 2020-01-01 RX ADMIN — IPRATROPIUM BROMIDE AND ALBUTEROL SULFATE 1 AMPULE: .5; 3 SOLUTION RESPIRATORY (INHALATION) at 08:08

## 2020-01-01 RX ADMIN — SODIUM CHLORIDE 1000 ML: 9 INJECTION, SOLUTION INTRAVENOUS at 09:53

## 2020-01-01 RX ADMIN — MEROPENEM 1 G: 1 INJECTION, POWDER, FOR SOLUTION INTRAVENOUS at 14:20

## 2020-01-01 RX ADMIN — FAMOTIDINE 20 MG: 10 INJECTION INTRAVENOUS at 07:22

## 2020-01-01 RX ADMIN — HYDROCORTISONE SODIUM SUCCINATE 100 MG: 100 INJECTION, POWDER, FOR SOLUTION INTRAMUSCULAR; INTRAVENOUS at 08:03

## 2020-01-01 RX ADMIN — IPRATROPIUM BROMIDE AND ALBUTEROL SULFATE 1 AMPULE: .5; 3 SOLUTION RESPIRATORY (INHALATION) at 11:56

## 2020-01-01 RX ADMIN — IPRATROPIUM BROMIDE AND ALBUTEROL SULFATE 1 AMPULE: .5; 3 SOLUTION RESPIRATORY (INHALATION) at 08:27

## 2020-01-01 RX ADMIN — VASOPRESSIN 0.04 UNITS/MIN: 20 INJECTION INTRAVENOUS at 22:36

## 2020-01-01 RX ADMIN — HYDROCORTISONE SODIUM SUCCINATE 50 MG: 100 INJECTION, POWDER, FOR SOLUTION INTRAMUSCULAR; INTRAVENOUS at 13:32

## 2020-01-01 RX ADMIN — SODIUM CHLORIDE 1000 ML: 9 INJECTION, SOLUTION INTRAVENOUS at 06:31

## 2020-01-01 RX ADMIN — MIDAZOLAM 2 MG/HR: 5 INJECTION INTRAMUSCULAR; INTRAVENOUS at 20:49

## 2020-01-01 RX ADMIN — Medication 15 ML: at 08:59

## 2020-01-01 RX ADMIN — PANTOPRAZOLE SODIUM 40 MG: 40 INJECTION, POWDER, FOR SOLUTION INTRAVENOUS at 08:36

## 2020-01-01 RX ADMIN — HYDROCORTISONE SODIUM SUCCINATE 100 MG: 100 INJECTION, POWDER, FOR SOLUTION INTRAMUSCULAR; INTRAVENOUS at 16:12

## 2020-01-01 RX ADMIN — IPRATROPIUM BROMIDE AND ALBUTEROL SULFATE 1 AMPULE: .5; 3 SOLUTION RESPIRATORY (INHALATION) at 20:54

## 2020-01-01 RX ADMIN — Medication 10 ML: at 20:41

## 2020-01-01 RX ADMIN — THIAMINE HYDROCHLORIDE 200 MG: 100 INJECTION, SOLUTION INTRAMUSCULAR; INTRAVENOUS at 15:13

## 2020-01-01 RX ADMIN — IPRATROPIUM BROMIDE AND ALBUTEROL SULFATE 1 AMPULE: .5; 3 SOLUTION RESPIRATORY (INHALATION) at 19:54

## 2020-01-01 RX ADMIN — HYDROCORTISONE SODIUM SUCCINATE 50 MG: 100 INJECTION, POWDER, FOR SOLUTION INTRAMUSCULAR; INTRAVENOUS at 00:20

## 2020-01-01 RX ADMIN — IPRATROPIUM BROMIDE AND ALBUTEROL SULFATE 1 AMPULE: .5; 3 SOLUTION RESPIRATORY (INHALATION) at 11:34

## 2020-01-01 RX ADMIN — SENNOSIDES 17.2 MG: 8.6 TABLET, FILM COATED ORAL at 20:16

## 2020-01-01 RX ADMIN — Medication 10 ML: at 09:32

## 2020-01-01 RX ADMIN — FUROSEMIDE 20 MG: 10 INJECTION, SOLUTION INTRAMUSCULAR; INTRAVENOUS at 12:48

## 2020-01-01 RX ADMIN — SODIUM CHLORIDE: 4.5 INJECTION, SOLUTION INTRAVENOUS at 16:10

## 2020-01-01 RX ADMIN — CARBOXYMETHYLCELLULOSE SODIUM 1 DROP: 10 GEL OPHTHALMIC at 19:39

## 2020-01-01 RX ADMIN — THIAMINE HYDROCHLORIDE 200 MG: 100 INJECTION, SOLUTION INTRAMUSCULAR; INTRAVENOUS at 01:32

## 2020-01-01 RX ADMIN — LEVOTHYROXINE SODIUM ANHYDROUS 50 MCG: 100 INJECTION, POWDER, LYOPHILIZED, FOR SOLUTION INTRAVENOUS at 06:32

## 2020-01-01 RX ADMIN — HYDROCORTISONE SODIUM SUCCINATE 100 MG: 100 INJECTION, POWDER, FOR SOLUTION INTRAMUSCULAR; INTRAVENOUS at 17:25

## 2020-01-01 RX ADMIN — NOREPINEPHRINE BITARTRATE 5 MCG/MIN: 1 INJECTION INTRAVENOUS at 14:07

## 2020-01-01 RX ADMIN — HYDROCORTISONE SODIUM SUCCINATE 25 MG: 100 INJECTION, POWDER, FOR SOLUTION INTRAMUSCULAR; INTRAVENOUS at 22:35

## 2020-01-01 RX ADMIN — SENNOSIDES 17.2 MG: 8.6 TABLET, FILM COATED ORAL at 09:27

## 2020-01-01 RX ADMIN — CARBOXYMETHYLCELLULOSE SODIUM 1 DROP: 10 GEL OPHTHALMIC at 04:36

## 2020-01-01 RX ADMIN — Medication 15 ML: at 22:50

## 2020-01-01 RX ADMIN — POTASSIUM CHLORIDE 20 MEQ: 400 INJECTION, SOLUTION INTRAVENOUS at 07:18

## 2020-01-01 RX ADMIN — FAMOTIDINE 20 MG: 10 INJECTION INTRAVENOUS at 08:46

## 2020-01-01 RX ADMIN — IPRATROPIUM BROMIDE AND ALBUTEROL SULFATE 1 AMPULE: .5; 3 SOLUTION RESPIRATORY (INHALATION) at 00:10

## 2020-01-01 RX ADMIN — Medication 10 ML: at 09:18

## 2020-01-01 RX ADMIN — SODIUM CHLORIDE 1000 ML: 9 INJECTION, SOLUTION INTRAVENOUS at 11:38

## 2020-01-01 RX ADMIN — HEPARIN SODIUM 5000 UNITS: 5000 INJECTION INTRAVENOUS; SUBCUTANEOUS at 13:47

## 2020-01-01 RX ADMIN — HYDROCORTISONE SODIUM SUCCINATE 50 MG: 100 INJECTION, POWDER, FOR SOLUTION INTRAMUSCULAR; INTRAVENOUS at 15:14

## 2020-01-01 RX ADMIN — THIAMINE HYDROCHLORIDE 200 MG: 100 INJECTION, SOLUTION INTRAMUSCULAR; INTRAVENOUS at 14:32

## 2020-01-01 RX ADMIN — IPRATROPIUM BROMIDE AND ALBUTEROL SULFATE 1 AMPULE: .5; 3 SOLUTION RESPIRATORY (INHALATION) at 12:12

## 2020-01-01 RX ADMIN — IPRATROPIUM BROMIDE AND ALBUTEROL SULFATE 1 AMPULE: .5; 3 SOLUTION RESPIRATORY (INHALATION) at 20:11

## 2020-01-01 RX ADMIN — HYDROCORTISONE SODIUM SUCCINATE 50 MG: 100 INJECTION, POWDER, FOR SOLUTION INTRAMUSCULAR; INTRAVENOUS at 13:55

## 2020-01-01 RX ADMIN — BISACODYL 10 MG: 10 SUPPOSITORY RECTAL at 18:38

## 2020-01-01 RX ADMIN — LEVOTHYROXINE SODIUM 25 MCG: 25 TABLET ORAL at 06:50

## 2020-01-01 RX ADMIN — VASOPRESSIN 0.04 UNITS/MIN: 20 INJECTION INTRAVENOUS at 12:42

## 2020-01-01 RX ADMIN — LEVOTHYROXINE SODIUM 25 MCG: 25 TABLET ORAL at 08:02

## 2020-01-01 RX ADMIN — HYDROCORTISONE SODIUM SUCCINATE 25 MG: 100 INJECTION, POWDER, FOR SOLUTION INTRAMUSCULAR; INTRAVENOUS at 15:43

## 2020-01-01 RX ADMIN — IPRATROPIUM BROMIDE AND ALBUTEROL SULFATE 1 AMPULE: .5; 3 SOLUTION RESPIRATORY (INHALATION) at 07:37

## 2020-01-01 RX ADMIN — Medication 10 ML: at 07:32

## 2020-01-01 RX ADMIN — Medication 10 ML: at 20:52

## 2020-01-01 RX ADMIN — Medication 10 ML: at 22:40

## 2020-01-01 RX ADMIN — HYDROCORTISONE SODIUM SUCCINATE 100 MG: 100 INJECTION, POWDER, FOR SOLUTION INTRAMUSCULAR; INTRAVENOUS at 02:52

## 2020-01-01 RX ADMIN — ASCORBIC ACID 1500 MG: 500 INJECTION INTRAVENOUS at 20:36

## 2020-01-01 RX ADMIN — FUROSEMIDE 40 MG: 10 INJECTION, SOLUTION INTRAMUSCULAR; INTRAVENOUS at 16:12

## 2020-01-01 RX ADMIN — FAMOTIDINE 20 MG: 10 INJECTION INTRAVENOUS at 22:51

## 2020-01-01 RX ADMIN — SODIUM CHLORIDE 15 MCG/MIN: 9 INJECTION, SOLUTION INTRAVENOUS at 15:00

## 2020-01-01 RX ADMIN — IPRATROPIUM BROMIDE AND ALBUTEROL SULFATE 1 AMPULE: .5; 3 SOLUTION RESPIRATORY (INHALATION) at 11:50

## 2020-01-01 RX ADMIN — FAMOTIDINE 20 MG: 10 INJECTION INTRAVENOUS at 08:58

## 2020-01-01 RX ADMIN — CARBOXYMETHYLCELLULOSE SODIUM 1 DROP: 10 GEL OPHTHALMIC at 15:34

## 2020-01-01 RX ADMIN — CEFEPIME HYDROCHLORIDE 2 G: 2 INJECTION, POWDER, FOR SOLUTION INTRAVENOUS at 22:10

## 2020-01-01 RX ADMIN — VASOPRESSIN 0.04 UNITS/MIN: 20 INJECTION INTRAVENOUS at 07:58

## 2020-01-01 RX ADMIN — Medication 15 ML: at 07:30

## 2020-01-01 RX ADMIN — VANCOMYCIN HYDROCHLORIDE 500 MG: 500 INJECTION, POWDER, LYOPHILIZED, FOR SOLUTION INTRAVENOUS at 08:45

## 2020-01-01 RX ADMIN — SODIUM CHLORIDE: 9 INJECTION, SOLUTION INTRAVENOUS at 09:54

## 2020-01-01 RX ADMIN — HYDROCORTISONE SODIUM SUCCINATE 50 MG: 100 INJECTION, POWDER, FOR SOLUTION INTRAMUSCULAR; INTRAVENOUS at 08:59

## 2020-01-01 RX ADMIN — Medication 10 ML: at 21:12

## 2020-01-01 RX ADMIN — DOCUSATE SODIUM 100 MG: 50 LIQUID ORAL at 20:15

## 2020-01-01 RX ADMIN — DEXTROSE MONOHYDRATE: 50 INJECTION, SOLUTION INTRAVENOUS at 04:49

## 2020-01-01 RX ADMIN — HYDROCORTISONE SODIUM SUCCINATE 50 MG: 100 INJECTION, POWDER, FOR SOLUTION INTRAMUSCULAR; INTRAVENOUS at 20:36

## 2020-01-01 RX ADMIN — DEXTROSE MONOHYDRATE 12.5 G: 25 INJECTION, SOLUTION INTRAVENOUS at 08:34

## 2020-01-01 RX ADMIN — CARBOXYMETHYLCELLULOSE SODIUM 1 DROP: 10 GEL OPHTHALMIC at 04:05

## 2020-01-01 RX ADMIN — FUROSEMIDE 40 MG: 10 INJECTION, SOLUTION INTRAMUSCULAR; INTRAVENOUS at 17:24

## 2020-01-01 RX ADMIN — LEVOTHYROXINE SODIUM 25 MCG: 25 TABLET ORAL at 09:05

## 2020-01-01 RX ADMIN — Medication 10 ML: at 04:50

## 2020-01-01 RX ADMIN — HYDROCORTISONE SODIUM SUCCINATE 50 MG: 100 INJECTION, POWDER, FOR SOLUTION INTRAMUSCULAR; INTRAVENOUS at 07:21

## 2020-01-01 RX ADMIN — Medication 10 ML: at 04:20

## 2020-01-01 RX ADMIN — LORAZEPAM 0.5 MG: 2 INJECTION, SOLUTION INTRAMUSCULAR; INTRAVENOUS at 13:29

## 2020-01-01 RX ADMIN — CARBOXYMETHYLCELLULOSE SODIUM 1 DROP: 10 GEL OPHTHALMIC at 08:53

## 2020-01-01 RX ADMIN — CARBOXYMETHYLCELLULOSE SODIUM 1 DROP: 10 GEL OPHTHALMIC at 04:31

## 2020-01-01 RX ADMIN — MUPIROCIN: 20 OINTMENT TOPICAL at 08:46

## 2020-01-01 RX ADMIN — THIAMINE HYDROCHLORIDE 200 MG: 100 INJECTION, SOLUTION INTRAMUSCULAR; INTRAVENOUS at 02:36

## 2020-01-01 RX ADMIN — MUPIROCIN: 20 OINTMENT TOPICAL at 20:17

## 2020-01-01 RX ADMIN — ASCORBIC ACID 1500 MG: 500 INJECTION INTRAVENOUS at 21:32

## 2020-01-01 RX ADMIN — MEROPENEM 1 G: 1 INJECTION, POWDER, FOR SOLUTION INTRAVENOUS at 06:09

## 2020-01-01 RX ADMIN — FAMOTIDINE 20 MG: 10 INJECTION INTRAVENOUS at 20:41

## 2020-01-01 RX ADMIN — Medication 10 ML: at 08:56

## 2020-01-01 RX ADMIN — HYDROCORTISONE SODIUM SUCCINATE 50 MG: 100 INJECTION, POWDER, FOR SOLUTION INTRAMUSCULAR; INTRAVENOUS at 02:36

## 2020-01-01 RX ADMIN — CEFEPIME 2 G: 2 INJECTION, POWDER, FOR SOLUTION INTRAVENOUS at 10:41

## 2020-01-01 RX ADMIN — DEXTROSE MONOHYDRATE 30 MCG/MIN: 50 INJECTION, SOLUTION INTRAVENOUS at 13:34

## 2020-01-01 RX ADMIN — Medication 10 ML: at 08:59

## 2020-01-01 RX ADMIN — MEROPENEM 1 G: 1 INJECTION, POWDER, FOR SOLUTION INTRAVENOUS at 13:33

## 2020-01-01 RX ADMIN — MIDAZOLAM 1 MG/HR: 5 INJECTION INTRAMUSCULAR; INTRAVENOUS at 23:59

## 2020-01-01 RX ADMIN — IPRATROPIUM BROMIDE AND ALBUTEROL SULFATE 1 AMPULE: .5; 3 SOLUTION RESPIRATORY (INHALATION) at 04:20

## 2020-01-01 RX ADMIN — SODIUM CHLORIDE 1500 ML: 9 INJECTION, SOLUTION INTRAVENOUS at 21:30

## 2020-01-01 RX ADMIN — HEPARIN SODIUM 5000 UNITS: 5000 INJECTION INTRAVENOUS; SUBCUTANEOUS at 06:09

## 2020-01-01 RX ADMIN — PANTOPRAZOLE SODIUM 40 MG: 40 INJECTION, POWDER, FOR SOLUTION INTRAVENOUS at 09:33

## 2020-01-01 RX ADMIN — IPRATROPIUM BROMIDE AND ALBUTEROL SULFATE 1 AMPULE: .5; 3 SOLUTION RESPIRATORY (INHALATION) at 15:52

## 2020-01-01 RX ADMIN — IPRATROPIUM BROMIDE AND ALBUTEROL SULFATE 1 AMPULE: .5; 3 SOLUTION RESPIRATORY (INHALATION) at 15:55

## 2020-01-01 RX ADMIN — POTASSIUM PHOSPHATE, MONOBASIC AND POTASSIUM PHOSPHATE, DIBASIC 10 MMOL: 224; 236 INJECTION, SOLUTION, CONCENTRATE INTRAVENOUS at 10:38

## 2020-01-01 RX ADMIN — THIAMINE HYDROCHLORIDE 200 MG: 100 INJECTION, SOLUTION INTRAMUSCULAR; INTRAVENOUS at 13:22

## 2020-01-01 RX ADMIN — Medication 10 ML: at 09:58

## 2020-01-01 RX ADMIN — Medication 15 ML: at 20:23

## 2020-01-01 RX ADMIN — IPRATROPIUM BROMIDE AND ALBUTEROL SULFATE 1 AMPULE: .5; 3 SOLUTION RESPIRATORY (INHALATION) at 08:48

## 2020-01-01 RX ADMIN — DEXTROSE MONOHYDRATE 3 MCG/MIN: 50 INJECTION, SOLUTION INTRAVENOUS at 07:05

## 2020-01-01 RX ADMIN — ALBUMIN (HUMAN) 25 G: 0.25 INJECTION, SOLUTION INTRAVENOUS at 12:06

## 2020-01-01 RX ADMIN — IPRATROPIUM BROMIDE AND ALBUTEROL SULFATE 1 AMPULE: .5; 3 SOLUTION RESPIRATORY (INHALATION) at 16:10

## 2020-01-01 RX ADMIN — CARBOXYMETHYLCELLULOSE SODIUM 1 DROP: 10 GEL OPHTHALMIC at 18:07

## 2020-01-01 RX ADMIN — HYDROCORTISONE SODIUM SUCCINATE 50 MG: 100 INJECTION, POWDER, FOR SOLUTION INTRAMUSCULAR; INTRAVENOUS at 07:27

## 2020-01-01 RX ADMIN — IPRATROPIUM BROMIDE AND ALBUTEROL SULFATE 1 AMPULE: .5; 3 SOLUTION RESPIRATORY (INHALATION) at 20:05

## 2020-01-01 RX ADMIN — FUROSEMIDE 40 MG: 10 INJECTION, SOLUTION INTRAMUSCULAR; INTRAVENOUS at 15:13

## 2020-01-01 RX ADMIN — MUPIROCIN: 20 OINTMENT TOPICAL at 21:19

## 2020-01-01 RX ADMIN — LEVOTHYROXINE SODIUM 25 MCG: 25 TABLET ORAL at 08:33

## 2020-01-01 RX ADMIN — HYDROCORTISONE SODIUM SUCCINATE 100 MG: 100 INJECTION, POWDER, FOR SOLUTION INTRAMUSCULAR; INTRAVENOUS at 09:52

## 2020-01-01 RX ADMIN — Medication 10 ML: at 08:58

## 2020-01-01 RX ADMIN — MUPIROCIN: 20 OINTMENT TOPICAL at 20:48

## 2020-01-01 RX ADMIN — IPRATROPIUM BROMIDE AND ALBUTEROL SULFATE 1 AMPULE: .5; 3 SOLUTION RESPIRATORY (INHALATION) at 08:31

## 2020-01-01 RX ADMIN — ASCORBIC ACID 1500 MG: 500 INJECTION INTRAVENOUS at 08:47

## 2020-01-01 RX ADMIN — Medication 10 ML: at 21:33

## 2020-01-01 RX ADMIN — FENTANYL CITRATE 25 MCG/HR: 50 INJECTION, SOLUTION INTRAMUSCULAR; INTRAVENOUS at 21:37

## 2020-01-01 RX ADMIN — DEXTROSE MONOHYDRATE 25 G: 25 INJECTION, SOLUTION INTRAVENOUS at 11:24

## 2020-01-01 RX ADMIN — CARBOXYMETHYLCELLULOSE SODIUM 1 DROP: 10 GEL OPHTHALMIC at 15:13

## 2020-01-01 RX ADMIN — CARBOXYMETHYLCELLULOSE SODIUM 1 DROP: 10 GEL OPHTHALMIC at 20:40

## 2020-01-01 RX ADMIN — VASOPRESSIN 0.04 UNITS/MIN: 20 INJECTION INTRAVENOUS at 18:08

## 2020-01-01 RX ADMIN — PANTOPRAZOLE SODIUM 40 MG: 40 INJECTION, POWDER, FOR SOLUTION INTRAVENOUS at 08:00

## 2020-01-01 RX ADMIN — HYDROCORTISONE SODIUM SUCCINATE 50 MG: 100 INJECTION, POWDER, FOR SOLUTION INTRAMUSCULAR; INTRAVENOUS at 08:52

## 2020-01-01 RX ADMIN — IPRATROPIUM BROMIDE AND ALBUTEROL SULFATE 1 AMPULE: .5; 3 SOLUTION RESPIRATORY (INHALATION) at 08:16

## 2020-01-01 RX ADMIN — HYDROCORTISONE SODIUM SUCCINATE 100 MG: 100 INJECTION, POWDER, FOR SOLUTION INTRAMUSCULAR; INTRAVENOUS at 01:04

## 2020-01-01 RX ADMIN — CARBOXYMETHYLCELLULOSE SODIUM 1 DROP: 10 GEL OPHTHALMIC at 00:33

## 2020-01-01 RX ADMIN — FENTANYL CITRATE 35 MCG/HR: 50 INJECTION, SOLUTION INTRAMUSCULAR; INTRAVENOUS at 08:34

## 2020-01-01 RX ADMIN — THIAMINE HYDROCHLORIDE 200 MG: 100 INJECTION, SOLUTION INTRAMUSCULAR; INTRAVENOUS at 13:55

## 2020-01-01 RX ADMIN — HYDROCORTISONE SODIUM SUCCINATE 50 MG: 100 INJECTION, POWDER, FOR SOLUTION INTRAMUSCULAR; INTRAVENOUS at 08:46

## 2020-01-01 RX ADMIN — IPRATROPIUM BROMIDE AND ALBUTEROL SULFATE 1 AMPULE: .5; 3 SOLUTION RESPIRATORY (INHALATION) at 15:15

## 2020-01-01 RX ADMIN — FUROSEMIDE 40 MG: 10 INJECTION, SOLUTION INTRAMUSCULAR; INTRAVENOUS at 07:33

## 2020-01-01 RX ADMIN — ASCORBIC ACID 1500 MG: 500 INJECTION INTRAVENOUS at 09:39

## 2020-01-01 RX ADMIN — IPRATROPIUM BROMIDE AND ALBUTEROL SULFATE 1 AMPULE: .5; 3 SOLUTION RESPIRATORY (INHALATION) at 23:59

## 2020-01-01 RX ADMIN — ASCORBIC ACID 1500 MG: 500 INJECTION INTRAVENOUS at 14:17

## 2020-01-01 RX ADMIN — INSULIN HUMAN 10 UNITS: 100 INJECTION, SOLUTION PARENTERAL at 11:24

## 2020-01-01 RX ADMIN — MEROPENEM 1 G: 1 INJECTION, POWDER, FOR SOLUTION INTRAVENOUS at 13:58

## 2020-01-01 RX ADMIN — Medication 15 ML: at 20:37

## 2020-01-01 RX ADMIN — FAMOTIDINE 20 MG: 10 INJECTION INTRAVENOUS at 19:39

## 2020-01-01 RX ADMIN — CARBOXYMETHYLCELLULOSE SODIUM 1 DROP: 10 GEL OPHTHALMIC at 12:20

## 2020-01-01 RX ADMIN — Medication 10 ML: at 20:18

## 2020-01-01 RX ADMIN — FENTANYL CITRATE 50 MCG/HR: 50 INJECTION, SOLUTION INTRAMUSCULAR; INTRAVENOUS at 22:32

## 2020-01-01 RX ADMIN — FAMOTIDINE 20 MG: 10 INJECTION INTRAVENOUS at 07:32

## 2020-01-01 RX ADMIN — HYDROCORTISONE SODIUM SUCCINATE 50 MG: 100 INJECTION, POWDER, FOR SOLUTION INTRAMUSCULAR; INTRAVENOUS at 20:41

## 2020-01-01 RX ADMIN — PERFLUTREN 1.65 MG: 6.52 INJECTION, SUSPENSION INTRAVENOUS at 14:00

## 2020-01-01 RX ADMIN — SODIUM BICARBONATE: 84 INJECTION, SOLUTION INTRAVENOUS at 07:39

## 2020-01-01 RX ADMIN — Medication 15 ML: at 20:41

## 2020-01-01 RX ADMIN — MEROPENEM 1 G: 1 INJECTION, POWDER, FOR SOLUTION INTRAVENOUS at 14:05

## 2020-01-01 RX ADMIN — DEXTROSE MONOHYDRATE: 50 INJECTION, SOLUTION INTRAVENOUS at 23:45

## 2020-01-01 RX ADMIN — OXYBUTYNIN CHLORIDE 2.5 MG: 5 TABLET ORAL at 21:27

## 2020-01-01 RX ADMIN — VASOPRESSIN 0.04 UNITS/MIN: 20 INJECTION INTRAVENOUS at 10:40

## 2020-01-01 RX ADMIN — SODIUM CHLORIDE 1000 ML: 9 INJECTION, SOLUTION INTRAVENOUS at 02:25

## 2020-01-01 RX ADMIN — IPRATROPIUM BROMIDE AND ALBUTEROL SULFATE 1 AMPULE: .5; 3 SOLUTION RESPIRATORY (INHALATION) at 03:55

## 2020-01-01 RX ADMIN — IPRATROPIUM BROMIDE AND ALBUTEROL SULFATE 1 AMPULE: .5; 3 SOLUTION RESPIRATORY (INHALATION) at 11:48

## 2020-01-01 RX ADMIN — IPRATROPIUM BROMIDE AND ALBUTEROL SULFATE 3 ML: .5; 3 SOLUTION RESPIRATORY (INHALATION) at 12:57

## 2020-01-01 RX ADMIN — IPRATROPIUM BROMIDE AND ALBUTEROL SULFATE 1 AMPULE: .5; 3 SOLUTION RESPIRATORY (INHALATION) at 20:10

## 2020-01-01 RX ADMIN — CARBOXYMETHYLCELLULOSE SODIUM 1 DROP: 10 GEL OPHTHALMIC at 00:54

## 2020-01-01 RX ADMIN — VASOPRESSIN 0.04 UNITS/MIN: 20 INJECTION INTRAVENOUS at 17:01

## 2020-01-01 RX ADMIN — ASCORBIC ACID 1500 MG: 500 INJECTION INTRAVENOUS at 20:41

## 2020-01-01 RX ADMIN — HYDROCORTISONE SODIUM SUCCINATE 50 MG: 100 INJECTION, POWDER, FOR SOLUTION INTRAMUSCULAR; INTRAVENOUS at 02:08

## 2020-01-01 RX ADMIN — VANCOMYCIN HYDROCHLORIDE 1250 MG: 1 INJECTION, POWDER, LYOPHILIZED, FOR SOLUTION INTRAVENOUS at 11:20

## 2020-01-01 RX ADMIN — FAMOTIDINE 20 MG: 10 INJECTION INTRAVENOUS at 21:17

## 2020-01-01 RX ADMIN — PANTOPRAZOLE SODIUM 40 MG: 40 INJECTION, POWDER, FOR SOLUTION INTRAVENOUS at 11:09

## 2020-01-01 RX ADMIN — POTASSIUM CHLORIDE 20 MEQ: 400 INJECTION, SOLUTION INTRAVENOUS at 05:50

## 2020-01-01 RX ADMIN — IPRATROPIUM BROMIDE AND ALBUTEROL SULFATE 1 AMPULE: .5; 3 SOLUTION RESPIRATORY (INHALATION) at 00:18

## 2020-01-01 RX ADMIN — IPRATROPIUM BROMIDE AND ALBUTEROL SULFATE 1 AMPULE: .5; 3 SOLUTION RESPIRATORY (INHALATION) at 03:54

## 2020-01-01 RX ADMIN — PANTOPRAZOLE SODIUM 40 MG: 40 INJECTION, POWDER, FOR SOLUTION INTRAVENOUS at 08:02

## 2020-01-01 RX ADMIN — Medication 10 ML: at 08:03

## 2020-01-01 RX ADMIN — HYDROCORTISONE SODIUM SUCCINATE 50 MG: 100 INJECTION, POWDER, FOR SOLUTION INTRAMUSCULAR; INTRAVENOUS at 19:39

## 2020-01-01 RX ADMIN — VASOPRESSIN 0.04 UNITS/MIN: 20 INJECTION INTRAVENOUS at 03:21

## 2020-01-01 RX ADMIN — PANTOPRAZOLE SODIUM 40 MG: 40 INJECTION, POWDER, FOR SOLUTION INTRAVENOUS at 09:05

## 2020-01-01 RX ADMIN — Medication 10 ML: at 09:05

## 2020-01-01 RX ADMIN — HYDROCORTISONE SODIUM SUCCINATE 100 MG: 100 INJECTION, POWDER, FOR SOLUTION INTRAMUSCULAR; INTRAVENOUS at 08:00

## 2020-01-01 RX ADMIN — VANCOMYCIN HYDROCHLORIDE 500 MG: 500 INJECTION, POWDER, LYOPHILIZED, FOR SOLUTION INTRAVENOUS at 08:00

## 2020-01-01 RX ADMIN — MUPIROCIN: 20 OINTMENT TOPICAL at 07:29

## 2020-01-01 RX ADMIN — MEROPENEM 1 G: 1 INJECTION, POWDER, FOR SOLUTION INTRAVENOUS at 06:20

## 2020-01-01 RX ADMIN — Medication 10 ML: at 08:01

## 2020-01-01 RX ADMIN — Medication 10 ML: at 21:16

## 2020-01-01 RX ADMIN — MEROPENEM 1 G: 1 INJECTION, POWDER, FOR SOLUTION INTRAVENOUS at 13:45

## 2020-01-01 RX ADMIN — SODIUM CHLORIDE: 9 INJECTION, SOLUTION INTRAVENOUS at 01:15

## 2020-01-01 RX ADMIN — Medication 10 ML: at 20:37

## 2020-01-01 RX ADMIN — THIAMINE HYDROCHLORIDE 200 MG: 100 INJECTION, SOLUTION INTRAMUSCULAR; INTRAVENOUS at 03:53

## 2020-01-01 RX ADMIN — MIDAZOLAM HYDROCHLORIDE 2 MG: 2 INJECTION, SOLUTION INTRAMUSCULAR; INTRAVENOUS at 21:10

## 2020-01-01 RX ADMIN — MEROPENEM 1 G: 1 INJECTION, POWDER, FOR SOLUTION INTRAVENOUS at 05:50

## 2020-01-01 RX ADMIN — MUPIROCIN: 20 OINTMENT TOPICAL at 20:36

## 2020-01-01 RX ADMIN — FUROSEMIDE 40 MG: 10 INJECTION, SOLUTION INTRAMUSCULAR; INTRAVENOUS at 07:22

## 2020-01-01 RX ADMIN — PANTOPRAZOLE SODIUM 40 MG: 40 INJECTION, POWDER, FOR SOLUTION INTRAVENOUS at 08:12

## 2020-01-01 RX ADMIN — OXYBUTYNIN CHLORIDE 2.5 MG: 5 TABLET ORAL at 22:49

## 2020-01-01 RX ADMIN — HYDROCORTISONE SODIUM SUCCINATE 50 MG: 100 INJECTION, POWDER, FOR SOLUTION INTRAMUSCULAR; INTRAVENOUS at 02:13

## 2020-01-01 RX ADMIN — LEVOTHYROXINE SODIUM 25 MCG: 25 TABLET ORAL at 06:30

## 2020-01-01 RX ADMIN — ASCORBIC ACID 1500 MG: 500 INJECTION INTRAVENOUS at 07:38

## 2020-01-01 RX ADMIN — HYDROCORTISONE SODIUM SUCCINATE 50 MG: 100 INJECTION, POWDER, FOR SOLUTION INTRAMUSCULAR; INTRAVENOUS at 08:33

## 2020-01-01 RX ADMIN — VASOPRESSIN 0.03 UNITS/MIN: 20 INJECTION INTRAVENOUS at 02:18

## 2020-01-01 RX ADMIN — CARBOXYMETHYLCELLULOSE SODIUM 1 DROP: 10 GEL OPHTHALMIC at 21:18

## 2020-01-01 RX ADMIN — Medication 10 ML: at 08:33

## 2020-01-01 RX ADMIN — IPRATROPIUM BROMIDE AND ALBUTEROL SULFATE 1 AMPULE: .5; 3 SOLUTION RESPIRATORY (INHALATION) at 12:26

## 2020-01-01 RX ADMIN — IPRATROPIUM BROMIDE AND ALBUTEROL SULFATE 1 AMPULE: .5; 3 SOLUTION RESPIRATORY (INHALATION) at 16:01

## 2020-01-01 RX ADMIN — Medication 10 ML: at 08:47

## 2020-01-01 RX ADMIN — CARBOXYMETHYLCELLULOSE SODIUM 1 DROP: 10 GEL OPHTHALMIC at 20:35

## 2020-01-01 RX ADMIN — DEXTROSE AND SODIUM CHLORIDE: 5; 450 INJECTION, SOLUTION INTRAVENOUS at 00:17

## 2020-01-01 RX ADMIN — MUPIROCIN: 20 OINTMENT TOPICAL at 08:52

## 2020-01-01 RX ADMIN — IPRATROPIUM BROMIDE AND ALBUTEROL SULFATE 1 AMPULE: .5; 3 SOLUTION RESPIRATORY (INHALATION) at 19:15

## 2020-01-01 RX ADMIN — MEROPENEM 1 G: 1 INJECTION, POWDER, FOR SOLUTION INTRAVENOUS at 22:35

## 2020-01-01 RX ADMIN — CARBOXYMETHYLCELLULOSE SODIUM 1 DROP: 10 GEL OPHTHALMIC at 20:16

## 2020-01-01 RX ADMIN — SODIUM CHLORIDE 1000 ML: 9 INJECTION, SOLUTION INTRAVENOUS at 08:18

## 2020-01-01 RX ADMIN — DEXTROSE MONOHYDRATE 12.5 G: 25 INJECTION, SOLUTION INTRAVENOUS at 12:11

## 2020-01-01 RX ADMIN — CARBOXYMETHYLCELLULOSE SODIUM 1 DROP: 10 GEL OPHTHALMIC at 12:07

## 2020-01-01 RX ADMIN — THIAMINE HYDROCHLORIDE 200 MG: 100 INJECTION, SOLUTION INTRAMUSCULAR; INTRAVENOUS at 02:13

## 2020-01-01 RX ADMIN — SODIUM CHLORIDE 1000 ML: 9 INJECTION, SOLUTION INTRAVENOUS at 17:24

## 2020-01-01 RX ADMIN — ASCORBIC ACID 1500 MG: 500 INJECTION INTRAVENOUS at 15:39

## 2020-01-01 RX ADMIN — FUROSEMIDE 40 MG: 10 INJECTION, SOLUTION INTRAMUSCULAR; INTRAVENOUS at 11:36

## 2020-01-01 RX ADMIN — LEVOTHYROXINE SODIUM 25 MCG: 25 TABLET ORAL at 05:52

## 2020-01-01 RX ADMIN — Medication 10 ML: at 22:47

## 2020-01-01 RX ADMIN — CHLOROTHIAZIDE SODIUM 500 MG: 500 INJECTION, POWDER, LYOPHILIZED, FOR SOLUTION INTRAVENOUS at 15:47

## 2020-01-01 RX ADMIN — MORPHINE SULFATE 2 MG: 2 INJECTION, SOLUTION INTRAMUSCULAR; INTRAVENOUS at 13:31

## 2020-01-01 RX ADMIN — MUPIROCIN: 20 OINTMENT TOPICAL at 08:03

## 2020-01-01 RX ADMIN — FENTANYL CITRATE 25 MCG/HR: 50 INJECTION, SOLUTION INTRAMUSCULAR; INTRAVENOUS at 21:50

## 2020-01-01 RX ADMIN — Medication 15 ML: at 07:26

## 2020-01-01 RX ADMIN — HYDROCORTISONE SODIUM SUCCINATE 100 MG: 100 INJECTION, POWDER, FOR SOLUTION INTRAMUSCULAR; INTRAVENOUS at 16:11

## 2020-01-01 RX ADMIN — IPRATROPIUM BROMIDE AND ALBUTEROL SULFATE 1 AMPULE: .5; 3 SOLUTION RESPIRATORY (INHALATION) at 15:43

## 2020-01-01 RX ADMIN — IPRATROPIUM BROMIDE AND ALBUTEROL SULFATE 1 AMPULE: .5; 3 SOLUTION RESPIRATORY (INHALATION) at 07:52

## 2020-01-01 RX ADMIN — ASCORBIC ACID 1500 MG: 500 INJECTION INTRAVENOUS at 09:48

## 2020-01-01 RX ADMIN — MEROPENEM 1 G: 1 INJECTION, POWDER, FOR SOLUTION INTRAVENOUS at 05:39

## 2020-01-01 RX ADMIN — POTASSIUM CHLORIDE 20 MEQ: 400 INJECTION, SOLUTION INTRAVENOUS at 08:26

## 2020-01-01 RX ADMIN — POTASSIUM CHLORIDE 10 MEQ: 10 INJECTION, SOLUTION INTRAVENOUS at 13:08

## 2020-01-01 RX ADMIN — HYDROCORTISONE SODIUM SUCCINATE 50 MG: 100 INJECTION, POWDER, FOR SOLUTION INTRAMUSCULAR; INTRAVENOUS at 01:31

## 2020-01-01 RX ADMIN — Medication 10 ML: at 07:22

## 2020-01-01 RX ADMIN — MEROPENEM 1 G: 1 INJECTION, POWDER, FOR SOLUTION INTRAVENOUS at 05:28

## 2020-01-01 RX ADMIN — Medication 0.2 MCG/KG/HR: at 09:22

## 2020-01-01 RX ADMIN — IPRATROPIUM BROMIDE AND ALBUTEROL SULFATE 3 ML: 2.5; .5 SOLUTION RESPIRATORY (INHALATION) at 12:57

## 2020-01-01 ASSESSMENT — PAIN SCALES - GENERAL
PAINLEVEL_OUTOF10: 0
PAINLEVEL_OUTOF10: 4
PAINLEVEL_OUTOF10: 0

## 2020-01-01 ASSESSMENT — PULMONARY FUNCTION TESTS
PIF_VALUE: 40
PIF_VALUE: 39
PIF_VALUE: 40
PIF_VALUE: 45
PIF_VALUE: 42
PIF_VALUE: 40
PIF_VALUE: 44
PIF_VALUE: 38
PIF_VALUE: 52
PIF_VALUE: 41
PIF_VALUE: 39
PIF_VALUE: 37
PIF_VALUE: 44
PIF_VALUE: 47
PIF_VALUE: 42
PIF_VALUE: 38
PIF_VALUE: 38
PIF_VALUE: 39
PIF_VALUE: 45
PIF_VALUE: 37
PIF_VALUE: 45
PIF_VALUE: 48
PIF_VALUE: 38
PIF_VALUE: 43
PIF_VALUE: 41
PIF_VALUE: 34
PIF_VALUE: 39
PIF_VALUE: 43
PIF_VALUE: 36
PIF_VALUE: 45
PIF_VALUE: 38
PIF_VALUE: 45
PIF_VALUE: 45
PIF_VALUE: 42
PIF_VALUE: 44

## 2020-01-01 ASSESSMENT — PAIN SCALES - WONG BAKER
WONGBAKER_NUMERICALRESPONSE: 0
WONGBAKER_NUMERICALRESPONSE: 2
WONGBAKER_NUMERICALRESPONSE: 0

## 2020-09-22 PROBLEM — N17.9 AKI (ACUTE KIDNEY INJURY) (HCC): Status: ACTIVE | Noted: 2020-01-01

## 2020-09-22 PROBLEM — E66.9 CLASS 2 OBESITY IN ADULT: Status: ACTIVE | Noted: 2020-01-01

## 2020-09-22 PROBLEM — R29.818 SUSPECTED SLEEP APNEA: Status: ACTIVE | Noted: 2020-01-01

## 2020-09-22 PROBLEM — E03.9 HYPOTHYROIDISM: Status: ACTIVE | Noted: 2020-01-01

## 2020-09-22 PROBLEM — G93.40 ENCEPHALOPATHY: Status: ACTIVE | Noted: 2020-01-01

## 2020-09-22 PROBLEM — E87.5 HYPERKALEMIA: Status: ACTIVE | Noted: 2020-01-01

## 2020-09-22 PROBLEM — R57.9 SHOCK (HCC): Status: ACTIVE | Noted: 2020-01-01

## 2020-09-22 PROBLEM — J96.02 ACUTE RESPIRATORY FAILURE WITH HYPERCAPNIA (HCC): Status: ACTIVE | Noted: 2020-01-01

## 2020-09-22 PROBLEM — K85.90 ACUTE PANCREATITIS WITHOUT INFECTION OR NECROSIS: Status: ACTIVE | Noted: 2020-01-01

## 2020-09-22 PROBLEM — R91.8 PULMONARY INFILTRATES: Status: ACTIVE | Noted: 2020-01-01

## 2020-09-22 PROBLEM — T68.XXXA HYPOTHERMIA: Status: ACTIVE | Noted: 2020-01-01

## 2020-09-22 NOTE — CARE COORDINATION
CASE MANAGEMENT INITIAL ASSESSMENT      Reviewed chart and completed assessment via telephone with: Pt Cayetano pt father  Explained Case Management role/services. Primary contact information: Pt Father [address] [facility]:   Who do you trust or have selected to make healthcare decisions for you? Name:  Brain Yoo Pt father   Phone  Number: [phone]  Can this person be reached and be able to respond quickly, such as within a few minutes or hours? yes    Admit date/status: in patient  9/22/20  Diagnosis: encephalopathy   Is this a Readmission?: pt has had readmissions due to his continued failing health per his father . Insurance: My [address] required for SNF - Y, N        3 night stay required - Y, N    Living arrangements, Adls, care needs, prior to admission: pt is a long term resident at [facility] for over 7 years     Transportation: family/ facility    [address] at home: LTC  Services in the home and/or outpatient, prior to admission: LTC      PT/OT recs: [address] [place] Notification (HEN): Not sure if needed call to facility [facility] and aVM message was left. WILFREDO called and spoke with Alex Loud she is checking but was unable to make contact with the facility. She will call  tomorrow     Barriers to discharge: not noted    Plan/comments: pt will return to LTC at time of DC Pt father said that both of his sons live there and they both let him know that they like it there.    CHRISTY ReddW      [facility] on chart for MD signature

## 2020-09-22 NOTE — ED NOTES
Stroke team paged for Dr. Christiano Escobar - Stroke team Dr. Kenzie Naranjo returned page and spoke with Dr. Christiano Escobar - Fan Wilder  09/22/20 [id number]

## 2020-09-22 NOTE — PROGRESS NOTES
Consult received  K has improved  Trend with jvf     Full evaluation to follow    Monika Duran MD  The [address] [facility]  [phone]

## 2020-09-22 NOTE — H&P
Hospital Medicine History & Physical      PCP: Cecy Real MD    Date of Admission: 9/22/2020    Date of Service: Pt seen/examined on above date and Admitted to Inpatient with expected LOS greater than two midnights due to medical therapy. Chief Complaint: Altered mental status      History Of Present Illness:    60 y.o. male with history of CHF, COPD, GERD,  MRDD who presented to  [facility] ER from group home with altered mental status. History obtained per chart as patient unable to provide any history due to AMS. Pt minimally verbal at baseline. Patient reportedly had reduced responsiveness at the ECF overnight and was sent to the ER. He was noted to be hypothermic in the 80s and hypotensive in the 60s. Initial concern for possible CVA - stroke team consulted. CT head was nonacute with tiny area of low attenuation near the anterior right  internal capsule which was thought to be likely subacute/chronic. CTA head/neck was unremarkable. PICC line placed in ER. Labs notable for severe hyperkalemia with potassium of 7.1 creatinine 1.5, TSH of 7.5. Lipase was  elevated at 1471. Temperature improved to 90's with karuna hugger and warm IVF. There was concern for myxedema coma and was started on IV Synthroid. He was given a dose of IV hydrocortisone, started on broad-spectrum antibiotics and  pressors. Pt was transferred to [facility] for further care.        Past Medical History:          Diagnosis Date    Acne     Anemia     Cataract     unspecified eye    CHF (congestive heart failure) (HCC)     COPD (chronic obstructive pulmonary disease) (HCC)     GERD (gastroesophageal reflux disease)     Hearing loss     Hernia, umbilical     Hip fracture 2008    right    HTN      Mental retardation     non-verbal    Peripheral vascular disease ([address]        Past Surgical History:          Procedure Laterality Date    COLONOSCOPY  06/29/2018    colon polyp    FEMUR FRACTURE SURGERY Left 9/3/2019    LEFT CLOSED REDUCTION INTERNAL FIXATION SYNTHES performed by Asael Magana MD at [facility]  2008    right hip    UMBILICAL HERNIA REPAIR      UPPER GASTROINTESTINAL ENDOSCOPY  06/29/2018    Bx Gastric       Medications Prior to Admission:      Prior to Admission medications    Medication Sig Start Date End Date Taking?  Authorizing Provider   Ipratropium-Albuterol (COMBIVENT IN) Inhale into the lungs    Historical Provider, MD   potassium chloride (KLOR-CON M) 10 MEQ extended release tablet Take 10 mEq by mouth daily    Historical Provider, MD   ondansetron (ZOFRAN-ODT) 4 MG disintegrating tablet Take 4 mg by mouth every 8 hours as needed for Nausea or Vomiting    Historical Provider, MD   furosemide (LASIX) 20 MG tablet Take 1 tablet by mouth daily 9/26/19   Ubaldo Navarro MD   CLINDAMYCIN PHOSPHATE,TOPICAL, 1 % SWAB Apply topically every 8 hours apply to face topically every shift    Historical Provider, MD   GUAIFENESIN ER PO Take 800 mg by mouth 2 times daily    Historical Provider, MD   oxybutynin (DITROPAN) 5 MG tablet Take 2.5 mg by mouth nightly    Historical Provider, MD   acetaminophen (TYLENOL) 325 MG tablet Take 650 mg by mouth every 4 hours as needed for Pain or Fever    Historical Provider, MD   bisacodyl (DULCOLAX) 10 MG suppository Place 10 mg rectally daily as needed for Constipation    Historical Provider, MD   calcium carbonate (MARIBEL-GEST ANTACID) 500 MG chewable tablet Take 2 tablets by mouth every 6 hours as needed for Heartburn    Historical Provider, MD   cloNIDine (CATAPRES) 0.1 MG tablet Take 0.1 mg by mouth every 6 hours as needed for High Blood Pressure Systolic over 959 , diastolic 0ver 90    Historical Provider, MD   Sodium Phosphates (FLEET) 7-19 GM/118ML Place 1 enema rectally daily as needed    Historical Provider, MD   lisinopril (PRINIVIL;ZESTRIL) 10 MG tablet Take 10 mg by mouth nightly    Historical Provider, MD   loperamide (IMODIUM) 2 MG capsule Take 2 mg by mouth Skin:  Warm and dry . Neurologic:  Limited due to somnolence and lethargy. Pt minimally response to sternal rub, not following command  Psychiatric: somnolent       Labs:     Recent Labs     09/22/20  0755   WBC 6.5   HGB 13.4*   HCT 40.3*        Recent Labs     09/22/20  0755 09/22/20  0950    143   K 7.1* 6.3*    110   CO2 27 26   BUN 64* 62*   CREATININE 1.5* 1.5*   CALCIUM 9.3 8.4     Recent Labs     09/22/20  0755 09/22/20  0950   AST 79* 71*   ALT 77* 70*   BILITOT <0.2 <0.2   ALKPHOS 126 113     Recent Labs     09/22/20  0755   INR 0.97     Recent Labs     09/22/20  0755   CKTOTAL 56   TROPONINI <0.01       Urinalysis:      Lab Results   Component Value Date    NITRU Negative 09/22/2020    WBCUA 0-2 09/03/2019    RBCUA 10-20 09/03/2019    BLOODU Negative 09/22/2020    SPECGRAV 1.025 09/22/2020    GLUCOSEU Negative 09/22/2020       Radiology at [facility]:     CXR: Mild right basilar airspace disease, atelectasis versus pneumonia. AXR:   Moderate gaseous distension of the stomach.  Correlation for gastric outlet    obstruction recommended.         Indeterminate bowel gas pattern. CT head with no contrast  1. No definite evidence of acute intracranial abnormality. 2. Tiny focus of low attenuation near anterior limb of right internal    capsule.  Finding may represent tiny focus of ischemic change which could be    subacute to chronic in age.  Follow-up MRI examination with diffusion imaging    may be helpful for more complete evaluation if clinically indicated. 3. Paranasal sinus disease with visualization of small air-fluid/mucus level    within the right maxillary sinus. 4. Findings suggestive of presence of dental caries with abnormal lucency    surrounding roots of several teeth which raises possibility of associated    periodontal disease as described above.         CTA head and neck: No large vessel occlusion    CT abdomen pelvis  Gaseous distention of the stomach.  No hypothyroidism. Improved with karuna hugger and warm IVF. Temp was in 96 on arrival her. Continue supportive measures. Hyperkalemia: likely due to LINDA ,  potassium supplements he is on chronically and lisinopril. Given IV Ca gluconate, IV insulin, bicarb in ER. Repeat K level. Hold K supplements and lisinopril. Nephro consulted. LINDA: likely due to vol depletion, compounded by use of ACEi and lasix. Culprit meds held. Started on IVF. Check renal US. Nephro consult. Acute pancreatitis: lipase was elevated with signs of early pancreatitis on CT. Unclear etiology  Place NPO, start IVF. Gi consult    GERD : start IV PPI     Chronic diastolic CHF: EF 55 to 90% with G1DD on echo in 9/3/2019. does not seem in acute decompensation clinically. Received a dose of IV lasix in ER. Hold home lasix. Check pro- BNP. Monitor volume status closely. DVT Prophylaxis: heparin     Diet: Diet NPO Effective Now  Code Status: Full Code    PT/OT Eval Status: not indicated at this time     Dispo - admitted to ICU        Sharron Lopez MD    Thank you Sonal Byers MD for the opportunity to be involved in this patient's care. If you have any questions or concerns please feel free to contact me at [phone].

## 2020-09-22 NOTE — ED NOTES
1407-  Call placed to [facility] care for a full Medic crew [phone]- Call placed to [facility] for a full medic crew  1900 ETA        Dr. Cat Mack requested a flight to [facility].         [facility]  09/22/20 [address]  09/22/20 0952

## 2020-09-22 NOTE — CONSULTS
INPATIENT PULMONARY CRITICAL CARE CONSULT NOTE      Chief Complaint/Referring Provider:  Patient is being seen at the request of Dr. Odalys Boston for a consultation for encephalopathy, shock     Presenting HPI: Patient was brought to the hospital because of decreased responsiveness    As per the ER provider at [address] is a 60 y.o. 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     EMS provided limited information. Nursing home stated that at shift change, new nursing noticed that patient was not at his baseline status. Last known well unknown. At baseline, he is nonverbal but does interact with people. EMS says that he gave them a thumbs up. At baseline he is ambulatory with a walker.        Patient does feel cold to the touch. There was no reported cold exposures. Father reports that patient is always cold to the touch.      There are no other concerns reported. Father reports that he is not aware of any other concerns the nursing home had. Attempted to contact nursing home without answer.     No other complaints, modifying factors or associated symptoms.      Patient was having increased shortness of breath and had abnormal x-ray chest and as per the ICU nursing, patient was given Lasix prior to transfer to [facility] ICU  Patient when seen was in profound respiratory distress, patient was having audible wheezing, patient was also not very responsive, patient was on Levophed infusion to maintain hemodynamics, patient on arrival to the hospital ER was hypothermic with a temperature of 89.1 °F, patient temperature had increased to 96.1 on arrival out here, patient has sinus tachycardia on the monitor, patient has not had any good urine output so far as per the nursing, no other pertinent review of system could be obtained because of patient's clinical status which is precarious       Patient Active Problem List    Diagnosis Date Noted    Acute encephalopathy 09/22/2020    Acute respiratory failure with hypercapnia ([address] 09/22/2020    Pulmonary infiltrates 09/22/2020    Acute pancreatitis without infection or necrosis 09/22/2020    LINDA (acute kidney injury) ([address] 09/22/2020    Hyperkalemia 09/22/2020    Hypothyroidism 09/22/2020    Shock ([address] 09/22/2020    Hypothermia 09/22/2020    Class 2 obesity in adult 09/22/2020    Suspected sleep apnea 09/22/2020    Abnormal echocardiogram 09/26/2019    Closed displaced intertrochanteric fracture of left femur ([address] 09/03/2019    HTN (hypertension) 07/16/2010       Past Medical History:   Diagnosis Date    Acne     Anemia     Cataract     unspecified eye    CHF (congestive heart failure) ([address]     COPD [facility]ch[facility] obstructive [facility]) [facility]     GERD (gastroesophageal reflux disease)     Hearing loss     Hernia, umbilical     Hip fracture 2008    right    HTN      Mental retardation     non-verbal    Peripheral vascular disease ([address]         Past Surgical History:   Procedure Laterality Date    COLONOSCOPY  06/29/2018    colon polyp    FEMUR FRACTURE SURGERY Left 9/3/2019    LEFT CLOSED REDUCTION INTERNAL FIXATION SYNTHES performed by Marcel Nair MD at [address] [place] [address]  2008    right hip    UMBILICAL HERNIA REPAIR      UPPER GASTROINTESTINAL ENDOSCOPY  06/29/2018    Bx Gastric        Family History   Problem Relation Age of Onset    Stroke Mother     High Blood Pressure Father     Diabetes Brother         IDDM        Social History     Tobacco Use    Smoking status: Never Smoker    Smokeless tobacco: Never Used   Substance Use Topics    Alcohol use: No        Allergies   Allergen Reactions    Chocolate     Eggs [Egg White] Hives    Food      Orange juice, peanut butter, coke    Orange Juice [Orange Oil]     Peanut Butter Flavor [Flavoring Agent]    [facility] Penicillins Hives    Strawberry (Diagnostic)     Tape [facility] Tape]      Eco-tape               Physical Exam:  Blood pressure (!) 91/56, pulse 116, temperature 96 °F (35.6 °C), temperature source Bladder, resp. rate 18, SpO2 93 %.'  Patient's blood pressure was 113 /53 mmHg on Levophed infusion, patient's temperature was 96.1, patient was on 3 L of nasal oxygen with saturation of 94% on the monitor when seen, patient has a heart rate of 121  Constitutional: In mild respite distress with audible wheezing when seen  HENT:  Oropharynx is clear and moist. No thyromegaly. Eyes:  Conjunctivae are normal. Pupils equal, round, and reactive to light. No scleral icterus. Neck: . No tracheal deviation present. No obvious thyroid mass. Short enlarged neck  Cardiovascular: Sinus tachycardia, normal heart sounds. No right ventricular heave. 1-2+ lower extremity edema. Pulmonary/Chest: Bilateral wheezes. Bilateral rales. Chest wall is not dull to percussion. No accessory muscle usage or stridor. Decreased breath sound density  Abdominal: Soft. Bowel sounds present. Distended and obese  Musculoskeletal: No cyanosis. No clubbing. No obvious joint deformity. Lymphadenopathy: No cervical or supraclavicular adenopathy. Skin: Skin is warm and dry.   Areas of hypopigmentation present  Neurologic: Not responsive when seen        Results:  CBC:   Recent Labs     09/22/20  0755   WBC 6.5   HGB 13.4*   HCT 40.3*   MCV 95.5        BMP:   Recent Labs     09/22/20  0755 09/22/20  0950    143   K 7.1* 6.3*    110   CO2 27 26   BUN 64* 62*   CREATININE 1.5* 1.5*     LIVER PROFILE:   Recent Labs     09/22/20  0755 09/22/20  0950   AST 79* 71*   ALT 77* 70*   LIPASE 1,471.0*  --    BILITOT <0.2 <0.2   ALKPHOS 126 113     PT/INR:   Recent Labs     09/22/20  0755   PROTIME 11.2   INR 0.97     APTT:   Recent Labs     09/22/20  0755   APTT 44.4*     UA:  Recent Labs     09/22/20  0601   COLORU Yellow   PHUR 5.0 CLARITYU Clear   SPECGRAV 1.025   LEUKOCYTESUR Negative   UROBILINOGEN 0.2   BILIRUBINUR Negative   BLOODU Negative   GLUCOSEU Negative       Imaging:  I have reviewed radiology images personally. No orders to display       Results for [facility] (MRN [id number]) as of 9/22/2020 16:21   Ref. Range 9/4/2019 07:17 9/22/2020 05:20 9/22/2020 05:30 9/22/2020 07:55 9/22/2020 09:50   Sodium Latest Ref Range: 136 - 145 mmol/L 140   141 143   Potassium Latest Ref Range: 3.5 - 5.1 mmol/L 4.0   7.1 (HH) 6.3 (HH)   Chloride Latest Ref Range: 99 - 110 mmol/L 101   106 110   CO2 Latest Ref Range: 21 - 32 mmol/L 29   27 26   BUN Latest Ref Range: 7 - 20 mg/dL 17   64 (H) 62 (H)   Creatinine Latest Ref Range: 0.9 - 1.3 mg/dL 0.7 (L)   1.5 (H) 1.5 (H)   Anion Gap Latest Ref Range: 3 - 16  10   8 7   GFR Non- Latest Ref Range: >60  >60   48 (A) 48 (A)   GFR  Latest Ref Range: >60  >60   58 (A) 58 (A)   Lactic Acid Latest Ref Range: 0.4 - 2.0 mmol/L    1.0    Glucose Latest Ref Range: 70 - 99 mg/dL 152 (H)  93 88 79   POC Glucose Latest Ref Range: 70 - 99 mg/dl  93      Calcium Latest Ref Range: 8.3 - 10.6 mg/dL 8.7   9.3 8.4   Total Protein Latest Ref Range: 6.4 - 8.2 g/dL    7.1 6.4   QC OK? Unknown   yes       Results for [facility] (MRN [id number]) as of 9/22/2020 16:21   Ref.  Range 9/3/2019 03:20 9/22/2020 07:55 9/22/2020 09:50   Albumin Latest Ref Range: 3.4 - 5.0 g/dL 4.1 3.8 3.5   Globulin Latest Units: g/dL 3.8 3.3 2.9   Albumin/Globulin Ratio Latest Ref Range: 1.1 - 2.2  1.1 1.2 1.2   Alk Phos Latest Ref Range: 40 - 129 U/L 86 126 113   ALT Latest Ref Range: 10 - 40 U/L 32 77 (H) 70 (H)   AST Latest Ref Range: 15 - 37 U/L 26 79 (H) 71 (H)   Bilirubin Latest Ref Range: 0.0 - 1.0 mg/dL 0.3 <0.2 <0.2   Lipase Latest Ref Range: 13.0 - 60.0 U/L  1,471.0 (H)    Total Protein Latest Ref Range: 6.4 - 8.2 g/dL 7.9 7.1 6.4     Results for [facility] (MRN [id number]) as of 9/22/2020 16:21   Ref. Range 9/3/2019 03:20 9/4/2019 07:17 9/5/2019 10:03 9/22/2020 07:55   WBC Latest Ref Range: 4.0 - 11.0 K/uL 14.2 (H) 10.2 13.8 (H) 6.5   RBC Latest Ref Range: 4.20 - 5.90 M/uL 4.27 3.40 (L) 3.41 (L) 4.21   Hemoglobin Quant Latest Ref Range: 13.5 - 17.5 g/dL 13.4 (L) 10.9 (L) 10.9 (L) 13.4 (L)   Hematocrit Latest Ref Range: 40.5 - 52.5 % 40.5 32.5 (L) 32.2 (L) 40.3 (L)   MCV Latest Ref Range: 80.0 - 100.0 fL 94.9 95.6 94.4 95.5   MCH Latest Ref Range: 26.0 - 34.0 pg 31.4 32.1 31.9 31.7   MCHC Latest Ref Range: 31.0 - 36.0 g/dL 33.1 33.6 33.8 33.2   MPV Latest Ref Range: 5.0 - 10.5 fL 7.9 7.6 7.8 9.6   RDW Latest Ref Range: 12.4 - 15.4 % 14.4 14.7 14.4 17.1 (H)   Platelet Count Latest Ref Range: 135 - 450 K/uL 285 254 303 162     [id number] as of 9/22/2020 16:21   Ref. Range 9/22/2020 12:02   COVID-19 Unknown Rpt   SARS-CoV-2, NAAT Latest Ref Range: Not Detected  Not Detected     [facility] [id number]) as of 9/22/2020 16:21   Ref. Range 9/22/2020 06:01   Color, UA Latest Ref Range: Straw/Yellow  Yellow   Clarity, UA Latest Ref Range: Clear  Clear   Glucose, UA Latest Ref Range: Negative mg/dL Negative   Bilirubin, Urine Latest Ref Range: Negative  Negative   Ketones, Urine Latest Ref Range: Negative mg/dL Negative   Specific Gravity, UA Latest Ref Range: 1.005 - 1.030  1.025   Blood, Urine Latest Ref Range: Negative  Negative   pH, UA Latest Ref Range: 5.0 - 8.0  5.0   Protein, UA Latest Ref Range: Negative mg/dL Negative   Urobilinogen, Urine Latest Ref Range: <2.0 E.U./dL 0.2   Nitrite, Urine Latest Ref Range: Negative  Negative   Leukocyte Esterase, Urine Latest Ref Range: Negative  Negative   Urine Type Unknown NotGiven   Microscopic Examination Unknown Not Indicated     Results for [facility] (MRN [id number]) as of 9/22/2020 16:21   Ref.  Range 9/22/2020 07:55   pH, Tyson Latest Ref Range: 7.350 - 7.450  7.307 (L)   pCO2, Tyson Synthroid along with that patient also got calcium bicarb and hydrocortisone in the [facility] ER prior to coming to the hospital as per nursing  · Patient was also hypothermic at the time of evaluation in the ER  · Patient urine output is on the lower side and patient has LINDA along with that patient got 1 dose of IV Lasix in the ER and patient is in shock-will request nephrology consult  · Monitor for any cardiac arrhythmias  · Monitor renal function and electrolytes closely especially potassium levels  · Patient is on IV pressors to keep mean arterial pressure more than 65 mmHg  · Monitor input output and BMP  · Patient to be kept n.p.o. at this time  · Patient's lipase levels to be followed  · Patient's platelet count has dropped from 303 to 162 in 2 weeks time and needs to be trended  · Patient has pulmonary infiltrates which may be atelectasis/less likely aspiration- will follow  · Patient's temperature needs to be followed closely  · Neurochecks  · Monitor for any worsening respiratory status requiring intubation and mechanical vent to support  · Patient initial CT of the head was abnormal and may require repeat imaging including MRI at some point  · Monitor for any hypoglycemia  · PUD and DVT prophylaxis as per IM    Patient's clinical status is precarious and has potential for further decompensation and needs to monitor closely in the ICU    Critical care time spent on the patient was 40 minutes exclusive of any procedures            Electronically signed by:  Conrad Guardado MD    9/22/2020    4:48 PM.

## 2020-09-22 NOTE — ED NOTES
[facility] called for Dr Irma Del Cid, speaking with Dr Marj Vazquez    [facility] Radiology called back for Neuro Radiologist,  Dr Irma Del Cid speaking with Radiologist     Saroj Figueredo, JOAN  09/22/20 [phone]

## 2020-09-22 NOTE — ED NOTES
Call placed to [facility] for a update on pt. 1350 NO answer left message with a update on pt status  And requiring new IV meds     1359- 2nd call placed no answer     1404- 3nd call placed no answer     1542- Call placed to [address] [place] [address] Ave squad, No answer left message.  Stating no squad showed up after Cathi Fonseca gave a 1 hour ETA appx 1400 and we end up air-caring pt per Dr. Lourdes Espinosa  09/22/20 [facility]  09/22/20 6366

## 2020-09-22 NOTE — ED NOTES
K+ of 6.3 reported to Dr Mahsa Keenan and [facility], RN     Gretel Duverney, RN  09/22/20 1015

## 2020-09-22 NOTE — ED NOTES
1116 Transfer center contacted for Hospitalist at \[facility]\"", paged    [address] [facility] [address] Kathy Forrest speaking with hospitalist             Edy Beck, JOAN  09/22/20 22 Jeni Boyle, JOAN  09/22/20 22 Jeni Boyle, JOAN  09/22/20 22 Jeni Boyle, JOAN  09/22/20 2906

## 2020-09-22 NOTE — ED NOTES
[facility] consulted for Dr Abraham Rod, Radiologist speaking with Dr Artemio Granda, RN  09/22/20 3188 [facility]

## 2020-09-22 NOTE — DISCHARGE INSTR - COC
Continuity of Care Form    Patient Name: Serena Fierro   :  1965  MRN:  [id number]    Admit date:  2020  Discharge date:  ***    Code Status Order: Prior   Advance Directives:     Admitting Physician:  No admitting provider for patient encounter.   PCP: Kylah Sanchez MD    Discharging Nurse: [facility] Unit/Room#:   Discharging Unit Phone Number: ***    Emergency Contact:   Extended Emergency Contact Information  Primary Emergency Contact: Dejah Susu  Address: [address] [facility], [address] Phone: [phone]  Work Phone: [phone]  Relation: Parent  Secondary Emergency Contact: Dejah Iqbalor  Address: [address] [facility], [address] Phone: [phone]  Work Phone: [phone]  Relation: Parent    Past Surgical History:  Past Surgical History:   Procedure Laterality Date    COLONOSCOPY  2018    colon polyp    FEMUR FRACTURE SURGERY Left 9/3/2019    LEFT CLOSED REDUCTION INTERNAL FIXATION SYNTHES performed by Yamilet Wing MD at [address] [place] [address]      right hip    UMBILICAL HERNIA REPAIR      UPPER GASTROINTESTINAL ENDOSCOPY  2018    Bx Gastric       Immunization History:   Immunization History   Administered Date(s) Administered    Pneumococcal Polysaccharide ([id number]) 09/10/2008       Active Problems:  Patient Active Problem List   Diagnosis Code    HTN (hypertension) I10    Closed displaced intertrochanteric fracture of left femur ([facility] [address] S72.142A    Abnormal echocardiogram R93.1    Encephalopathy G93.40       Isolation/Infection:   Isolation          No Isolation        Patient Infection Status     Infection Onset Added Last Indicated Last Indicated By Review Planned Expiration Resolved Resolved By    None active    Resolved    COVID-19 Rule Out 20 COVID-19 (Ordered)   20 Rule-Out Test Resulted Bearing Status/Restrictions: [address] Lisa To CC Weight Bearin}  Other Medical Equipment (for information only, NOT a DME order):  {EQUIPMENT:387593128}  Other Treatments: ***    Patient's personal belongings (please select all that are sent with patient):  {CHP DME Belongings:396054715}    RN SIGNATURE:  {Esignature:414956145}    CASE MANAGEMENT/SOCIAL WORK SECTION    Inpatient Status Date: ***    Readmission Risk Assessment Score:  Readmission Risk              Risk of Unplanned Readmission:        10           Discharging to Facility/ Agency   · Name:   · Address:  · Phone:  · Fax:    Dialysis Facility (if applicable)   · Name:  · Address:  · Dialysis Schedule:  · Phone:  · Fax:    / signature: {Esignature:404620491}    PHYSICIAN SECTION    Prognosis: {Prognosis:9796559610}    Condition at Discharge: 50[place]ichelet To Patient Condition:705790519}    Rehab Potential (if transferring to Rehab): {Prognosis:1179615408}    Recommended Labs or Other Treatments After Discharge: ***    Physician Certification: I certify the above information and transfer of Manasa Small  is necessary for the continuing treatment of the diagnosis listed and that he requires {Admit to Appropriate Level of Care:11328} for {GREATER/LESS:903334983} 30 days.      Update Admission H&P: {CHP DME Changes in UJBED:859413447}    PHYSICIAN SIGNATURE:  {Esignature:333922999}

## 2020-09-22 NOTE — ED NOTES
Code Stroke called - 0831  Stroke team returned page and CT completed     Kris cMleod  09/22/20 9334

## 2020-09-22 NOTE — ED NOTES
[facility] MICU - ETA 1600 - Declined due to lengthy ETA     Alicia Starch  09/22/20 7971

## 2020-09-22 NOTE — ED NOTES
I hour ETA for [facility]  For transport     [id number] room     [address]  09/22/20 5260

## 2020-09-22 NOTE — ED NOTES
Stroke team called for Dr Melendez/Consult    21 [phone] Stroke team returned call     Iftikhar Dove, RN  09/22/20 [address], RN  09/22/20 1046

## 2020-09-22 NOTE — ED PROVIDER NOTES
[address] ED      CHIEF COMPLAINT  No chief complaint on file. HISTORY OF PRESENT ILLNESS  lAbaro Huitron is a 60 y.o. 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 Patient handed off to me at shift change. EMS provided limited information. Nursing home stated that at shift change, new nursing noticed that patient was not at his baseline status. Last known well unknown. Patient's nursing home to obtain further information. Per the nurse at nursing home, patient was found to be more altered, unable to follow commands, and less arousable compared to baseline around midnight. Denies any illnesses prior to midnight. Reports patient had been eating well. No other complaints, modifying factors or associated symptoms. I have reviewed the following from the nursing documentation.     Past Medical History:   Diagnosis Date    Acne     Anemia     Cataract     unspecified eye    CHF (congestive heart failure) (HCC)     COPD (chronic obstructive pulmonary disease) (HCC)     GERD (gastroesophageal reflux disease)     Hearing loss     Hernia, umbilical     Hip fracture 2008    right    HTN      Mental retardation     non-verbal    Peripheral vascular disease ([address]      Past Surgical History:   Procedure Laterality Date    COLONOSCOPY  06/29/2018    colon polyp    FEMUR FRACTURE SURGERY Left 9/3/2019    LEFT CLOSED REDUCTION INTERNAL FIXATION SYNTHES performed by Mercedes Barrios MD at [address] [place] [address]  2008    right hip    UMBILICAL HERNIA REPAIR      UPPER GASTROINTESTINAL ENDOSCOPY  06/29/2018    Bx Gastric     Family History   Problem Relation Age of Onset    Stroke Mother     High Blood Pressure Father     Diabetes Brother         IDDM     Social History     Socioeconomic History    Marital status: Single     Spouse name: Not on at 09/25/20 0833    ipratropium-albuterol (DUONEB) nebulizer solution 1 ampule  1 ampule Inhalation 4x daily Jose Alejandro Umanzor MD   1 ampule at 09/25/20 1216    bisacodyl (DULCOLAX) suppository 10 mg  10 mg Rectal Daily PRN Sergio Corrigan MD        oxybutynin (DITROPAN) tablet 2.5 mg  2.5 mg Oral Nightly Jose Alejandro Umanzor MD   2.5 mg at 09/23/20 2051    sodium chloride flush 0.9 % injection 10 mL  10 mL Intravenous 2 times per day Jose Alejandro Umanzor MD   10 mL at 09/25/20 1062    sodium chloride flush 0.9 % injection 10 mL  10 mL Intravenous PRN Jose Alejandro Umanzor MD        acetaminophen (TYLENOL) suppository 650 mg  650 mg Rectal Q6H PRN Jose Alejandro Umanzor MD        promethazine (PHENERGAN) tablet 12.5 mg  12.5 mg Oral Q6H PRN Jose Alejandro Umanzor MD        Or    ondansetron (ZOFRAN) injection 4 mg  4 mg Intravenous Q6H PRN Jose Alejandro Umanzor MD         Allergies   Allergen Reactions    Chocolate     Eggs [Egg White] Hives    Food      Orange juice, peanut butter, coke    Orange Juice [Orange Oil]     Peanut Butter Flavor [Flavoring Agent]     Penicillins Hives    Strawberry (Diagnostic)     Tape [Adhesive Tape]      Eco-tape       REVIEW OF SYSTEMS  10 systems reviewed, pertinent positives per HPI otherwise noted to be negative. PHYSICAL EXAM  BP (!) 113/53   Pulse 115   Temp 96.1 °F (35.6 °C) (Bladder)   Resp 30   Ht 4' 8\" (1.422 m)   Wt 162 lb (73.5 kg)   SpO2 (!) 3%   BMI 36.32 kg/m²    GENERAL APPEARANCE: Drowsy. arousable to stimulation  HENT: Normocephalic. Atraumatic. No obvious facial droop. Non verbal  HEART/CHEST: RRR. No murmurs appreciated  LUNGS: Respirations unlabored. Bilateral rales. ABDOMEN: Soft, non-distended abdomen. Non tender to palpation. No guarding. No rebound. EXTREMITIES: No gross deformities. Withdraws all extremities to pain  SKIN: Warm and dry. No acute rashes. NEUROLOGICAL: Drowsy. arousable to stmulation.  No gross facial drooping. withdraws all extremities to pain. LABS  Results for orders placed or performed during the hospital encounter of 09/22/20   Culture, Urine    Specimen: Urine, clean catch   Result Value Ref Range    Urine Culture, Routine No growth at 18 to 36 hours    Culture, Blood 1    Specimen: Blood   Result Value Ref Range    Blood Culture, Routine       No Growth to date. Any change in status will be called. Culture, Blood 2    Specimen: Blood   Result Value Ref Range    Culture, Blood 2       No Growth to date. Any change in status will be called.    CBC Auto Differential   Result Value Ref Range    WBC 6.5 4.0 - 11.0 K/uL    RBC 4.21 4.20 - 5.90 M/uL    Hemoglobin 13.4 (L) 13.5 - 17.5 g/dL    Hematocrit 40.3 (L) 40.5 - 52.5 %    MCV 95.5 80.0 - 100.0 fL    MCH 31.7 26.0 - 34.0 pg    MCHC 33.2 31.0 - 36.0 g/dL    RDW 17.1 (H) 12.4 - 15.4 %    Platelets 900 240 - 822 K/uL    MPV 9.6 5.0 - 10.5 fL    Neutrophils % 89.1 %    Lymphocytes % 7.5 %    Monocytes % 3.2 %    Eosinophils % 0.1 %    Basophils % 0.1 %    Neutrophils Absolute 5.8 1.7 - 7.7 K/uL    Lymphocytes Absolute 0.5 (L) 1.0 - 5.1 K/uL    Monocytes Absolute 0.2 0.0 - 1.3 K/uL    Eosinophils Absolute 0.0 0.0 - 0.6 K/uL    Basophils Absolute 0.0 0.0 - 0.2 K/uL   Comprehensive Metabolic Panel w/ Reflex to MG   Result Value Ref Range    Sodium 141 136 - 145 mmol/L    Potassium reflex Magnesium 7.1 (HH) 3.5 - 5.1 mmol/L    Chloride 106 99 - 110 mmol/L    CO2 27 21 - 32 mmol/L    Anion Gap 8 3 - 16    Glucose 88 70 - 99 mg/dL    BUN 64 (H) 7 - 20 mg/dL    CREATININE 1.5 (H) 0.9 - 1.3 mg/dL    GFR Non- 48 (A) >60    GFR  58 (A) >60    Calcium 9.3 8.3 - 10.6 mg/dL    Total Protein 7.1 6.4 - 8.2 g/dL    Alb 3.8 3.4 - 5.0 g/dL    Albumin/Globulin Ratio 1.2 1.1 - 2.2    Total Bilirubin <0.2 0.0 - 1.0 mg/dL    Alkaline Phosphatase 126 40 - 129 U/L    ALT 77 (H) 10 - 40 U/L    AST 79 (H) 15 - 37 U/L    Globulin 3.3 g/dL   Lipase   Result Value Ref Range Lipase 1,471.0 (H) 13.0 - 60.0 U/L   Urinalysis, reflex to microscopic   Result Value Ref Range    Color, UA Yellow Straw/Yellow    Clarity, UA Clear Clear    Glucose, Ur Negative Negative mg/dL    Bilirubin Urine Negative Negative    Ketones, Urine Negative Negative mg/dL    Specific Gravity, UA 1.025 1.005 - 1.030    Blood, Urine Negative Negative    pH, UA 5.0 5.0 - 8.0    Protein, UA Negative Negative mg/dL    Urobilinogen, Urine 0.2 <2.0 E.U./dL    Nitrite, Urine Negative Negative    Leukocyte Esterase, Urine Negative Negative    Microscopic Examination Not Indicated     Urine Type NotGiven    Blood Gas, Venous   Result Value Ref Range    pH, Tyson 7.307 (L) 7.350 - 7.450    pCO2, Tyson 48.9 40.0 - 50.0 mmHg    pO2, Tyson 152.1 (H) 25.0 - 40.0 mmHg    HCO3, Venous 23.9 23.0 - 29.0 mmol/L    Base Excess, Tyson -2.8 -3.0 - 3.0 mmol/L    O2 Sat, Tyson 99 Not Established %    Carboxyhemoglobin 2.0 (H) 0.0 - 1.5 %    MetHgb, Tyson 0.3 <1.5 %    TC02 (Calc), Tyson 25 Not Established mmol/L    O2 Content, Tyson 19 Not Established VOL %    O2 Therapy Unknown    Lactic Acid, Plasma   Result Value Ref Range    Lactic Acid 1.0 0.4 - 2.0 mmol/L   TSH with Reflex   Result Value Ref Range    TSH 7.48 (H) 0.27 - 4.20 uIU/mL   Protime-INR   Result Value Ref Range    Protime 11.2 10.0 - 13.2 sec    INR 0.97 0.86 - 1.14   Troponin   Result Value Ref Range    Troponin <0.01 <0.01 ng/mL   APTT   Result Value Ref Range    aPTT 44.4 (H) 24.2 - 36.2 sec   T4, Free   Result Value Ref Range    T4 Free 1.0 0.9 - 1.8 ng/dL   CK   Result Value Ref Range    Total CK 56 39 - 308 U/L   Brain Natriuretic Peptide   Result Value Ref Range    Pro-BNP 72 0 - 124 pg/mL   Comprehensive metabolic panel   Result Value Ref Range    Sodium 143 136 - 145 mmol/L    Potassium 6.3 (HH) 3.5 - 5.1 mmol/L    Chloride 110 99 - 110 mmol/L    CO2 26 21 - 32 mmol/L    Anion Gap 7 3 - 16    Glucose 79 70 - 99 mg/dL    BUN 62 (H) 7 - 20 mg/dL    CREATININE 1.5 (H) 0.9 - 1.3 mg/dL GFR Non- 48 (A) >60    GFR  58 (A) >60    Calcium 8.4 8.3 - 10.6 mg/dL    Total Protein 6.4 6.4 - 8.2 g/dL    Alb 3.5 3.4 - 5.0 g/dL    Albumin/Globulin Ratio 1.2 1.1 - 2.2    Total Bilirubin <0.2 0.0 - 1.0 mg/dL    Alkaline Phosphatase 113 40 - 129 U/L    ALT 70 (H) 10 - 40 U/L    AST 71 (H) 15 - 37 U/L    Globulin 2.9 g/dL   Cortisol Total   Result Value Ref Range    Cortisol 17.3 ug/dL   Ammonia   Result Value Ref Range    Ammonia 34 16 - 60 umol/L   COVID-19   Result Value Ref Range    SARS-CoV-2, NAAT Not Detected Not Detected   POCT Glucose   Result Value Ref Range    Glucose 93 mg/dL    QC OK? yes    POCT Glucose   Result Value Ref Range    POC Glucose 93 70 - 99 mg/dl    Performed on [employer]    EKG 12 Lead   Result Value Ref Range    Ventricular Rate 63 BPM    Atrial Rate 63 BPM    P-R Interval 188 ms    QRS Duration 88 ms    Q-T Interval 428 ms    QTc Calculation (Bazett) 437 ms    P Axis 45 degrees    R Axis 17 degrees    T Axis 77 degrees    Diagnosis       Normal sinus rhythmNormal ECGConfirmed by Samuel Wilson MD, [facility] ([facility]) on 9/22/2020 4:13:08 PM       I have reviewed all labs for this visit. RADIOLOGY  Xr Abdomen (kub) (single Ap View)    Result Date: 9/22/2020  EXAMINATION: ONE SUPINE XRAY VIEW(S) OF THE ABDOMEN 9/22/2020 4:46 am COMPARISON: Chest radiograph earlier same date. HISTORY: ORDERING SYSTEM PROVIDED HISTORY: abdominal distension TECHNOLOGIST PROVIDED HISTORY: Reason for exam:->abdominal distension Reason for Exam: distention Acuity: Acute Type of Exam: Initial There is moderate gaseous distension of the stomach. Only a few gas-filled loops bowel are seen. Paucity of bowel gas rendering bowel gas pattern indeterminate. No focal dilated gas-filled loops of bowel are identified. There postsurgical changes in both hips. No acute osseous abnormality. FINDINGS: Moderate gaseous distension of the stomach.   Correlation for gastric outlet obstruction recommended. Indeterminate bowel gas pattern. Ct Head Wo Contrast    Result Date: 9/22/2020  EXAMINATION: CT OF THE HEAD WITHOUT CONTRAST  9/22/2020 6:36 am TECHNIQUE: CT of the head was performed without the administration of intravenous contrast. Dose modulation, iterative reconstruction, and/or weight based adjustment of the mA/kV was utilized to reduce the radiation dose to as low as reasonably achievable. COMPARISON: None. HISTORY: ORDERING SYSTEM PROVIDED HISTORY: decreased responsiveness TECHNOLOGIST PROVIDED HISTORY: Reason for exam:->decreased responsiveness Has a \"code stroke\" or \"stroke alert\" been called? ->No Reason for Exam: decreased responsiveness Acuity: Unknown Type of Exam: Initial Additional signs and symptoms: decreased responsiveness FINDINGS: BRAIN/VENTRICLES: Patient is tilted in the CT gantry. Mild motion/streak artifact is present on the examination. The ventricular system is normal in appearance. No evidence of mass effect or midline shift. Prominence of sulci overlying convexities of cerebral hemispheres and cerebellum consistent with atrophy. Tiny focus of low attenuation along the lateral margin of the head of the left caudate nucleus (image 34) suggest small focus of remote ischemic change. Very tiny focus of low attenuation in left basal ganglia region (image 36) could represent perivascular space versus tiny focus of remote ischemic change as well. There is a very tiny focus of low attenuation near the anterior limb of the right internal capsule (image 38). Finding may represent tiny focus of ischemic change which could be subacute to chronic in age. Very minimal low attenuation within periventricular/subcortical white matter suggest changes of minimal ischemic leukoencephalopathy. No abnormal extra-axial fluid collections are identified. There is atherosclerotic calcification of distal internal carotid arteries.   There is asymmetric dolichoectasia of the distal right internal carotid artery as compared to the left. ORBITS: The visualized portion of the orbits demonstrate no acute abnormality. SINUSES: There is minimal mucosal thickening within a few bilateral ethmoid air cells. There is asymmetric thickening of the wall of the right maxillary sinus when compared to the right which raises possibility of changes associated with chronic sinus disease. There is mucosal thickening with minimally complex air-fluid/mucus level within the right maxillary sinus. Presence of the fluid level suggest component of acute paranasal sinus disease. Abnormal lucency identified about several maxillary teeth suggest presence of dental caries. Abnormal lucency identified in the region of the roots of several maxillary teeth most pronounced along the 2nd maxillary incisor where there is cortical disruption (image 4). Findings may be on the basis of periodontal disease. Mastoid air cells are well aerated. SOFT TISSUES/SKULL:  No acute abnormality of the visualized skull or soft tissues. 1. No definite evidence of acute intracranial abnormality. 2. Tiny focus of low attenuation near anterior limb of right internal capsule. Finding may represent tiny focus of ischemic change which could be subacute to chronic in age. Follow-up MRI examination with diffusion imaging may be helpful for more complete evaluation if clinically indicated. 3. Paranasal sinus disease with visualization of small air-fluid/mucus level within the right maxillary sinus. 4. Findings suggestive of presence of dental caries with abnormal lucency surrounding roots of several teeth which raises possibility of associated periodontal disease as described above.      Ct Abdomen Pelvis W Iv Contrast Additional Contrast? None    Result Date: 9/22/2020  EXAMINATION: CT OF THE ABDOMEN AND PELVIS WITH CONTRAST 9/22/2020 10:11 am TECHNIQUE: CT of the abdomen and pelvis was performed with the administration of intravenous contrast. Multiplanar reformatted images are provided for review. Dose modulation, iterative reconstruction, and/or weight based adjustment of the mA/kV was utilized to reduce the radiation dose to as low as reasonably achievable. COMPARISON: None. HISTORY: ORDERING SYSTEM PROVIDED HISTORY: pancreatitis, abdominal distension TECHNOLOGIST PROVIDED HISTORY: Reason for exam:->pancreatitis, abdominal distension Additional Contrast?->None Reason for Exam: scan ran on the back end of a CTA head and neck stroke protocol,   75 second delay was more like 95 second delay. after contrast administration Acuity: Acute Type of Exam: Initial Additional signs and symptoms: presented with low body temp FINDINGS: Lower Chest: Bandlike opacities are seen at the lung bases. Bandlike morphology favors subsegmental atelectasis or scar. Small hiatal hernia seen. There is nonspecific thickening at the GE junction Organs: Trace perisplenic fluid is seen Adrenal glands appear normal No hydronephrosis on the right. No hydronephrosis on the left. A few punctate circumscribed hypodense nodule seen in the kidneys, too small to characterize, likely cyst.  There are clips from prior cholecystectomy. There is subtle injection of the fat surrounding pancreas. No discrete drainable fluid collection. Pancreas enhances normally. No evidence of pancreatic necrosis. GI/Bowel: There is mild distention of the stomach. No significant small bowel distention noted. A few colonic diverticula are seen. Descending and sigmoid colon is incompletely distended accentuating its wall thickness Pelvis: No free fluid is seen within the pelvis. Elliott catheter seen within the bladder. .  Bladder is partially contracted. Nodular soft tissue density seen in the right inguinal canal measuring 1.7 x 1.8 cm. Peritoneum/Retroperitoneum: No aortic aneurysm. No retroperitoneal hematoma. A few small lymph nodes are seen along the Bones/Soft Tissues: Spurring is seen in the spine. Streak artifact is seen from orthopedic hardware in the proximal right and left femur, as well as spinal stabilization rods. Gaseous distention of the stomach. No small bowel obstruction. Injection of fat is seen surrounding the pancreas suggestive of early pancreatitis Small nodular soft tissue density is seen in the right inguinal canal, most commonly reactive lymph node in the absence of a known primary     Xr Chest Portable    Result Date: 9/22/2020  EXAMINATION: ONE XRAY VIEW OF THE CHEST 9/22/2020 4:46 am COMPARISON: Chest radiograph November 9, 2009 and priors. HISTORY: ORDERING SYSTEM PROVIDED HISTORY: assess for pneumonia TECHNOLOGIST PROVIDED HISTORY: Reason for exam:->assess for pneumonia Reason for Exam: cold Acuity: Acute Type of Exam: Initial FINDINGS: There is minimal right basilar airspace disease. No pneumothorax or pleural effusion. Cardiac and mediastinal contours are unchanged. Scoliosis and postsurgical changes are again seen within the thoracic spine. No acute osseous abnormality. Mild right basilar airspace disease, atelectasis versus pneumonia. Ir Picc Wo Sq Port/pump > 5 Years    Result Date: 9/22/2020  EXAMINATION: LIMITED ULTRASOUND OF THE ARM FOR PICC ACCESS, 9/22/2020 TECHNIQUE: The PICC team used ultrasound and VPS guidance to place a PICC line. HISTORY: ORDERING SYSTEM PROVIDED HISTORY: no access TECHNOLOGIST PROVIDED HISTORY: Reason for exam:->no access How many lumens are being requested?->2 What site is the preferred site? ->No preference What side should this line be placed? ->Either Reason for Exam: no access Acuity: Acute Type of Exam: Initial Additional signs and symptoms: 34 cm 2 out single lumen non tunneled power PICC right basilic US/VPS guidance FLUOROSCOPY DOSE AND TYPE OR TIME AND EXPOSURES: No fluoroscopy. 0 images.  FINDINGS: Ultrasound images demonstrate patency of the right basilic vein which was used for access for placement of a PICC by the PICC team, cerebral arteries. No aneurysm. OTHER: No dural venous sinus thrombosis on this non-dedicated study. BRAIN: No mass effect or midline shift. No extra-axial fluid collection. The gray-white differentiation is maintained. No large vessel occlusion. Critical results were called by Dr. Rodrigue Singleton to Dr. Jamir Preston On 9/22/2020 at 11:03. ED COURSE/MDM  Patient handed off to me at shift change. Patient was closely observed on cardiac monitor. As unable to obtain IV or labs by overnight team, US guided IV obtained. Sterile prep done according to hospital policy. IV placed under ultrasound guidance to left AC and placement confirmed on ultrasound. Blood sent to lab. Patient was borderline hypotensive. Patient given 2L IVF bolus. Labs remarkable for hyperkalemia with potassium of 7 and LINDA with creatinine of 1.5 (up from baseline 2.7. Patient also found to have elevated lipase of 1471. TSH elevated at 7.4. After PT/PTT, PICC nurse called to bedside. PICC line obtained. Patient given calcium gluconate for hyperkalemia. Repeat cmp obtained and showed hyperkalemia. CT showed small bowel obstruction, evidence of pancreatitis, no large vessel occlusion of head and neck, no acute intracranial bleed. Stroke team called to review images and no acute intervention per stroke. Recommended patient receive further stroke work up. Patient's temp improved with warmer. PROCEDURE NOTE - Ultrasound guided peripheral IV placement  Indication: unable to obtain adequate PIV access without ultrasound for medication administration, fluid resuscitation and/or lab draw    Views:  Short access view of target vein: Adequate    Images obtained with findings:   Vein compressible: yes  Needle tip within vein: yes    Impression:   Successful cannulation of vein: yes    The left antecubital fossa and forearm were prepped with chloraprep and tourniquet applied.   Using a 20g angiocath, under direct ultrasound visualization and guidance, IV initial encounter    3. Hypothyroidism, unspecified type    4. Hyperkalemia    5. Acute pancreatitis, unspecified complication status, unspecified pancreatitis type    6. LINDA (acute kidney injury) ([facility] [address]        Blood pressure (!) 113/53, pulse 115, temperature 96.1 °F (35.6 °C), temperature source Bladder, resp. rate 30, height 4' 8\" (1.422 m), weight 162 lb (73.5 kg), SpO2 (!) 3 %. DISPOSITION  Jonnie Zuniga was admitted to [facility] hospitalist and transferred. Patient was given scripts for the following medications. I counseled patient how to take these medications. Discharge Medication List as of 9/22/2020  3:02 PM          Follow-up with:  No follow-up provider specified. DISCLAIMER: This chart was created using Dragon dictation software. Efforts were made by me to ensure accuracy, however some errors may be present due to limitations of this technology and occasionally words are not transcribed correctly.        Carolyne Rosario MD  09/25/20 0892

## 2020-09-22 NOTE — ED NOTES
Returned for CT, pt alert remains nonverbal per his normal behavior. Pt is able to follow RN and interact with family at bedside. Bear hugger restarted, pt's rectal temp 95.0, will continue to monitor via temperature sensing parnell catheter. Diana Astudillo RN  09/22/20 2488

## 2020-09-22 NOTE — ED NOTES
1415 Call Placed to  for flight    [address] faxed to [phone] [place] [address] called to dispatch [facility] to coordinate LZ due to multiple inbound helicopters    [phone] [facility] at bedside     1440  Call placed to Maryse ZAIDI to advise of inbound  aircare to their facility         [address] [place] [address]  09/22/20 1446

## 2020-09-22 NOTE — PROGRESS NOTES
Gave Bedside report to: Bonita RN    4 Eyes Skin Assessment     The patient is being assess for   Transfer to New Unit    I agree that 2 RN's have performed a thorough Head to Toe Skin Assessment on the patient. ALL assessment sites listed below have been assessed. Areas assessed by both nurses:   [x]   Head, Face, and Ears   [x]   Shoulders, Back, and Chest, Abdomen  [x]   Arms, Elbows, and Hands   [x]   Coccyx, Sacrum, and Ischium  [x]   Legs, Feet, and Heels        Does the Patient have Skin Breakdown?   No          Omid Prevention initiated:  Yes   Wound Care Orders initiated:  No      WOC nurse consulted for Pressure Injury (Stage 3,4, Unstageable, DTI, NWPT, Complex wounds)and New or Established Ostomies:  NA      Primary Nurse eSignature: Electronically signed by Burton Carter RN on 9/22/20 at 6:24 PM EDT    **SHARE this note so that the co-signing nurse is able to place an eSignature**    Co-signer eSignature: {Esignature:[id number]}

## 2020-09-22 NOTE — ED PROVIDER NOTES
[address] ED      CHIEF COMPLAINT  No chief complaint on file. HISTORY OF PRESENT ILLNESS  Chester Pedroza is a 60 y.o. 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 EMS provided limited information. Nursing home stated that at shift change, new nursing noticed that patient was not at his baseline status. Last known well unknown. At baseline, he is nonverbal but does interact with people. EMS says that he gave them a thumbs up. At baseline he is ambulatory with a walker. Patient does feel cold to the touch. There was no reported cold exposures. Father reports that patient is always cold to the touch. There are no other concerns reported. Father reports that he is not aware of any other concerns the nursing home had. Attempted to contact nursing home without answer. No other complaints, modifying factors or associated symptoms. I have reviewed the following from the nursing documentation.     Past Medical History:   Diagnosis Date    Acne     Anemia     Cataract     unspecified eye    CHF (congestive heart failure) (HCC)     COPD (chronic obstructive pulmonary disease) (HCC)     GERD (gastroesophageal reflux disease)     Hearing loss     Hernia, umbilical     Hip fracture 2008    right    HTN      Mental retardation     non-verbal    Peripheral vascular disease ([address]      Past Surgical History:   Procedure Laterality Date    COLONOSCOPY  06/29/2018    colon polyp    FEMUR FRACTURE SURGERY Left 9/3/2019    LEFT CLOSED REDUCTION INTERNAL FIXATION SYNTHES performed by Angelica Lee MD at [address] [place] [address]  2008    right hip    UMBILICAL HERNIA REPAIR      UPPER GASTROINTESTINAL ENDOSCOPY  06/29/2018    Bx Gastric     Family History   Problem Relation Age of Onset    Stroke Mother     High Blood Pressure Father  Diabetes Brother         IDDM     Social History     Socioeconomic History    Marital status: Single     Spouse name: Not on file    Number of children: Not on file    Years of education: Not on file    Highest education level: Not on file   Occupational History    Not on file   Social Needs    Financial resource strain: Not on file    Food insecurity     Worry: Not on file     Inability: Not on file    Transportation needs     Medical: Not on file     Non-medical: Not on file   Tobacco Use    Smoking status: Never Smoker    Smokeless tobacco: Never Used   Substance and Sexual Activity    Alcohol use: No    Drug use: Not on file    Sexual activity: Not on file   Lifestyle    Physical activity     Days per week: Not on file     Minutes per session: Not on file    Stress: Not on file   Relationships    Social connections     Talks on phone: Not on file     Gets together: Not on file     Attends Yazidi service: Not on file     Active member of club or organization: Not on file     Attends meetings of clubs or organizations: Not on file     Relationship status: Not on file    Intimate partner violence     Fear of current or ex partner: Not on file     Emotionally abused: Not on file     Physically abused: Not on file     Forced sexual activity: Not on file   Other Topics Concern    Not on file   Social History Narrative    Not on file     No current facility-administered medications for this encounter.       Current Outpatient Medications   Medication Sig Dispense Refill    furosemide (LASIX) 20 MG tablet Take 1 tablet by mouth daily 30 tablet 5    CLINDAMYCIN PHOSPHATE,TOPICAL, 1 % SWAB Apply topically every 8 hours apply to face topically every shift      GUAIFENESIN ER PO Take 800 mg by mouth 2 times daily      ipratropium-albuterol (DUONEB) 0.5-2.5 (3) MG/3ML SOLN nebulizer solution Inhale 1 vial into the lungs every 4 hours as needed for Shortness of Breath      oxybutynin (DITROPAN) 5 MG tablet Take 2.5 mg by mouth nightly      acetaminophen (TYLENOL) 325 MG tablet Take 650 mg by mouth every 4 hours as needed for Pain or Fever      bisacodyl (DULCOLAX) 10 MG suppository Place 10 mg rectally daily as needed for Constipation      calcium carbonate (MARIBEL-GEST ANTACID) 500 MG chewable tablet Take 2 tablets by mouth every 6 hours as needed for Heartburn      cloNIDine (CATAPRES) 0.1 MG tablet Take 0.1 mg by mouth every 6 hours as needed for High Blood Pressure Systolic over 813 , diastolic 0ver 90      ipratropium-albuterol (DUONEB) 0.5-2.5 (3) MG/3ML SOLN nebulizer solution Inhale 1 vial into the lungs 3 times daily       Sodium Phosphates (FLEET) 7-19 GM/118ML Place 1 enema rectally daily as needed      lisinopril (PRINIVIL;ZESTRIL) 10 MG tablet Take 10 mg by mouth nightly      loperamide (IMODIUM) 2 MG capsule Take 2 mg by mouth every 4 hours as needed for Diarrhea      magnesium hydroxide (MILK OF MAGNESIA) 400 MG/5ML suspension Take 30 mLs by mouth daily as needed for Constipation      omeprazole (PRILOSEC) 20 MG delayed release capsule Take 20 mg by mouth nightly       Multiple Vitamins-Minerals (MULTIVITAMIN PO) Take by mouth daily       Allergies   Allergen Reactions    Chocolate     Eggs [Egg White] Hives    Food      Orange juice, peanut butter, coke    Orange Juice [Orange Oil]     Peanut Butter Flavor [Flavoring Agent]     Penicillins Hives    Strawberry (Diagnostic)     Tape [Adhesive Tape]      Eco-tape       REVIEW OF SYSTEMS  10 systems reviewed, pertinent positives per HPI otherwise noted to be negative. PHYSICAL EXAM  /77   Pulse 92   Temp 92.8 °F (33.8 °C)   Resp 22   Ht 4' 8\" (1.422 m)   Wt 162 lb (73.5 kg)   SpO2 97%   BMI 36.32 kg/m²    GENERAL APPEARANCE: Somnolent but arousable. No acute distress. HENT: Normocephalic. Atraumatic. Mucous membranes are moist.  NECK: Supple. Brudinski sign negative. EYES: PERRL. HEART/CHEST: RRR. No murmurs. LUNGS: Respirations unlabored. CTAB. Good air exchange. Speaking comfortably in full sentences. ABDOMEN: Distended. Soft. No parent tenderness. No masses. No organomegaly. No guarding or rebound. MUSCULOSKELETAL: No extremity edema. Compartments soft. No deformity. No tenderness in the extremities. All extremities neurovascularly intact. SKIN: Cool and dry. No acute rashes. NEUROLOGICAL: Somnolent but arousable. No gross facial drooping. Patient withdraws all extremities to noxious stimuli. PSYCHIATRIC: Unable to assess due to mental status and patient nonverbal at baseline    LABS  I have reviewed all labs for this visit.    Results for orders placed or performed during the hospital encounter of 09/22/20   CBC Auto Differential   Result Value Ref Range    WBC 6.5 4.0 - 11.0 K/uL    RBC 4.21 4.20 - 5.90 M/uL    Hemoglobin 13.4 (L) 13.5 - 17.5 g/dL    Hematocrit 40.3 (L) 40.5 - 52.5 %    MCV 95.5 80.0 - 100.0 fL    MCH 31.7 26.0 - 34.0 pg    MCHC 33.2 31.0 - 36.0 g/dL    RDW 17.1 (H) 12.4 - 15.4 %    Platelets 666 812 - 284 K/uL    MPV 9.6 5.0 - 10.5 fL    Neutrophils % 89.1 %    Lymphocytes % 7.5 %    Monocytes % 3.2 %    Eosinophils % 0.1 %    Basophils % 0.1 %    Neutrophils Absolute 5.8 1.7 - 7.7 K/uL    Lymphocytes Absolute 0.5 (L) 1.0 - 5.1 K/uL    Monocytes Absolute 0.2 0.0 - 1.3 K/uL    Eosinophils Absolute 0.0 0.0 - 0.6 K/uL    Basophils Absolute 0.0 0.0 - 0.2 K/uL   Comprehensive Metabolic Panel w/ Reflex to MG   Result Value Ref Range    Sodium 141 136 - 145 mmol/L    Potassium reflex Magnesium 7.1 (HH) 3.5 - 5.1 mmol/L    Chloride 106 99 - 110 mmol/L    CO2 27 21 - 32 mmol/L    Anion Gap 8 3 - 16    Glucose 88 70 - 99 mg/dL    BUN 64 (H) 7 - 20 mg/dL    CREATININE 1.5 (H) 0.9 - 1.3 mg/dL    GFR Non- 48 (A) >60    GFR  58 (A) >60    Calcium 9.3 8.3 - 10.6 mg/dL    Total Protein 7.1 6.4 - 8.2 g/dL    Alb 3.8 3.4 - 5.0 g/dL    Albumin/Globulin Ratio 1.2 1.1 - 2. 2    Total Bilirubin <0.2 0.0 - 1.0 mg/dL    Alkaline Phosphatase 126 40 - 129 U/L    ALT 77 (H) 10 - 40 U/L    AST 79 (H) 15 - 37 U/L    Globulin 3.3 g/dL   Lipase   Result Value Ref Range    Lipase 1,471.0 (H) 13.0 - 60.0 U/L   Urinalysis, reflex to microscopic   Result Value Ref Range    Color, UA Yellow Straw/Yellow    Clarity, UA Clear Clear    Glucose, Ur Negative Negative mg/dL    Bilirubin Urine Negative Negative    Ketones, Urine Negative Negative mg/dL    Specific Gravity, UA 1.025 1.005 - 1.030    Blood, Urine Negative Negative    pH, UA 5.0 5.0 - 8.0    Protein, UA Negative Negative mg/dL    Urobilinogen, Urine 0.2 <2.0 E.U./dL    Nitrite, Urine Negative Negative    Leukocyte Esterase, Urine Negative Negative    Microscopic Examination Not Indicated     Urine Type NotGiven    Blood Gas, Venous   Result Value Ref Range    pH, Tyson 7.307 (L) 7.350 - 7.450    pCO2, Tyson 48.9 40.0 - 50.0 mmHg    pO2, Tyson 152.1 (H) 25.0 - 40.0 mmHg    HCO3, Venous 23.9 23.0 - 29.0 mmol/L    Base Excess, Tyson -2.8 -3.0 - 3.0 mmol/L    O2 Sat, Tyson 99 Not Established %    Carboxyhemoglobin 2.0 (H) 0.0 - 1.5 %    MetHgb, Tyson 0.3 <1.5 %    TC02 (Calc), Tyson 25 Not Established mmol/L    O2 Content, Tyson 19 Not Established VOL %    O2 Therapy Unknown    Lactic Acid, Plasma   Result Value Ref Range    Lactic Acid 1.0 0.4 - 2.0 mmol/L   TSH with Reflex   Result Value Ref Range    TSH 7.48 (H) 0.27 - 4.20 uIU/mL   Protime-INR   Result Value Ref Range    Protime 11.2 10.0 - 13.2 sec    INR 0.97 0.86 - 1.14   Troponin   Result Value Ref Range    Troponin <0.01 <0.01 ng/mL   APTT   Result Value Ref Range    aPTT 44.4 (H) 24.2 - 36.2 sec   CK   Result Value Ref Range    Total CK 56 39 - 308 U/L   Brain Natriuretic Peptide   Result Value Ref Range    Pro-BNP 72 0 - 124 pg/mL   POCT Glucose   Result Value Ref Range    Glucose 93 mg/dL    QC OK?  yes    POCT Glucose   Result Value Ref Range    POC Glucose 93 70 - 99 mg/dl    Performed on ACCU-CHEK    EKG 12 Lead   Result Value Ref Range    Ventricular Rate 63 BPM    Atrial Rate 63 BPM    P-R Interval 188 ms    QRS Duration 88 ms    Q-T Interval 428 ms    QTc Calculation (Bazett) 437 ms    P Axis 45 degrees    R Axis 17 degrees    T Axis 77 degrees    Diagnosis Normal sinus rhythmNormal ECG        ECG  The Ekg interpreted by me shows  normal sinus rhythm with a rate of 63  Axis is   Normal  QTc is  normal  Intervals and Durations are unremarkable. ST Segments: no acute change  No significant change from prior EKG dated 9/3/19    RADIOLOGY    CT Head WO Contrast   Preliminary Result   1. No definite evidence of acute intracranial abnormality. 2. Tiny focus of low attenuation near anterior limb of right internal   capsule. Finding may represent tiny focus of ischemic change which could be   subacute to chronic in age. Follow-up MRI examination with diffusion imaging   may be helpful for more complete evaluation if clinically indicated. 3. Paranasal sinus disease with visualization of small air-fluid/mucus level   within the right maxillary sinus. 4. Findings suggestive of presence of dental caries with abnormal lucency   surrounding roots of several teeth which raises possibility of associated   periodontal disease as described above. XR ABDOMEN (KUB) (SINGLE AP VIEW)   Final Result      XR CHEST PORTABLE   Final Result   Mild right basilar airspace disease, atelectasis versus pneumonia. IR PICC WO SQ PORT/PUMP > 5 YEARS    (Results Pending)   CTA HEAD NECK W CONTRAST    (Results Pending)   CT ABDOMEN PELVIS W IV CONTRAST Additional Contrast? None    (Results Pending)       ED COURSE and MDM  Patient seen and evaluated. Old records reviewed. Labs and imaging reviewed and results discussed with patient. Patient initially arrived alone. Information was limited. Last known well unknown. Patient had been noted to have decreased responsiveness compared to baseline.   At baseline he is nonverbal but does interact with other people. Patient was noted to be cold to the touch on arrival.  Bear hugger was placed with improvement of tactile temperature. We had significant difficulty obtaining access. Nursing attempted peripheral IVs.  They were unsuccessful. We called clinical management to have a nurse come down to perform an ultrasound IV. I did attempt to lyse an ultrasound IV myself while awaiting clinical nursing to present to the bedside and was unsuccessful. Clinical nursing attempted IV and was able to gain access but was unable to draw labs. At that time, I did discuss with the father about placement of a central venous catheter. Clinical nursing had also recommended PICC line, father's preference was for a PICC line at that time, given that patient's vital signs other than hypothermia were stable, did to respect father's wishes. We had difficulty obtaining temperatures. When rectal temperature was obtained, patient temperature was approximately 88 °F.  Temperature sensing Elliott placed. We continued passive warming until access was obtained when warm IV fluids was initiated. Patient's mental status was improving with warming measures. At this time, information obtained without access showed that patient was not hypoglycemic. He did not have a UTI.  KUB showed gaseous distention of the stomach, father reports that patient does have gastric obstruction at baseline. Chest x-ray shows mild basilar airspace disease, atelectasis versus pneumonia. Will treat with antibiotics in the setting of possible infection and hypothermia. When Dr. Nayely Pineda arrived, she attempted peripheral IV access and was able to obtain laboratory studies. Laboratory studies were arriving back abnormal, I did again discuss with the father about placing a central venous catheter and he agreed due to our need for immediate access to obtain CT scans and provide treatment.   At that time, PICC team arrived, so PICC line placed due to this being father's preference. ED Course as of Sep 22 0932   Tue Sep 22, 2020   0514 CXR: IMPRESSION: Mild right basilar airspace disease, atelectasis versus pneumonia. [ER]   0525 POC glucose WNL    [ER]   0631 XR abd: FINDINGS:  Moderate gaseous distension of the stomach. Correlation for gastric outlet  obstruction recommended.     Indeterminate bowel gas pattern.    [ER]   7526 Nursing unable to obtain peripheral IV. I attempted to place an ultrasound IV unsuccessfully. Nursing supervisor came to the ER and attempted ultrasound IV unsuccessfully. Discussed with patient's father about placing a IJ central line versus a PICC line. Father wished to wait 1 more hour until the PICC line team is available. [ER]   0719 Urinalysis does not show evidence of infection or blood. [ER]   0719 CT head: IMPRESSION:  1. No definite evidence of acute intracranial abnormality. 2. Tiny focus of low attenuation near anterior limb of right internal  capsule. Finding may represent tiny focus of ischemic change which could be  subacute to chronic in age. Follow-up MRI examination with diffusion imaging  may be helpful for more complete evaluation if clinically indicated. 3. Paranasal sinus disease with visualization of small air-fluid/mucus level  within the right maxillary sinus. 4. Findings suggestive of presence of dental caries with abnormal lucency  surrounding roots of several teeth which raises possibility of associated  periodontal disease as described above.    [ER]   3930 Blood gas shows acidemia with pH of 7.31. No hypercarbia. [ER]   [id number] No leukocytosis or thrombocytopenia. Mild anemia. [ER]   [id number] Patient has hyperkalemia but the specimen is hemolyzed. No evidence of hyperkalemic changes on EKG. Patient given calcium gluconate and bicarb.   Will repeat potassium level.    [ER]   0904 Spoke to [facility] stroke team regarding anterior limb of right internal capsule focus of low attenuation that might be subacute to chronic in age. Last known well is unknown but it is sometime within the last 24 hours. [facility] did recommend CTA for further evaluation for stroke. We will need to obtain better access prior to CT scan.    [ER]   0914 As we were preparing for central line, PIC team arrived at bedside. They will attempt PICC line as this is still the father's preference. [ER]   0915 Troponin within normal limits. [ER]   0915 No other significant electrolyte abnormalities. [ER]   [id number] Patient does have new creatinine elevation compared to 1 year ago. Patient is receiving fluids. [ER]   0915 Lipase significantly elevated. [ER]   0916 TSH significantly elevated. This appears to be a new elevation.    [ER]   [id number] Will plan for CT of the abdomen in the setting of pancreatitis and hospital gastric obstruction to assess for necrotizing pancreatitis. Mother does report that patient does have a history of abdominal distention from gastric distention.    [ER]   0917 CK level within normal limits. [ER]   0917 Lactate within normal limits. [ER]   [id number] In the setting of elevated TSH and abnormal lipase, there is concern for a endocrine abnormality. Will give levothyroxine and hydrocortisone.    [ER]      ED Course User Index  [ER] Isabelle Quiñones MD     The cause of the patient's hypothermia and altered mental status is unclear. Hypothermia is being treated through passive warming and warm fluids. Hypothermia is improving with these measures. There are no known cold exposure. It is summer, low suspicion for hypothermia due to environmental factors. Patient may have hypothermia and decreased responsiveness in the setting of infection. He does not have a UTI. His chest x-ray shows possible pneumonia.   Patient has severe pancreatitis, there is concern that he may have a necrotizing pancreatitis-CT still to be obtained. Lactate within normal limits. No leukocytosis. We will treat with antibiotics while further investigation is completed. For pancreatitis treatment, patient is n.p.o. He is receiving fluids. Patient does not appear to be in pain, now that he is more alert he is intermittently smiling. He is not tachycardic. Patient has an elevated TSH. Symptoms could be related to hypothyroidism. Given altered mental status, there could be a question for myxedema coma. Patient treated with levothyroxine. Given that we are seeing multiple abnormal endocrine labs, there is concern for a endocrine insufficiency syndrome. In this setting, we will also give hydrocortisone to treat for possible adrenal insufficiency. BNP is within normal limits. Patient's echocardiogram in 2019 showed an ejection fraction of 55 to 60%. Low suspicion for patient's hypothermia and altered mental status being related to heart failure. Troponin within normal limits. No evidence of ischemia on EKG. Low suspicion for ACS. CT scan showed a tiny focus of low attenuation in the anterior limb of the right internal capsule that could have been subacute on chronic ischemic changes. Did speak to the [facility] stroke team given that patient's last known well was within 24 hours. They did recommend CTA evaluation and possibly MRI. Stroke alert called. At that time, the access that we had was not appropriate for contrast administration. Plan was for to place CVC when PICC team arrived and PICC was placed. We will now obtain CTA scans and rediscuss with  stroke team.  Unable to obtain NIH stroke scale in the setting of patient's baseline neurologic deficits and altered mental status. Patient is not a TPA candidate due to last known well. He is likely a poor candidate for thrombectomy in the setting of baseline neurologic deficits, will discuss further after CTA.     Patient is being treated for myxedema coma, adrenal insufficiency, and infection. He is being rewarmed. Still pending at this time is cortisol level to assess for adrenal insufficiency, CTA to assess for large vessel occlusion, and CT abdomen pelvis to assess for intra-abdominal infection. Workup otherwise remarkable for:  Patient is hyperkalemic. No changes on EKG. Will resend potassium level in the setting of hemolysis. In the meantime though given the significant elevation we will treat with calcium gluconate and bicarb. If repeat potassium remains elevated, will give insulin. CK within normal limits. At the time of signout, CTA head and neck and CT abdomen pelvis pending. Dr. Rayne Tay will follow up on these results and rediscuss patient with the [facility] stroke team after results of CTA. If plan is to further evaluate for stroke, will need to transfer to [facility] for neurology evaluation. If stroke is no longer considered significant diagnosis, patient will require ICU admission. Cortisol levels and repeat potassium level pending. We will continue warming the patient. 9:19 AM   I have signed out [facility] Emergency Department care to Dr. Rayne Tay. We discussed the pertinent history, physical exam, completed/pending test results (if applicable) and current treatment plan. Please refer to his/her chart for the patients remaining Emergency Department course and final disposition. I spent a total of 55 minutes of critical care time in obtaining history, performing a physical exam, bedside monitoring of interventions, collecting and interpreting tests and discussion with consultants but not including time spent performing procedures.      Clinical Concern altered mental status and hypothermia-hyperkalemia, pancreatitis, infection, concern for myxedema coma versus adrenal insufficiency    Intervention rewarming, discussion with family, fluid resuscitation, hormone replacement      During the patient's ED course, the patient was given:  Medications   lidocaine 1 % injection 5 mL (has no administration in time range)   sodium chloride flush 0.9 % injection 10 mL (has no administration in time range)   0.9 % sodium chloride bolus (1,000 mLs Intravenous New Bag 9/22/20 0818)   calcium gluconate 10 % injection 2 g (has no administration in time range)   sodium bicarbonate 8.4 % injection 50 mEq (has no administration in time range)   hydrocortisone sodium succinate PF (SOLU-CORTEF) injection 100 mg (has no administration in time range)   levothyroxine (SYNTHROID) injection 200 mcg (has no administration in time range)        CLINICAL IMPRESSION  1. Altered mental status, unspecified altered mental status type    2. Hypothermia, initial encounter        Blood pressure 108/77, pulse 92, temperature 92.8 °F (33.8 °C), resp. rate 22, height 4' 8\" (1.422 m), weight 162 lb (73.5 kg), SpO2 97 %. DISPOSITION  Brendan Ship was Signed out to oncoming physician in fair condition. DISCLAIMER: This chart was created using Dragon dictation software. Efforts were made by me to ensure accuracy, however some errors may be present due to limitations of this technology and occasionally words are not transcribed correctly.      Christa Serna MD  09/22/20 190 [facility] MD Jose E  09/22/20 0498

## 2020-09-23 NOTE — PROGRESS NOTES
Comprehensive Nutrition Assessment    Type and Reason for Visit:  Initial    Nutrition Recommendations/Plan:   1. BSE today. SLP may be needed (Baseline diet is mechanical soft/ground)  2. If continues NPO, NG to be placed  3. If TF needed, recommend initiate Jevity 1.5 at 20 mL/hr. Increase by 20 mL q 4 hrs to goal of 65 mL/hr is met  4. 120 mL free water flush q 3 hrs if NO IVF infusing. Monitor Na, IVF, need for flush adjustments  5. Monitor nutrition adequacy, pertinent labs, bowel habits, wt changes, and clinical progress    Nutrition Assessment:  Pt with hx of MRDD. Admitted with encephalopathy ordered NPO at this time. Spoke with pts dad over the phone, states he typically tolerates a mechanical soft diet, ground foods. At this time, unsure if pt will tolerate diet advancement. May need NGT. RN to assess BSE after BIPAP treatment today. Malnutrition Assessment:  Malnutrition Status: At risk for malnutrition (Comment)        Estimated Daily Nutrient Needs:  Energy (kcal):  9915-4916; Weight Used for Energy Requirements:  Current(71 kg)     Protein (g):  85-99; Weight Used for Protein Requirements:  Current(1.2-1.4)        Fluid (ml/day):  1 mL/kcal; Weight Used for Fluid Requirements:  Current      Nutrition Related Findings:  Hypotensive on levophed in the ICU. Hypernatremia noted on normal saline at 100 mL/hr. Na 150. Wounds:  None       Current Nutrition Therapies:    Diet NPO Effective Now     · Current Tube Feeding (TF) Recs:   · Feeding Route: Nasogastric  · Formula: 1.5 Calorie with Fiber  · Schedule: Continuous  · Goal TF & Flush Orders Provides: Jevity 1.5 at 65 mL/hr x 20 hrs to provide 1300 mL TV, 1950 kcals, 83 g protein, and 988 mL free fluid. 120 mL free water flush q 3 hrs. Additional free water may be needed to correct hypernatremia.       Anthropometric Measures:  · Height: 4' 8\" (142.2 cm)  · Current Body Weight: 156 lb (70.8 kg)   · Ideal Body Weight: 82 lbs; % Ideal Body Weight 190.2 %   · BMI: 35  · BMI Categories: Obese Class 2 (BMI 35.0 -39.9)       Nutrition Diagnosis:   · Inadequate oral intake related to cognitive or neurological impairment as evidenced by NPO or clear liquid status due to medical condition      Nutrition Interventions:   Food and/or Nutrient Delivery:  (Diet advancement vs TF initiation)  Nutrition Education/Counseling:  No recommendation at this time   Coordination of Nutrition Care:  Continued Inpatient Monitoring    Goals: Tolerate diet advancement and consume greater than 50% of meals and ONS this admission       Nutrition Monitoring and Evaluation:   Food/Nutrient Intake Outcomes:  Food and Nutrient Intake, Diet Advancement/Tolerance  Physical Signs/Symptoms Outcomes:  Biochemical Data     Discharge Planning: Too soon to determine     Electronically signed by Sheyla Sellers.  Amy Brar RD, LD on 9/23/20 at 9:53 AM EDT    Contact: [phone]

## 2020-09-23 NOTE — CONSULTS
Thank you to requesting provider: Dr. Sweetie Kyle  , for asking us to see Judy Fisher  Reason for consultation:  Acute Kidney Injury and hyperkalemia   Chief Complaint:  Acute altered mental status     History of Presenting Illness      61 y/o with history of MRDD admitted with acute encephalopathy. We have been asked to see him for acute kidney injury. Baseline normal kidney function. Initially his Scr was 1.5 and associated with hyperkalemia. He has a parnell placed and he is on IV fluids. He is now hemodynamically stable. Renal failure is associated with hypernatremia and his hyperkalemia is improving. On 2 liters NC. No edema. Past Medical/Surgical History      Active Ambulatory Problems     Diagnosis Date Noted    HTN (hypertension) 07/16/2010    Closed displaced intertrochanteric fracture of left femur ([facility] [address] 09/03/2019    Abnormal echocardiogram 09/26/2019     Resolved Ambulatory Problems     Diagnosis Date Noted    No Resolved Ambulatory Problems     Past Medical History:   Diagnosis Date    Acne     Anemia     Cataract     CHF (congestive heart failure) (HCC)     COPD (chronic obstructive pulmonary disease) (HCC)     GERD (gastroesophageal reflux disease)     Hearing loss     Hernia, umbilical     Hip fracture 2008    HTN      Mental retardation     Peripheral vascular disease (HCC)          Review of Systems     Unable to obtain due to AMS       Medications      Reviewed in EMR     Allergies     Chocolate; Eggs [egg white]; Food; Orange juice [orange oil]; Peanut butter flavor [flavoring agent];  Penicillins; Strawberry (diagnostic); and Tape [adhesive tape]      Family History       Negative for Kidney Disease    Social History      Social History     Socioeconomic History    Marital status: Single     Spouse name: None    Number of children: None    Years of education: None    Highest education level: None   Occupational History    None   Social Needs    Financial resource strain: None    Food insecurity     Worry: None     Inability: None    Transportation needs     Medical: None     Non-medical: None   Tobacco Use    Smoking status: Never Smoker    Smokeless tobacco: Never Used   Substance and Sexual Activity    Alcohol use: No    Drug use: None    Sexual activity: None   Lifestyle    Physical activity     Days per week: None     Minutes per session: None    Stress: None   Relationships    Social connections     Talks on phone: None     Gets together: None     Attends Buddhism service: None     Active member of club or organization: None     Attends meetings of clubs or organizations: None     Relationship status: None    Intimate partner violence     Fear of current or ex partner: None     Emotionally abused: None     Physically abused: None     Forced sexual activity: None   Other Topics Concern    None   Social History Narrative    None       Physical Exam     Blood pressure (!) 106/54, pulse 111, temperature 96.8 °F (36 °C), temperature source Bladder, resp. rate 15, height 4' 8\" (1.422 m), weight 156 lb 8.4 oz (71 kg), SpO2 95 %.     General:  Critically ill, not responsive   HEENT:  PERRL, EOMI  Neck:  Supple, normal range of movement  Chest:  CTAB, good respiratory effort, good air movement  CV:  Tachycardic, no rub   Abdomen:  NTND, soft, +BS, no hepatosplenomegaly  Extremities:  No peripheral edema  Neurological:  Moving all four extremities, CN II-XII grossly intact  Lymphatics:  No palpable lymph nodes  Skin:  No rash, no jaundice  Psychiatric:  Somnolent, no rub     Data     Recent Labs     09/22/20  0755 09/23/20  0600   WBC 6.5 13.5*   HGB 13.4* 10.2*   HCT 40.3* 31.0*   MCV 95.5 96.5    114*     Recent Labs     09/22/20  0755 09/22/20  0950 09/22/20  1737 09/23/20  0600    143  --  150*   K 7.1* 6.3* 5.4* 5.4*    110  --  122*   CO2 27 26  --  21   GLUCOSE 88 79  --  87   PHOS  --   --   --  3.4   MG  --   --   --  1.80   BUN 64* 62*  --  57*   CREATININE 1.5* 1.5*  --  1.9*   LABGLOM 48* 48*  --  37*   GFRAA 58* 58*  --  44*       Assessment:    Acute Kidney Injury:  KDIGO stage 2  - Data:  UA bland. Normal renal u/s  - Etiology:  Pre-renal is the leading clinical suspicion     Hyperkalemia:  Due to acute kidney injury, improving with medical management. Urine output slowly improving     Altered Mental Status:  Multifactorial.  Neurology following, favor metabolic process     Hypernatremia:  Acute onset with volume     Heart Failure:  History of diastolic heart failure.   EF normal    Plan:    IV fluids 1/2 NS at 125 cc/hr  Follow labs  Monitor in/outs, hopeful kidney function will turn around and he will avoid dialysis       Thank you for asking us to participate in the management of your patient, please do not hesitate to contact me for any concerns regarding my recommendations as outlined above.    -----------------------------  Ashley Langston M.D.   [facility]

## 2020-09-23 NOTE — PROGRESS NOTES
09/23/20 0409   NIV Type   Skin Protection for O2 Device Yes   Mode Bilevel   Mask Type Full face mask   Mask Size Medium   Settings/Measurements   IPAP 15 cmH20   CPAP/EPAP 5 cmH2O   Rate Ordered 12   Resp 15   FiO2  25 %   Vt Exhaled 469 mL   Minute Volume 6.9 Liters   Mask Leak (lpm) 9 lpm   Comfort Level Good   Using Accessory Muscles No   SpO2 98   Alarm Settings   Alarms On Y   Press Low Alarm 6 cmH2O   High Pressure Alarm 30 cmH2O   Delay Alarm 20 sec(s)   Resp Rate Low Alarm 6   High Respiratory Rate 40 br/min   Oxygen Therapy/Pulse Ox   SpO2 98 %

## 2020-09-23 NOTE — PROGRESS NOTES
RESPIRATORY THERAPY ASSESSMENT    Name:  Junior Mantilla  Medical Record Number:  [id number]  Age: 60 y.o. Gender: male  : 1965  Today's Date:  2020  Room:  0251/0251-01    Assessment     Is the patient being admitted for a COPD or Asthma exacerbation? No   (If yes the patient will be seen every 4 hours for the first 24 hours and then reassessed)    Patient Admission Diagnosis      Allergies  Allergies   Allergen Reactions    Chocolate     Eggs [Egg White] Hives    Food      Orange juice, peanut butter, coke    Orange Juice [Orange Oil]     Peanut Butter Flavor [Flavoring Agent]     Penicillins Hives    Strawberry (Diagnostic)     Tape Jerona Vj Tape]      Eco-tape       Minimum Predicted Vital Capacity:     n/a          Actual Vital Capacity:      Pt on Bipap              Pulmonary History:No history  Home Oxygen Therapy:  unknown  Home Respiratory Therapy: per chart, Combivent MDI   Current Respiratory Therapy:  Duoneb QID  Treatment Type: HHN  Medications: Albuterol/Ipratropium    Respiratory Severity Index(RSI)   Patients with orders for inhalation medications, oxygen, or any therapeutic treatment modality will be placed on Respiratory Protocol. They will be assessed with the first treatment and at least every 72 hours thereafter. The following severity scale will be used to determine frequency of treatment intervention.     Smoking History: No Smoking History = 0    Social History  Social History     Tobacco Use    Smoking status: Never Smoker    Smokeless tobacco: Never Used   Substance Use Topics    Alcohol use: No    Drug use: Not on file       Recent Surgical History: None = 0  Past Surgical History  Past Surgical History:   Procedure Laterality Date    COLONOSCOPY  2018    colon polyp    FEMUR FRACTURE SURGERY Left 9/3/2019    LEFT CLOSED REDUCTION INTERNAL FIXATION SYNTHES performed by Linwood Mclaughlin MD at [address] [place] [address]      right hip    UMBILICAL HERNIA REPAIR      UPPER GASTROINTESTINAL ENDOSCOPY  06/29/2018    Bx Gastric       Level of Consciousness: Lethargic = 3    Level of Activity: Mostly sedentary, minimal walking = 2    Respiratory Pattern: Regular Pattern; RR 8-20 = 0    Breath Sounds: Diminshed bilaterally and/or crackles = 2    Sputum   ,  ,    Cough: Weak, non-productive = 3    Vital Signs   BP (!) 111/55   Pulse 133   Temp 96 °F (35.6 °C) (Bladder)   Resp 19   Wt 151 lb 14.4 oz (68.9 kg)   SpO2 97%   BMI 34.05 kg/m²   SPO2 (COPD values may differ): 90-91% on room air or greater than 92% on FiO2 24- 28% = 1    Peak Flow (asthma only): not applicable = 0    RSI: 11-94 = Q6H or QID and Q4HPRN for dyspnea        Plan       Goals: medication delivery, mobilize retained secretions, volume expansion and improve oxygenation    Patient/caregiver was educated on the proper method of use for Respiratory Care Devices:  No:       Level of patient/caregiver understanding able to:   ? Verbalize understanding   ? Demonstrate understanding       ? Teach back        ? Needs reinforcement       ? No available caregiver               ? Other:     Response to education:       Is patient being placed on Home Treatment Regimen? Yes     Does the patient have everything they need prior to discharge? NA     Comments: Pt assessed and meds reviewed. Plan of Care: Continue as ordered. Electronically signed by Hugo Barnhart RCP on 9/22/2020 at 9:17 PM    Respiratory Protocol Guidelines     1. Assessment and treatment by Respiratory Therapy will be initiated for medication and therapeutic interventions upon initiation of aerosolized medication. 2. Physician will be contacted for respiratory rate (RR) greater than 35 breaths per minute. Therapy will be held for heart rate (HR) greater than 140 beats per minute, pending direction from physician.   3. Bronchodilators will be administered via Metered Dose Inhaler (MDI) with spacer when the following criteria are met:  a. Alert and cooperative     b. HR < 140 bpm  c. RR < 30 bpm                d. Can demonstrate a 2-3 second inspiratory hold  4. Bronchodilators will be administered via Hand Held Nebulizer [facility]) to patients when ANY of the following criteria are met  a. Incognizant or uncooperative          b. Patients treated with HHN at Home        c. Unable to demonstrate proper use of MDI with spacer     d. RR > 30 bpm   5. Bronchodilators will be delivered via Metered Dose Inhaler (MDI), HHN, Aerogen to intubated patients on mechanical ventilation. 6. Inhalation medication orders will be delivered and/or substituted as outlined below. Aerosolized Medications Ordering and Administration Guidelines:    1. All Medications will be ordered by a physician, and their frequency and/or modality will be adjusted as defined by the patients Respiratory Severity Index (RSI) score. 2. If the patient does not have documented COPD, consider discontinuing anticholinergics when RSI is less than 9.  3. If the bronchospasm worsens (increased RSI), then the bronchodilator frequency can be increased to a maximum of every 4 hours. If greater than every 4 hours is required, the physician will be contacted. 4. If the bronchospasm improves, the frequency of the bronchodilator can be decreased, based on the patient's RSI, but not less than home treatment regimen frequency. 5. Bronchodilator(s) will be discontinued if patient has a RSI less than 9 and has received no scheduled or as needed treatment for 72  Hrs. Patients Ordered on a Mucolytic Agent:    1. Must always be administered with a bronchodilator. 2. Discontinue if patient experiences worsened bronchospasm, or secretions have lessened to the point that the patient is able to clear them with a cough. Anti-inflammatory and Combination Medications:    1.  If the patient lacks prior history of lung disease, is not using inhaled anti-inflammatory medication at home, and lacks wheezing by examination or by history for at least 24 hours, contact physician for possible discontinuation.

## 2020-09-23 NOTE — DISCHARGE INSTR - COC
Continuity of Care Form    Patient Name: Jolanta Olivera   :  1965  MRN:  [id number]    Admit date:  2020  Discharge date:  ***    Code Status Order: Full Code   Advance Directives:     Admitting Physician:  Maryuri Belcher MD  PCP: No primary care provider on file. Discharging Nurse: [facility] Unit/Room#: 0251/0251-01  Discharging Unit Phone Number: ***    Emergency Contact:   Extended Emergency Contact Information  Primary Emergency Contact: Cayetano Piña  Address: [address] [facility], [address] [place] [address] Phone: [phone]  Work Phone: [phone]  Relation: Parent    Past Surgical History:  Past Surgical History:   Procedure Laterality Date    COLONOSCOPY  2018    colon polyp    FEMUR FRACTURE SURGERY Left 9/3/2019    LEFT CLOSED REDUCTION INTERNAL FIXATION SYNTHES performed by Martir Nelson MD at [facility]      right hip    UMBILICAL HERNIA REPAIR      UPPER GASTROINTESTINAL ENDOSCOPY  2018    Bx Gastric       Immunization History:   Immunization History   Administered Date(s) Administered    Pneumococcal Polysaccharide ([id number]) 09/10/2008       Active Problems:  Patient Active Problem List   Diagnosis Code    HTN (hypertension) I10    Closed displaced intertrochanteric fracture of left femur ([address] S72.142A    Abnormal echocardiogram R93.1    Acute encephalopathy G93.40    Acute respiratory failure with hypercapnia ([address] J96.02    Pulmonary infiltrates R91.8    Acute pancreatitis without infection or necrosis K85.90    LINDA (acute kidney injury) ([address] N17.9    Hyperkalemia E87.5    Hypothyroidism E03.9    Shock ([address] R57.9    Hypothermia T68. Shanna Hidalgo    Class 2 obesity in adult E66.9    Suspected sleep apnea R29.818       Isolation/Infection:   Isolation          No Isolation        Patient Infection Status     Infection Onset Added Last Indicated Last Indicated By Review Planned Expiration Resolved Resolved By    None active    Resolved    COVID-19 Rule Out 09/22/20 09/22/20 09/22/20 COVID-19 (Ordered)   09/22/20 Rule-Out Test Resulted          Nurse Assessment:  Last Vital Signs: /76   Pulse 108   Temp 97 °F (36.1 °C) (Bladder)   Resp 16   Ht 4' 8\" (1.422 m)   Wt 156 lb 8.4 oz (71 kg)   SpO2 98%   BMI 35.09 kg/m²     Last documented pain score (0-10 scale):    Last Weight:   Wt Readings from Last 1 Encounters:   09/23/20 156 lb 8.4 oz (71 kg)     Mental Status:  {IP PT MENTAL STATUS:20030}    IV Access:  { MARVIN IV ACCESS:664998433}    Nursing Mobility/ADLs:  Walking   {CHP DME FTVQ:666200399}  Transfer  {CHP DME RCTU:136334278}  Bathing  {CHP DME YXAL:165177300}  Dressing  {CHP DME JBLL:322255285}  Toileting  {CHP DME GBGB:055686959}  Feeding  {CHP DME AWMQ:932281438}  Med Admin  {CHP DME KGND:574734136}  Med Delivery   { MARVIN MED Delivery:939069301}    Wound Care Documentation and Therapy:        Elimination:  Continence:   · Bowel: {YES / QF:90876}  · Bladder: {YES / SK:58636}  Urinary Catheter: {Urinary Catheter:786060568}   Colostomy/Ileostomy/Ileal Conduit: {YES / EQ:90221}       Date of Last BM: ***    Intake/Output Summary (Last 24 hours) at 9/23/2020 0923  Last data filed at 9/23/2020 0800  Gross per 24 hour   Intake 3133 ml   Output 750 ml   Net 2383 ml     I/O last 3 completed shifts:   In: 3133 [I.V.:3133]  Out: 625 [Urine:625]    Safety Concerns:     [address] Safety Concerns:837577530}    Impairments/Disabilities:      [address] Impairments/Disabilities:037671056}    Nutrition Therapy:  Current Nutrition Therapy:   [address] Diet List:654427222}    Routes of Feeding: {CHP DME Other Feedings:999075150}  Liquids: {Slp liquid thickness:54194}  Daily Fluid Restriction: {CHP DME Yes amt example:044993745}  Last Modified Barium Swallow with Video (Video Swallowing Test): {Done Not Done TEOR:848269730}    Treatments at the Time of Hospital Discharge:   Respiratory Treatments: ***  Oxygen Therapy:  {Therapy; copd oxygen:24202}  Ventilator:    {MH CC Vent XILM:213080272}    Rehab Therapies: {THERAPEUTIC INTERVENTION:0890395219}  Weight Bearing Status/Restrictions: [place] Weight Bearin}  Other Medical Equipment (for information only, NOT a DME order):  {EQUIPMENT:282438336}  Other Treatments: ***    Patient's personal belongings (please select all that are sent with patient):  {CHP DME Belongings:992747411}    RN SIGNATURE:  {Esignature:536467623}    CASE MANAGEMENT/SOCIAL WORK SECTION    Inpatient Status Date: 20    Readmission Risk Assessment Score:  Readmission Risk              Risk of Unplanned Readmission:        20           Discharging to Facility/ Agency   · Name: [facility]  · Address:  · Phone: [phone]  · Fax: [phone]    / signature: {Esignature:101275253}    PHYSICIAN SECTION    Prognosis: {Prognosis:7400275653}    Condition at Discharge: [address] To Patient Condition:863290364}    Rehab Potential (if transferring to Rehab): {Prognosis:6012663990}    Recommended Labs or Other Treatments After Discharge: ***    Physician Certification: I certify the above information and transfer of Marilee Rivera  is necessary for the continuing treatment of the diagnosis listed and that he requires {Admit to Appropriate Level of Care:28776} for {GREATER/LESS:128441069} 30 days.      Update Admission H&P: {CHP DME Changes in KXKCE:515085983}    PHYSICIAN SIGNATURE:  {Esignature:777325097}

## 2020-09-23 NOTE — PROGRESS NOTES
Pt resting in bed wearing bipap. Pt not following commands but responds to pain. Pt showing sinus tachycardia on heart monitor. Levo infusing at 22 mcg/min and will titrate to keep MAP>65. Shift assessment completed. See flowsheet for vitals and assessment. See MAR for meds given. Repositioned pt in bed. Will continue to assess.  0747 Hailey Steen RN

## 2020-09-23 NOTE — PROGRESS NOTES
09/23/20 0835   NIV Type   Skin Protection for O2 Device Yes   Equipment Type v60   Mode Bilevel   Mask Type Full face mask   Mask Size Medium   Bonnet size Medium   Settings/Measurements   IPAP 15 cmH20   CPAP/EPAP 5 cmH2O   Rate Ordered 12   Resp 17   FiO2  25 %   I Time/ I Time % 1 s   Vt Exhaled 516 mL   Minute Volume 8.7 Liters   Mask Leak (lpm) 5 lpm   Comfort Level Good   Using Accessory Muscles No   SpO2 98   Alarm Settings   Alarms On Y   Press Low Alarm 6 cmH2O   High Pressure Alarm 30 cmH2O   Delay Alarm 20 sec(s)   Resp Rate Low Alarm 6   High Respiratory Rate 40 br/min   Oxygen Therapy/Pulse Ox   SpO2 100 %

## 2020-09-23 NOTE — CONSULTS
GI Consult Note      Reason for Consult:  Acute pancreatitis  Requesting Physician:  Anusionwu    CHIEF COMPLAINT:  MS changes    History Obtained From:  electronic medical record, pt is unable to provide history    HISTORY OF PRESENT ILLNESS:                The patient is a 60 y.o. male with significant past medical history of dCHF, COPD, HTN and MRDD who presents with MS changes. MS changes noted in ECF. Hypothermia and hypotension manifest.  Lab eval demonstrated ARF, hyperkalemia, elevated TSH and hyperlipasemia. CT of the abd indicated the presence of pancreatic inflammation w/o fluid collection, abscess or necrosis. He was admitted to the ICU due to hypotension requiring pressors. Empiric ATBs were started and IV Synthroid was provided due to concerns for possible myxedema coma. CXR demonstrated mild right basilar airspace disease.  KUB yesterday demonstrated moderate gaseous distention of the stomach unchanged with KUB this am.    Past Medical History:        Diagnosis Date    Acne     Anemia     Cataract     unspecified eye    CHF (congestive heart failure) (HCC)     COPD (chronic obstructive pulmonary disease) (HCC)     GERD (gastroesophageal reflux disease)     Hearing loss     Hernia, umbilical     Hip fracture 2008    right    HTN      Mental retardation     non-verbal    Peripheral vascular disease ([address]      Past Surgical History:        Procedure Laterality Date    COLONOSCOPY  06/29/2018    colon polyp    FEMUR FRACTURE SURGERY Left 9/3/2019    LEFT CLOSED REDUCTION INTERNAL FIXATION SYNTHES performed by Paulina Soriano MD at [address]  2008    right hip    UMBILICAL HERNIA REPAIR      UPPER GASTROINTESTINAL ENDOSCOPY  06/29/2018    Bx Gastric     Current Medications:    Current Facility-Administered Medications: mupirocin (BACTROBAN) 2 % ointment, , Nasal, BID  pantoprazole (PROTONIX) injection 40 mg, 40 mg, Intravenous, Daily  [START ON 9/24/2020] levothyroxine (SYNTHROID) tablet 25 mcg, 25 mcg, Oral, Daily  norepinephrine (LEVOPHED) 16 mg in sodium chloride 0.9 % 250 mL infusion, 5 mcg/min, Intravenous, Continuous  ipratropium-albuterol (DUONEB) nebulizer solution 1 ampule, 1 ampule, Inhalation, 4x daily  hydrocortisone sodium succinate PF (SOLU-CORTEF) injection 100 mg, 100 mg, Intravenous, Q8H  bisacodyl (DULCOLAX) suppository 10 mg, 10 mg, Rectal, Daily PRN  oxybutynin (DITROPAN) tablet 2.5 mg, 2.5 mg, Oral, Nightly  sodium chloride flush 0.9 % injection 10 mL, 10 mL, Intravenous, 2 times per day  sodium chloride flush 0.9 % injection 10 mL, 10 mL, Intravenous, PRN  [DISCONTINUED] acetaminophen (TYLENOL) tablet 650 mg, 650 mg, Oral, Q6H PRN **OR** acetaminophen (TYLENOL) suppository 650 mg, 650 mg, Rectal, Q6H PRN  promethazine (PHENERGAN) tablet 12.5 mg, 12.5 mg, Oral, Q6H PRN **OR** ondansetron (ZOFRAN) injection 4 mg, 4 mg, Intravenous, Q6H PRN  heparin (porcine) injection 5,000 Units, 5,000 Units, Subcutaneous, 3 times per day  0.9 % sodium chloride infusion, 1,000 mL, Intravenous, Continuous  Allergies:  Chocolate; Eggs [egg white]; Food; Orange juice [orange oil]; Peanut butter flavor [flavoring agent]; Penicillins; Strawberry (diagnostic); and Tape [adhesive tape]    Social History:    Devoid of TOB or ETOH use  Family History:   [place]  REVIEW OF SYSTEMS:    Positive for that which was noted in the HPI. All other systems reviewed and negative.      PHYSICAL EXAM:    Vitals:    /66   Pulse 106   Temp 97 °F (36.1 °C) (Bladder)   Resp 18   Ht 4' 8\" (1.422 m)   Wt 156 lb 8.4 oz (71 kg)   SpO2 98%   BMI 35.09 kg/m²     GEN: lethargic, BiPap in place  HEENT: PERRLA, clear and moist oropharynx  Neck: supple, no masses, trachea midline, no JVD  Lungs: CTA-B w/o wheezes, rhonchi or rales, good A/E B  Cardiac:  RRR w/o murmurs, rubs or gallops  Abdomen: soft, non-tender, moderately severe distention with tympany and w/o HSM, masses, ascites or bruits, NABs  EXT: no clubbing, cyanosis, edema or asterixis  Skin: no rashes, telangiectasias, lesions  Neuro: grossly non-focal    DATA:    CBC with Differential:    Lab Results   Component Value Date    WBC 13.5 09/23/2020    RBC 3.22 09/23/2020    HGB 10.2 09/23/2020    HCT 31.0 09/23/2020     09/23/2020    MCV 96.5 09/23/2020    MCH 31.6 09/23/2020    MCHC 32.7 09/23/2020    RDW 17.8 09/23/2020    LYMPHOPCT 3.2 09/23/2020    MONOPCT 4.0 09/23/2020    BASOPCT 0.3 09/23/2020    MONOSABS 0.5 09/23/2020    LYMPHSABS 0.4 09/23/2020    EOSABS 0.0 09/23/2020    BASOSABS 0.0 09/23/2020     CMP:    Lab Results   Component Value Date     09/23/2020    K 5.4 09/23/2020     09/23/2020    CO2 21 09/23/2020    BUN 57 09/23/2020    CREATININE 1.9 09/23/2020    GFRAA 44 09/23/2020    AGRATIO 1.2 09/22/2020    LABGLOM 37 09/23/2020    GLUCOSE 87 09/23/2020    PROT 6.4 09/22/2020    LABALBU 3.5 09/22/2020    CALCIUM 7.6 09/23/2020    BILITOT <0.2 09/22/2020    ALKPHOS 113 09/22/2020    AST 71 09/22/2020    ALT 70 09/22/2020     Lipase 1471    KUB(9/23): Unchanged moderate amount of gaseous distention of the stomach.  No other    findings to explain abdominal distension.  Of note however these images were    obtained supine and early free air would not be detected. CT(9/22):   Gaseous distention of the stomach.  No small bowel obstruction.         Injection of fat is seen surrounding the pancreas suggestive of early    pancreatitis         Small nodular soft tissue density is seen in the right inguinal canal, most    commonly reactive lymph node in the absence of a known primary          IMPRESSION/RECOMMENDATIONS:    The patient is a 60 y.o. male with significant past medical history of dCHF, COPD, HTN and MRDD who presents with MS changes and resp distress.   We have been asked to this patient regarding a diagnosis of acute pancreatitis based on the presence of hyperlipasemia and peripancreatic

## 2020-09-23 NOTE — CARE COORDINATION
Call received from Levi Aponte with [employer]. States pt is long term care at [facility] (for the last 10 years). Insurance is a dual plan, so if pt has skilled recommendations, precert will be needed to return. Info above relayed to Colby Douglas, [address] for B2.

## 2020-09-23 NOTE — PROGRESS NOTES
09/22/20 2005   NIV Type   Skin Assessment Clean, dry, & intact   Skin Protection for O2 Device Yes   NIV Started/Stopped On   Equipment Type v60   Mode Bilevel   Mask Type Full face mask   Mask Size Medium   Settings/Measurements   IPAP 15 cmH20   CPAP/EPAP 5 cmH2O   Rate Ordered 12   Resp 19   FiO2  25 %   Vt Exhaled 402 mL   Minute Volume 8.2 Liters   Mask Leak (lpm) 0 lpm   Comfort Level Good   Using Accessory Muscles No   SpO2 97   Breath Sounds   Right Upper Lobe Diminished   Right Middle Lobe Crackles   Right Lower Lobe Crackles   Left Upper Lobe Diminished   Left Lower Lobe Diminished   Alarm Settings   Alarms On Y   Press Low Alarm 6 cmH2O   High Pressure Alarm 30 cmH2O   Delay Alarm 20 sec(s)   Resp Rate Low Alarm 6   High Respiratory Rate 40 br/min

## 2020-09-23 NOTE — PROGRESS NOTES
Hospitalist Progress Note      PCP: No primary care provider on file. Date of Admission: 9/22/2020    Chief Complaint:  [address] Course:   HPI :   60 y.o. male with history of CHF, COPD, GERD,  MRDD who presented to  [facility] ER from group home with altered mental status. History obtained per chart as patient unable to provide any history due to AMS. Pt minimally verbal at baseline. Patient reportedly had reduced responsiveness at the ECF overnight and was sent to the ER. He was noted to be hypothermic in the 80s and hypotensive in the 60s. Initial concern for possible CVA - stroke team consulted. CT head was nonacute with tiny area of low attenuation near the anterior right  internal capsule which was thought to be likely subacute/chronic. CTA head/neck was unremarkable. PICC line placed in ER. Labs notable for severe hyperkalemia with potassium of 7.1 creatinine 1.5, TSH of 7.4. Lipase was  elevated at 1471. Temperature improved to 90's with karuna hugger and warm IVF. There was concern for myxedema coma and was started on IV Synthroid. He was given a dose of IV hydrocortisone, started on broad-spectrum antibiotics and  pressors. Pt was transferred to [address] [place] [address] for further care. Subjective:     Pressors weaned off earlier this am. Pt minimally responsive to pain.         Medications:  Reviewed    Infusion Medications    sodium chloride 1,000 mL (09/23/20 1138)    norepinephrine Stopped (09/23/20 0920)     Scheduled Medications    mupirocin   Nasal BID    pantoprazole  40 mg Intravenous Daily    [START ON 9/24/2020] levothyroxine  25 mcg Oral Daily    ipratropium-albuterol  1 ampule Inhalation 4x daily    hydrocortisone sodium succinate PF  100 mg Intravenous Q8H    oxybutynin  2.5 mg Oral Nightly    sodium chloride flush  10 mL Intravenous 2 times per day    heparin (porcine)  5,000 Units Subcutaneous 3 times per day     PRN Meds: bisacodyl, sodium chloride flush, [DISCONTINUED] 0-2 09/03/2019    RBCUA 10-20 09/03/2019    BLOODU Negative 09/22/2020    SPECGRAV 1.025 09/22/2020    GLUCOSEU Negative 09/22/2020       Radiology:  XR ABDOMEN FOR NG/OG/NE TUBE PLACEMENT   Final Result   Gastric tube in mid stomach         US RENAL COMPLETE   Final Result   1. Unremarkable sonographic appearance of the kidneys. 2. Collapsed urinary bladder, limiting its evaluation. XR ABDOMEN (KUB) (SINGLE AP VIEW)   Final Result   Unchanged moderate amount of gaseous distention of the stomach. No other   findings to explain abdominal distension. Of note however these images were   obtained supine and early free air would not be detected. Assessment/Plan:    Active Hospital Problems    Diagnosis    Acute encephalopathy [G93.40]    Acute respiratory failure with hypercapnia (HCC) [J96.02]    Pulmonary infiltrates [R91.8]    Acute pancreatitis without infection or necrosis [K85.90]    LINDA (acute kidney injury) ([address] [N17.9]    Hyperkalemia [E87.5]    Hypothyroidism [E03.9]    Shock ([address] [R57.9]    Hypothermia [V00. XXXA]    Class 2 obesity in adult [E66.9]    Suspected sleep apnea [R29.818]       Acute metabolic encephalopathy: Likely multifactorial due to septic shock, hyperkalemia, hypothermia, ? Myxedema coma. Minimally verbal at baseline per report. CT head was nonacute. Stroke work-up negative in ER. Neuro recs appreciated. Supportive care for acute morbidities as stated below        Septic shock: met SIRS criteria with tachycardia, tachypnea, hypothermia on arrival in ER. Initial concern for pneumonia as cause. Pneumonia ruled out per pulm. Empiric abx d/rich. Off pressors. Continue stress dose steroids. Held home BP meds and lasix.          Hypothyroidism: TSH elevated at 7.4, FT3, FT4  normal. Given decreased responsiveness from baseline and hypothermia concern for possible myxedema coma.   recieved IV synthroid -now started on PO on 9/23.      Hypothermia: Likely due to sepsis, hypothyroidism. Improving  with supportive care.          Hyperkalemia: likely due to LINDA ,  potassium supplements he is on chronically and lisinopril. Treated with improvement. Held K supplements and lisinopril. Nephro assisting     LINDA: likely due to vol depletion, compounded by use of ACEi and lasix. Culprit meds held. Nephro consulted. Appreciate help. Monitor Cr and avoid nephrotoxins.           Acute pancreatitis: lipase was elevated with signs of early pancreatitis on CT. Unclear etiology. GI recs appreciated. NG tube to LIWS, NPO         GERD : Continue PPI          Chronic diastolic CHF: EF 55 to 67% with G1DD on echo in 9/3/2019. does not seem in acute decompensation clinically. Received a dose of IV lasix in ER. Continue to hold lasix. Pro BNP normal.  Monitor volume status closely.         Hypernatremia: hypotonic IVF initiated per nephro.          DVT Prophylaxis: heparin     Diet: DIET TUBE FEED CONTINUOUS/CYCLIC NPO; 1.5 Calorie with Fiber; Nasogastric; Continuous; 20; 65  Code Status: Full Code    PT/OT Eval Status: not indicated at this time     Dispo -  Hard to say,  pending clinical course     Yuliana Fregoso MD

## 2020-09-23 NOTE — CONSULTS
Pharmacy to Dose Vancomycin     Dx: Encephalopathy, Possible PNA   Goal trough =  15-20 mcg/mL  Pt wt =  73 kg kg  SCr 1.5, LINDA      Vancomycin 1250 mg IVPB x 1 in ED at 1120  Will obtain Vanc level tomorrow AM  Olivia Laws, PharmD 9/22/2020 5:08 PM    Day 2  SCr 1.9 mg/dL  Vancomycin level = 16.7 mcg/mL at 0600  Vancomycin 500mg IVPB x 1 today  Vancomycin random level tomorrow AM.  Dione Fountain.  Margaux Garcia, BCPS   9/23/2020 7:41 AM

## 2020-09-23 NOTE — CONSULTS
In patient Neurology consult        [facility] Neurology      Pam Martinez MD      Jose Juárezopal  1965    Date of Service: 9/23/2020    Referring Physician: Willie Reid MD    Most of the history was obtained from detailed chart reviewing and discussion with the patient's nurse and primary team. The patient has baseline MRDD and nonverbal.  He is unable to give any history. Reason for the consult and CC: Acute encephalopathy and possible stroke. HPI:   The patient is a 60y.o.  years old male with history of MRDD, CHF and COPD who was admitted from [facility] ER yesterday with acute encephalopathy. He does live at [facility] ([place], [place]). Apparently, he became abruptly lethargic and not responsive at the ECF few hours prior to admission. Initially he was somewhat hypothermic and hypotensive. Degree was severe. Duration was persistent. No witnessed seizure or focal weakness. No other relieving or aggravating factors. He came to the ED where he had a CT scan of the head that showed right subcortical lacunar infarct which is chronic as well as unremarkable CTA of the head and neck. He was found to have multiple metabolic derangement with hypernatremia, hyperkalemia. There was concern of possible myxedema coma and he was started on IV Synthroid. He was transferred to [facility] ICU. The patient is currently lethargic. Open eyes to voice. He is nonverbal.  Seen by GI and other specialist.  Current blood test reviewed and showed sodium 150, potassium 5.4, creatinine 1.9 and BUN 57. . Elevated lipase and other LFT. TSH was 7.4. White count today 13.   Other review system was limited at this point    Family History   Problem Relation Age of Onset    Stroke Mother     High Blood Pressure Father     Diabetes Brother         IDDM         Past Medical History:   Diagnosis Date    Acne     Anemia     Cataract     unspecified eye    CHF (congestive heart failure) (HCC)     COPD (chronic obstructive pulmonary disease) (HCC)     GERD (gastroesophageal reflux disease)     Hearing loss     Hernia, umbilical     Hip fracture 2008    right    HTN      Mental retardation     non-verbal    Peripheral vascular disease ([address]      Past Surgical History:   Procedure Laterality Date    COLONOSCOPY  06/29/2018    colon polyp    FEMUR FRACTURE SURGERY Left 9/3/2019    LEFT CLOSED REDUCTION INTERNAL FIXATION SYNTHES performed by Trell Felipe MD at [facility]  2008    right hip    UMBILICAL HERNIA REPAIR      UPPER GASTROINTESTINAL ENDOSCOPY  06/29/2018    Bx Gastric     Social History     Tobacco Use    Smoking status: Never Smoker    Smokeless tobacco: Never Used   Substance Use Topics    Alcohol use: No    Drug use: Not on file     Allergies   Allergen Reactions    Chocolate     Eggs [Egg White] Hives    Food      Orange juice, peanut butter, coke    Orange Juice [Orange Oil]     Peanut Butter Flavor [Flavoring Agent]     Penicillins Hives    Strawberry (Diagnostic)     Tape Maxine Hardesty Tape]      Eco-tape     Current Facility-Administered Medications   Medication Dose Route Frequency Provider Last Rate Last Dose    mupirocin (BACTROBAN) 2 % ointment   Nasal BID Tan Champagne MD        pantoprazole (PROTONIX) injection 40 mg  40 mg Intravenous Daily Tan Chapmagne MD   40 mg at 09/23/20 0800    [START ON 9/24/2020] levothyroxine (SYNTHROID) tablet 25 mcg  25 mcg Oral Daily GERALD Venegas CNP        norepinephrine (LEVOPHED) 16 mg in sodium chloride 0.9 % 250 mL infusion  5 mcg/min Intravenous Continuous Tan Champagne MD   Stopped at 09/23/20 0920    ipratropium-albuterol (DUONEB) nebulizer solution 1 ampule  1 ampule Inhalation 4x daily Tan Champagne MD   1 ampule at 09/23/20 0816    hydrocortisone sodium succinate PF (SOLU-CORTEF) injection 100 mg  100 mg Intravenous Q8H Tan Champagne MD   100 mg at 09/23/20 0800    bisacodyl (DULCOLAX) suppository 10 mg  10 mg Rectal Daily PRN Lety Ruelas MD        oxybutynin (DITROPAN) tablet 2.5 mg  2.5 mg Oral Nightly Jose Alejandro Umanzor MD        sodium chloride flush 0.9 % injection 10 mL  10 mL Intravenous 2 times per day Jose Alejandro Umanzor MD   10 mL at 09/23/20 0801    sodium chloride flush 0.9 % injection 10 mL  10 mL Intravenous PRN Jose Alejandro Umanzor MD        acetaminophen (TYLENOL) suppository 650 mg  650 mg Rectal Q6H PRN Jose Alejandro Umanzor MD        promethazine (PHENERGAN) tablet 12.5 mg  12.5 mg Oral Q6H PRN Jose Alejandro Umanzor MD        Or    ondansetron (ZOFRAN) injection 4 mg  4 mg Intravenous Q6H PRN Jose Alejandro Umanzor MD        heparin (porcine) injection 5,000 Units  5,000 Units Subcutaneous 3 times per day Jose Alejandro Umanzor MD   5,000 Units at 09/23/20 0609    0.9 % sodium chloride infusion  1,000 mL Intravenous Continuous Jessica Paulino  mL/hr at 09/23/20 0631 1,000 mL at 09/23/20 0631       ROS: 10-14 system review was limited due to MS changes or as per HPI. Constitutional:   Vitals:    09/23/20 0938 09/23/20 0945 09/23/20 1000 09/23/20 1100   BP:  119/63 131/60 (!) 111/52   Pulse:  110 109 110   Resp:  22 20 15   Temp:       TempSrc:       SpO2:  96% 97% 96%   Weight:       Height: 4' 8\" (1.422 m)          General appearance: Lethargic  Eye: PRRR  Fundus: Funduscopic examination could not be performed due to patient's poor cooperation and COVID-19 restriction. Neck: Rigid  Cardiovascular: +1 lower leg edema with good pulsation. Mental Status: The patient is lethargic. Does not open his eyes  Poor attention and concentration  Language: Nonverbal  Unable to assess recent remote memory  Unable to assess fund of knowledge due to encephalopathy. At baseline, he can follow direction and he is independent with ambulation. Cranial Nerves:   II: Visual fields: NT due to confusion.  Pupils: equal, round, reactive to light  III,IV,VI: Extra Ocular Movements are intact. No nystagmus  V: Facial sensation : NT due to confusion  VII: Facial strength and movements: Symmetric  VIII: Hearing: NT due to confusion  IX: Palate elevation NT due to confusion  XI: Shoulder shrug: NT due to confusion  XII: Tongue movements: NT due to confusion  Musculoskeletal: He withdraws to pain from left and right, symmetric. Tone: Normal tone. No rigidity. Reflexes: Bilateral biceps 2/4, triceps 2/4, brachial radialis 2/4, knee 2/4 and ankle 2/4. Planters: flexor bilaterally. Coordination: NT due to confusion  Sensation: NT due to confusion  Gait/Posture: NT due to confusion    Data:  LABS:   Lab Results   Component Value Date     09/23/2020    K 5.4 09/23/2020     09/23/2020    CO2 21 09/23/2020    BUN 57 09/23/2020    CREATININE 1.9 09/23/2020    GFRAA 44 09/23/2020    LABGLOM 37 09/23/2020    GLUCOSE 87 09/23/2020    PHOS 3.4 09/23/2020    MG 1.80 09/23/2020    CALCIUM 7.6 09/23/2020     Lab Results   Component Value Date    WBC 13.5 09/23/2020    RBC 3.22 09/23/2020    HGB 10.2 09/23/2020    HCT 31.0 09/23/2020    MCV 96.5 09/23/2020    RDW 17.8 09/23/2020     09/23/2020     Lab Results   Component Value Date    INR 0.97 09/22/2020    PROTIME 11.2 09/22/2020       Neuroimaging were independently reviewed by me. Reviewed notes from different physicians  Reviewed lab and blood testing    Impression:  Acute encephalopathy, severe. Likely acute metabolic encephalopathy, multifactorial.  Rule out sepsis  Acute pancreatitis  Acute kidney injury  Hypernatremia  Hyperkalemia  Abnormal LFT  ? Abnormal CT head which is secondary to small vessel disease, chronic. Most likely incidental findings and doubt of any clinically significant or contributing to his encephalopathy.       Recommendation:  Continue current supportive care  Critically sick patient with multiple organ involvement  Hydration  Follow electrolytes and kidney function testing  Follow blood and urine cultures and LFT  Neurochecks  Telemetry  Continue Synthroid  Continue hydrocortisone  So far less likely CNS infection. LP under IR will be considered if encephalopathy does not improve  Discussed with ICU nurse and primary team  We will follow. MDM: High complexity. Thank you for referring such patient. If you have any questions regarding my consult note, please don't hesitate to call me. Italia Caldera MD  [phone]    This dictation was generated by voice recognition computer software.  Although all attempts are made to edit the dictation for accuracy, there may be errors in the  transcription that are not intended

## 2020-09-23 NOTE — PLAN OF CARE
Nutrition Problem #1: Inadequate oral intake  Intervention: Food and/or Nutrient Delivery: (Diet advancement vs TF initiation)  Nutritional Goals:  Tolerate diet advancement and consume greater than 50% of meals and ONS this admission

## 2020-09-24 PROBLEM — D69.6 THROMBOCYTOPENIA (HCC): Status: ACTIVE | Noted: 2020-01-01

## 2020-09-24 PROBLEM — E87.0 HYPERNATREMIA: Status: ACTIVE | Noted: 2020-01-01

## 2020-09-24 NOTE — CARE COORDINATION
Chart reviewed due to patient new to this . Patient from [facility] and has been for the past 10 years. Spoke with patient's father who is very involved in patient's care per nursing facility. He states he wants to get his son \"the best care he can\" and if therapy could work with him at least for bed mobility, then he could get his son back to [facility] skilled under his Medicare benefits. Therapy ordered. Placed call to [facility] at [facility] and updated her on the above. She states that patient used to be primary [employer] but that they switched him to Medicare primary and [facility] secondary so if patient goes back skilled, no precert needed. She further states that due to patient had a normal COVID test that was negative, they will take a rapid COVID on the day of discharge. Will continue to watch for therapy rec's.

## 2020-09-24 NOTE — PROGRESS NOTES
Elizabeth Deutsch  Neurology Follow-up  [facility]    Date of Service: 9/24/2020    Subjective:   CC: Follow up today regarding: Acute encephalopathy. Events noted. Chart and lab reviewed. Reviewed events overnight and notes from different physicians. He is about the same. He opens eyes to voice but continues to be lethargic. Does not follow direction. At baseline, he is nonverbal.  Blood test reviewed and showed sodium 147 and creatinine of 1.7. No fever. White count is 10.5. Glucose 64. Other review system was limited at this point. ROS : A 10-12 system review obtained and updated today and is unremarkable except as mentioned  in my interval history. family history includes Diabetes in his brother; High Blood Pressure in his father; Stroke in his mother.     Past Medical History:   Diagnosis Date    Acne     Anemia     Cataract     unspecified eye    CHF (congestive heart failure) (HCC)     COPD (chronic obstructive pulmonary disease) (HCC)     GERD (gastroesophageal reflux disease)     Hearing loss     Hernia, umbilical     Hip fracture 2008    right    HTN      Mental retardation     non-verbal    Peripheral vascular disease (HCC)      Current Facility-Administered Medications   Medication Dose Route Frequency Provider Last Rate Last Dose    mupirocin (BACTROBAN) 2 % ointment   Nasal BID Kim Copeland MD        pantoprazole (PROTONIX) injection 40 mg  40 mg Intravenous Daily Kim Copeland MD   40 mg at 09/24/20 0802    levothyroxine (SYNTHROID) tablet 25 mcg  25 mcg Oral Daily GERALD Dumont CNP   25 mcg at 09/24/20 0802    0.45 % sodium chloride infusion   Intravenous Continuous Jordan Watson  mL/hr at 09/23/20 1610      ipratropium-albuterol (DUONEB) nebulizer solution 1 ampule  1 ampule Inhalation 4x daily Kim Copeland MD   1 ampule at 09/24/20 1156    hydrocortisone sodium succinate PF (SOLU-CORTEF) injection 100 mg  100 mg Intravenous Claudene Pointer, MD   100 mg at 09/24/20 0803    bisacodyl (DULCOLAX) suppository 10 mg  10 mg Rectal Daily PRN Gricelda Stephens MD        oxybutynin (DITROPAN) tablet 2.5 mg  2.5 mg Oral Nightly Jose Alejandro Umanzor MD   2.5 mg at 09/23/20 2051    sodium chloride flush 0.9 % injection 10 mL  10 mL Intravenous 2 times per day Jose Alejandro Umanzor MD   10 mL at 09/24/20 0803    sodium chloride flush 0.9 % injection 10 mL  10 mL Intravenous PRN Jose Alejandro Umanzor MD        acetaminophen (TYLENOL) suppository 650 mg  650 mg Rectal Q6H PRN Jose Alejandro Umanzor MD        promethazine (PHENERGAN) tablet 12.5 mg  12.5 mg Oral Q6H PRN Jose Alejandro Umanzor MD        Or    ondansetron (ZOFRAN) injection 4 mg  4 mg Intravenous Q6H PRN Jose Alejandro Umanzor MD         Allergies   Allergen Reactions    Chocolate     Eggs [Egg White] Hives    Food      Orange juice, peanut butter, coke    Orange Juice [Orange Oil]     Peanut Butter Flavor [Flavoring Agent]     Penicillins Hives    Strawberry (Diagnostic)     Tape [Adhesive Lamount Souleymane      reports that he has never smoked. He has never used smokeless tobacco. He reports that he does not drink alcohol. Objective:  Exam:   Constitutional:   Vitals:    09/24/20 0900 09/24/20 1157 09/24/20 1158 09/24/20 1200   BP: (!) 119/54   102/70   Pulse: 93   82   Resp: 15 13 14 15   Temp: 96.7 °F (35.9 °C)   96.4 °F (35.8 °C)   TempSrc: Bladder   Bladder   SpO2: 96% 95% 95% 96%   Weight:       Height:           General appearance: Lethargic  Eye: PRRR  Neck: Rigid  Cardiovascular: +1 lower leg edema with good pulsation. Mental Status: The patient is lethargic. Does not open his eyes  Poor attention and concentration  Language: Nonverbal  Unable to assess recent remote memory  Unable to assess fund of knowledge due to encephalopathy. At baseline, he can follow direction and he is independent with ambulation.   Cranial Nerves:   II:  Pupils: equal, round, reactive to precautions  According to his nurse, he is able to respond to voice but not following directions. If encephalopathy does not improve by tomorrow, I will consider MRI and EEG  We will follow  MDM: High complexity due to persistent encephalopathy and lack of significant improvement. Vimal Hernandez MD   [phone]      This dictation was generated by voice recognition computer software. Although all attempts are made to edit the dictation for accuracy, there may be errors in the transcription that are not intended.

## 2020-09-24 NOTE — PROGRESS NOTES
Pulmonary & Critical Care Inpatient Progress Note   Amy Mensah MD     REASON FOR TODAY'S VISIT:  Acute resp failure    SUBJECTIVE:   COVID testing negative  On simple nasal cannula oxygen  Off vasopressor support     Scheduled Meds:   mupirocin   Nasal BID    pantoprazole  40 mg Intravenous Daily    levothyroxine  25 mcg Oral Daily    ipratropium-albuterol  1 ampule Inhalation 4x daily    hydrocortisone sodium succinate PF  100 mg Intravenous Q8H    oxybutynin  2.5 mg Oral Nightly    sodium chloride flush  10 mL Intravenous 2 times per day       Continuous Infusions:   dextrose 5 % and 0.45 % NaCl 125 mL/hr at 09/24/20 1615       PRN Meds:  bisacodyl, sodium chloride flush, [DISCONTINUED] acetaminophen **OR** acetaminophen, promethazine **OR** ondansetron    ALLERGIES:  Patient is allergic to chocolate; eggs [egg white]; food; orange juice [orange oil]; peanut butter flavor [flavoring agent]; penicillins; strawberry (diagnostic); and tape [adhesive tape]. Objective:   PHYSICAL EXAM:  /79   Pulse 88   Temp 97.2 °F (36.2 °C) (Axillary)   Resp 16   Ht 4' 8\" (1.422 m)   Wt 156 lb 8.4 oz (71 kg)   SpO2 96%   BMI 35.09 kg/m²    Physical Exam  Constitutional:       General: He is not in acute distress. Appearance: He is well-developed. He is not diaphoretic. HENT:      Head: Normocephalic and atraumatic. Mouth/Throat:      Pharynx: No oropharyngeal exudate. Eyes:      Pupils: Pupils are equal, round, and reactive to light. Neck:      Musculoskeletal: Neck supple. Vascular: No JVD. Cardiovascular:      Heart sounds: Normal heart sounds. No murmur. No friction rub. No gallop. Pulmonary:      Effort: Pulmonary effort is normal.      Breath sounds: No wheezing or rales. Abdominal:      General: Bowel sounds are normal. There is no distension. Palpations: Abdomen is soft. Tenderness: There is no abdominal tenderness. Musculoskeletal: Normal range of motion. Lymphadenopathy:      Cervical: No cervical adenopathy. Skin:     General: Skin is warm and dry. Findings: No rash. Neurological:      Mental Status: He is alert. Cranial Nerves: No cranial nerve deficit. Comments: CN 2-12 grossly intact            Data Reviewed:   LABS:  CBC:  Recent Labs     09/22/20 0755 09/23/20 0600 09/24/20  0526   WBC 6.5 13.5* 10.5   HGB 13.4* 10.2* 11.9*   HCT 40.3* 31.0* 35.6*   MCV 95.5 96.5 95.6    114* 67*     BMP:  Recent Labs     09/22/20  0950  09/23/20  0600 09/24/20  0526     --  150* 147*   K 6.3*   < > 5.4* 4.7  4.7     --  122* 118*   CO2 26  --  21 20*   PHOS  --   --  3.4 3.5   BUN 62*  --  57* 51*   CREATININE 1.5*  --  1.9* 1.7*    < > = values in this interval not displayed. LIVER PROFILE:   Recent Labs     09/22/20 0755 09/22/20  0950 09/23/20 0600 09/24/20  0526   AST 79* 71*  --  46*   ALT 77* 70*  --  50*   LIPASE 1,471.0*  --  373.0*  --    BILIDIR  --   --   --  <0.2   BILITOT <0.2 <0.2  --  0.5   ALKPHOS 126 113  --  106     PT/INR:  Recent Labs     09/22/20 0755   PROTIME 11.2   INR 0.97     APTT:   Recent Labs     09/22/20  0755   APTT 44.4*     UA:  Recent Labs     09/22/20  0601   COLORU Yellow   PHUR 5.0   CLARITYU Clear   SPECGRAV 1.025   LEUKOCYTESUR Negative   UROBILINOGEN 0.2   BILIRUBINUR Negative   BLOODU Negative   GLUCOSEU Negative     Recent Labs     09/22/20 2235 09/23/20 0620   PHART 7.304* 7.312*   XYD2DXM 44.8 38.9   PO2ART 85.6 87.5       Vent Information  Skin Assessment: Clean, dry, & intact  FiO2 : 25 %  SpO2: 96 %  SpO2/FiO2 ratio: 380  I Time/ I Time %: 1 s  Mask Type: Full face mask  Mask Size: Medium  Bonnet size: Medium    CXR and lung windows of CT abd personally reviewed, bibasilar atelectasis           Assessment:     1. Chronic resp failure, hypoxic   -bibasilar atelectasis/hypoventilation  2. Septic shock/hypotension    -unclear etiology  3. LINDA, prerenal   4.  Acute pancreatitis Plan:      -Wean oxygen as tolerated,   -Encourage pulm expansion  -DC bipap   -wean off hydrocortisone  -IVF, nephrology following  -Souleymane Jackson to transfer out of the ICU     Will sign off, please contact with questions     Gela Valles MD

## 2020-09-24 NOTE — PROGRESS NOTES
09/24/20 0341   NIV Type   Skin Protection for O2 Device Yes   Equipment Type v60   Mode Bilevel   Mask Type Full face mask   Mask Size Medium   Settings/Measurements   IPAP 15 cmH20   CPAP/EPAP 5 cmH2O   Rate Ordered 12   Resp 15   FiO2  25 %   Vt Exhaled 526 mL   Minute Volume 8.8 Liters   Mask Leak (lpm) 20 lpm   Comfort Level Good   Using Accessory Muscles No   SpO2 97   Breath Sounds   Right Upper Lobe Clear;Diminished   Right Middle Lobe Diminished;Clear   Right Lower Lobe Clear;Diminished   Left Upper Lobe Clear;Diminished   Left Lower Lobe Diminished;Clear   Alarm Settings   Alarms On Y   Press Low Alarm 6 cmH2O   High Pressure Alarm 30 cmH2O   Apnea (secs) 20 secs   Resp Rate Low Alarm 6   High Respiratory Rate 40 br/min   Oxygen Therapy/Pulse Ox   SpO2 97 %

## 2020-09-24 NOTE — PROGRESS NOTES
09/24/20 0005   NIV Type   $NIV $Daily Charge   Skin Protection for O2 Device Yes   Equipment Type v60   Mode Bilevel   Mask Type Full face mask   Mask Size Medium  (pt found on medium mask)   Settings/Measurements   IPAP 15 cmH20   CPAP/EPAP 5 cmH2O   Rate Ordered 12   Resp 13   FiO2  25 %   Vt Exhaled 660 mL   Minute Volume 8.7 Liters   Mask Leak (lpm) 21 lpm   Comfort Level Good   Using Accessory Muscles No   SpO2 96   Alarm Settings   Alarms On Y   Press Low Alarm 6 cmH2O   High Pressure Alarm 30 cmH2O   Apnea (secs) 20 secs   Resp Rate Low Alarm 6   High Respiratory Rate 40 br/min   Oxygen Therapy/Pulse Ox   SpO2 96 %

## 2020-09-24 NOTE — PROGRESS NOTES
INPATIENT PULMONARY CRITICAL CARE PROGRESS NOTE      Reason for visit    encephalopathy, shock    SUBJECTIVE: Patient when seen this morning was continued to be critically ill on IV pressors to maintain hemodynamics, patient was given BiPAP overnight which he tolerated well, patient was essentially afebrile, patient continues to have sinus tachycardia on the monitor, patient was on 25% oxygen on the BiPAP when seen, patient had no urine output with cumulative fluid balance of +4.4 L, patient has been n.p.o., NG tube was inserted per tube feedings and free water but as per nursing GI wants the NG tube to suction to decrease the abdominal distention which is being done, no other pertinent review of system could be obtained               Physical Exam:  Blood pressure (!) 114/57, pulse 106, temperature 98.6 °F (37 °C), temperature source Bladder, resp. rate 13, height 4' 8\" (1.422 m), weight 156 lb 8.4 oz (71 kg), SpO2 96 %.'     Constitutional: In mild respite distress with audible wheezing when seen  HENT:  Oropharynx is clear and moist. No thyromegaly. Eyes:  Conjunctivae are normal. Pupils equal, round, and reactive to light. No scleral icterus. Neck: . No tracheal deviation present. No obvious thyroid mass. Short enlarged neck  Cardiovascular: Sinus tachycardia, normal heart sounds. No right ventricular heave. 1-2+ lower extremity edema. Pulmonary/Chest: Bilateral wheezes. Bilateral rales. Chest wall is not dull to percussion. No accessory muscle usage or stridor. Decreased breath sound density  Abdominal: Soft. Bowel sounds present. Distended and obese  Musculoskeletal: No cyanosis. No clubbing. No obvious joint deformity. Lymphadenopathy: No cervical or supraclavicular adenopathy. Skin: Skin is warm and dry.   Areas of hypopigmentation present  Neurologic: Not responsive when seen      Results:  CBC:   Recent Labs     09/22/20  0755 09/23/20  0600   WBC 6.5 13.5*   HGB 13.4* 10.2*   HCT 40.3* 31.0*   MCV 95.5 96.5    114*     BMP:   Recent Labs     09/22/20  0755 09/22/20  0950 09/22/20  1737 09/23/20  0600    143  --  150*   K 7.1* 6.3* 5.4* 5.4*    110  --  122*   CO2 27 26  --  21   PHOS  --   --   --  3.4   BUN 64* 62*  --  57*   CREATININE 1.5* 1.5*  --  1.9*     LIVER PROFILE:   Recent Labs     09/22/20  0755 09/22/20  0950 09/23/20  0600   AST 79* 71*  --    ALT 77* 70*  --    LIPASE 1,471.0*  --  373.0*   BILITOT <0.2 <0.2  --    ALKPHOS 126 113  --      PT/INR:   Recent Labs     09/22/20  0755   PROTIME 11.2   INR 0.97     APTT:   Recent Labs     09/22/20 0755   APTT 44.4*     UA:  Recent Labs     09/22/20  0601   COLORU Yellow   PHUR 5.0   CLARITYU Clear   SPECGRAV 1.025   LEUKOCYTESUR Negative   UROBILINOGEN 0.2   BILIRUBINUR Negative   BLOODU Negative   GLUCOSEU Negative       Imaging:  I have reviewed radiology images personally. XR ABDOMEN FOR NG/OG/NE TUBE PLACEMENT   Final Result   Gastric tube in mid stomach         US RENAL COMPLETE   Final Result   1. Unremarkable sonographic appearance of the kidneys. 2. Collapsed urinary bladder, limiting its evaluation. XR ABDOMEN (KUB) (SINGLE AP VIEW)   Final Result   Unchanged moderate amount of gaseous distention of the stomach. No other   findings to explain abdominal distension. Of note however these images were   obtained supine and early free air would not be detected. Xr Abdomen (kub) (single Ap View)    Result Date: 9/23/2020  EXAMINATION: ONE SUPINE XRAY VIEW(S) OF THE ABDOMEN 9/23/2020 7:39 am COMPARISON: CT abdomen and pelvis September 22, 2020 HISTORY: ORDERING SYSTEM PROVIDED HISTORY: abdominal distention TECHNOLOGIST PROVIDED HISTORY: Reason for exam:->abdominal distention FINDINGS: There is unchanged gaseous distention of the stomach. No evidence of bowel distention elsewhere.   No evidence of free air although this is very limited as these images were obtained supine     Unchanged moderate amount of gaseous distention of the stomach. No other findings to explain abdominal distension. Of note however these images were obtained supine and early free air would not be detected. Xr Abdomen (kub) (single Ap View)    Result Date: 9/22/2020  EXAMINATION: ONE SUPINE XRAY VIEW(S) OF THE ABDOMEN 9/22/2020 4:46 am COMPARISON: Chest radiograph earlier same date. HISTORY: ORDERING SYSTEM PROVIDED HISTORY: abdominal distension TECHNOLOGIST PROVIDED HISTORY: Reason for exam:->abdominal distension Reason for Exam: distention Acuity: Acute Type of Exam: Initial There is moderate gaseous distension of the stomach. Only a few gas-filled loops bowel are seen. Paucity of bowel gas rendering bowel gas pattern indeterminate. No focal dilated gas-filled loops of bowel are identified. There postsurgical changes in both hips. No acute osseous abnormality. FINDINGS: Moderate gaseous distension of the stomach. Correlation for gastric outlet obstruction recommended. Indeterminate bowel gas pattern. Ct Head Wo Contrast    Result Date: 9/23/2020  EXAMINATION: CT OF THE HEAD WITHOUT CONTRAST  9/22/2020 6:36 am TECHNIQUE: CT of the head was performed without the administration of intravenous contrast. Dose modulation, iterative reconstruction, and/or weight based adjustment of the mA/kV was utilized to reduce the radiation dose to as low as reasonably achievable. COMPARISON: None. HISTORY: ORDERING SYSTEM PROVIDED HISTORY: decreased responsiveness TECHNOLOGIST PROVIDED HISTORY: Reason for exam:->decreased responsiveness Has a \"code stroke\" or \"stroke alert\" been called? ->No Reason for Exam: decreased responsiveness Acuity: Unknown Type of Exam: Initial Additional signs and symptoms: decreased responsiveness FINDINGS: BRAIN/VENTRICLES: Patient is tilted in the CT gantry. Mild motion/streak artifact is present on the examination. The ventricular system is normal in appearance.   No evidence of mass effect or midline shift. Prominence of sulci overlying convexities of cerebral hemispheres and cerebellum consistent with atrophy. Tiny focus of low attenuation along the lateral margin of the head of the left caudate nucleus (image 34) suggest small focus of remote ischemic change. Very tiny focus of low attenuation in left basal ganglia region (image 36) could represent perivascular space versus tiny focus of remote ischemic change as well. There is a very tiny focus of low attenuation near the anterior limb of the right internal capsule (image 38). Finding may represent tiny focus of ischemic change which could be subacute to chronic in age. Very minimal low attenuation within periventricular/subcortical white matter suggest changes of minimal ischemic leukoencephalopathy. No abnormal extra-axial fluid collections are identified. There is atherosclerotic calcification of distal internal carotid arteries. There is asymmetric dolichoectasia of the distal right internal carotid artery as compared to the left. ORBITS: The visualized portion of the orbits demonstrate no acute abnormality. SINUSES: There is minimal mucosal thickening within a few bilateral ethmoid air cells. There is asymmetric thickening of the wall of the right maxillary sinus when compared to the right which raises possibility of changes associated with chronic sinus disease. There is mucosal thickening with minimally complex air-fluid/mucus level within the right maxillary sinus. Presence of the fluid level suggest component of acute paranasal sinus disease. Abnormal lucency identified about several maxillary teeth suggest presence of dental caries. Abnormal lucency identified in the region of the roots of several maxillary teeth most pronounced along the 2nd maxillary incisor where there is cortical disruption (image 4). Findings may be on the basis of periodontal disease. Mastoid air cells are well aerated.  SOFT TISSUES/SKULL:  No acute abnormality of the visualized skull or soft tissues. 1. No definite evidence of acute intracranial abnormality. 2. Tiny focus of low attenuation near anterior limb of right internal capsule. Finding may represent tiny focus of ischemic change which could be subacute to chronic in age. Follow-up MRI examination with diffusion imaging may be helpful for more complete evaluation if clinically indicated. 3. Paranasal sinus disease with visualization of small air-fluid/mucus level within the right maxillary sinus. 4. Findings suggestive of presence of dental caries with abnormal lucency surrounding roots of several teeth which raises possibility of associated periodontal disease as described above. Us Renal Complete    Result Date: 9/23/2020  EXAMINATION: RETROPERITONEAL ULTRASOUND OF THE KIDNEYS AND URINARY BLADDER 9/23/2020 COMPARISON: Abdominal CT 09/22/2020 HISTORY: ORDERING SYSTEM PROVIDED HISTORY: alba TECHNOLOGIST PROVIDED HISTORY: Reason for exam:->alba FINDINGS: Kidneys: The right kidney measures 8.1 x 5.4 x 4.7 cm in size and the left kidney measures 9.4 x 5.8 x 5.2 cm in size. Kidneys demonstrate normal cortical echogenicity. No evidence of hydronephrosis or intrarenal stones. Bladder: The urinary bladder is collapsed, limiting its evaluation. 1. Unremarkable sonographic appearance of the kidneys. 2. Collapsed urinary bladder, limiting its evaluation. Ct Abdomen Pelvis W Iv Contrast Additional Contrast? None    Result Date: 9/22/2020  EXAMINATION: CT OF THE ABDOMEN AND PELVIS WITH CONTRAST 9/22/2020 10:11 am TECHNIQUE: CT of the abdomen and pelvis was performed with the administration of intravenous contrast. Multiplanar reformatted images are provided for review. Dose modulation, iterative reconstruction, and/or weight based adjustment of the mA/kV was utilized to reduce the radiation dose to as low as reasonably achievable. COMPARISON: None.  HISTORY: ORDERING SYSTEM PROVIDED HISTORY: pancreatitis, abdominal distension TECHNOLOGIST PROVIDED HISTORY: Reason for exam:->pancreatitis, abdominal distension Additional Contrast?->None Reason for Exam: scan ran on the back end of a CTA head and neck stroke protocol,   75 second delay was more like 95 second delay. after contrast administration Acuity: Acute Type of Exam: Initial Additional signs and symptoms: presented with low body temp FINDINGS: Lower Chest: Bandlike opacities are seen at the lung bases. Bandlike morphology favors subsegmental atelectasis or scar. Small hiatal hernia seen. There is nonspecific thickening at the GE junction Organs: Trace perisplenic fluid is seen Adrenal glands appear normal No hydronephrosis on the right. No hydronephrosis on the left. A few punctate circumscribed hypodense nodule seen in the kidneys, too small to characterize, likely cyst.  There are clips from prior cholecystectomy. There is subtle injection of the fat surrounding pancreas. No discrete drainable fluid collection. Pancreas enhances normally. No evidence of pancreatic necrosis. GI/Bowel: There is mild distention of the stomach. No significant small bowel distention noted. A few colonic diverticula are seen. Descending and sigmoid colon is incompletely distended accentuating its wall thickness Pelvis: No free fluid is seen within the pelvis. Elliott catheter seen within the bladder. .  Bladder is partially contracted. Nodular soft tissue density seen in the right inguinal canal measuring 1.7 x 1.8 cm. Peritoneum/Retroperitoneum: No aortic aneurysm. No retroperitoneal hematoma. A few small lymph nodes are seen along the Bones/Soft Tissues: Spurring is seen in the spine. Streak artifact is seen from orthopedic hardware in the proximal right and left femur, as well as spinal stabilization rods. Gaseous distention of the stomach. No small bowel obstruction.  Injection of fat is seen surrounding the pancreas suggestive of early pancreatitis Small nodular soft tissue density is seen in the right inguinal canal, most commonly reactive lymph node in the absence of a known primary     Xr Chest Portable    Result Date: 9/22/2020  EXAMINATION: ONE XRAY VIEW OF THE CHEST 9/22/2020 4:46 am COMPARISON: Chest radiograph November 9, 2009 and priors. HISTORY: ORDERING SYSTEM PROVIDED HISTORY: assess for pneumonia TECHNOLOGIST PROVIDED HISTORY: Reason for exam:->assess for pneumonia Reason for Exam: cold Acuity: Acute Type of Exam: Initial FINDINGS: There is minimal right basilar airspace disease. No pneumothorax or pleural effusion. Cardiac and mediastinal contours are unchanged. Scoliosis and postsurgical changes are again seen within the thoracic spine. No acute osseous abnormality. Mild right basilar airspace disease, atelectasis versus pneumonia. Ir Picc Wo Sq Port/pump > 5 Years    Result Date: 9/22/2020  EXAMINATION: LIMITED ULTRASOUND OF THE ARM FOR PICC ACCESS, 9/22/2020 TECHNIQUE: The PICC team used ultrasound and VPS guidance to place a PICC line. HISTORY: ORDERING SYSTEM PROVIDED HISTORY: no access TECHNOLOGIST PROVIDED HISTORY: Reason for exam:->no access How many lumens are being requested?->2 What site is the preferred site? ->No preference What side should this line be placed? ->Either Reason for Exam: no access Acuity: Acute Type of Exam: Initial Additional signs and symptoms: 34 cm 2 out single lumen non tunneled power PICC right basilic US/VPS guidance FLUOROSCOPY DOSE AND TYPE OR TIME AND EXPOSURES: No fluoroscopy. 0 images. FINDINGS: Ultrasound images demonstrate patency of the right basilic vein which was used for access for placement of a PICC by the PICC team, without a radiologist present. VPS guidance was used by the PICC team By report, a single lumen PICC, trimmed to 34 cm was placed. Successful placement of PICC line.      Xr Abdomen For Ng/og/ne Tube Placement    Result Date: 9/23/2020  EXAMINATION: ONE SUPINE XRAY VIEW(S) OF THE ABDOMEN 9/23/2020 10:41 am COMPARISON: None. HISTORY: ORDERING SYSTEM PROVIDED HISTORY: Confirmation of course of NG/OG/NE tube and location of tip of tube TECHNOLOGIST PROVIDED HISTORY: Reason for exam:->Confirmation of course of NG/OG/NE tube and location of tip of tube Portable? ->Yes FINDINGS: Gastric tube in the mid stomach. Gastric tube in mid stomach     Cta Head Neck W Contrast    Result Date: 9/22/2020  EXAMINATION: CTA OF THE HEAD AND NECK WITH CONTRAST 9/22/2020 10:11 am TECHNIQUE: CTA of the head and neck was performed with the administration of intravenous contrast. Multiplanar reformatted images are provided for review. MIP images are provided for review. Stenosis of the internal carotid arteries measured using NASCET criteria. Dose modulation, iterative reconstruction, and/or weight based adjustment of the mA/kV was utilized to reduce the radiation dose to as low as reasonably achievable. COMPARISON: None HISTORY: ORDERING SYSTEM PROVIDED HISTORY: assess for stroke TECHNOLOGIST PROVIDED HISTORY: Reason for exam:->assess for stroke Reason for Exam: presented in ED with low body temp. pt from nursing home facility next door to hospital Acuity: Acute Type of Exam: Initial FINDINGS: CTA NECK: AORTIC ARCH/ARCH VESSELS: No dissection or arterial injury. No significant stenosis of the brachiocephalic or subclavian arteries. CAROTID ARTERIES: No dissection, arterial injury, or hemodynamically significant stenosis by NASCET criteria. VERTEBRAL ARTERIES: No dissection, arterial injury, or significant stenosis. SOFT TISSUES: The lung apices are clear. No cervical or superior mediastinal lymphadenopathy. The larynx and pharynx are unremarkable. No acute abnormality of the salivary and thyroid glands. BONES: Thoracic spinal hardware is incompletely imaged but includes a maximus with laminar hook beginning at T3. There is chronic right maxillary sinusitis.  CTA HEAD: ANTERIOR CIRCULATION: No significant stenosis of the intracranial internal carotid, anterior cerebral, or middle cerebral arteries. No aneurysm. POSTERIOR CIRCULATION: No significant stenosis of the vertebral, basilar, or posterior cerebral arteries. No aneurysm. OTHER: No dural venous sinus thrombosis on this non-dedicated study. BRAIN: No mass effect or midline shift. No extra-axial fluid collection. The gray-white differentiation is maintained. No large vessel occlusion. Critical results were called by Dr. Ghislaine Betancourt to Dr. Maximiliano Hines On 9/22/2020 at 11:03. Results for [facility] (MRN [id number]) as of 9/24/2020 00:54   Ref. Range 9/22/2020 07:55 9/22/2020 09:50 9/22/2020 17:37 9/23/2020 06:00   Sodium Latest Ref Range: 136 - 145 mmol/L 141 143  150 (H)   Potassium Latest Ref Range: 3.5 - 5.1 mmol/L 7.1 (HH) 6.3 (HH) 5.4 (H) 5.4 (H)   Chloride Latest Ref Range: 99 - 110 mmol/L 106 110  122 (H)   CO2 Latest Ref Range: 21 - 32 mmol/L 27 26  21   BUN Latest Ref Range: 7 - 20 mg/dL 64 (H) 62 (H)  57 (H)   Creatinine Latest Ref Range: 0.9 - 1.3 mg/dL 1.5 (H) 1.5 (H)  1.9 (H)   Anion Gap Latest Ref Range: 3 - 16  8 7  7   GFR Non- Latest Ref Range: >60  48 (A) 48 (A)  37 (A)   GFR  Latest Ref Range: >60  58 (A) 58 (A)  44 (A)   Magnesium Latest Ref Range: 1.80 - 2.40 mg/dL    1.80   Lactic Acid Latest Ref Range: 0.4 - 2.0 mmol/L 1.0      Glucose Latest Ref Range: 70 - 99 mg/dL 88 79  87   Calcium Latest Ref Range: 8.3 - 10.6 mg/dL 9.3 8.4  7.6 (L)   Phosphorus Latest Ref Range: 2.5 - 4.9 mg/dL    3.4   Total Protein Latest Ref Range: 6.4 - 8.2 g/dL 7.1 6.4       Results for [facility] (MRN [id number]) as of 9/24/2020 00:54   Ref.  Range 9/4/2019 07:17 9/5/2019 10:03 9/22/2020 07:55 9/23/2020 06:00   WBC Latest Ref Range: 4.0 - 11.0 K/uL 10.2 13.8 (H) 6.5 13.5 (H)   RBC Latest Ref Range: 4.20 - 5.90 M/uL 3.40 (L) 3.41 (L) 4.21 3.22 (L)   Hemoglobin Quant Latest Ref Range: 13.5 - 17.5 g/dL 10.9 (L) 10.9 (L) 13.4 (L) 10.2 (L)   Hematocrit Latest Ref Range: 40.5 - 52.5 % 32.5 (L) 32.2 (L) 40.3 (L) 31.0 (L)   MCV Latest Ref Range: 80.0 - 100.0 fL 95.6 94.4 95.5 96.5   MCH Latest Ref Range: 26.0 - 34.0 pg 32.1 31.9 31.7 31.6   MCHC Latest Ref Range: 31.0 - 36.0 g/dL 33.6 33.8 33.2 32.7   MPV Latest Ref Range: 5.0 - 10.5 fL 7.6 7.8 9.6 9.1   RDW Latest Ref Range: 12.4 - 15.4 % 14.7 14.4 17.1 (H) 17.8 (H)   Platelet Count Latest Ref Range: 135 - 450 K/uL 254 303 162 114 (L)   Neutrophils % Latest Units: %   89.1 92.5     Results for [facility] (MRN [id number]) as of 9/24/2020 00:54   Ref. Range 9/22/2020 07:55 9/22/2020 09:50 9/22/2020 12:15 9/23/2020 06:00   Albumin Latest Ref Range: 3.4 - 5.0 g/dL 3.8 3.5     Globulin Latest Units: g/dL 3.3 2.9     Albumin/Globulin Ratio Latest Ref Range: 1.1 - 2.2  1.2 1.2     Alk Phos Latest Ref Range: 40 - 129 U/L 126 113     ALT Latest Ref Range: 10 - 40 U/L 77 (H) 70 (H)     Ammonia Latest Ref Range: 16 - 60 umol/L   34    AST Latest Ref Range: 15 - 37 U/L 79 (H) 71 (H)     Bilirubin Latest Ref Range: 0.0 - 1.0 mg/dL <0.2 <0.2     Lipase Latest Ref Range: 13.0 - 60.0 U/L 1,471.0 (H)   373.0 (H)   Total Protein Latest Ref Range: 6.4 - 8.2 g/dL 7.1 6.4       Results for [facility] (MRN [id number]) as of 9/24/2020 00:54   Ref.  Range 9/22/2020 07:55 9/22/2020 22:35 9/23/2020 06:20   Hemoglobin, Art, Extended Latest Ref Range: 13.5 - 17.5 g/dL  12.1 (L) 10.8 (L)   pH, Arterial Latest Ref Range: 7.350 - 7.450   7.304 (L) 7.312 (L)   pCO2, Arterial Latest Ref Range: 35.0 - 45.0 mmHg  44.8 38.9   pO2, Arterial Latest Ref Range: 75.0 - 108.0 mmHg  85.6 87.5   HCO3, Arterial Latest Ref Range: 21.0 - 29.0 mmol/L  21.7 19.2 (L)   TCO2 (calc), Art Latest Ref Range: Not Established mmol/L  23.1 20.4   Base Excess, Arterial Latest Ref Range: -3.0 - 3.0 mmol/L  -4.5 (L) -6.5 (L)   O2 Sat, Arterial Latest Ref Range: >92 %  95.7 95.7   O2 Content, Arterial Latest Ref Range: Not Established mL/dL  16 15   Methemoglobin, Arterial Latest Ref Range: <1.5 %  1.3 0.6   Carboxyhgb, Arterial Latest Ref Range: 0.0 - 1.5 %  0.5 0.4   pH, Tyson Latest Ref Range: 7.350 - 7.450  7.307 (L)     pCO2, Tyson Latest Ref Range: 40.0 - 50.0 mmHg 48.9     pO2, Tyson Latest Ref Range: 25.0 - 40.0 mmHg 152.1 (H)     HCO3, Venous Latest Ref Range: 23.0 - 29.0 mmol/L 23.9     TC02 (Calc), Tyson Latest Ref Range: Not Established mmol/L 25     Base Excess, Tyson Latest Ref Range: -3.0 - 3.0 mmol/L -2.8     O2 Content, Tyson Latest Ref Range: Not Established VOL % 19     MetHgb, Tyson Latest Ref Range: <1.5 % 0.3     O2 Sat, Tyson Latest Ref Range: Not Established % 99       ONE SUPINE XRAY VIEW(S) OF THE ABDOMEN         9/23/2020 7:39 am         COMPARISON:    CT abdomen and pelvis September 22, 2020         HISTORY:    ORDERING SYSTEM PROVIDED HISTORY: abdominal distention    TECHNOLOGIST PROVIDED HISTORY:    Reason for exam:->abdominal distention         FINDINGS:    There is unchanged gaseous distention of the stomach.  No evidence of bowel    distention elsewhere.  No evidence of free air although this is very limited    as these images were obtained supine              Impression    Unchanged moderate amount of gaseous distention of the stomach.  No other    findings to explain abdominal distension.  Of note however these images were    obtained supine and early free air would not be detected. Assessment:  Active Problems:    Acute encephalopathy    Acute respiratory failure with hypercapnia (HCC)    Pulmonary infiltrates    Acute pancreatitis without infection or necrosis    LINDA (acute kidney injury) ([address]    Hyperkalemia    Hypothyroidism    Shock ([address]    Hypothermia    Class 2 obesity in adult    Suspected sleep apnea    Hypernatremia  Resolved Problems:    * No resolved hospital problems.  *          Plan:   · BiPAP with oxygen supplementation to keep saturation between 90 and 94% only  · Patient to have a repeat ABG in 2 hours time and discussed the results with nocturnist to do the needful  · Patient has profound bronchospasm at the time of evaluation  · Bronchodilators started  · IV hydrocortisone to continue  · Will hold off on any antibiotics for now  · Patient got IV Synthroid along with that patient also got calcium bicarb and hydrocortisone in the [facility] ER prior to coming to the hospital as per nursing  · Patient was also hypothermic at the time of evaluation in the ER but is having a T-max of 99.1 °F,  · Patient's urine output has not picked up and patient has worsening renal failure with worsening hypernatremia  · Nephrology consult and recommendations appreciated  · Patient has been started on hypertonic saline by nephrology  · Monitor sodium levels  · Monitor for any cardiac arrhythmias  · Monitor renal function and electrolytes closely especially potassium levels  · Patient is on IV pressors to keep mean arterial pressure more than 65 mmHg  · Monitor input output and BMP  · Patient to be kept n.p.o. at this time  · Patient's lipase levels to be followed  · Patient has progressive thrombocytopenia  · Subcutaneous heparin discontinued  · HIT panel being sent  · Patient has pulmonary infiltrates which may be atelectasis/less likely aspiration- will follow  · Patient's temperature needs to be followed closely  · Neurochecks  · Monitor for any worsening respiratory status requiring intubation and mechanical vent to support  · Patient initial CT of the head was abnormal and may require repeat imaging including MRI at some point  · Monitor for any hypoglycemia  · PUD and DVT prophylaxis as per IM     Patient's clinical status is precarious and has potential for further decompensation and needs to monitor closely in the ICU     Critical care time spent on the patient was 40 minutes exclusive of any procedures    Case discussed with ICU team and internal medicine      Electronically signed by:  Seth Guajardo MD 9/24/2020    12:59 AM.

## 2020-09-24 NOTE — PROGRESS NOTES
09/24/20 0830   NIV Type   Skin Protection for O2 Device Yes   Mode Bilevel   Mask Type Full face mask   Mask Size Medium   Bonnet size Medium   Settings/Measurements   IPAP 15 cmH20   CPAP/EPAP 5 cmH2O   Rate Ordered 12   Resp 14   FiO2  25 %   I Time/ I Time % 1 s   Vt Exhaled 614 mL   Minute Volume 8.6 Liters   Mask Leak (lpm) 19 lpm   Comfort Level Good   Using Accessory Muscles No   SpO2 97   Alarm Settings   Alarms On Y   Press Low Alarm 6 cmH2O   High Pressure Alarm 30 cmH2O   Apnea (secs) 20 secs   Resp Rate Low Alarm 6   High Respiratory Rate 40 br/min   Oxygen Therapy/Pulse Ox   SpO2 97 %

## 2020-09-24 NOTE — PROGRESS NOTES
Patient resting at this present time. Assessment completed. Medications administered. Patient cleaned, turned and repositioned. No complaints at this present time. MD Elsy states that she will transfer patient after he is seen by pulmonology. Will continue to monitor.

## 2020-09-24 NOTE — PROGRESS NOTES
Hospitalist Progress Note      PCP: No primary care provider on file. Date of Admission: 9/22/2020    Chief Complaint:  [address] Course:   HPI :   60 y.o. male with history of CHF, COPD, GERD,  MRDD who presented to  [facility] ER from group home with altered mental status. History obtained per chart as patient unable to provide any history due to AMS. Pt minimally verbal at baseline. Patient reportedly had reduced responsiveness at the ECF overnight and was sent to the ER. He was noted to be hypothermic in the 80s and hypotensive in the 60s. Initial concern for possible CVA - stroke team consulted. CT head was nonacute with tiny area of low attenuation near the anterior right  internal capsule which was thought to be likely subacute/chronic. CTA head/neck was unremarkable. PICC line placed in ER. Labs notable for severe hyperkalemia with potassium of 7.1 creatinine 1.5, TSH of 7.4. Lipase was  elevated at 1471. Temperature improved to 90's with karuna hugger and warm IVF. There was concern for myxedema coma and was started on IV Synthroid. He was given a dose of IV hydrocortisone, started on broad-spectrum antibiotics and  pressors. Pt was transferred to [facility] for further care. Subjective:     Off pressors since yesterday. On BIPAP overnight and at time of eval. Pt  more responsive today- opened eyes slightly to voice and following commands.  Non verbal at baseline     Medications:  Reviewed    Infusion Medications    sodium chloride 125 mL/hr at 09/23/20 1610     Scheduled Medications    mupirocin   Nasal BID    pantoprazole  40 mg Intravenous Daily    levothyroxine  25 mcg Oral Daily    ipratropium-albuterol  1 ampule Inhalation 4x daily    hydrocortisone sodium succinate PF  100 mg Intravenous Q8H    oxybutynin  2.5 mg Oral Nightly    sodium chloride flush  10 mL Intravenous 2 times per day     PRN Meds: bisacodyl, sodium chloride flush, [DISCONTINUED] acetaminophen **OR** TROPONINI <0.01 <0.01       Urinalysis:      Lab Results   Component Value Date    NITRU Negative 09/22/2020    WBCUA 0-2 09/03/2019    RBCUA 10-20 09/03/2019    BLOODU Negative 09/22/2020    SPECGRAV 1.025 09/22/2020    GLUCOSEU Negative 09/22/2020       Radiology:  XR ABDOMEN FOR NG/OG/NE TUBE PLACEMENT   Final Result   Gastric tube in mid stomach         US RENAL COMPLETE   Final Result   1. Unremarkable sonographic appearance of the kidneys. 2. Collapsed urinary bladder, limiting its evaluation. XR ABDOMEN (KUB) (SINGLE AP VIEW)   Final Result   Unchanged moderate amount of gaseous distention of the stomach. No other   findings to explain abdominal distension. Of note however these images were   obtained supine and early free air would not be detected. Assessment/Plan:    Active Hospital Problems    Diagnosis    Hypernatremia [E87.0]    Thrombocytopenia ([address] [D69.6]    Acute encephalopathy [G93.40]    Acute respiratory failure with hypercapnia (HCC) [J96.02]    Pulmonary infiltrates [R91.8]    Acute pancreatitis without infection or necrosis [K85.90]    LINDA (acute kidney injury) ([address] [N17.9]    Hyperkalemia [E87.5]    Hypothyroidism [E03.9]    Shock ([address] [R57.9]    Hypothermia [U52. XXXA]    Class 2 obesity in adult [E66.9]    Suspected sleep apnea [R29.818]       Acute metabolic encephalopathy: Likely multifactorial due to septic shock, hyperkalemia, hypothermia, ? Myxedema coma. Minimally verbal at baseline per report. CT head was nonacute. Stroke work-up negative in ER. Neuro recs appreciated. Supportive care for acute morbidities as stated below        Septic shock: met SIRS criteria with tachycardia, tachypnea, hypothermia on arrival in ER. Initial concern for pneumonia as cause. Pneumonia ruled out per pulm. Empiric abx d/rich. Off pressors. Continue stress dose steroids.   Held home BP meds and lasix.          Hypothyroidism: TSH elevated at 7.4, FT3, FT4 normal. Given decreased responsiveness from baseline and hypothermia concern for possible myxedema coma. recieved IV synthroid - started on PO on 9/23.      Hypothermia: Likely due to sepsis, hypothyroidism. karuna hugger placed- improving         Hyperkalemia: likely due to LINDA ,  potassium supplements he is on chronically and lisinopril. Treated with improvement. Held K supplements and lisinopril. Nephro assisting     LINDA: likely due to vol depletion, compounded by use of ACEi and lasix. Culprit meds held. Nephro assisting. Monitor Cr and avoid nephrotoxins.           Acute pancreatitis: lipase was elevated with signs of early pancreatitis on CT. Unclear etiology. GI assisting. Continue NG tube to LIWS, NPO         GERD : Continue PPI          Chronic diastolic CHF: EF 55 to 18% with G1DD on echo in 9/3/2019. does not seem in acute decompensation clinically. Received a dose of IV lasix in ER. Continue to hold lasix. Pro BNP normal.  Monitor volume status closely.         Hypernatremia: on IVF.  nephro managing         DVT Prophylaxis: heparin     Diet: Diet NPO Effective Now Exceptions are: Sips with Meds  Code Status: Full Code    PT/OT Eval Status: not indicated at this time     Dispo -  Hard to say,  pending clinical course     Marcus Tinajero MD

## 2020-09-24 NOTE — PROGRESS NOTES
09/24/20 1158   NIV Type   Skin Protection for O2 Device Yes   Equipment Type v60   Mode Bilevel   Mask Type Full face mask   Mask Size Medium   Bonnet size Medium   Settings/Measurements   IPAP 15 cmH20   CPAP/EPAP 5 cmH2O   Rate Ordered 12   Resp 14   FiO2  25 %   I Time/ I Time % 1 s   Vt Exhaled 543 mL   Minute Volume 7.2 Liters   Mask Leak (lpm) 35 lpm   Using Accessory Muscles No   SpO2 96   Alarm Settings   Alarms On Y   Press Low Alarm 6 cmH2O   High Pressure Alarm 30 cmH2O   Apnea (secs) 20 secs   Resp Rate Low Alarm 6   High Respiratory Rate 40 br/min   Oxygen Therapy/Pulse Ox   SpO2 95 %

## 2020-09-24 NOTE — PROGRESS NOTES
Progress Note    HISTORY     CC:   Altered Mental Status            Acute kidney injury      Subjective/   HPI:   Remains on the ICU. Now on Bilevel. Urine output is ok and Scr is slightly better today. ROS:  Constitutional:  No fevers,  Cardiovascular:  No edema  Respiratory:  Bilevel     Social Hx:  No family at bedside     Past Medical and Surgical History:  - Reviewed, no changes     EXAM       Objective/     Vitals:    09/24/20 0800 09/24/20 0828 09/24/20 0830 09/24/20 0900   BP: 105/69   (!) 119/54   Pulse: 86   93   Resp: 15 16 14 15   Temp: 96.2 °F (35.7 °C)   96.7 °F (35.9 °C)   TempSrc: Bladder   Bladder   SpO2: 97% 96% 97% 96%   Weight:       Height:         24HR INTAKE/OUTPUT:      Intake/Output Summary (Last 24 hours) at 9/24/2020 1136  Last data filed at 9/24/2020 0805  Gross per 24 hour   Intake 2394 ml   Output 545 ml   Net 1849 ml     General:  Critically ill, not responsive   HEENT:  PERRL, EOMI  Neck:  Supple, normal range of movement  Chest:  CTAB, on bilevel   CV:  Tachycardic, no rub   Abdomen:  NTND, soft, +BS, no hepatosplenomegaly  Extremities:  No peripheral edema  Neurological:  Moving all four extremities, CN II-XII grossly intact  Lymphatics:  No palpable lymph nodes  Skin:  No rash, no jaundice  Psychiatric:  Somnolent      MEDICAL DECISION MAKING       Data/  Recent Labs     09/22/20  0755 09/23/20  0600 09/24/20  0526   WBC 6.5 13.5* 10.5   HGB 13.4* 10.2* 11.9*   HCT 40.3* 31.0* 35.6*   MCV 95.5 96.5 95.6    114* 67*     Recent Labs     09/22/20  0950  09/23/20  0600 09/24/20  0526     --  150* 147*   K 6.3*   < > 5.4* 4.7  4.7     --  122* 118*   CO2 26  --  21 20*   GLUCOSE 79  --  87 64*   PHOS  --   --  3.4 3.5   MG  --   --  1.80 1.80   BUN 62*  --  57* 51*   CREATININE 1.5*  --  1.9* 1.7*   LABGLOM 48*  --  37* 42*   GFRAA 58*  --  44* 50*    < > = values in this interval not displayed.        Assessment/     Acute Kidney Injury:  KDIGO stage 2  - Data:  UA bland. Normal renal u/s  - Etiology:  Pre-renal is the leading clinical suspicion   - Clinical:  Labs improving and stable urine output with volume      Hyperkalemia:  Due to acute kidney injury, improving with medical management. Urine output slowly improving      Altered Mental Status:  Multifactorial.  Neurology following, favor metabolic process. Metabolic status is improving      Hypernatremia:  Acute onset. Free water deficit, now on hypotonic IV fluids with slow improvement.      Heart Failure:  History of diastolic heart failure.   EF normal    Plan/     Will continue hypotonic IV fluids  Follow labs  Monitor volume status and in/outs as well as respiratory status   -----------------------------  Mateo Calderón M.D.   [facility]

## 2020-09-24 NOTE — PROGRESS NOTES
GI Progress Note      SUBJECTIVE:  No longer requires pressors and has been transferred out of the ICU.   He remains non verbal    OBJECTIVE      Medications    Current Facility-Administered Medications: mupirocin (BACTROBAN) 2 % ointment, , Nasal, BID  pantoprazole (PROTONIX) injection 40 mg, 40 mg, Intravenous, Daily  levothyroxine (SYNTHROID) tablet 25 mcg, 25 mcg, Oral, Daily  0.45 % sodium chloride infusion, , Intravenous, Continuous  ipratropium-albuterol (DUONEB) nebulizer solution 1 ampule, 1 ampule, Inhalation, 4x daily  hydrocortisone sodium succinate PF (SOLU-CORTEF) injection 100 mg, 100 mg, Intravenous, Q8H  bisacodyl (DULCOLAX) suppository 10 mg, 10 mg, Rectal, Daily PRN  oxybutynin (DITROPAN) tablet 2.5 mg, 2.5 mg, Oral, Nightly  sodium chloride flush 0.9 % injection 10 mL, 10 mL, Intravenous, 2 times per day  sodium chloride flush 0.9 % injection 10 mL, 10 mL, Intravenous, PRN  [DISCONTINUED] acetaminophen (TYLENOL) tablet 650 mg, 650 mg, Oral, Q6H PRN **OR** acetaminophen (TYLENOL) suppository 650 mg, 650 mg, Rectal, Q6H PRN  promethazine (PHENERGAN) tablet 12.5 mg, 12.5 mg, Oral, Q6H PRN **OR** ondansetron (ZOFRAN) injection 4 mg, 4 mg, Intravenous, Q6H PRN  Physical    VITALS:  /73   Pulse 99   Temp 97.5 °F (36.4 °C) (Axillary)   Resp 18   Ht 4' 8\" (1.422 m)   Wt 156 lb 8.4 oz (71 kg)   SpO2 96%   BMI 35.09 kg/m²   GEN: more awake today  ABD: soft, less distended and non tympanic, no obvious tenderness or peritoneal signs noted   Data    CBC with Differential:    Lab Results   Component Value Date    WBC 10.5 09/24/2020    RBC 3.73 09/24/2020    HGB 11.9 09/24/2020    HCT 35.6 09/24/2020    PLT 67 09/24/2020    MCV 95.6 09/24/2020    MCH 31.8 09/24/2020    MCHC 33.3 09/24/2020    RDW 18.2 09/24/2020    LYMPHOPCT 5.7 09/24/2020    MONOPCT 5.8 09/24/2020    BASOPCT 0.4 09/24/2020    MONOSABS 0.6 09/24/2020    LYMPHSABS 0.6 09/24/2020    EOSABS 0.0 09/24/2020    BASOSABS 0.0 09/24/2020 CMP:    Lab Results   Component Value Date     09/24/2020    K 4.7 09/24/2020    K 4.7 09/24/2020     09/24/2020    CO2 20 09/24/2020    BUN 51 09/24/2020    CREATININE 1.7 09/24/2020    GFRAA 50 09/24/2020    AGRATIO 1.2 09/22/2020    LABGLOM 42 09/24/2020    GLUCOSE 64 09/24/2020    PROT 5.7 09/24/2020    LABALBU 2.9 09/24/2020    CALCIUM 7.6 09/24/2020    BILITOT 0.5 09/24/2020    ALKPHOS 106 09/24/2020    AST 46 09/24/2020    ALT 50 09/24/2020     TRIG 52    ASSESSMENT AND PLAN  The patient is a 60 y.o. male with significant past medical history of dCHF, COPD, HTN and MRDD (non verbal) who presents with MS changes and resp distress. He maintains a diagnosis of acute pancreatitis based on the presence of hyperlipasemia and peripancreatic inflammation noted on CT. ARF and hypotension likely reflect significant intravascular volume depletion. Nephrology is following. An obvious etiology of pancreatitis is not noted. He has also manifest significant gaseous distention of the stomach either reflecting delayed emptying due to neighboring pancreatic inflammation or perhaps mechanical obstruction. This has improved with NGT. Empiric ATBs have been discontinued. Resp status hs improved.   Creatinine decreasing.  - continue NGT to LIWS  - IVF per nephrology  - keep NPO  - continue daily Protonix

## 2020-09-25 NOTE — FLOWSHEET NOTE
09/24/20 1959   Assessment   Charting Type Shift assessment   Neurological   Neuro (WDL) X   Level of Consciousness 1   Orientation Level KAREN  (pt is nonverbal/baseline)   Cognition Poor safety awareness;Poor attention/concentration; Unable to follow commands   Language KAREN  (non verbal baseline)   Size R Pupil (mm) 3   R Pupil Shape Round   R Pupil Reaction Brisk   Size L Pupil (mm) 3   L Pupil Shape Round   L Pupil Reaction Brisk   R Hand  KAREN   L Hand  KAREN   R Foot Dorsiflexion KAREN   L Foot Dorsiflexion KAREN   R Foot Plantar Flexion KAREN   L Foot Plantar Flexion KAREN   RUE Motor Response Movement to painful stimulus   LUE Motor Response Movement to painful stimulus   RLE Motor Response Movement to painful stimulus; Non-purposeful movement   LLE Motor Response Movement to painful stimulus; Non-purposeful movement   Gag Present   [place] Coma Scale   Eye Opening 3   Best Verbal Response 1   Best Motor Response 4   [place] Coma Scale Score 8   HEENT   HEENT (WDL) X   Right Eye Intact   Left Eye Intact   Nose Intact  (NGT present)   Mucous Membrane Pink;Dry   Teeth Missing teeth   Respiratory   Respiratory Pattern Regular   Respiratory Depth Normal   Respiratory Quality/Effort Unlabored   Chest Assessment Chest expansion symmetrical;Trachea midline   L Breath Sounds Diminished; Expiratory Wheezes   R Breath Sounds Diminished; Expiratory Wheezes   Breath Sounds   Right Upper Lobe Diminished   Right Middle Lobe Diminished   Right Lower Lobe Diminished   Left Upper Lobe Diminished   Left Lower Lobe Diminished   Cardiac   Cardiac (WDL) X   Cardiac Regularity Regular   Cardiac Rhythm NSR   Rhythm Interpretation   Pulse 97   Cardiac Monitor   Telemetry Monitor On Yes   Telemetry Audible Yes   Telemetry Alarms Set Yes   Telemetry Box Number 25   Gastrointestinal   Abdomen Inspection Rotund;Distended; Taut   Tenderness No guarding;Rigid   RUQ Bowel Sounds Hypoactive   LUQ Bowel Sounds Hypoactive   RLQ Bowel Sounds Hypoactive   LLQ Bowel Sounds Hypoactive   Peripheral Vascular   Edema Generalized   Edema Generalized +1   Sensation RUE KAREN   Sensation LUE KAREN   Sensation RLE KAREN   Sensation LLE KAREN   Skin Integrity   Skin Integrity (WDL) X   Skin Integrity Redness   Location coccyx   Musculoskeletal   Musculoskeletal (WDL) X   RUE KAREN   LUE KAREN   RL Extremity KAREN   LL Extremity KAREN   Genitourinary   Genitourinary (WDL) X  (parnell in place)   Flank Tenderness KAREN   Suprapubic Tenderness KAREN   Dysuria KAREN   Anus/Rectum   Anus/Rectum (WDL) WDL   Urethral Catheter Temperature probe 16 fr   Placement Date/Time: 09/22/20 0601   Urethral Catheter Timeout: Patient;Procedure;Sterile technique  Inserted by: kaushal flores  Catheter Type: Temperature probe  Tube Size (fr): 16 fr  Catheter Balloon Size: 10 mL  Collection Container: Other (Comment)  . .. Catheter Indications Need for fluid management in critically ill patients in a critical care setting not able to be managed by other means such as BSC with hat, bedpan, urinal, condom catheter, or short term intermittent urethral catherization   Site Assessment Pink; No urethral drainage   Urine Color Yellow   Psychosocial   Patient Behaviors Withdrawn; Not interactive   Needs Expressed Physical   Rest/Sleep for Patient Fair

## 2020-09-25 NOTE — PROGRESS NOTES
Pulmonary & Critical Care Inpatient Progress Note   Misty Schwartz MD     REASON FOR TODAY'S VISIT:  Acute resp failure    SUBJECTIVE:   Worsening hypoxia overnight  Given lasix this AM  Now back down to 2 LPM    Scheduled Meds:   pantoprazole  40 mg Intravenous Daily    levothyroxine  25 mcg Oral Daily    ipratropium-albuterol  1 ampule Inhalation 4x daily    oxybutynin  2.5 mg Oral Nightly    sodium chloride flush  10 mL Intravenous 2 times per day       Continuous Infusions:   [Held by provider] dextrose 5 % and 0.45 % NaCl Stopped (09/25/20 0342)       PRN Meds:  dextrose, bisacodyl, sodium chloride flush, [DISCONTINUED] acetaminophen **OR** acetaminophen, promethazine **OR** ondansetron    ALLERGIES:  Patient is allergic to chocolate; eggs [egg white]; food; orange juice [orange oil]; peanut butter flavor [flavoring agent]; penicillins; strawberry (diagnostic); and tape [adhesive tape]. Objective:   PHYSICAL EXAM:  /71   Pulse 97   Temp 97.5 °F (36.4 °C) (Axillary)   Resp 16   Ht 4' 8\" (1.422 m)   Wt 225 lb 15.5 oz (102.5 kg)   SpO2 96%   BMI 50.66 kg/m²    Physical Exam  Constitutional:       General: He is not in acute distress. Appearance: He is well-developed. He is not diaphoretic. HENT:      Head: Normocephalic and atraumatic. Mouth/Throat:      Pharynx: No oropharyngeal exudate. Eyes:      Pupils: Pupils are equal, round, and reactive to light. Neck:      Musculoskeletal: Neck supple. Vascular: No JVD. Cardiovascular:      Heart sounds: Normal heart sounds. No murmur. No friction rub. No gallop. Pulmonary:      Effort: Pulmonary effort is normal.      Breath sounds: No wheezing or rales. Abdominal:      General: Bowel sounds are normal. There is no distension. Palpations: Abdomen is soft. Tenderness: There is no abdominal tenderness. Musculoskeletal: Normal range of motion. Lymphadenopathy:      Cervical: No cervical adenopathy.    Skin: General: Skin is warm and dry. Findings: No rash. Neurological:      Mental Status: He is alert. Cranial Nerves: No cranial nerve deficit. Comments: CN 2-12 grossly intact            Data Reviewed:   LABS:  CBC:  Recent Labs     09/23/20 0600 09/24/20 0526 09/25/20  0545   WBC 13.5* 10.5 11.3*   HGB 10.2* 11.9* 9.1*   HCT 31.0* 35.6* 27.4*   MCV 96.5 95.6 95.0   * 67* 74*     BMP:  Recent Labs     09/23/20 0600 09/24/20 0526 09/25/20  0545   * 147* 144   K 5.4* 4.7  4.7 4.7  4.7   * 118* 114*   CO2 21 20* 20*   PHOS 3.4 3.5 3.3   BUN 57* 51* 45*   CREATININE 1.9* 1.7* 1.5*     LIVER PROFILE:   Recent Labs     09/23/20 0600 09/24/20 0526   AST  --  46*   ALT  --  50*   LIPASE 373.0*  --    BILIDIR  --  <0.2   BILITOT  --  0.5   ALKPHOS  --  106     PT/INR:  No results for input(s): PROTIME, INR in the last 72 hours. APTT:   No results for input(s): APTT in the last 72 hours. UA:  No results for input(s): NITRITE, COLORU, PHUR, LABCAST, WBCUA, RBCUA, MUCUS, TRICHOMONAS, YEAST, BACTERIA, CLARITYU, SPECGRAV, LEUKOCYTESUR, UROBILINOGEN, BILIRUBINUR, BLOODU, GLUCOSEU, AMORPHOUS in the last 72 hours. Invalid input(s): [address]     09/22/20 2235 09/23/20 0620   PHART 7.304* 7.312*   QGR1RIO 44.8 38.9   PO2ART 85.6 87.5       Vent Information  Skin Assessment: Clean, dry, & intact  FiO2 : 25 %  SpO2: 96 %  SpO2/FiO2 ratio: 380  I Time/ I Time %: 1 s  Mask Type: Full face mask  Mask Size: Medium  Bonnet size: Medium    CXR personally reviewed, pulm edema           Assessment:     1. Acute on chronic resp failure, hypoxic   -Acute pulm edema    -bibasilar atelectasis/hypoventilation  2. Septic shock/hypotension    -unclear etiology  3. LINDA, prerenal   4.  Acute pancreatitis     Plan:      -Wean oxygen as tolerated, agree with pulm edema as etiology  -Diuresis  -Repeat CXR in AM  -Encourage pulm expansion  -DC bipap   -stop hydrocortisone    Eunice Hutchinson MD

## 2020-09-25 NOTE — PROGRESS NOTES
Mackey Nyhan  Neurology Follow-up  [facility]rology    Date of Service: 9/25/2020    Subjective:   CC: Follow up today regarding: Acute encephalopathy. Events noted. Chart and lab reviewed. The patient is more awake and alert today. He was able to open his eyes to voice but he is nonverbal at baseline. Events overnight noted. Reviewed notes from different physician. Other review of system was limited. family history includes Diabetes in his brother; High Blood Pressure in his father; Stroke in his mother.     Past Medical History:   Diagnosis Date    Acne     Anemia     Cataract     unspecified eye    CHF [facility]congestive heart failure) [facility])     COPD (chronic obstructive pulmonary disease) ([facility])     GERD (gastroesophageal reflux disease)     Hearing loss     Hernia, umbilical     Hip fracture 2008    right    HTN      Mental retardation     non-verbal    Peripheral vascular disease ([facility])      Current Facility-Administered Medications   Medication Dose Route Frequency Provider Last Rate Last Dose    dextrose 50 % IV solution  12.5 g Intravenous PRN Shena Chowdhury, DO   12.5 g at 09/25/20 0834    dextrose 5 % and 0.45 % sodium chloride infusion   Intravenous Continuous Jose Alejandro Umanzor MD   Stopped at 09/25/20 0342    pantoprazole (PROTONIX) injection 40 mg  40 mg Intravenous Daily Jose Alejandro Umanzor MD   40 mg at 09/25/20 0836    levothyroxine (SYNTHROID) tablet 25 mcg  25 mcg Oral Daily Jose Alejandro Umanzor MD   25 mcg at 09/25/20 4756    ipratropium-albuterol (DUONEB) nebulizer solution 1 ampule  1 ampule Inhalation 4x daily Jose Alejandro Umanzor MD   1 ampule at 09/25/20 0831    bisacodyl (DULCOLAX) suppository 10 mg  10 mg Rectal Daily PRN Chapis Trinidad MD        oxybutynin (DITROPAN) tablet 2.5 mg  2.5 mg Oral Nightly Jose Alejandro Umanzor MD   2.5 mg at 09/23/20 2051    sodium chloride flush 0.9 % injection 10 mL  10 mL Intravenous 2 times per day Chapis Trindiad MD right, symmetric. No change  Tone: Normal tone. No rigidity. Coordination: NT due to confusion  Sensation: NT due to confusion  No change in exam today    Data:  LABS:   Lab Results   Component Value Date     09/25/2020    K 4.7 09/25/2020    K 4.7 09/25/2020     09/25/2020    CO2 20 09/25/2020    BUN 45 09/25/2020    CREATININE 1.5 09/25/2020    GFRAA 58 09/25/2020    LABGLOM 48 09/25/2020    GLUCOSE 79 09/25/2020    PHOS 3.3 09/25/2020    MG 1.90 09/25/2020    CALCIUM 8.0 09/25/2020     Lab Results   Component Value Date    WBC 11.3 09/25/2020    RBC 2.89 09/25/2020    HGB 9.1 09/25/2020    HCT 27.4 09/25/2020    MCV 95.0 09/25/2020    RDW 17.5 09/25/2020    PLT 74 09/25/2020     Lab Results   Component Value Date    INR 0.97 09/22/2020    PROTIME 11.2 09/22/2020       I reviewed blood testing and other test results       Impression: Slight improvement today. Acute encephalopathy, severe. Likely acute metabolic encephalopathy, multifactorial.  Rule out sepsis  Acute pancreatitis  Acute kidney injury  Hypernatremia  Hyperkalemia  Abnormal LFT  ? Abnormal CT head which is secondary to small vessel disease, chronic. Most likely incidental findings and doubt of any clinically significant or contributing to his encephalopathy. Recommendation    Continue current supportive care  Hydration and follow kidney function testing  Continue Synthroid  Aspiration precautions  Speech evaluation  Continue to follow GI consultation and recommendations  Since his encephalopathy improved today, I would not add more testing. We will follow PRN for now  Please call for questions. Sherrie Reyes MD   [phone]      This dictation was generated by voice recognition computer software. Although all attempts are made to edit the dictation for accuracy, there may be errors in the transcription that are not intended.

## 2020-09-25 NOTE — PROGRESS NOTES
Occupational Therapy   Occupational Therapy Initial Assessment and Treatment Note  Date: 2020   Patient Name: Radha Pastor  MRN: [id number]     : 1965    Date of Service: 2020    Discharge Recommendations:  Continue to assess pending progress, Subacute/Skilled Nursing Facility     Assessment   Performance deficits / Impairments: Decreased functional mobility ; Decreased ADL status; Decreased safe awareness;Decreased cognition;Decreased balance;Decreased coordination;Decreased endurance;Decreased strength;Decreased posture  Assessment: Pt is functioning below baseline according to information from ED and case management notes. Pt lives at [facility] and may have been able to ambulate prior to admission. He currently requires max x2 for bed mobility. Recommend SNF for d/c disposition. Cont skilled OT services in acute care. Prognosis: Fair  Decision Making: High Complexity  OT Education: OT Role  Patient Education: disease specific ed:  role of OT. No evidence of learning. Barriers to Learning: cognition  REQUIRES OT FOLLOW UP: Yes  Activity Tolerance  Activity Tolerance: Patient limited by fatigue;Treatment limited secondary to decreased cognition  Safety Devices  Safety Devices in place: Yes  Type of devices: Call light within reach;Nurse notified; Left in bed;Bed alarm in place         Patient Diagnosis(es): There were no encounter diagnoses. has a past medical history of Acne, Anemia, Cataract, CHF (congestive heart failure) ([address] COPD (chronic obstructive pulmonary disease) ([address] GERD (gastroesophageal reflux disease), Hearing loss, Hernia, umbilical, Hip fracture, HTN, Mental retardation, and Peripheral vascular disease ([address] has a past surgical history that includes Hip fracture surgery (); Umbilical hernia repair; Upper gastrointestinal endoscopy (2018); Colonoscopy (2018); and Femur fracture surgery (Left, 9/3/2019). Restrictions  Restrictions/Precautions  Restrictions/Precautions: Strict Bedrest, Fall Risk, General Precautions  Position Activity Restriction  Other position/activity restrictions: NGT with wall suction, parnell, non verbal with hx MR, per ED note pt does interact with people and amb with RW at baseline    Subjective   General  Chart Reviewed: Yes  Patient assessed for rehabilitation services?: Yes  Additional Pertinent Hx: hx MRDD  Family / Caregiver Present: No  Referring Practitioner: N Anusionwu  Diagnosis: sepsis  Subjective  Subjective: Pt resting in bed  General Comment  Comments: RN approved therapy this am  Patient Currently in Pain: Other (comment)(KAREN d/t non-verbal)  Social/Functional History  Social/Functional History  Type of Home: Facility([address]  Home Equipment: Rolling walker  Ambulation Assistance: Independent  Transfer Assistance: Independent  Additional Comments: PLOF obtained from ED notes       Objective   Hearing: Exceptions to [facility]  Hearing Exceptions: Hard of hearing/hearing concerns    Orientation  Overall Orientation Status: (KAREN)     Balance  Sitting Balance: Moderate assistance(mod A initially, improving to CGA)  Standing Balance: Unable to assess(comment)  Standing Balance  Time: Sat EOB ~5 min  ADL  Feeding: NPO  LE Dressing: Dependent/Total  Toileting: Dependent/Total(parnell)  Tone RUE  RUE Tone: Normotonic  Tone LUE  LUE Tone: Normotonic  Coordination  Movements Are Fluid And Coordinated: No  Coordination and Movement description: Fine motor impairments;Gross motor impairments;Decreased accuracy; Decreased speed;Right UE;Left UE  Quality of Movement Other  Comment: edema in BUE, bruising on R forearm     Bed mobility  Supine to Sit: Dependent/Total;Maximum assistance;2 Person assistance  Sit to Supine: Dependent/Total;Maximum assistance;2 Person assistance  Scooting: Dependent/Total;2 Person assistance        Cognition  Overall Cognitive Status: Exceptions  Arousal/Alertness: Inconsistent responses to stimuli(Pt alert and occassionally responds with grunts)  Following Commands: Does not follow commands  Attention Span: Unable to maintain attention  Initiation: Requires cues for all  Sequencing: Requires cues for all  Cognition Comment: non-verbal at baseline; during eval, he occassionally grunted in response to questions. Did not follow directions. Type of ROM/Therapeutic Exercise  Type of ROM/Therapeutic Exercise: AAROM;PROM  Comment: seated and semi-reclined  Exercises  Shoulder Flexion: 5x  Elbow Flexion: 5x  Elbow Extension: 5x  Supination: 5x  Pronation: 5x  Wrist Flexion: 5x  Wrist Extension: 5x  Finger Flexion: 5x  Finger Extension: 5x     LUE PROM (degrees)  LUE PROM: WFL  RUE PROM (degrees)  RUE PROM: WFL  LUE Strength  LUE Strength Comment: KAREN  RUE Strength  RUE Strength Comment: KAREN       Plan   Plan  Times per week: 3-5x    AM-PAC Score   AM-[facility] Inpatient Daily Activity Raw Score: 6 (09/25/20 1207)  AM-PAC Inpatient ADL T-Scale Score : 17.07 (09/25/20 1207)  ADL Inpatient CMS 0-100% Score: 100 (09/25/20 1207)  ADL Inpatient CMS G-Code Modifier : CN (09/25/20 1207)  Goals  Short term goals  Time Frame for Short term goals: 1 week (10/2) unless noted  Short term goal 1: Sit EOB x7 min with CGA  Short term goal 2: Perform 1 grooming task with min A by 9/30  Short term goal 3: Participate in UE exer 15x each  Short term goal 4: Stand with mod Ax2 with AD  Patient Goals   Patient goals : Pt did not state     Therapy Time   Individual Concurrent Group Co-treatment   Time In 1107         Time Out 1127         Minutes 20         Timed Code Treatment Minutes: 10 Minutes(10 min eval)    If pt is discharged prior to next OT session, this note will serve as the discharge summary.   Minda Carreon OT

## 2020-09-25 NOTE — PROGRESS NOTES
Writer spoke with pt's father at bedside, per Dr. Chinedu Hensley request. According to pt's father, pt is on a dental soft diet, with thin liquids at the ECF. Father states that the pt generally has a very good appetite, however, does not drink much fluid.

## 2020-09-25 NOTE — PROGRESS NOTES
\"Pt sounds wet upon auscultation of lungs. Fluids running at 125 have been stopped. Oxygen bumped from 2L to 4L. Lasix has been held due to LINDA with creatinine of 1.7. Would you like to order a chest x ray? \"  Message sent to NP

## 2020-09-25 NOTE — PROGRESS NOTES
Comprehensive Nutrition Assessment    Type and Reason for Visit:  Reassess    Nutrition Recommendations/Plan:   1. Monitor nutrition progression and plan of care. 2.  If enteral nutrition recommend Jevity 1.5 titrate as tolerated up to goal rate of 65 ml/hr. Jevity 1.5 does not contain eggs. 3.  Water flush per nephrology with recent hypernatremia and IV fluid usage and stopping overnight   4. Swallow evaluation when medically appropriate  5. Will monitor nutritional adequacy, nutrition-related labs, weights, BMs, and clinical progress     Nutrition Assessment:  Follow up:  Pressors stabilized and able to be transferred out of ICU to . Patient is from [facility].  NPO at this time. Usual diet consists of ground meats at [facility]. IV fluids stopped overnight due to paitent sounded wet. O2 requirement increased. Malnutrition Assessment:  Malnutrition Status:   At risk for malnutrition (Comment)      Estimated Daily Nutrient Needs:  Energy (kcal):  3343-7944; Weight Used for Energy Requirements:  Current(71 kg)     Protein (g):  85-99; Weight Used for Protein Requirements:  Current(1.2-1.4)        Fluid (ml/day):  1 mL/kcal; Weight Used for Fluid Requirements:  Current      Nutrition Related Findings:  No BM noted, NG to suction with low output      Wounds:  None       Current Nutrition Therapies:    Diet NPO Effective Now Exceptions are: Sips with Meds    Anthropometric Measures:  · Height: 4' 8\" (142.2 cm)  · Current Body Weight: 156 lb (70.8 kg)   · Admission Body Weight:      · Usual Body Weight:       · Ideal Body Weight: 82 lbs; % Ideal Body Weight 190.2 %   · BMI: 35  · Adjusted Body Weight:  ;     · Adjusted BMI:      · BMI Categories: Obese Class 2 (BMI 35.0 -39.9)       Nutrition Diagnosis:   · Inadequate oral intake related to cognitive or neurological impairment as evidenced by NPO or clear liquid status due to medical condition      Nutrition Interventions:   Food and/or Nutrient Delivery: Continue NPO  Nutrition Education/Counseling:  No recommendation at this time   Coordination of Nutrition Care:  Continued Inpatient Monitoring    Goals: Tolerate diet advancement and consume greater than 50% of meals and ONS this admission       Nutrition Monitoring and Evaluation:   Behavioral-Environmental Outcomes:      Food/Nutrient Intake Outcomes:  Food and Nutrient Intake, Diet Advancement/Tolerance  Physical Signs/Symptoms Outcomes:  Biochemical Data     Discharge Planning:     Too soon to determine     Electronically signed by Jamir Rod, [address] [place] [address],  on 9/25/20 at 11:48 AM EDT    Contact: Office: [phone]; [address] [place] [address] Road: [phone]

## 2020-09-25 NOTE — PROGRESS NOTES
GI Progress Note      SUBJECTIVE:  NGT output is low. No BMs. No reports of emesis.     OBJECTIVE      Medications    Current Facility-Administered Medications: dextrose 50 % IV solution, 12.5 g, Intravenous, PRN  dextrose 5 % and 0.45 % sodium chloride infusion, , Intravenous, Continuous  pantoprazole (PROTONIX) injection 40 mg, 40 mg, Intravenous, Daily  levothyroxine (SYNTHROID) tablet 25 mcg, 25 mcg, Oral, Daily  ipratropium-albuterol (DUONEB) nebulizer solution 1 ampule, 1 ampule, Inhalation, 4x daily  bisacodyl (DULCOLAX) suppository 10 mg, 10 mg, Rectal, Daily PRN  oxybutynin (DITROPAN) tablet 2.5 mg, 2.5 mg, Oral, Nightly  sodium chloride flush 0.9 % injection 10 mL, 10 mL, Intravenous, 2 times per day  sodium chloride flush 0.9 % injection 10 mL, 10 mL, Intravenous, PRN  [DISCONTINUED] acetaminophen (TYLENOL) tablet 650 mg, 650 mg, Oral, Q6H PRN **OR** acetaminophen (TYLENOL) suppository 650 mg, 650 mg, Rectal, Q6H PRN  promethazine (PHENERGAN) tablet 12.5 mg, 12.5 mg, Oral, Q6H PRN **OR** ondansetron (ZOFRAN) injection 4 mg, 4 mg, Intravenous, Q6H PRN  Physical    VITALS:  /78   Pulse 104   Temp 97.6 °F (36.4 °C) (Axillary)   Resp 20   Ht 4' 8\" (1.422 m)   Wt 226 lb 13.7 oz (102.9 kg)   SpO2 96%   BMI 50.86 kg/m²   ABD: soft, moderate distention with tympanic epigastrium, decreased BS  Data    CBC with Differential:    Lab Results   Component Value Date    WBC 11.3 09/25/2020    RBC 2.89 09/25/2020    HGB 9.1 09/25/2020    HCT 27.4 09/25/2020    PLT 74 09/25/2020    MCV 95.0 09/25/2020    MCH 31.6 09/25/2020    MCHC 33.3 09/25/2020    RDW 17.5 09/25/2020    LYMPHOPCT 5.0 09/25/2020    MONOPCT 6.0 09/25/2020    BASOPCT 0.2 09/25/2020    MONOSABS 0.7 09/25/2020    LYMPHSABS 0.6 09/25/2020    EOSABS 0.0 09/25/2020    BASOSABS 0.0 09/25/2020     CMP:    Lab Results   Component Value Date     09/25/2020    K 4.7 09/25/2020    K 4.7 09/25/2020     09/25/2020    CO2 20 09/25/2020    BUN 45 09/25/2020    CREATININE 1.5 09/25/2020    GFRAA 58 09/25/2020    AGRATIO 1.2 09/22/2020    LABGLOM 48 09/25/2020    GLUCOSE 79 09/25/2020    PROT 5.7 09/24/2020    LABALBU 2.8 09/25/2020    CALCIUM 8.0 09/25/2020    BILITOT 0.5 09/24/2020    ALKPHOS 106 09/24/2020    AST 46 09/24/2020    ALT 50 09/24/2020       ASSESSMENT AND PLAN  The patient is a 60 y. o. male with significant past medical history of dCHF, COPD, HTN and MRDD (non verbal) who presents with MS changes and resp distress.  He maintains a diagnosis of acute pancreatitis based on the presence of hyperlipasemia and peripancreatic inflammation noted on CT.  ARF and hypotension likely reflect significant intravascular volume depletion.  Nephrology is following.  An obvious etiology of pancreatitis is not noted.   He has also manifest significant gaseous distention of the stomach either reflecting delayed emptying due to neighboring pancreatic inflammation or perhaps mechanical obstruction. This has improved with NGT. Empiric ATBs have been discontinued. Resp status hs improved.   Creatinine decreasing.  - continue NGT to LIWS  - IVF per nephrology  - keep NPO  - continue daily Protonix  - obtain IgG4

## 2020-09-25 NOTE — PROGRESS NOTES
Progress Note    HISTORY     CC:   Altered Mental Status            Acute kidney injury      Subjective/   HPI:   Out of the ICU. Speaking a few words and looking around but not back to his baseline. Getting IV fluids. Good urine output.   Kidney function improving     ROS:  Constitutional:  No fevers,  Cardiovascular:  No edema  Respiratory:  No distress     Social Hx:  Dad at bedside     Past Medical and Surgical History:  - Reviewed, no changes     EXAM       Objective/     Vitals:    09/25/20 0833 09/25/20 1108 09/25/20 1133 09/25/20 1218   BP:   110/71    Pulse:   97    Resp: 20 18 16   Temp:   97.5 °F (36.4 °C)    TempSrc:   Axillary    SpO2: 96%  97% 96%   Weight:  225 lb 15.5 oz (102.5 kg)     Height:         24HR INTAKE/OUTPUT:      Intake/Output Summary (Last 24 hours) at 9/25/2020 1342  Last data filed at 9/25/2020 1254  Gross per 24 hour   Intake 2991.77 ml   Output 1550 ml   Net 1441.77 ml     General:  NAD, looking around   HEENT:  PERRL, EOMI  Neck:  Supple, normal range of movement  Chest:  CTAB, no distress   CV:  Tachycardic, no rub   Abdomen:  NTND, soft, +BS, no hepatosplenomegaly  Extremities:  trace peripheral edema  Neurological:  Moving all four extremities, CN II-XII grossly intact  Lymphatics:  No palpable lymph nodes  Skin:  No rash, no jaundice  Psychiatric:  Flat but more alert   :  Elliott in place       MEDICAL DECISION MAKING       Data/  Recent Labs     09/23/20  0600 09/24/20  0526 09/25/20  0545   WBC 13.5* 10.5 11.3*   HGB 10.2* 11.9* 9.1*   HCT 31.0* 35.6* 27.4*   MCV 96.5 95.6 95.0   * 67* 74*     Recent Labs     09/23/20  0600 09/24/20  0526 09/25/20  0545   * 147* 144   K 5.4* 4.7  4.7 4.7  4.7   * 118* 114*   CO2 21 20* 20*   GLUCOSE 87 64* 79   PHOS 3.4 3.5 3.3   MG 1.80 1.80 1.90   BUN 57* 51* 45*   CREATININE 1.9* 1.7* 1.5*   LABGLOM 37* 42* 48*   GFRAA 44* 50* 58*       Assessment/     Acute Kidney Injury:  KDIGO stage 2  - Data:  UA

## 2020-09-25 NOTE — PLAN OF CARE
Problem: Nutrition  Goal: Optimal nutrition therapy  Outcome: Ongoing     Problem: Skin Integrity:  Goal: Will show no infection signs and symptoms  Description: Will show no infection signs and symptoms  Outcome: Ongoing  Goal: Absence of new skin breakdown  Description: Absence of new skin breakdown  Outcome: Ongoing     Problem: Falls - Risk of:  Goal: Will remain free from falls  Description: Will remain free from falls  Outcome: Ongoing  Goal: Absence of physical injury  Description: Absence of physical injury  Outcome: Ongoing

## 2020-09-25 NOTE — PROGRESS NOTES
\"Stopped pts continuous D5 and 1/2 NS because patient sounded wet upon auscultation of the lungs. O2 was bumped from 2L to 4L and chest x ray was ordered and has resulted. Pt sugars are also now lowering. Do you have any new orders? \" Message sent to MD

## 2020-09-25 NOTE — PLAN OF CARE
Problem: Falls - Risk of:  Goal: Will remain free from falls  Description: Will remain free from falls  9/25/2020 0202 by Armandina Lennox, RN  Outcome: Ongoing  Pt remains free of falls. Bed in lowest position with call light and bedside table within reach.       Problem: Falls - Risk of:  Goal: Absence of physical injury  Description: Absence of physical injury  9/25/2020 0202 by Armandina Lennox, RN  Outcome: Ongoing     Problem: Skin Integrity:  Goal: Will show no infection signs and symptoms  Description: Will show no infection signs and symptoms  9/25/2020 0202 by Armandina Lennox, RN  Outcome: Ongoing

## 2020-09-25 NOTE — PLAN OF CARE
Nutrition Problem #1: Inadequate oral intake  Intervention: Food and/or Nutrient Delivery: Continue NPO  Nutritional Goals:  Tolerate diet advancement and consume greater than 50% of meals and ONS this admission

## 2020-09-25 NOTE — PROGRESS NOTES
Hospitalist Progress Note      PCP: No primary care provider on file. Date of Admission: 9/22/2020    Chief Complaint:  [address] Course:   HPI :   60 y.o. male with history of CHF, COPD, GERD,  MRDD who presented to  [facility] ER from group home with altered mental status. History obtained per chart as patient unable to provide any history due to AMS. Pt minimally verbal at baseline. Patient reportedly had reduced responsiveness at the ECF overnight and was sent to the ER. He was noted to be hypothermic in the 80s and hypotensive in the 60s. Initial concern for possible CVA - stroke team consulted. CT head was nonacute with tiny area of low attenuation near the anterior right  internal capsule which was thought to be likely subacute/chronic. CTA head/neck was unremarkable. PICC line placed in ER. Labs notable for severe hyperkalemia with potassium of 7.1 creatinine 1.5, TSH of 7.4. Lipase was  elevated at 1471. Temperature improved to 90's with karuna hugger and warm IVF. There was concern for myxedema coma and was started on IV Synthroid. He was given a dose of IV hydrocortisone, started on broad-spectrum antibiotics and  pressors. Pt was transferred to [facility] for further care. Subjective:     Transferred from ICU yesterday. Was hypoxic last night requiring 4L 02. IVF stopped. CXR showed small bilat pleural effusions with bilat ASD edema vrs pneumonia. 02 weaned down to 2.5L today.      Medications:  Reviewed    Infusion Medications    dextrose 5 % and 0.45 % NaCl Stopped (09/25/20 4922)     Scheduled Medications    pantoprazole  40 mg Intravenous Daily    levothyroxine  25 mcg Oral Daily    ipratropium-albuterol  1 ampule Inhalation 4x daily    oxybutynin  2.5 mg Oral Nightly    sodium chloride flush  10 mL Intravenous 2 times per day     PRN Meds: dextrose, bisacodyl, sodium chloride flush, [DISCONTINUED] acetaminophen **OR** acetaminophen, promethazine **OR** ondansetron      Intake/Output Summary (Last 24 hours) at 9/25/2020 1120  Last data filed at 9/25/2020 0515  Gross per 24 hour   Intake 2991.77 ml   Output 800 ml   Net 2191.77 ml       Physical Exam Performed:    /78   Pulse 104   Temp 97.6 °F (36.4 °C) (Axillary)   Resp 20   Ht 4' 8\" (1.422 m)   Wt 225 lb 15.5 oz (102.5 kg)   SpO2 96%   BMI 50.66 kg/m²   General appearance: lethargic and somnolent but arousable,  on BIPAP  in no acute resp distress, appears stated age   HEENT:  Normal cephalic, atraumatic without obvious deformity. Conjunctivae/corneas clear. Neck: Supple, with full range of motion. No jugular venous distention. Trachea midline. Respiratory:  Normal respiratory effort. Bibasilar crackles   Cardiovascular:  Regular rate and rhythm with normal S1/S2 without murmurs, rubs or gallops. Abdomen: Soft, distended,  non-tender,  with normal bowel sounds. Musculoskeletal:  No clubbing, cyanosis or edema bilaterally. Skin:  Warm and dry . Neurologic:  Limited due to lethargy. Following some commands, moving his extremities spontaneously    Psychiatric: somnolent          Labs:   Recent Labs     09/23/20  0600 09/24/20  0526 09/25/20  0545   WBC 13.5* 10.5 11.3*   HGB 10.2* 11.9* 9.1*   HCT 31.0* 35.6* 27.4*   * 67* 74*     Recent Labs     09/23/20  0600 09/24/20  0526 09/25/20  0545   * 147* 144   K 5.4* 4.7  4.7 4.7  4.7   * 118* 114*   CO2 21 20* 20*   BUN 57* 51* 45*   CREATININE 1.9* 1.7* 1.5*   CALCIUM 7.6* 7.6* 8.0*   PHOS 3.4 3.5 3.3     Recent Labs     09/24/20  0526   AST 46*   ALT 50*   BILIDIR <0.2   BILITOT 0.5   ALKPHOS 106     No results for input(s): INR in the last 72 hours.   Recent Labs     09/22/20  1737   TROPONINI <0.01       Urinalysis:      Lab Results   Component Value Date    NITRU Negative 09/22/2020    WBCUA 0-2 09/03/2019    RBCUA 10-20 09/03/2019    BLOODU Negative 09/22/2020    SPECGRAV 1.025 09/22/2020    GLUCOSEU Negative 09/22/2020 assistance. Continue 02 and wean as tolerated.      Hypothyroidism: TSH elevated at 7.4, FT3, FT4  normal. Given decreased responsiveness from baseline and hypothermia concern for possible myxedema coma. recieved IV synthroid - started on PO on 9/23.      Hypothermia: Likely due to sepsis, hypothyroidism. karuna hugger placed- improving         Hyperkalemia: likely due to LINDA ,  potassium supplements he is on chronically and lisinopril. Treated with improvement. Held K supplements and lisinopril. Resolved. Nephro assisting     LINDA: likely due to vol depletion, compounded by use of ACEi and lasix. Culprit meds held. Nephro assisting. Monitor Cr and avoid nephrotoxins.           Acute pancreatitis: lipase was elevated with signs of early pancreatitis on CT. Unclear etiology. GI assisting. Continue NG tube to LIWS, NPO         GERD : Continue PPI          Acute on Chronic diastolic CHF: EF 55 to 50% with G1DD on echo in 9/3/2019. Received IVF for sepsis, hypernatremia. Pulm edema on CXR. IV lasix given. Monitor vol status closely         Hypernatremia: on IVF. nephro managing . Held due to pulm edema for now     Hypoglycemia: likely due to NPO status. Getting dextrose in IVF. Monitor with hypoglycemia protocol in place. Thrombocytopenia: cause unclear. No overt bleeding. Heparin d/rich. HIT ab neg.  Monitor      DVT Prophylaxis: SCDs  Diet: Diet NPO Effective Now Exceptions are: Sips with Meds  Code Status: Full Code    PT/OT Eval Status: not indicated at this time     Dispo -  Hard to say,  pending clinical course     Audrey Alas MD

## 2020-09-25 NOTE — PROGRESS NOTES
Physical Therapy    Facility/Department: [facility] C4 PCU  Initial Assessment    NAME: Jesusita Graff  : 1965  MRN: [id number]    Date of Service: 2020    Discharge Recommendations:  Subacute/Skilled Nursing Facility   PT Equipment Recommendations  Equipment Needed: No  If pt discharges prior to next PT session this note will serve as discharge summary. Assessment   Body structures, Functions, Activity limitations: Decreased functional mobility ; Decreased strength;Decreased balance;Decreased safe awareness  Assessment: pt is 59 yo male, long term care resident at [facility], adm through ED with encephalopathy. Per ED note pt amb with walker at baseline, and is non verbal with hx of MR. Today pt offers minimal participation with LE movements, needs max assist of 2 for bed mob, and was able to sit EOB with assist for approx 5 minutes. He is cooperative with therapy. Pt is functioning below reported baseline and would benefit from skilled PT to address current deficits. Recommend SNF at discharge. Treatment Diagnosis: Decreased mobility  Specific instructions for Next Treatment: ther ex, functional mob, progress to transfers as able  Prognosis: Fair  Decision Making: Medium Complexity  PT Education: Goals; General Safety;Gait Training;PT Role;Disease Specific Education;Plan of Care; Functional Mobility Training  Patient Education: Pt is non verbal, hx MR, no evidence of learning  Barriers to Learning: MR  REQUIRES PT FOLLOW UP: Yes  Activity Tolerance  Activity Tolerance: Patient Tolerated treatment well       Patient Diagnosis(es): There were no encounter diagnoses. has a past medical history of Acne, Anemia, Cataract, CHF (congestive heart failure) ([address] COPD (chronic obstructive pulmonary disease) ([address] GERD (gastroesophageal reflux disease), Hearing loss, Hernia, umbilical, Hip fracture, HTN, Mental retardation, and Peripheral vascular disease ([address]    has a past surgical history that includes Hip fracture surgery (2008); Umbilical hernia repair; Upper gastrointestinal endoscopy (06/29/2018); Colonoscopy (06/29/2018); and Femur fracture surgery (Left, 9/3/2019).     Restrictions  Restrictions/Precautions: Strict Bedrest, Fall Risk, General Precautions  Position Activity Restriction  Other position/activity restrictions: NGT with wall suction, parnell, non verbal with hx MR, per ED note pt does interact with people and amb with RW at baseline  Vision/Hearing  Hearing: Exceptions to [facility] Exceptions: Hard of hearing/hearing concerns     Subjective  General  Chart Reviewed: Yes  Patient assessed for rehabilitation services?: Yes  Family / Caregiver Present: No  Referring Practitioner: Estephanieu  Referral Date : 09/25/20  Diagnosis: encephalopathy  Follows Commands: Within Functional Limits  General Comment  Comments: RN cleared pt for therapy, pt resting in bed on approach  Subjective  Subjective: Pt is non verbal, cooperative with therapy  Pain Screening  Patient Currently in Pain: Other (comment)(Pt offered no response when questioned, no facial grimmacing noted)     Orientation  Overall Orientation Status: (Pt is non verbal, unable to answer orientation questions)  Social/Functional History  Social/Functional History  Type of Home: Facility([address]  Home Equipment: Rolling walker  Ambulation Assistance: Independent  Transfer Assistance: Independent  Additional Comments: PLOF obtained from ED notes  Objective     General PROM: BLEs WFL with stiffness noted at hips and ankles  Strength : BLEs: DF and quads 3-/5, pt did not demonstrate active hip movements spontaneously or offer assist with ROM ex        Bed mobility  Supine to Sit: Dependent/Total;Maximum assistance;2 Person assistance  Sit to Supine: Dependent/Total;Maximum assistance;2 Person assistance  Comment: Assist for trunk and LEs with HOB elevated partially  Transfers  Sit to Stand: Unable to

## 2020-09-25 NOTE — FLOWSHEET NOTE
09/25/20 0829   Assessment   Charting Type Shift assessment   Neurological   Neuro (WDL) X   Level of Consciousness 1   Orientation Level KAREN  (pt is nonverbal/baseline)   Cognition Poor safety awareness;Poor attention/concentration; Unable to follow commands   Language KAREN  (non verbal baseline)   Size R Pupil (mm) 3   R Pupil Shape Round   R Pupil Reaction Brisk   Size L Pupil (mm) 3   L Pupil Shape Round   L Pupil Reaction Brisk   R Hand  KAREN   L Hand  KAREN   R Foot Dorsiflexion KAREN   L Foot Dorsiflexion KAREN   R Foot Plantar Flexion KAREN   L Foot Plantar Flexion KAREN   RUE Motor Response Movement to painful stimulus   LUE Motor Response Movement to painful stimulus   RLE Motor Response Movement to painful stimulus; Non-purposeful movement   LLE Motor Response Movement to painful stimulus; Non-purposeful movement   Gag Present   HEENT   HEENT (WDL) X   Right Eye Intact   Left Eye Intact   Nose Intact  (NGT present)   Mucous Membrane Pink;Dry   Teeth Missing teeth   Respiratory   Respiratory Pattern Regular   Respiratory Depth Normal   Respiratory Quality/Effort Unlabored   Chest Assessment Chest expansion symmetrical;Trachea midline   L Breath Sounds Crackles   R Breath Sounds Crackles   Breath Sounds   Right Upper Lobe Crackles   Right Middle Lobe Crackles   Right Lower Lobe Crackles   Left Upper Lobe Crackles   Left Lower Lobe Crackles   Cardiac   Cardiac (WDL) X   Cardiac Regularity Regular   Cardiac Rhythm NSR   Rhythm Interpretation   Pulse 104   Cardiac Monitor   Telemetry Monitor On Yes   Telemetry Audible Yes   Telemetry Alarms Set Yes   Telemetry Box Number 25   Gastrointestinal   Abdomen Inspection Rotund;Distended; Taut   Tenderness No guarding;Rigid   RUQ Bowel Sounds Hypoactive   LUQ Bowel Sounds Hypoactive   RLQ Bowel Sounds Hypoactive   LLQ Bowel Sounds Hypoactive   Peripheral Vascular   Peripheral Vascular (WDL) X   Edema Generalized   Edema Generalized +1   Sensation RUE KAREN   Sensation LUE

## 2020-09-26 NOTE — PROGRESS NOTES
Pulmonary & Critical Care Inpatient Progress Note   Reece Vasquez MD     REASON FOR TODAY'S VISIT:  Acute resp failure    SUBJECTIVE:   Decreasing oxygen requirements  Had increased tachypnea and there was concern for worsening respiratory status. An ABG was collected and not notably abnormal.   Net positive 5L    Scheduled Meds:   pantoprazole  40 mg Intravenous Daily    levothyroxine  25 mcg Oral Daily    oxybutynin  2.5 mg Oral Nightly    sodium chloride flush  10 mL Intravenous 2 times per day       Continuous Infusions:   dextrose         PRN Meds:  dextrose, ipratropium-albuterol, bisacodyl, sodium chloride flush, [DISCONTINUED] acetaminophen **OR** acetaminophen, promethazine **OR** ondansetron    ALLERGIES:  Patient is allergic to chocolate; eggs [egg white]; food; orange juice [orange oil]; peanut butter flavor [flavoring agent]; penicillins; strawberry (diagnostic); and tape [adhesive tape]. Objective:   PHYSICAL EXAM:  /75   Pulse 83   Temp 97.6 °F (36.4 °C) (Axillary)   Resp 20   Ht 4' 8\" (1.422 m)   Wt 227 lb 15.3 oz (103.4 kg)   SpO2 94%   BMI 51.11 kg/m²    Physical Exam  Constitutional:       General: He is not in acute distress. Appearance: He is well-developed. He is not diaphoretic. HENT:      Head: Normocephalic and atraumatic. Mouth/Throat:      Pharynx: No oropharyngeal exudate. Eyes:      Pupils: Pupils are equal, round, and reactive to light. Neck:      Musculoskeletal: Neck supple. Vascular: No JVD. Cardiovascular:      Heart sounds: Normal heart sounds. No murmur. No friction rub. No gallop. Pulmonary:      Effort: Pulmonary effort is normal.      Breath sounds: No wheezing or rales. Abdominal:      General: Bowel sounds are normal. There is no distension. Palpations: Abdomen is soft. Tenderness: There is no abdominal tenderness. Musculoskeletal: Normal range of motion. Lymphadenopathy:      Cervical: No cervical adenopathy. Skin:     General: Skin is warm and dry. Findings: No rash. Neurological:      Mental Status: He is alert. Cranial Nerves: No cranial nerve deficit. Comments: CN 2-12 grossly intact            Data Reviewed:   LABS:  CBC:  Recent Labs     09/24/20  0526 09/25/20  0545 09/26/20  0510   WBC 10.5 11.3* 8.2   HGB 11.9* 9.1* 9.1*   HCT 35.6* 27.4* 26.9*   MCV 95.6 95.0 95.6   PLT 67* 74* 81*     BMP:  Recent Labs     09/24/20  0526 09/25/20  0545 09/26/20  0510   * 144 147*   K 4.7  4.7 4.7  4.7 3.7  3.7   * 114* 115*   CO2 20* 20* 22   PHOS 3.5 3.3 3.4   BUN 51* 45* 42*   CREATININE 1.7* 1.5* 1.5*     LIVER PROFILE:   Recent Labs     09/24/20 0526   AST 46*   ALT 50*   BILIDIR <0.2   BILITOT 0.5   ALKPHOS 106     PT/INR:  No results for input(s): PROTIME, INR in the last 72 hours. APTT:   No results for input(s): APTT in the last 72 hours. UA:  No results for input(s): NITRITE, COLORU, PHUR, LABCAST, WBCUA, RBCUA, MUCUS, TRICHOMONAS, YEAST, BACTERIA, CLARITYU, SPECGRAV, LEUKOCYTESUR, UROBILINOGEN, BILIRUBINUR, BLOODU, GLUCOSEU, AMORPHOUS in the last 72 hours. Invalid input(s): Uriel Nathanprincess  Recent Labs     09/26/20  1230   PHART 7.287*   LYT9TWA 48.5*   PO2ART 96.9       Vent Information  Skin Assessment: Clean, dry, & intact  FiO2 : 25 %  SpO2: 94 %  SpO2/FiO2 ratio: 380  I Time/ I Time %: 1 s  Mask Type: Full face mask  Mask Size: Medium  Bonnet size: Medium    CXR personally reviewed, pulm edema           Assessment:     1. Acute on chronic resp failure, hypoxic   -Acute pulm edema    -bibasilar atelectasis/hypoventilation  2. Septic shock  3. LINDA, prerenal   4. Acute pancreatitis     Plan:      -Wean oxygen as tolerated, making progress  -Diuresis, repeat dose of lasix today  -ABG without significant hypercapnia, may be alternate reason for agitation. ? abdominal source  -Repeat CXR in AM  -Du Salomon MD

## 2020-09-26 NOTE — PROGRESS NOTES
Progress Note    HISTORY     CC:   Altered Mental Status            Acute kidney injury      Subjective/   HPI:   More somnolent today, appears to be wheezing. Fluid stopped and sodium is up to 147.   Kidney function is stable     ROS:  Constitutional:  No fevers,  Cardiovascular:  No edema  Respiratory:  No distress     Social Hx:  No family at bedside     Past Medical and Surgical History:  - Reviewed, no changes     EXAM       Objective/     Vitals:    09/25/20 2330 09/26/20 0500 09/26/20 0800 09/26/20 0815   BP: 114/73 104/67  115/75   Pulse: 85 87  83   Resp: 18 18  20   Temp: 97.6 °F (36.4 °C) 97.6 °F (36.4 °C)  97.6 °F (36.4 °C)   TempSrc: Axillary Axillary  Axillary   SpO2: 95% 96%  94%   Weight:   227 lb 15.3 oz (103.4 kg)    Height:         24HR INTAKE/OUTPUT:      Intake/Output Summary (Last 24 hours) at 9/26/2020 1229  Last data filed at 9/26/2020 0826  Gross per 24 hour   Intake 0 ml   Output 900 ml   Net -900 ml     General:  NAD, not responsive   HEENT:  PERRL, EOMI  Neck:  Supple, normal range of movement  Chest:  Expiratory wheezing, no distress, on supplemental oxygen    CV:  Tachycardic, no rub   Abdomen:  NTND, soft, +BS, no hepatosplenomegaly  Extremities:  trace peripheral edema  Neurological:  Moving all four extremities, CN II-XII grossly intact  Lymphatics:  No palpable lymph nodes  Skin:  No rash, no jaundice  :  Elliott in place       MEDICAL DECISION MAKING       Data/  Recent Labs     09/24/20  0526 09/25/20  0545 09/26/20  0510   WBC 10.5 11.3* 8.2   HGB 11.9* 9.1* 9.1*   HCT 35.6* 27.4* 26.9*   MCV 95.6 95.0 95.6   PLT 67* 74* 81*     Recent Labs     09/24/20  0526 09/25/20  0545 09/26/20  0510   * 144 147*   K 4.7  4.7 4.7  4.7 3.7  3.7   * 114* 115*   CO2 20* 20* 22   GLUCOSE 64* 79 126*   PHOS 3.5 3.3 3.4   MG 1.80 1.90 2.10   BUN 51* 45* 42*   CREATININE 1.7* 1.5* 1.5*   LABGLOM 42* 48* 48*   GFRAA 50* 58* 58*       Assessment/     Acute Kidney Injury:

## 2020-09-26 NOTE — PLAN OF CARE
Problem: Falls - Risk of:  Goal: Absence of physical injury  Description: Absence of physical injury  9/26/2020 0753 by Bernardo Tolentino RN  Outcome: Ongoing     Problem: Falls - Risk of:  Goal: Will remain free from falls  Description: Will remain free from falls  9/26/2020 0753 by Bernardo Tolentino RN  Outcome: Ongoing     Problem: Skin Integrity:  Goal: Absence of new skin breakdown  Description: Absence of new skin breakdown  9/26/2020 0753 by Bernardo Tolentino RN  Outcome: Ongoing

## 2020-09-26 NOTE — PROGRESS NOTES
PROGRESS NOTE  S:55 yrs Patient  admitted on 9/22/2020 with Encephalopathy [G93.40]  Septic shock ([facility] [A41.9, R65.21] . Current Hospital [facility] Meds   pantoprazole  40 mg Intravenous Daily    levothyroxine  25 mcg Oral Daily    oxybutynin  2.5 mg Oral Nightly    sodium chloride flush  10 mL Intravenous 2 times per day     Current Hospital IV Meds   dextrose 100 mL/hr at 09/26/20 1326     Current Hospital PRN Meds  dextrose, ipratropium-albuterol, bisacodyl, sodium chloride flush, [DISCONTINUED] acetaminophen **OR** acetaminophen, promethazine **OR** ondansetron    Exam:   Vitals:    09/26/20 0815   BP: 115/75   Pulse: 83   Resp: 20   Temp: 97.6 °F (36.4 °C)   SpO2: 94%     I/O last 3 completed shifts: In: 0   Out: 550 [Urine:500; Emesis/NG output:50]   General appearance: moderate distress  HEENT: EOMI  Neck: no adenopathy, no carotid bruit, no JVD, supple, symmetrical, trachea midline and thyroid not enlarged, symmetric, no tenderness/mass/nodules  Lungs: clear to auscultation bilaterally  Heart: regular rate and rhythm, S1, S2 normal, no murmur, click, rub or gallop  Abdomen: soft, non-tender; bowel sounds normal; no masses,  no organomegaly  Extremities: extremities normal, atraumatic, no cyanosis or edema     Labs:  CBC:   Recent Labs     09/24/20  0526 09/25/20  0545 09/26/20  0510   WBC 10.5 11.3* 8.2   HGB 11.9* 9.1* 9.1*   HCT 35.6* 27.4* 26.9*   MCV 95.6 95.0 95.6   PLT 67* 74* 81*     BMP:   Recent Labs     09/24/20  0526 09/25/20  0545 09/26/20  0510   * 144 147*   K 4.7  4.7 4.7  4.7 3.7  3.7   * 114* 115*   CO2 20* 20* 22   PHOS 3.5 3.3 3.4   BUN 51* 45* 42*   CREATININE 1.7* 1.5* 1.5*     LIVER PROFILE:   Recent Labs     09/24/20  0526   AST 46*   ALT 50*   PROT 5.7*   BILIDIR <0.2   BILITOT 0.5   ALKPHOS 106     PT/INR: No results for input(s): INR in the last 72 hours.     Invalid input(s): PT    IMAGING:  Xr Chest Portable    Result Date: 9/26/2020  EXAMINATION: ONE XRAY VIEW OF THE CHEST 9/26/2020 6:01 am COMPARISON: September 25, 2020 HISTORY: ORDERING SYSTEM PROVIDED HISTORY: pulm edema TECHNOLOGIST PROVIDED HISTORY: Reason for exam:->pulm edema Reason for Exam: pulm edema Acuity: Acute Type of Exam: Initial FINDINGS: Rotated positioning limits evaluation. Thoracic scoliosis maximus. Enteric tube extends beyond the gastroesophageal junction. Right PICC with the tip near the mid SVC. Small right lung base opacity. Increased opacification throughout the left hemithorax. Cardiomegaly. No definite pulmonary edema. Increased opacification within the left lung may represent a layering pleural effusion. Xr Chest Portable    Result Date: 9/25/2020  EXAMINATION: ONE XRAY VIEW OF THE CHEST 9/25/2020 3:53 am COMPARISON: Chest radiograph September 22, 2020 and priors. HISTORY: ORDERING SYSTEM PROVIDED HISTORY: respiratory distress TECHNOLOGIST PROVIDED HISTORY: Reason for exam:->respiratory distress FINDINGS: There has been placement nasogastric tube with side hole beyond the GE junction and tip overlying the expected location the distal stomach. There has also been placement right upper extremity PICC with tip overlying the mid SVC. There has been development of small bilateral pleural effusions with mild bilateral hazy interstitial opacities and mild septal thickening. No pneumothorax. Evaluation of the superior mediastinum is limited due to portable technique. Superior mediastinum appears mildly widened but has been seen on multiple prior studies, likely related to vasculature. There are postsurgical changes throughout thoracic spine. No acute osseous abnormality. New small bilateral pleural effusions and bilateral airspace disease which may be related to edema and/or pneumonia.         Hospital Problems           Last Modified POA    Acute encephalopathy 9/22/2020 Yes    Acute respiratory failure with hypercapnia ([address] 9/22/2020

## 2020-09-26 NOTE — FLOWSHEET NOTE
09/25/20 2035   Assessment   Charting Type Shift assessment   Neurological   Neuro (WDL) X   Level of Consciousness 0   Orientation Level KAREN  (pt is nonverbal/baseline)   Cognition Poor safety awareness;Poor attention/concentration; Unable to follow commands   Language KAREN  (non verbal baseline)   Size R Pupil (mm) 3   R Pupil Shape Round   R Pupil Reaction Brisk   Size L Pupil (mm) 3   L Pupil Shape Round   L Pupil Reaction Brisk   R Hand  KAREN   L Hand  KAREN   R Foot Dorsiflexion KAREN   L Foot Dorsiflexion KAREN   R Foot Plantar Flexion KAREN   L Foot Plantar Flexion KAREN   RUE Motor Response Movement to painful stimulus   LUE Motor Response Movement to painful stimulus   RLE Motor Response Movement to painful stimulus; Non-purposeful movement   LLE Motor Response Movement to painful stimulus; Non-purposeful movement   Gag Present   [place] Coma Scale   Eye Opening 4   Best Verbal Response 1   Best Motor Response 4   [place] Coma Scale Score 9   HEENT   HEENT (WDL) X   Right Eye Intact   Left Eye Intact   Nose Intact  (NGT present)   Mucous Membrane Pink;Dry   Teeth Missing teeth   Respiratory   Respiratory Pattern Regular   Respiratory Depth Normal   Respiratory Quality/Effort Unlabored   Chest Assessment Chest expansion symmetrical;Trachea midline   L Breath Sounds Crackles   R Breath Sounds Crackles   Breath Sounds   Right Upper Lobe Crackles   Right Middle Lobe Crackles   Right Lower Lobe Crackles   Left Upper Lobe Crackles   Left Lower Lobe Crackles   Cardiac   Cardiac (WDL) X   Cardiac Regularity Regular   Cardiac Rhythm NSR   Rhythm Interpretation   Pulse 92   Cardiac Monitor   Telemetry Monitor On Yes   Telemetry Audible Yes   Telemetry Alarms Set Yes   Telemetry Box Number 25   Gastrointestinal   Abdomen Inspection Rotund;Distended; Taut   Tenderness No guarding;Rigid   RUQ Bowel Sounds Hypoactive   LUQ Bowel Sounds Hypoactive   RLQ Bowel Sounds Hypoactive   LLQ Bowel Sounds Hypoactive   Peripheral Vascular   Peripheral Vascular (WDL) X   Edema Generalized   Edema Generalized +1   Sensation RUE KAREN   Sensation LUE KAREN   Sensation RLE KAREN   Sensation LLE KAREN   Skin Color/Condition   Skin Color/Condition (WDL) X   Skin Color Pale   Skin Integrity   Skin Integrity (WDL) X   Skin Integrity Redness  (blanchable )   Location coccyx   Musculoskeletal   Musculoskeletal (WDL) X   RUE KAREN   LUE KAREN   RL Extremity KAREN   LL Extremity KAREN   Genitourinary   Genitourinary (WDL) X  (parnell in place)   Flank Tenderness KAREN   Suprapubic Tenderness KAREN   Dysuria KAREN   Anus/Rectum   Anus/Rectum (WDL) WDL   Urethral Catheter Temperature probe 16 fr   Placement Date/Time: 09/22/20 0601   Urethral Catheter Timeout: Patient;Procedure;Sterile technique  Inserted by: kaushal flores  Catheter Type: Temperature probe  Tube Size (fr): 16 fr  Catheter Balloon Size: 10 mL  Collection Container: Other (Comment)  . .. Catheter Indications Need for fluid management in critically ill patients in a critical care setting not able to be managed by other means such as BSC with hat, bedpan, urinal, condom catheter, or short term intermittent urethral catherization   Site Assessment Pink; No urethral drainage   Urine Color Yellow   Psychosocial   Psychosocial (WDL) X   Patient Behaviors Withdrawn; Not interactive   Needs Expressed Physical   Rest/Sleep for Patient Fair

## 2020-09-26 NOTE — PROGRESS NOTES
Contacted Dr. Chinedu Josue regarding current patient status. Breathing is seemingly labored and diaphragmatic. SPO2 stable in the 90s at this time on 2 LPM. Patient's father was at bedside and commented on patient not being as alert as usual. Requested evaluation from pulmonologist. MD ordered ABG. Will continue to monitor.

## 2020-09-26 NOTE — PLAN OF CARE
Problem: Falls - Risk of:  Goal: Will remain free from falls  Description: Will remain free from falls  9/26/2020 0056 by Rafal Ryan RN  Outcome: Ongoing  Pt remains free of falls. Bed in lowest position with call light and bedside table within reach.       Problem: Falls - Risk of:  Goal: Absence of physical injury  Description: Absence of physical injury  9/26/2020 0056 by Rafal Ryan RN  Outcome: Ongoing     Problem: Skin Integrity:  Goal: Will show no infection signs and symptoms  Description: Will show no infection signs and symptoms  9/26/2020 0056 by Rafal Ryan RN  Outcome: Ongoing     Problem: Skin Integrity:  Goal: Absence of new skin breakdown  Description: Absence of new skin breakdown  9/26/2020 0056 by Rafal Ryan RN  Outcome: Ongoing

## 2020-09-27 NOTE — PROGRESS NOTES
Pulmonary & Critical Care Inpatient Progress Note   Sabiha Ospina MD     REASON FOR TODAY'S VISIT:  Acute resp failure    SUBJECTIVE:   Decreasing oxygen requirements  Persistent tachypnea, making progress with diuresis   Net positive 4.6L    Scheduled Meds:   pantoprazole  40 mg Intravenous Daily    levothyroxine  25 mcg Oral Daily    oxybutynin  2.5 mg Oral Nightly    sodium chloride flush  10 mL Intravenous 2 times per day       Continuous Infusions:   dextrose 100 mL/hr at 09/26/20 2345       PRN Meds:  dextrose, ipratropium-albuterol, bisacodyl, sodium chloride flush, [DISCONTINUED] acetaminophen **OR** acetaminophen, promethazine **OR** ondansetron    ALLERGIES:  Patient is allergic to chocolate; eggs [egg white]; food; orange juice [orange oil]; peanut butter flavor [flavoring agent]; penicillins; strawberry (diagnostic); and tape [adhesive tape]. Objective:   PHYSICAL EXAM:  /64   Pulse 93   Temp 98.3 °F (36.8 °C) (Axillary)   Resp 20   Ht 4' 8\" (1.422 m)   Wt 225 lb 12 oz (102.4 kg)   SpO2 94%   BMI 50.61 kg/m²    Physical Exam  Constitutional:       General: He is not in acute distress. Appearance: He is well-developed. He is not diaphoretic. HENT:      Head: Normocephalic and atraumatic. Mouth/Throat:      Pharynx: No oropharyngeal exudate. Eyes:      Pupils: Pupils are equal, round, and reactive to light. Neck:      Musculoskeletal: Neck supple. Vascular: No JVD. Cardiovascular:      Heart sounds: Normal heart sounds. No murmur. No friction rub. No gallop. Pulmonary:      Effort: Pulmonary effort is normal.      Breath sounds: No wheezing or rales. Abdominal:      General: Bowel sounds are normal. There is no distension. Palpations: Abdomen is soft. Tenderness: There is no abdominal tenderness. Musculoskeletal: Normal range of motion. Lymphadenopathy:      Cervical: No cervical adenopathy. Skin:     General: Skin is warm and dry. No

## 2020-09-27 NOTE — FLOWSHEET NOTE
This note also relates to the following rows which could not be included:  Pulse - Cannot attach notes to unvalidated device data       09/26/20 2100   Assessment   Charting Type Shift assessment   Neurological   Neuro (WDL) X   Level of Consciousness 0   Orientation Level KAREN  (pt is nonverbal/baseline)   Cognition KAREN   Language KAREN  (non verbal baseline)   Size R Pupil (mm) 3   R Pupil Shape Round   R Pupil Reaction Brisk   Size L Pupil (mm) 3   L Pupil Shape Round   L Pupil Reaction Brisk   R Hand  KAREN   L Hand  KAREN   R Foot Dorsiflexion KAREN   L Foot Dorsiflexion KAREN   R Foot Plantar Flexion KAREN   L Foot Plantar Flexion KAREN   RUE Motor Response Movement to painful stimulus   LUE Motor Response Movement to painful stimulus   RLE Motor Response Movement to painful stimulus; Non-purposeful movement   LLE Motor Response Movement to painful stimulus; Non-purposeful movement   Gag Present   Jose Cruz Coma Scale   Eye Opening 1   Best Verbal Response 1   Best Motor Response 5   Jose Cruz Coma Scale Score 7   HEENT   HEENT (WDL) X   Right Eye Intact   Left Eye Intact   Nose Intact  (NGT present)   Voice Other (Comment)  (nonverbal at baseline)   Mucous Membrane Pink;Dry   Teeth Missing teeth   Respiratory   Respiratory (WDL) X   Respiratory Pattern Regular; Tachypneic   Respiratory Depth Deep   Respiratory Quality/Effort Diaphragmatic;Labored; Accessory muscle use   Chest Assessment Chest expansion symmetrical;Trachea midline   L Breath Sounds Diminished   R Breath Sounds Diminished   Breath Sounds   Right Upper Lobe Diminished   Right Middle Lobe Diminished   Right Lower Lobe Diminished   Left Upper Lobe Diminished   Left Lower Lobe Diminished   Cough/Sputum   Sputum How Obtained None   Cardiac   Cardiac (WDL) X   Cardiac Regularity Regular   Cardiac Rhythm NSR   Ectopy PVC   Ectopy Frequency Occasional   Cardiac Monitor   Bedside Cardiac Monitor On Yes   Bedside Cardiac Audible Yes   Bedside Cardiac Alarms Set Yes Bedside Monitor Alarm Parameters    Telemetry Monitor On Yes   Telemetry Audible Yes   Telemetry Alarms Set Yes   Telemetry Box Number 25   Gastrointestinal   Abdominal (WDL) X   Abdomen Inspection Rotund;Distended; Taut   Tenderness No guarding;Rigid   RUQ Bowel Sounds Hypoactive   LUQ Bowel Sounds Hypoactive   RLQ Bowel Sounds Hypoactive   LLQ Bowel Sounds Hypoactive   Peripheral Vascular   Peripheral Vascular (WDL) X   Edema Generalized   Edema Generalized +1   Sensation RUE KAREN   Sensation LUE KAREN   Sensation RLE KAREN   Sensation LLE KAREN   Skin Color/Condition   Skin Color/Condition (WDL) X   Skin Color Pale   Skin Condition/Temp Cool   Skin Integrity   Skin Integrity (WDL) X   Skin Integrity Redness   Location coccyx    Skin Integrity Site 2   Skin Integrity Location 2 Excoriation   Location 2 bilat groin    Musculoskeletal   Musculoskeletal (WDL) X   RUE KAREN   LUE KAREN   RL Extremity KAREN   LL Extremity KAREN   Genitourinary   Genitourinary (WDL) X  (parnell in place)   Flank Tenderness KAREN   Suprapubic Tenderness KAREN   Dysuria KAREN   Anus/Rectum   Anus/Rectum (WDL) WDL   Urethral Catheter Temperature probe 16 fr   Placement Date/Time: 09/22/20 0601   Urethral Catheter Timeout: Patient;Procedure;Sterile technique  Inserted by: kaushal flores  Catheter Type: Temperature probe  Tube Size (fr): 16 fr  Catheter Balloon Size: 10 mL  Collection Container: Other (Comment)  . .. Catheter Indications Need for fluid management in critically ill patients in a critical care setting not able to be managed by other means such as BSC with hat, bedpan, urinal, condom catheter, or short term intermittent urethral catherization   Site Assessment Pink; No urethral drainage   Urine Color Yellow   Urine Appearance Cloudy   Psychosocial   Psychosocial (WDL) X   Patient Behaviors Withdrawn; Not interactive   Needs Expressed Physical   Rest/Sleep for Patient Fair

## 2020-09-27 NOTE — PROGRESS NOTES
PROGRESS NOTE  S:55 yrs Patient  admitted on 9/22/2020 with Encephalopathy [G93.40]  Septic shock ([facility] [address] [A41.9, R65.21] . Patient laying in bed, deep respirations. Minimal NG output. Xray shows effusions    Current Hospital Schedued Meds   pantoprazole  40 mg Intravenous Daily    levothyroxine  25 mcg Oral Daily    oxybutynin  2.5 mg Oral Nightly    sodium chloride flush  10 mL Intravenous 2 times per day     Current Hospital IV Meds   dextrose 100 mL/hr at 09/26/20 2345     Current Hospital PRN Meds  dextrose, ipratropium-albuterol, bisacodyl, sodium chloride flush, [DISCONTINUED] acetaminophen **OR** acetaminophen, promethazine **OR** ondansetron    Exam:   Vitals:    09/27/20 1003   BP: 112/64   Pulse: 93   Resp: 20   Temp: 98.3 °F (36.8 °C)   SpO2: 94%     I/O last 3 completed shifts: In: 772 [I.V.:772]  Out: 1500 [Urine:1500]   General appearance: alert, appears stated age and cooperative  HEENT: PERRLA  Neck: no adenopathy, no carotid bruit, no JVD, supple, symmetrical, trachea midline and thyroid not enlarged, symmetric, no tenderness/mass/nodules  Lungs: clear to auscultation bilaterally  Heart: regular rate and rhythm, S1, S2 normal, no murmur, click, rub or gallop  Abdomen: soft, non-tender; bowel sounds normal; no masses,  no organomegaly  Extremities: extremities normal, atraumatic, no cyanosis or edema     Labs:  CBC:   Recent Labs     09/25/20  0545 09/26/20  0510 09/27/20  0515   WBC 11.3* 8.2 11.4*   HGB 9.1* 9.1* 8.7*   HCT 27.4* 26.9* 25.8*   MCV 95.0 95.6 94.8   PLT 74* 81* 95*     BMP:   Recent Labs     09/25/20  0545 09/26/20  0510 09/27/20  0515    147* 144   K 4.7  4.7 3.7  3.7 4.0  4.0   * 115* 109   CO2 20* 22 26   PHOS 3.3 3.4 3.6   BUN 45* 42* 36*   CREATININE 1.5* 1.5* 1.5*     LIVER PROFILE: No results for input(s): AST, ALT, LIPASE, PROT, BILIDIR, BILITOT, ALKPHOS in the last 72 hours. Invalid input(s):   AMYLASE, ALB  PT/INR: No results for input(s): INR in the last 72 hours. Invalid input(s): PT    IMAGING:  Xr Chest Portable    Result Date: 9/27/2020  EXAMINATION: ONE XRAY VIEW OF THE CHEST 9/27/2020 6:06 am COMPARISON: Yesterday HISTORY: ORDERING SYSTEM PROVIDED HISTORY: left pleural effusion TECHNOLOGIST PROVIDED HISTORY: Reason for exam:->left pleural effusion Reason for Exam: left pleural effusion Type of Exam: Subsequent/Follow-up FINDINGS: There is a left thoracic lumbar support maximus with sub laminar hooks. Scoliotic curvature of the thoracic spine with dextro is noted. Nasogastric tube has a normal course, extending at least to the mid stomach. PICC extends to the superior vena cava. The cardiac silhouette is stable. There is hazy opacity in the lung bases. There is improved aeration of the left upper lung. Bibasilar opacities, likely combination of atelectasis/airspace disease with layered pleural effusions, larger on the left. There improvement in aeration of the left lung in the interval, which may be related to differences in positioning. Xr Chest Portable    Result Date: 9/26/2020  EXAMINATION: ONE XRAY VIEW OF THE CHEST 9/26/2020 6:01 am COMPARISON: September 25, 2020 HISTORY: ORDERING SYSTEM PROVIDED HISTORY: pulm edema TECHNOLOGIST PROVIDED HISTORY: Reason for exam:->pulm edema Reason for Exam: pulm edema Acuity: Acute Type of Exam: Initial FINDINGS: Rotated positioning limits evaluation. Thoracic scoliosis maximus. Enteric tube extends beyond the gastroesophageal junction. Right PICC with the tip near the mid SVC. Small right lung base opacity. Increased opacification throughout the left hemithorax. Cardiomegaly. No definite pulmonary edema. Increased opacification within the left lung may represent a layering pleural effusion.         Hospital Problems           Last Modified POA    Acute encephalopathy 9/22/2020 Yes    Acute respiratory failure with hypercapnia ([address] 9/22/2020 Yes Pulmonary infiltrates 9/22/2020 Yes    Acute pancreatitis without infection or necrosis 9/22/2020 Yes    LINDA (acute kidney injury) ([facility] [address] 9/22/2020 Yes    Hyperkalemia 9/22/2020 Yes    Hypothyroidism 9/22/2020 Yes    Shock ([address] 9/22/2020 Yes    Hypothermia 9/22/2020 Yes    Class 2 obesity in adult 9/22/2020 Yes    Suspected sleep apnea 9/22/2020 Yes    Hypernatremia 9/24/2020 Yes    Thrombocytopenia ([facility] [address] 9/24/2020 Yes         Impression:  59 yo male with CHD, COPD, HTN, MRDD with pancreatitis without obvious etiology    Recommendation:  1. IgG4 pending to evaluate for autoimmune pancreatitis. Received IV and oral synthroid for hypothyroidism. 2. Acute respiratory failure with pulmonary edema. On lasix  3. Will follow.   Dr. Niyah Hubbard to evaluate tomorrow      Nichelle Matos DO  1:23 PM 9/27/2020            [facility]    [address]      [address] [place] [address]     Phone: [phone]     Fax: [phone]

## 2020-09-27 NOTE — PROGRESS NOTES
Hospitalist Progress Note      PCP: No primary care provider on file. Date of Admission: 9/22/2020    Chief Complaint:  [address] Course:   HPI :   60 y.o. male with history of CHF, COPD, GERD,  MRDD who presented to  [facility] ER from group home with altered mental status. History obtained per chart as patient unable to provide any history due to AMS. Pt minimally verbal at baseline. Patient reportedly had reduced responsiveness at the ECF overnight and was sent to the ER. He was noted to be hypothermic in the 80s and hypotensive in the 60s. Initial concern for possible CVA - stroke team consulted. CT head was nonacute with tiny area of low attenuation near the anterior right  internal capsule which was thought to be likely subacute/chronic. CTA head/neck was unremarkable. PICC line placed in ER. Labs notable for severe hyperkalemia with potassium of 7.1 creatinine 1.5, TSH of 7.4. Lipase was  elevated at 1471. Temperature improved to 90's with karuna hugger and warm IVF. There was concern for myxedema coma and was started on IV Synthroid. He was given a dose of IV hydrocortisone, started on broad-spectrum antibiotics and  pressors. Pt was transferred to [facility] for further care. Subjective:        Pt non verbal at baseline. Pt somnolent, opened his eyes to voice today. Pt on 2L 02. Called pt's father over the phone and updated him on pt's care.        Medications:  Reviewed    Infusion Medications    dextrose 100 mL/hr at 09/26/20 3606     Scheduled Medications    pantoprazole  40 mg Intravenous Daily    levothyroxine  25 mcg Oral Daily    oxybutynin  2.5 mg Oral Nightly    sodium chloride flush  10 mL Intravenous 2 times per day     PRN Meds: dextrose, ipratropium-albuterol, bisacodyl, sodium chloride flush, [DISCONTINUED] acetaminophen **OR** acetaminophen, promethazine **OR** ondansetron      Intake/Output Summary (Last 24 hours) at 9/27/2020 0813  Last data filed at 9/27/2020 6991  Gross per 24 hour   Intake 772 ml   Output 1500 ml   Net -728 ml       Physical Exam Performed:    /72   Pulse 91   Temp 97.8 °F (36.6 °C) (Axillary)   Resp 22   Ht 4' 8\" (1.422 m)   Wt 225 lb 12 oz (102.4 kg)   SpO2 96%   BMI 50.61 kg/m²   General appearance: lethargic and somnolent but arousable,  on BIPAP  in no acute resp distress, appears stated age   HEENT:  Normal cephalic, atraumatic without obvious deformity. Conjunctivae/corneas clear. Neck: Supple, with full range of motion. No jugular venous distention. Trachea midline. Respiratory:  Normal respiratory effort. Bibasilar crackles   Cardiovascular:  Regular rate and rhythm with normal S1/S2 without murmurs, rubs or gallops. Abdomen: Soft, distended,  non-tender,  with normal bowel sounds. Musculoskeletal:  No clubbing, cyanosis or edema bilaterally. Skin:  Warm and dry . Neurologic:  Limited due to lethargy. Following some commands, moving his extremities spontaneously    Psychiatric: somnolent          Labs:   Recent Labs     09/25/20  0545 09/26/20  0510 09/27/20  0515   WBC 11.3* 8.2 11.4*   HGB 9.1* 9.1* 8.7*   HCT 27.4* 26.9* 25.8*   PLT 74* 81* 95*     Recent Labs     09/25/20  0545 09/26/20  0510 09/27/20  0515    147* 144   K 4.7  4.7 3.7  3.7 4.0  4.0   * 115* 109   CO2 20* 22 26   BUN 45* 42* 36*   CREATININE 1.5* 1.5* 1.5*   CALCIUM 8.0* 8.5 8.2*   PHOS 3.3 3.4 3.6     No results for input(s): AST, ALT, BILIDIR, BILITOT, ALKPHOS in the last 72 hours. No results for input(s): INR in the last 72 hours. No results for input(s): Sotero Clarington in the last 72 hours.     Urinalysis:      Lab Results   Component Value Date    NITRU Negative 09/26/2020    WBCUA 6-9 09/26/2020    BACTERIA 2+ 09/26/2020    RBCUA 3-4 09/26/2020    BLOODU TRACE-INTACT 09/26/2020    SPECGRAV 1.015 09/26/2020    GLUCOSEU Negative 09/26/2020       Radiology:  XR CHEST PORTABLE   Final Result   Bibasilar opacities, likely combination of atelectasis/airspace disease with   layered pleural effusions, larger on the left. There improvement in aeration   of the left lung in the interval, which may be related to differences in   positioning. XR CHEST PORTABLE   Final Result   Increased opacification within the left lung may represent a layering pleural   effusion. XR CHEST PORTABLE   Final Result   New small bilateral pleural effusions and bilateral airspace disease which   may be related to edema and/or pneumonia. XR ABDOMEN FOR NG/OG/NE TUBE PLACEMENT   Final Result   Gastric tube in mid stomach         US RENAL COMPLETE   Final Result   1. Unremarkable sonographic appearance of the kidneys. 2. Collapsed urinary bladder, limiting its evaluation. XR ABDOMEN (KUB) (SINGLE AP VIEW)   Final Result   Unchanged moderate amount of gaseous distention of the stomach. No other   findings to explain abdominal distension. Of note however these images were   obtained supine and early free air would not be detected. Assessment/Plan:    Active Hospital Problems    Diagnosis    Hypernatremia [E87.0]    Thrombocytopenia ([address] [D69.6]    Acute encephalopathy [G93.40]    Acute respiratory failure with hypercapnia (HCC) [J96.02]    Pulmonary infiltrates [R91.8]    Acute pancreatitis without infection or necrosis [K85.90]    LINDA (acute kidney injury) ([address] [N17.9]    Hyperkalemia [E87.5]    Hypothyroidism [E03.9]    Shock ([address] [R57.9]    Hypothermia [H07. XXXA]    Class 2 obesity in adult [E66.9]    Suspected sleep apnea [R29.818]       Acute metabolic encephalopathy: Likely multifactorial due to septic shock, hyperkalemia, hypothermia, ? Myxedema coma. Non verbal at baseline per report. CT head was nonacute. Stroke work-up negative in ER. Neuro recs appreciated.  Supportive care for acute morbidities as stated below        Septic shock: met SIRS criteria with tachycardia, tachypnea, hypothermia on Madie Shukla MD

## 2020-09-27 NOTE — PROGRESS NOTES
Injury:  KDIGO stage 2  - Data:  UA bland. Normal renal u/s  - Etiology:  ATN  - Clinical:  Plateau phase, getting intermittent lasix      Hyperkalemia:  Due to acute kidney injury, Resolved      Altered Mental Status:  Multifactorial.  Neurology following, favor metabolic process. Metabolic status is improving but seems he is slow to respond      Hypernatremia:  Acute onset. Free water deficit, now on hypotonic IV fluids with slow improvement.      Heart Failure:  History of diastolic heart failure.   EF normal    Plan/     Recheck UA and urine indices  Follow labs  D5W, this will not contribute to ECF overload  Intermittent lasix dosing ok from nephrology standpoint, will not impact natural history of ATN recovery unless he get volume depleted which is currently not the case     -----------------------------  Morgan Hui M.D.   [facility]

## 2020-09-27 NOTE — PROGRESS NOTES
4 Eyes Skin Assessment     The patient is being assess for  Shift Handoff    I agree that 2 RN's have performed a thorough Head to Toe Skin Assessment on the patient. ALL assessment sites listed below have been assessed. Areas assessed by both nurses:   [x]   Head, Face, and Ears   [x]   Shoulders, Back, and Chest  [x]   Arms, Elbows, and Hands   [x]   Coccyx, Sacrum, and IschIum  [x]   Legs, Feet, and Heels        Does the Patient have Skin Breakdown?   No         Omid Prevention initiated:  yes   Wound Care Orders initiated:  No      [facility] nurse consulted for Pressure Injury (Stage 3,4, Unstageable, DTI, NWPT, and Complex wounds), New and Established Ostomies:  No      Nurse 1 eSignature: Electronically signed by Shoshana Daly RN on 9/26/20 at 10:56 PM EDT    **SHARE this note so that the co-signing nurse is able to place an eSignature**    Nurse 2 eSignature: {Esignature:[id number]}

## 2020-09-28 NOTE — PROGRESS NOTES
Hospitalist Progress Note      PCP: No primary care provider on file. Date of Admission: 9/22/2020    Chief Complaint:  [address] Course:   HPI :   60 y.o. male with history of CHF, COPD, GERD,  MRDD who presented to  [facility] ER from group home with altered mental status. History obtained per chart as patient unable to provide any history due to AMS. Pt minimally verbal at baseline. Patient reportedly had reduced responsiveness at the ECF overnight and was sent to the ER. He was noted to be hypothermic in the 80s and hypotensive in the 60s. Initial concern for possible CVA - stroke team consulted. CT head was nonacute with tiny area of low attenuation near the anterior right  internal capsule which was thought to be likely subacute/chronic. CTA head/neck was unremarkable. PICC line placed in ER. Labs notable for severe hyperkalemia with potassium of 7.1 creatinine 1.5, TSH of 7.4. Lipase was  elevated at 1471. Temperature improved to 90's with karuna hugger and warm IVF. There was concern for myxedema coma and was started on IV Synthroid. He was given a dose of IV hydrocortisone, started on broad-spectrum antibiotics and  pressors. Pt was transferred to [facility] for further care. Subjective:      Pt non verbal at baseline. Pt somnolent, opened his eyes to voice today. Pt on 2L 02. Called pt's father over the phone and updated him on pt's care.        Medications:  Reviewed    Infusion Medications    dextrose 50 mL/hr at 09/28/20 1453     Scheduled Medications    pantoprazole  40 mg Intravenous Daily    levothyroxine  25 mcg Oral Daily    oxybutynin  2.5 mg Oral Nightly    sodium chloride flush  10 mL Intravenous 2 times per day     PRN Meds: dextrose, ipratropium-albuterol, bisacodyl, sodium chloride flush, [DISCONTINUED] acetaminophen **OR** acetaminophen, promethazine **OR** ondansetron      Intake/Output Summary (Last 24 hours) at 9/28/2020 1105  Last data filed at 9/28/2020 0509  Gross per acute morbidities as stated below        Septic shock: met SIRS criteria with tachycardia, tachypnea, hypothermia on arrival in ER. Initial concern for pneumonia as cause. Pneumonia ruled out per pulm. Empiric abx d/rich. Off pressors. BP stable. D/rich steroids. Continue to hold home BP meds    Clinically resolved. Acute on chronic resp failure:likely due to acute pulm edema and bibasilar atelectasis/ hypoventilation. Received IV lasix. CXR today reviewed. Continue diuresis as needed per pulm. Continue 02 and wean as tolerated. Clinically improving.     Hypothyroidism: TSH elevated at 7.4, FT3, FT4  normal. Given decreased responsiveness from baseline and hypothermia concern for possible myxedema coma. recieved IV synthroid - started on PO on 9/23. repeat TFTs in 4-6 weeks      Hypothermia: Likely due to sepsis, hypothyroidism. Improved with karuna hugger . Temp normalized         Hyperkalemia: likely due to LINDA ,  potassium supplements he is on chronically and lisinopril. Treated with improvement. Held K supplements and lisinopril. Resolved. Nephro assisting     LINDA: likely due to vol depletion, compounded by use of ACEi and lasix. Culprit meds held. Nephro assisting. Cr unchanged. Monitor Cr and avoid nephrotoxins. Likely due to ATN, clinically improving, nephrology input noted, monitor.       Acute pancreatitis: lipase was elevated with signs of early pancreatitis on CT. Unclear etiology. GI assisting. Continue NG tube to LIWS, NPO         GERD : Continue PPI          Acute on Chronic diastolic CHF: EF 55 to 02% with G1DD on echo in 9/3/2019. Received IVF for sepsis, hypernatremia. Pulm edema on CXR. IV lasix given. Monitor vol status closely         Hypernatremia: improving with IVF. nephro managing . Hypoglycemia: likely due to NPO status. BG's better since getting dextrose in IVF. Monitor with hypoglycemia protocol in place. Thrombocytopenia: cause unclear. No overt bleeding. Heparin d/rihc.  HIT ab neg.  Monitor      DVT Prophylaxis: SCDs  Diet: Diet NPO Effective Now Exceptions are: Sips with Meds  Code Status: Full Code    PT/OT Eval Status: not indicated at this time     Dispo -  Hard to say,  pending clinical course     Twan Villarreal MD

## 2020-09-28 NOTE — CARE COORDINATION
Chart reviewed day #6. Patient is long term at [facility] and has been there for over 10 years. Placed call to [facility] with [facility] and updated her on patient status. Informed her that patient will be working with therapy again today and the plan is to discharge patient back skilled. [facility] states that since he had a negative COVID on the 22nd, they will just need a rapid COVID on day of discharge.

## 2020-09-28 NOTE — PROGRESS NOTES
ABG SENT TO LAB VIA TUBE SYSTEM       09/28/20 4492   Oxygen Therapy/Pulse Ox   O2 Therapy Oxygen   O2 Device PAP (positive airway pressure)   FiO2  45 %   SpO2 99 %   Blood Gas  Performed? Yes   $ABG $ABG   Chaitanya's Test #1 Pos   Site #1 Right Radial   Site Prepped #1 Yes   Number of Attempts #1 1   Pressure Held #1 Yes   Complications #1 None   Post-procedure #1 Standard   Specimen Status #1 To lab   How Tolerated?  Tolerated well

## 2020-09-28 NOTE — PLAN OF CARE
Problem: Nutrition  Intervention: Swallowing evaluation  Note: Bedside swallow evaluation completed this date. Najma Au M.S. [id number] [facility]  Speech-language pathologist  [id number]         Problem: Nutrition  Intervention: Aspiration precautions  Note: Bedside swallow evaluation completed this date.     Najma Au M.S. [id number] [facility]  Speech-language pathologist  [id number]

## 2020-09-28 NOTE — FLOWSHEET NOTE
09/27/20 1955   Assessment   Charting Type Shift assessment   Neurological   Neuro (WDL) X   Level of Consciousness 1   Orientation Level KAREN   [place] Coma Scale   Eye Opening 3   Best Verbal Response 2   Best Motor Response 5   [place] Coma Scale Score 10   HEENT   HEENT (WDL) X   Right Eye Impaired vision   Left Eye Impaired vision   Teeth Missing teeth   Respiratory   Respiratory (WDL) X   Respiratory Pattern Regular   Respiratory Depth Normal   Respiratory Quality/Effort Diaphragmatic   Chest Assessment Chest expansion symmetrical;Trachea midline   L Breath Sounds Diminished; Expiratory Wheezes   R Breath Sounds Diminished; Expiratory Wheezes   Breath Sounds   Right Upper Lobe Diminished   Right Middle Lobe Diminished   Right Lower Lobe Diminished   Left Upper Lobe Diminished   Left Lower Lobe Diminished   Cardiac   Cardiac (WDL) WDL   Cardiac Regularity Regular   Cardiac Rhythm NSR   Rhythm Interpretation   Pulse 83   Cardiac Monitor   Telemetry Monitor On Yes   Telemetry Audible Yes   Telemetry Alarms Set Yes   Telemetry Box Number 25   Gastrointestinal   Abdominal (WDL) X   Abdomen Inspection Distended; [place]aut   RUQ Bowel Sounds Hypoactive   LUQ Bowel Sounds Hypoactive   RLQ Bowel Sounds Hypoactive   LLQ Bowel Sounds Hypoactive   Peripheral Vascular   Peripheral Vascular (WDL) X   Edema Right upper extremity; Left upper extremity   RUE Edema +2;Non-pitting   LUE Edema +2;Non-pitting   Skin Color/Condition   Skin Color/Condition (WDL) X   Skin Color Pale   Skin Condition/Temp Cool   Skin Integrity   Skin Integrity (WDL) WDL   Musculoskeletal   Musculoskeletal (WDL) X   RUE KAREN   LUE KAREN   RL Extremity KAREN   LL Extremity KAREN   Genitourinary   Genitourinary (WDL) X  (parnell)   Anus/Rectum   Anus/Rectum (WDL) WDL   Urethral Catheter Temperature probe 16 fr   Placement Date/Time: 09/22/20 0601   Urethral Catheter Timeout: Patient;Procedure;Sterile technique  Inserted by: kaushal flores  Catheter Type: Temperature probe Tube Size (fr): 16 fr  Catheter Balloon Size: 10 mL  Collection Container: Other (Comment)  . ..    Catheter Indications Need for fluid management in critically ill patients in a critical care setting not able to be managed by other means such as BSC with hat, bedpan, urinal, condom catheter, or short term intermittent urethral catherization   Site Assessment No urethral drainage   Urine Color Yellow   Psychosocial   Psychosocial (WDL) X   Patient Behaviors Not interactive

## 2020-09-28 NOTE — PROGRESS NOTES
Physical Therapy/Occupational Therapy  Attempted to see pt at this time for PT/OT session, pt currently supine in bed with father present in room. RN and pts father reporting pt has been very lethargic and minimally responsive this day. Pt does not respond or open eyes despite multiple attempts. Will hold PT/OT this date and follow at later date. Thanks.   Ole Hawk PT, DPT  Jason POWERS/ABHI

## 2020-09-28 NOTE — PLAN OF CARE
Problem: Nutrition  Goal: Optimal nutrition therapy  9/28/2020 0844 by Matilda Alvarado RN  Outcome: Ongoing     Problem: Skin Integrity:  Goal: Will show no infection signs and symptoms  Description: Will show no infection signs and symptoms  Outcome: Ongoing  Goal: Absence of new skin breakdown  Description: Absence of new skin breakdown  Outcome: Ongoing     Problem: Falls - Risk of:  Goal: Will remain free from falls  Description: Will remain free from falls  Outcome: Ongoing  Goal: Absence of physical injury  Description: Absence of physical injury  Outcome: Ongoing  Bed locked and in lowest position. Bed alarm on. Non-skid socks in place.

## 2020-09-28 NOTE — PROGRESS NOTES
GI Progress Note      SUBJECTIVE:  One BM was recorded 2 days ago. Minimal NG output noted. No reports of emesis.     OBJECTIVE      Medications    Current Facility-Administered Medications: dextrose 5 % solution, , Intravenous, Continuous  dextrose 50 % IV solution, 12.5 g, Intravenous, PRN  ipratropium-albuterol (DUONEB) nebulizer solution 1 ampule, 1 ampule, Inhalation, Q6H PRN  pantoprazole (PROTONIX) injection 40 mg, 40 mg, Intravenous, Daily  levothyroxine (SYNTHROID) tablet 25 mcg, 25 mcg, Oral, Daily  bisacodyl (DULCOLAX) suppository 10 mg, 10 mg, Rectal, Daily PRN  oxybutynin (DITROPAN) tablet 2.5 mg, 2.5 mg, Oral, Nightly  sodium chloride flush 0.9 % injection 10 mL, 10 mL, Intravenous, 2 times per day  sodium chloride flush 0.9 % injection 10 mL, 10 mL, Intravenous, PRN  [DISCONTINUED] acetaminophen (TYLENOL) tablet 650 mg, 650 mg, Oral, Q6H PRN **OR** acetaminophen (TYLENOL) suppository 650 mg, 650 mg, Rectal, Q6H PRN  promethazine (PHENERGAN) tablet 12.5 mg, 12.5 mg, Oral, Q6H PRN **OR** ondansetron (ZOFRAN) injection 4 mg, 4 mg, Intravenous, Q6H PRN  Physical    VITALS:  BP (!) 90/59   Pulse 83   Temp 97.4 °F (36.3 °C) (Axillary)   Resp 16   Ht 4' 8\" (1.422 m)   Wt 218 lb 0.6 oz (98.9 kg)   SpO2 92%   BMI 48.88 kg/m²   ABD: soft with mild epigastric tympany and moderate distention, NT, NABs  Data    CBC with Differential:    Lab Results   Component Value Date    WBC 11.7 09/28/2020    RBC 2.64 09/28/2020    HGB 8.3 09/28/2020    HCT 25.4 09/28/2020    PLT 82 09/28/2020    MCV 96.1 09/28/2020    MCH 31.5 09/28/2020    MCHC 32.8 09/28/2020    RDW 16.9 09/28/2020    BANDSPCT 19 09/26/2020    LYMPHOPCT 4.2 09/28/2020    MONOPCT 5.7 09/28/2020    BASOPCT 0.2 09/28/2020    MONOSABS 0.7 09/28/2020    LYMPHSABS 0.5 09/28/2020    EOSABS 0.1 09/28/2020    BASOSABS 0.0 09/28/2020     CMP:    Lab Results   Component Value Date     09/28/2020    K 3.7 09/28/2020    K 3.7 09/28/2020     09/28/2020

## 2020-09-28 NOTE — PROGRESS NOTES
INPATIENT PULMONARY CRITICAL CARE PROGRESS NOTE      Reason for visit: Acute on chronic respiratory failure (hypoxemic), acute encephalopathy, acute pulmonary edema, bibasilar atelectasis, septic shock, LINDA, acute pancreatitis    SUBJECTIVE: The patient is nonverbal, afebrile and hemodynamically stable. He did receive Lasix 40 mg on 9/27, I/os still +5310 mL. As per nursing, he had increased respiratory effort yesterday. Reportedly he is close to his baseline. Unable to converse with the patient. He does appear to have slightly increased inspiratory effort, but he does not appear to be in distress. He opens his eyes when his name is called, but otherwise is noncommunicative. He has an NG in place. Physical Exam:  Blood pressure 100/65, pulse 76, temperature 97.6 °F (36.4 °C), temperature source Axillary, resp. rate 16, height 4' 8\" (1.422 m), weight 218 lb 0.6 oz (98.9 kg), SpO2 95 %.'   Constitutional:  No acute distress. Sleepy/lethargic, nonverbal  HENT: NG, oropharynx not examined as he does not open his mouth  Eyes:  Conjunctivae are normal. Pupils equal, round, and reactive to light. No scleral icterus. Neck: Short and large neck, no JVD. No tracheal deviation present. No obvious thyroid mass. Cardiovascular: Normal rate, regular rhythm, distant heart sounds. No right ventricular heave. No lower extremity edema. Pulmonary/Chest: No wheezes. No rales. No accessory muscle usage or stridor. Abdominal: Soft, protuberant, fatty abdominal wall. Bowel sounds present. No distension or tenderness. Musculoskeletal: No cyanosis. No clubbing. No obvious joint deformity except for short limbs. Lymphadenopathy: No cervical or supraclavicular adenopathy. Skin: Skin is warm and dry. No rash or nodules on the exposed extremities. Psychiatric: Could not be assessed.    Neurologic: Cannot be assessed        Results:  CBC:   Recent Labs     09/26/20  0510 09/27/20  0515 09/28/20  0445   WBC 8.2 11.4* 11.7*   HGB 9.1* 8.7* 8.3*   HCT 26.9* 25.8* 25.4*   MCV 95.6 94.8 96.1   PLT 81* 95* 82*     BMP:   Recent Labs     09/26/20  0510 09/27/20  0515 09/28/20  0445   * 144 139   K 3.7  3.7 4.0  4.0 3.7  3.7   * 109 102   CO2 22 26 29   PHOS 3.4 3.6 3.4   BUN 42* 36* 27*   CREATININE 1.5* 1.5* 1.2     UA:  Recent Labs     09/26/20  1850   COLORU Straw   PHUR 5.5   WBCUA 6-9*   RBCUA 3-4   MUCUS 1+*   BACTERIA 2+*   CLARITYU Clear   SPECGRAV 1.015   LEUKOCYTESUR Negative   UROBILINOGEN 0.2   BILIRUBINUR Negative   BLOODU TRACE-INTACT*   GLUCOSEU Negative   AMORPHOUS 1+       Imaging:  I have reviewed radiology images personally. XR ABDOMEN (KUB) (SINGLE AP VIEW)   Final Result   Unremarkable bowel gas pattern. NG tube extends well into the stomach. XR CHEST PORTABLE   Final Result   Bibasilar opacities, likely combination of atelectasis/airspace disease with   layered pleural effusions, larger on the left. There improvement in aeration   of the left lung in the interval, which may be related to differences in   positioning. XR CHEST PORTABLE   Final Result   Increased opacification within the left lung may represent a layering pleural   effusion. XR CHEST PORTABLE   Final Result   New small bilateral pleural effusions and bilateral airspace disease which   may be related to edema and/or pneumonia. XR ABDOMEN FOR NG/OG/NE TUBE PLACEMENT   Final Result   Gastric tube in mid stomach         US RENAL COMPLETE   Final Result   1. Unremarkable sonographic appearance of the kidneys. 2. Collapsed urinary bladder, limiting its evaluation. XR ABDOMEN (KUB) (SINGLE AP VIEW)   Final Result   Unchanged moderate amount of gaseous distention of the stomach. No other   findings to explain abdominal distension. Of note however these images were   obtained supine and early free air would not be detected.            Xr Abdomen (kub) (single Ap View)    Result Date: 9/23/2020  EXAMINATION: ONE SUPINE XRAY VIEW(S) OF THE ABDOMEN 9/23/2020 7:39 am COMPARISON: CT abdomen and pelvis September 22, 2020 HISTORY: ORDERING SYSTEM PROVIDED HISTORY: abdominal distention TECHNOLOGIST PROVIDED HISTORY: Reason for exam:->abdominal distention FINDINGS: There is unchanged gaseous distention of the stomach. No evidence of bowel distention elsewhere. No evidence of free air although this is very limited as these images were obtained supine     Unchanged moderate amount of gaseous distention of the stomach. No other findings to explain abdominal distension. Of note however these images were obtained supine and early free air would not be detected. Xr Abdomen (kub) (single Ap View)    Result Date: 9/22/2020  EXAMINATION: ONE SUPINE XRAY VIEW(S) OF THE ABDOMEN 9/22/2020 4:46 am COMPARISON: Chest radiograph earlier same date. HISTORY: ORDERING SYSTEM PROVIDED HISTORY: abdominal distension TECHNOLOGIST PROVIDED HISTORY: Reason for exam:->abdominal distension Reason for Exam: distention Acuity: Acute Type of Exam: Initial There is moderate gaseous distension of the stomach. Only a few gas-filled loops bowel are seen. Paucity of bowel gas rendering bowel gas pattern indeterminate. No focal dilated gas-filled loops of bowel are identified. There postsurgical changes in both hips. No acute osseous abnormality. FINDINGS: Moderate gaseous distension of the stomach. Correlation for gastric outlet obstruction recommended. Indeterminate bowel gas pattern. Ct Head Wo Contrast    Result Date: 9/23/2020  EXAMINATION: CT OF THE HEAD WITHOUT CONTRAST  9/22/2020 6:36 am TECHNIQUE: CT of the head was performed without the administration of intravenous contrast. Dose modulation, iterative reconstruction, and/or weight based adjustment of the mA/kV was utilized to reduce the radiation dose to as low as reasonably achievable. COMPARISON: None.  HISTORY: ORDERING SYSTEM PROVIDED HISTORY: decreased sinus. Presence of the fluid level suggest component of acute paranasal sinus disease. Abnormal lucency identified about several maxillary teeth suggest presence of dental caries. Abnormal lucency identified in the region of the roots of several maxillary teeth most pronounced along the 2nd maxillary incisor where there is cortical disruption (image 4). Findings may be on the basis of periodontal disease. Mastoid air cells are well aerated. SOFT TISSUES/SKULL:  No acute abnormality of the visualized skull or soft tissues. 1. No definite evidence of acute intracranial abnormality. 2. Tiny focus of low attenuation near anterior limb of right internal capsule. Finding may represent tiny focus of ischemic change which could be subacute to chronic in age. Follow-up MRI examination with diffusion imaging may be helpful for more complete evaluation if clinically indicated. 3. Paranasal sinus disease with visualization of small air-fluid/mucus level within the right maxillary sinus. 4. Findings suggestive of presence of dental caries with abnormal lucency surrounding roots of several teeth which raises possibility of associated periodontal disease as described above. Us Renal Complete    Result Date: 9/23/2020  EXAMINATION: RETROPERITONEAL ULTRASOUND OF THE KIDNEYS AND URINARY BLADDER 9/23/2020 COMPARISON: Abdominal CT 09/22/2020 HISTORY: ORDERING SYSTEM PROVIDED HISTORY: alba TECHNOLOGIST PROVIDED HISTORY: Reason for exam:->alba FINDINGS: Kidneys: The right kidney measures 8.1 x 5.4 x 4.7 cm in size and the left kidney measures 9.4 x 5.8 x 5.2 cm in size. Kidneys demonstrate normal cortical echogenicity. No evidence of hydronephrosis or intrarenal stones. Bladder: The urinary bladder is collapsed, limiting its evaluation. 1. Unremarkable sonographic appearance of the kidneys. 2. Collapsed urinary bladder, limiting its evaluation.      Ct Abdomen Pelvis W Iv Contrast Additional Contrast? None    Result Date: 9/22/2020  EXAMINATION: CT OF THE ABDOMEN AND PELVIS WITH CONTRAST 9/22/2020 10:11 am TECHNIQUE: CT of the abdomen and pelvis was performed with the administration of intravenous contrast. Multiplanar reformatted images are provided for review. Dose modulation, iterative reconstruction, and/or weight based adjustment of the mA/kV was utilized to reduce the radiation dose to as low as reasonably achievable. COMPARISON: None. HISTORY: ORDERING SYSTEM PROVIDED HISTORY: pancreatitis, abdominal distension TECHNOLOGIST PROVIDED HISTORY: Reason for exam:->pancreatitis, abdominal distension Additional Contrast?->None Reason for Exam: scan ran on the back end of a CTA head and neck stroke protocol,   75 second delay was more like 95 second delay. after contrast administration Acuity: Acute Type of Exam: Initial Additional signs and symptoms: presented with low body temp FINDINGS: Lower Chest: Bandlike opacities are seen at the lung bases. Bandlike morphology favors subsegmental atelectasis or scar. Small hiatal hernia seen. There is nonspecific thickening at the GE junction Organs: Trace perisplenic fluid is seen Adrenal glands appear normal No hydronephrosis on the right. No hydronephrosis on the left. A few punctate circumscribed hypodense nodule seen in the kidneys, too small to characterize, likely cyst.  There are clips from prior cholecystectomy. There is subtle injection of the fat surrounding pancreas. No discrete drainable fluid collection. Pancreas enhances normally. No evidence of pancreatic necrosis. GI/Bowel: There is mild distention of the stomach. No significant small bowel distention noted. A few colonic diverticula are seen. Descending and sigmoid colon is incompletely distended accentuating its wall thickness Pelvis: No free fluid is seen within the pelvis. Elliott catheter seen within the bladder. .  Bladder is partially contracted.  Nodular soft tissue density seen in the right inguinal canal measuring 1.7 x 1.8 cm. Peritoneum/Retroperitoneum: No aortic aneurysm. No retroperitoneal hematoma. A few small lymph nodes are seen along the Bones/Soft Tissues: Spurring is seen in the spine. Streak artifact is seen from orthopedic hardware in the proximal right and left femur, as well as spinal stabilization rods. Gaseous distention of the stomach. No small bowel obstruction. Injection of fat is seen surrounding the pancreas suggestive of early pancreatitis Small nodular soft tissue density is seen in the right inguinal canal, most commonly reactive lymph node in the absence of a known primary     Xr Chest Portable    Result Date: 9/27/2020  EXAMINATION: ONE XRAY VIEW OF THE CHEST 9/27/2020 6:06 am COMPARISON: Yesterday HISTORY: ORDERING SYSTEM PROVIDED HISTORY: left pleural effusion TECHNOLOGIST PROVIDED HISTORY: Reason for exam:->left pleural effusion Reason for Exam: left pleural effusion Type of Exam: Subsequent/Follow-up FINDINGS: There is a left thoracic lumbar support maximus with sub laminar hooks. Scoliotic curvature of the thoracic spine with dextro is noted. Nasogastric tube has a normal course, extending at least to the mid stomach. PICC extends to the superior vena cava. The cardiac silhouette is stable. There is hazy opacity in the lung bases. There is improved aeration of the left upper lung. Bibasilar opacities, likely combination of atelectasis/airspace disease with layered pleural effusions, larger on the left. There improvement in aeration of the left lung in the interval, which may be related to differences in positioning. Xr Chest Portable    Result Date: 9/26/2020  EXAMINATION: ONE XRAY VIEW OF THE CHEST 9/26/2020 6:01 am COMPARISON: September 25, 2020 HISTORY: ORDERING SYSTEM PROVIDED HISTORY: pulm edema TECHNOLOGIST PROVIDED HISTORY: Reason for exam:->pulm edema Reason for Exam: pulm edema Acuity: Acute Type of Exam: Initial FINDINGS: Rotated positioning limits evaluation. Thoracic scoliosis maximus. Enteric tube extends beyond the gastroesophageal junction. Right PICC with the tip near the mid SVC. Small right lung base opacity. Increased opacification throughout the left hemithorax. Cardiomegaly. No definite pulmonary edema. Increased opacification within the left lung may represent a layering pleural effusion. Xr Chest Portable    Result Date: 9/25/2020  EXAMINATION: ONE XRAY VIEW OF THE CHEST 9/25/2020 3:53 am COMPARISON: Chest radiograph September 22, 2020 and priors. HISTORY: ORDERING SYSTEM PROVIDED HISTORY: respiratory distress TECHNOLOGIST PROVIDED HISTORY: Reason for exam:->respiratory distress FINDINGS: There has been placement nasogastric tube with side hole beyond the GE junction and tip overlying the expected location the distal stomach. There has also been placement right upper extremity PICC with tip overlying the mid SVC. There has been development of small bilateral pleural effusions with mild bilateral hazy interstitial opacities and mild septal thickening. No pneumothorax. Evaluation of the superior mediastinum is limited due to portable technique. Superior mediastinum appears mildly widened but has been seen on multiple prior studies, likely related to vasculature. There are postsurgical changes throughout thoracic spine. No acute osseous abnormality. New small bilateral pleural effusions and bilateral airspace disease which may be related to edema and/or pneumonia. Xr Chest Portable    Result Date: 9/22/2020  EXAMINATION: ONE XRAY VIEW OF THE CHEST 9/22/2020 4:46 am COMPARISON: Chest radiograph November 9, 2009 and priors. HISTORY: ORDERING SYSTEM PROVIDED HISTORY: assess for pneumonia TECHNOLOGIST PROVIDED HISTORY: Reason for exam:->assess for pneumonia Reason for Exam: cold Acuity: Acute Type of Exam: Initial FINDINGS: There is minimal right basilar airspace disease. No pneumothorax or pleural effusion.   Cardiac and mediastinal contours are unchanged. Scoliosis and postsurgical changes are again seen within the thoracic spine. No acute osseous abnormality. Mild right basilar airspace disease, atelectasis versus pneumonia. Ir Picc Wo Sq Port/pump > 5 Years    Result Date: 9/22/2020  EXAMINATION: LIMITED ULTRASOUND OF THE ARM FOR PICC ACCESS, 9/22/2020 TECHNIQUE: The PICC team used ultrasound and VPS guidance to place a PICC line. HISTORY: ORDERING SYSTEM PROVIDED HISTORY: no access TECHNOLOGIST PROVIDED HISTORY: Reason for exam:->no access How many lumens are being requested?->2 What site is the preferred site? ->No preference What side should this line be placed? ->Either Reason for Exam: no access Acuity: Acute Type of Exam: Initial Additional signs and symptoms: 34 cm 2 out single lumen non tunneled power PICC right basilic US/VPS guidance FLUOROSCOPY DOSE AND TYPE OR TIME AND EXPOSURES: No fluoroscopy. 0 images. FINDINGS: Ultrasound images demonstrate patency of the right basilic vein which was used for access for placement of a PICC by the PICC team, without a radiologist present. VPS guidance was used by the PICC team By report, a single lumen PICC, trimmed to 34 cm was placed. Successful placement of PICC line. Xr Abdomen For Ng/og/ne Tube Placement    Result Date: 9/23/2020  EXAMINATION: ONE SUPINE XRAY VIEW(S) OF THE ABDOMEN 9/23/2020 10:41 am COMPARISON: None. HISTORY: ORDERING SYSTEM PROVIDED HISTORY: Confirmation of course of NG/OG/NE tube and location of tip of tube TECHNOLOGIST PROVIDED HISTORY: Reason for exam:->Confirmation of course of NG/OG/NE tube and location of tip of tube Portable? ->Yes FINDINGS: Gastric tube in the mid stomach.      Gastric tube in mid stomach     Cta Head Neck W Contrast    Result Date: 9/22/2020  EXAMINATION: CTA OF THE HEAD AND NECK WITH CONTRAST 9/22/2020 10:11 am TECHNIQUE: CTA of the head and neck was performed with the administration of intravenous contrast. Multiplanar reformatted images are provided for review. MIP images are provided for review. Stenosis of the internal carotid arteries measured using NASCET criteria. Dose modulation, iterative reconstruction, and/or weight based adjustment of the mA/kV was utilized to reduce the radiation dose to as low as reasonably achievable. COMPARISON: None HISTORY: ORDERING SYSTEM PROVIDED HISTORY: assess for stroke TECHNOLOGIST PROVIDED HISTORY: Reason for exam:->assess for stroke Reason for Exam: presented in ED with low body temp. pt from nursing home facility next door to hospital Acuity: Acute Type of Exam: Initial FINDINGS: CTA NECK: AORTIC ARCH/ARCH VESSELS: No dissection or arterial injury. No significant stenosis of the brachiocephalic or subclavian arteries. CAROTID ARTERIES: No dissection, arterial injury, or hemodynamically significant stenosis by NASCET criteria. VERTEBRAL ARTERIES: No dissection, arterial injury, or significant stenosis. SOFT TISSUES: The lung apices are clear. No cervical or superior mediastinal lymphadenopathy. The larynx and pharynx are unremarkable. No acute abnormality of the salivary and thyroid glands. BONES: Thoracic spinal hardware is incompletely imaged but includes a maximus with laminar hook beginning at T3. There is chronic right maxillary sinusitis. CTA HEAD: ANTERIOR CIRCULATION: No significant stenosis of the intracranial internal carotid, anterior cerebral, or middle cerebral arteries. No aneurysm. POSTERIOR CIRCULATION: No significant stenosis of the vertebral, basilar, or posterior cerebral arteries. No aneurysm. OTHER: No dural venous sinus thrombosis on this non-dedicated study. BRAIN: No mass effect or midline shift. No extra-axial fluid collection. The gray-white differentiation is maintained. No large vessel occlusion. Critical results were called by Dr. Lorrie Mercado to Dr. Ramez Osborne On 9/22/2020 at 11:03. Assessment and plan:  Acute encephalopathy. Unclear etiology.   Acute on chronic respiratory failure with hypercapnia. When seen oxygen was outside his nostrils, I have asked nursing to obtain an ABG on room air. Acute pulmonary edema. Being diuresed, received 1 dose of Lasix yesterday. Still positive on I/O, may consider repeat diuresis  Pulmonary infiltrates/atelectasis. Unimpressive on chest x-ray 9/27 septic shock. Resolved  Acute pancreatitis without infection or necrosis. Does not appear to be clinically relevant at this point  LINDA , hyperkalemia. Renal function slightly improved  Anemia. Normochromic, normocytic. Follow profile. Thrombocytopenia. Mild. Stable. HTN, hypothyroidism, GERD. Per IM  Suspected GRICEL. Evaluation as an outpatient  Morbid obesity. Address as an outpatient  PUD prophylaxis. PPI  DVT prophylaxis. Thrombocytopenic    Discussed with nursing. Limited treatment options in my opinion.       Electronically signed by:  Kurt Baumgarten, MD    9/28/2020    11:44 AM.

## 2020-09-28 NOTE — PROGRESS NOTES
09/28/20 1408   NIV Type   $NIV $Daily Charge   Skin Assessment Clean, dry, & intact   Skin Protection for O2 Device Yes   NIV Started/Stopped On   Equipment Type v60   Mode AVAPS   Mask Type Full face mask   Mask Size Small   Settings/Measurements   CPAP/EPAP 10 cmH2O   Vt Ordered 500 mL   Rate Ordered 16   Resp 16   FiO2  45 %   I Time/ I Time % 1 s   Vt Exhaled 529 mL   Minute Volume 8.3 Liters   Mask Leak (lpm) 32 lpm   Comfort Level Good   Using Accessory Muscles No   SpO2 98   Breath Sounds   Right Upper Lobe Diminished   Right Middle Lobe Diminished   Right Lower Lobe Diminished   Left Upper Lobe Diminished   Left Lower Lobe Diminished   Patient Observation   Observations mepilex on nose, verbal order dr Vogel Crerina for  vt 500   Alarm Settings   Alarms On Y   Press Low Alarm 6 cmH2O   High Pressure Alarm 30 cmH2O   Delay Alarm 20 sec(s)   Apnea (secs) 20 secs   Resp Rate Low Alarm 6   High Respiratory Rate 40 br/min   Oxygen Therapy/Pulse Ox   O2 Therapy Oxygen   O2 Device PAP (positive airway pressure)   SpO2 98 %

## 2020-09-28 NOTE — PROGRESS NOTES
Nephrology Progress Note   [email or web address]      This patient is a 60year old male whom we are following for LINDA. Subjective: The patient was seen and examined. Sodium is better this morning. Family History: No family at bedside  ROS: Unable to obtain      Vitals:  /70   Pulse 83   Temp 97.4 °F (36.3 °C) (Axillary)   Resp 16   Ht 4' 8\" (1.422 m)   Wt 218 lb 0.6 oz (98.9 kg)   SpO2 96%   BMI 48.88 kg/m²   I/O last 3 completed shifts: In: 2414.4 [I.V.:2414.4]  Out: 1800 [Urine:1800]  No intake/output data recorded. Physical Exam:  Physical Exam  Vitals signs reviewed. Constitutional:       General: He is not in acute distress. HENT:      Head: Normocephalic and atraumatic. Mouth/Throat:      Mouth: Mucous membranes are moist.   Eyes:      General: No scleral icterus. Cardiovascular:      Rate and Rhythm: Normal rate and regular rhythm. Heart sounds: No friction rub. Pulmonary:      Effort: Pulmonary effort is normal. No respiratory distress. Breath sounds: No wheezing. Abdominal:      General: Bowel sounds are normal. There is no distension. Tenderness: There is no abdominal tenderness. Musculoskeletal:      Right lower leg: Edema present. Left lower leg: Edema present.            Medications:   pantoprazole  40 mg Intravenous Daily    levothyroxine  25 mcg Oral Daily    oxybutynin  2.5 mg Oral Nightly    sodium chloride flush  10 mL Intravenous 2 times per day         Labs:  Recent Labs     09/26/20  0510 09/27/20  0515 09/28/20  0445   WBC 8.2 11.4* 11.7*   HGB 9.1* 8.7* 8.3*   HCT 26.9* 25.8* 25.4*   MCV 95.6 94.8 96.1   PLT 81* 95* 82*     Recent Labs     09/26/20  0510 09/27/20  0515 09/28/20  0445   * 144 139   K 3.7  3.7 4.0  4.0 3.7  3.7   * 109 102   CO2 22 26 29   GLUCOSE 126* 94 140*   PHOS 3.4 3.6 3.4   MG 2.10 2.00 1.90   BUN 42* 36* 27*   CREATININE 1.5* 1.5* 1.2   LABGLOM 48* 48* >60   GFRAA 58* 58* >60 Assessment/Plan:    Acute Kidney Injury:    - Data:  UA bland.   Normal renal u/s  - Etiology:  ATN  - Clinical:  Improving.     Hyperkalemia:    - Due to acute kidney injury, Resolved      Altered Mental Status:    - Multifactorial.  Neurology following, favor metabolic process. Metabolic status is improving but seems he is slow to respond      Hypernatremia:    - Acute onset. Free water deficit, now on hypotonic IV fluids with improvement. - Decrease D5W to 50cc/hr.     Heart Failure:    - History of diastolic heart failure.  EF normal    Please do not hesitate to contact me at [phone] if with questions. Thank you!     Kali Laguerre MD  9/28/2020  [facility]

## 2020-09-28 NOTE — PROGRESS NOTES
Speech Language Pathology  Facility/Department: [facility] C4 PCU   CLINICAL BEDSIDE SWALLOW EVALUATION      Recommended Diet and Intervention  Diet Solids Recommendation: NPO  Liquid Consistency Recommendation: NPO  Recommended Form of Meds: Via alternative means of nutrition      NAME: Katya Thurman  : 1965  MRN: [id number]    ADMISSION DATE: 2020  ADMITTING DIAGNOSIS: has HTN (hypertension); Closed displaced intertrochanteric fracture of left femur ([address] Abnormal echocardiogram; Acute encephalopathy; Acute respiratory failure with hypercapnia ([address] Pulmonary infiltrates; Acute pancreatitis without infection or necrosis; LINDA (acute kidney injury) ([address] Hyperkalemia; Hypothyroidism; Shock ([address] Hypothermia; Class 2 obesity in adult; Suspected sleep apnea; Hypernatremia; and Thrombocytopenia ([address] on their problem list.  ONSET DATE: Pt admitted to [facility] on 20    Recent Chest Xray (20): Impression    Bibasilar opacities, likely combination of atelectasis/airspace disease with    layered pleural effusions, larger on the left.  There improvement in aeration    of the left lung in the interval, which may be related to differences in    positioning.             Date of Eval: 2020  Evaluating Therapist: Ras Jaime    Current Diet level:  Current Diet : NPO  Current Liquid Diet : NPO      Primary Complaint  Patient Complaint: Per MD H&P, \"62 y.o. male with history of CHF, COPD, GERD,  MRDD who presented to  [facility] ER from group home with altered mental status. History obtained per chart as patient unable to provide any history due to AMS. Pt minimally verbal at baseline. Patient reportedly had reduced responsiveness at the ECF overnight and was sent to the ER. He was noted to be hypothermic in the 80s and hypotensive in the 60s. Initial concern for possible CVA - stroke team consulted.   CT head was nonacute with tiny area of low attenuation near the anterior right  internal capsule Plan  Requires SLP Intervention: Yes  Duration/Frequency of Treatment: 3-5x/week for LOS  D/C Recommendations: SNF       Recommended Diet and Intervention  Diet Solids Recommendation: NPO  Liquid Consistency Recommendation: NPO  Recommended Form of Meds: Via alternative means of nutrition  Recommendations: NPO;Dysphagia treatment; Modified barium swallow study  Therapeutic Interventions: Patient/Family education;Oral care;Diet tolerance monitoring    Compensatory Swallowing Strategies  NPO  Oral care 3x/day  Upright positioning as able    Treatment/Goals  Short-term Goals  Timeframe for Short-term Goals: 7 days (10/5/20)  Long-term Goals  Timeframe for Long-term Goals: 10 days (10/8/20)  Goal 1: The pt will tolerate safest and least restrictive diet without s/s of aspiration. Dysphagia Goals: The patient will tolerate recommended diet without observed clinical signs of aspiration; The patient/caregiver will demonstrate understanding of compensatory strategies for improved swallowing safety. ;The patient will tolerate instrumental swallowing procedure; The patient will tolerate repeat BSE when able. General  Chart Reviewed: Yes  Comments: Pt admitted d/t AMS, pancreatitis. Pt has hx of COPD, GERD, CHF, and MR per chart. Pt is nonverbal at baseline. Pt currently with NGT. Behavior/Cognition: Alert;Lethargic;Requires cueing  Respiratory Status: O2 via nasual cannula  O2 Device: Nasal cannula  Liters of Oxygen: 2 L  Communication Observation: Functional  Follows Directions: Simple  Dentition: Adequate  Patient Positioning: Upright in bed  Baseline Vocal Quality: Weak(Pt vocalized x2 during BSE given max cues)  Volitional Cough: Strong(Post-swallow with HTL via 1/2 tsp)  Volitional Swallow: Delayed  Prior Dysphagia History: No hx of dysphagia per chart review, however, pt's father at bedside reports pt on mechanical soft diet at NH, unable to report liquid consistency.   He endorsed pt previously seen for dysphagia tx by SLP. This SLP attempted to reach RN at [facility] without success. Consistencies Administered: Honey - teaspoon;Dysphagia Pureed (Dysphagia I)           Vision/Hearing  Vision  Vision: (KAREN; pt occasionally opening eyes during BSE)  Hearing  Hearing: Exceptions to WFL(KAREN)  Hearing Exceptions: Hard of hearing/hearing concerns    Oral Motor Deficits  Oral/Motor  Oral Motor: Exceptions to WFL(Unable to complete d/t pt's cognitive status; pt opened oral cavity once presented with tsp)    Oral Phase Dysfunction  Oral Phase  Oral Phase: Exceptions  Oral Phase  Oral Phase - Comment: Weak lingual manipulation and reduced bolus control wtih minimal PO trials via tsp. Reduced A-P bolus transit observed with puree (1/2 tsp x1) with significant lingual pumping. Indicators of Pharyngeal Phase Dysfunction   Pharyngeal Phase  Pharyngeal Phase: Exceptions  Indicators of Pharyngeal Phase Dysfunction  Decreased Laryngeal Elevation: All(Pt's laryngeal elevation appears reduced upon palpation of the anterior neck, however, difficult to fully assess d/t pt's neck habitus)  Cough - Immediate: Honey - teaspoon  Pharyngeal Phase   Pharyngeal: Pt's RR consistent at 20/min throughout BSE, O2 sats 98%. Pt with immediate strong cough post-swallow with HTL via 1/2 tsp (1 of 2 trials). Double swallow noted with all PO trials (HTL via 1/2 tsp x2, puree via 1/2 tsp x1). Further PO trials discontinued at this time d/t pt's increased risk of aspiration, overall lethargy. At end of BSE, RN reported that RT to place pt on BiPAP shortly d/t elevated CO2 levels s/p ABG. Prognosis  Prognosis  Prognosis for safe diet advancement: fair  Barriers to reach goals: cognitive deficits;language deficits;time post onset;inconsistent alertness;severity of dysphagia  Individuals consulted  Consulted and agree with results and recommendations: Patient; Family member;RN  Family member consulted: Pt's father    Education  Patient Education: Pt and pt's

## 2020-09-28 NOTE — PROGRESS NOTES
Comprehensive Nutrition Assessment    Type and Reason for Visit:  Reassess    Nutrition Recommendations/Plan:   1. Oral diet progression per GI, recommend swallow evaluation prior to initiation, RD informed speech therapy of patient's multiple food allergies last week in case eval ordered over the weekend. 2.  If unable to initiate oral nutrition in the next 24 hours consider post pyloric or gastric tube feeding per GI. If initiated recommend Jevity 1.5 titrate as tolerated to goal rate of 65 ml/hr. Water flush start with 60 ml every 3 hours and monitor Na trends  3. If enteral nutrition not tolerated and parenteral nutrition necessary, lipids unable to be provided due to egg allergy    Nutrition Assessment:  Follow up:  Remains NPO for multiple reasons. Lytes improved. Na 147 mmol/L ib 9/26, improved to 139 mg/dl today. D5 at 50 ml/hr. Lipase elevated on admission. Malnutrition Assessment:  Malnutrition Status: At risk for malnutrition (Comment)      Estimated Daily Nutrient Needs:  Energy (kcal):  2705-7751; Weight Used for Energy Requirements:  Current(71 kg)     Protein (g):  85-99; Weight Used for Protein Requirements:  Current(1.2-1.4)        Fluid (ml/day):  1 mL/kcal; Weight Used for Fluid Requirements:  Current      Nutrition Related Findings:  BM x 1 on 9/27; minimal NG output.       Wounds:  (redness on sacrum, excoriation groin)       Current Nutrition Therapies:    Diet NPO Effective Now Exceptions are: Sips with Meds    Anthropometric Measures:  · Height: 4' 8\" (142.2 cm)  · Current Body Weight: 218 lb (98.9 kg)   · Admission Body Weight: 156 lb 8 oz (71 kg)    · Usual Body Weight:       · Ideal Body Weight: 82 lbs; % Ideal Body Weight 190.2 %   · BMI: 48.9  · BMI Categories: Obese Class 2 (BMI 35.0 -39.9)       Nutrition Diagnosis:   · Inadequate oral intake related to cognitive or neurological impairment as evidenced by NPO or clear liquid status due to medical condition    Nutrition Interventions:   Food and/or Nutrient Delivery:  Continue NPO  Nutrition Education/Counseling:  No recommendation at this time   Coordination of Nutrition Care:  Continued Inpatient Monitoring    Goals: Tolerate diet advancement and consume greater than 50% of meals and ONS this admission       Nutrition Monitoring and Evaluation:   Behavioral-Environmental Outcomes:      Food/Nutrient Intake Outcomes:  Food and Nutrient Intake, Diet Advancement/Tolerance  Physical Signs/Symptoms Outcomes:  Biochemical Data     Discharge Planning:     Too soon to determine     Electronically signed by Shakira Fernandez, [address],  on 9/28/20 at 10:32 AM EDT    Contact: Office: [phone]; [address] [place] [address] Road: [phone]

## 2020-09-28 NOTE — PROGRESS NOTES
09/28/20 1952   NIV Type   Skin Assessment Clean, dry, & intact   Skin Protection for O2 Device Yes   NIV Started/Stopped On   Equipment Type V60   Mode Bilevel   Mask Type Full face mask   Mask Size Small   Settings/Measurements   IPAP 16 cmH20   CPAP/EPAP 6 cmH2O   Rate Ordered 16   Resp 17   FiO2  35 %   I Time/ I Time % 1 s   Vt Exhaled 532 mL   Minute Volume 9.5 Liters   Mask Leak (lpm) 16 lpm   Comfort Level Good   Using Accessory Muscles No   SpO2 98   Breath Sounds   Right Upper Lobe Diminished   Right Middle Lobe Diminished   Right Lower Lobe Diminished   Left Upper Lobe Diminished   Left Lower Lobe Diminished   Alarm Settings   Alarms On Y   Oxygen Therapy/Pulse Ox   SpO2 98 %

## 2020-09-28 NOTE — PROGRESS NOTES
MD Zackary Haro made aware of repeat ABG results. Per MD Zackary Haro, patient to remain on continuous bipap with NGT to suction.

## 2020-09-28 NOTE — PROGRESS NOTES
This note also relates to the following rows which could not be included:  SpO2 - Cannot attach notes to unvalidated device data       09/28/20 1834   NIV Type   Skin Protection for O2 Device Yes   NIV Started/Stopped On   Equipment Type v60   Mode Bilevel   Mask Type Full face mask   Mask Size Small   Settings/Measurements   IPAP 16 cmH20   CPAP/EPAP 6 cmH2O   Vt Ordered 500 mL   Rate Ordered 16   FiO2  35 %  (pao2 121.3 on 45%)   Vt Exhaled 495 mL   Minute Volume 8.9 Liters   Mask Leak (lpm) 28 lpm   Comfort Level Good   SpO2 98

## 2020-09-29 NOTE — PROGRESS NOTES
Nephrology Progress Note   [email or web address]      This patient is a 60year old male whom we are following for LINDA. Subjective: The patient was seen and examined. Father at bedside and claims no change in mental status. Family History: Family at bedside  ROS: Unable to obtain      Vitals:  /73   Pulse 82   Temp 97.5 °F (36.4 °C) (Axillary)   Resp 18   Ht 4' 8\" (1.422 m)   Wt 224 lb 13.9 oz (102 kg)   SpO2 99%   BMI 50.41 kg/m²   I/O last 3 completed shifts: In: 10 [I.V.:10]  Out: 700 [Urine:700]  I/O this shift:  In: 2433 [I.V.:2433]  Out: 750 [Urine:750]    Physical Exam:  Physical Exam  Vitals signs reviewed. Constitutional:       General: He is not in acute distress. HENT:      Head: Normocephalic and atraumatic. Mouth/Throat:      Mouth: Mucous membranes are moist.   Eyes:      General: No scleral icterus. Cardiovascular:      Rate and Rhythm: Normal rate and regular rhythm. Heart sounds: No friction rub. Pulmonary:      Effort: Pulmonary effort is normal. No respiratory distress. Breath sounds: No wheezing. Abdominal:      General: Bowel sounds are normal. There is no distension. Tenderness: There is no abdominal tenderness. Musculoskeletal:      Right lower leg: Edema present. Left lower leg: Edema present.            Medications:   bisacodyl  5 mg Oral Daily    pantoprazole  40 mg Intravenous Daily    levothyroxine  25 mcg Oral Daily    oxybutynin  2.5 mg Oral Nightly    sodium chloride flush  10 mL Intravenous 2 times per day         Labs:  Recent Labs     09/27/20  0515 09/28/20 0445 09/29/20 0616   WBC 11.4* 11.7* 10.1   HGB 8.7* 8.3* 8.2*   HCT 25.8* 25.4* 24.5*   MCV 94.8 96.1 95.5   PLT 95* 82* 101*     Recent Labs     09/27/20  0515 09/28/20 0445 09/29/20  0616    139 136   K 4.0  4.0 3.7  3.7 3.8    102 100   CO2 26 29 30   GLUCOSE 94 140* 111*   PHOS 3.6 3.4 2.7   MG 2.00 1.90 2.00   BUN 36* 27* 20   CREATININE 1.5* 1.2

## 2020-09-29 NOTE — PROGRESS NOTES
09/29/20 0033   NIV Type   $NIV $Daily Charge   Skin Assessment Clean, dry, & intact   Equipment Type V60   Mode Bilevel   Mask Type Full face mask   Mask Size Small   Settings/Measurements   IPAP 16 cmH20   CPAP/EPAP 6 cmH2O   Resp 17   FiO2  35 %   Vt Exhaled 522 mL   Minute Volume 9 Liters   Mask Leak (lpm) 21 lpm   Comfort Level Good   Using Accessory Muscles No   SpO2 99   Oxygen Therapy/Pulse Ox   SpO2 99 %

## 2020-09-29 NOTE — PROGRESS NOTES
09/29/20 0901   Oxygen Therapy/Pulse Ox   O2 Therapy Oxygen   O2 Device PAP (positive airway pressure)   FiO2  30 %   SpO2 98 %   Blood Gas  Performed? Yes   $ABG $ABG   Chaitanya's Test #1 Pos   Site #1 Right Radial   Site Prepped #1 Yes   Number of Attempts #1 1   Pressure Held #1 Yes   Complications #1 None   Post-procedure #1 Standard   Specimen Status #1 To lab   How Tolerated?  Tolerated well

## 2020-09-29 NOTE — PROGRESS NOTES
GI Progress Note      SUBJECTIVE:  No BMs recorded overnight. No reports of emesis.     OBJECTIVE      Medications    Current Facility-Administered Medications: dextrose 5 % solution, , Intravenous, Continuous  dextrose 50 % IV solution, 12.5 g, Intravenous, PRN  ipratropium-albuterol (DUONEB) nebulizer solution 1 ampule, 1 ampule, Inhalation, Q6H PRN  pantoprazole (PROTONIX) injection 40 mg, 40 mg, Intravenous, Daily  levothyroxine (SYNTHROID) tablet 25 mcg, 25 mcg, Oral, Daily  bisacodyl (DULCOLAX) suppository 10 mg, 10 mg, Rectal, Daily PRN  oxybutynin (DITROPAN) tablet 2.5 mg, 2.5 mg, Oral, Nightly  sodium chloride flush 0.9 % injection 10 mL, 10 mL, Intravenous, 2 times per day  sodium chloride flush 0.9 % injection 10 mL, 10 mL, Intravenous, PRN  [DISCONTINUED] acetaminophen (TYLENOL) tablet 650 mg, 650 mg, Oral, Q6H PRN **OR** acetaminophen (TYLENOL) suppository 650 mg, 650 mg, Rectal, Q6H PRN  promethazine (PHENERGAN) tablet 12.5 mg, 12.5 mg, Oral, Q6H PRN **OR** ondansetron (ZOFRAN) injection 4 mg, 4 mg, Intravenous, Q6H PRN  Physical    VITALS:  /73   Pulse 82   Temp 97.5 °F (36.4 °C) (Axillary)   Resp 18   Ht 4' 8\" (1.422 m)   Wt 224 lb 13.9 oz (102 kg)   SpO2 99%   BMI 50.41 kg/m²   ABD: soft, moderate distention though not tympanic, NABS noted, NT  Data    CBC with Differential:    Lab Results   Component Value Date    WBC 10.1 09/29/2020    RBC 2.57 09/29/2020    HGB 8.2 09/29/2020    HCT 24.5 09/29/2020     09/29/2020    MCV 95.5 09/29/2020    MCH 31.9 09/29/2020    MCHC 33.5 09/29/2020    RDW 16.5 09/29/2020    BANDSPCT 19 09/26/2020    LYMPHOPCT 4.2 09/29/2020    MONOPCT 5.7 09/29/2020    BASOPCT 0.2 09/29/2020    MONOSABS 0.6 09/29/2020    LYMPHSABS 0.4 09/29/2020    EOSABS 0.1 09/29/2020    BASOSABS 0.0 09/29/2020     CMP:    Lab Results   Component Value Date     09/29/2020    K 3.8 09/29/2020     09/29/2020    CO2 30 09/29/2020    BUN 20 09/29/2020    CREATININE 1.1 09/29/2020    GFRAA >60 09/29/2020    AGRATIO 1.2 09/22/2020    LABGLOM >60 09/29/2020    GLUCOSE 111 09/29/2020    PROT 5.7 09/24/2020    LABALBU 2.6 09/28/2020    CALCIUM 8.6 09/29/2020    BILITOT 0.5 09/24/2020    ALKPHOS 106 09/24/2020    AST 46 09/24/2020    ALT 50 09/24/2020     IgG4 15    ASSESSMENT AND PLAN  The patient is a 60 y. o. male with significant past medical history of dCHF, COPD, HTN and MRDD (non verbal) who presents with MS changes and resp distress.  He maintains a diagnosis of acute pancreatitis based on the presence of hyperlipasemia and peripancreatic inflammation noted on CT.  ARF and hypotension have resolved.  An obvious etiology of pancreatitis is not noted.   He has also manifest significant gaseous distention of the stomach either reflecting delayed emptying due to neighboring pancreatic inflammation or perhaps mechanical obstruction.  This has improved with NGT as noted by KUB . Empiric ATBs have been discontinued.  Resp status remains an issue. SLP evaluation demonstrated the presence of moderate to severe OP dysphagia. It was their conclusion that he is unable to tolerate any PO intake safely. MBS was recommended.   - MBS  - start TFs via NGT   - continue daily Protonix

## 2020-09-29 NOTE — PROGRESS NOTES
Speech Language Pathology    SLP acknowledged order for MBSS, spoke with radiology and RN. Unable to complete MBSS this date d/t radiology schedule and pt requiring BiPAP again placed ~1600. Will re-attempt MBSS tomorrow as pt is able and appropriate. RN aware and in agreement.     Douglas Prado M.S. [id number] [facility]  Speech-language pathologist  [phone]

## 2020-09-29 NOTE — PROGRESS NOTES
INPATIENT PULMONARY CRITICAL CARE PROGRESS NOTE      Reason for visit: Acute on chronic respiratory failure (hypoxemic), acute encephalopathy, acute pulmonary edema, bibasilar atelectasis, septic shock, LINDA, acute pancreatitis    SUBJECTIVE: The patient is nonverbal, afebrile and hemodynamically stable. The patient had hypercarbia, was left on BiPAP overnight. Here appears to be slightly more responsive. Unable to converse with the patient. He does not have increased inspiratory effort, does not appear to be in distress. He opens his eyes when his name is called, but otherwise is noncommunicative. He has an NG in place. Physical Exam:  Blood pressure 114/73, pulse 82, temperature 97.5 °F (36.4 °C), temperature source Axillary, resp. rate 18, height 4' 8\" (1.422 m), weight 224 lb 13.9 oz (102 kg), SpO2 99 %.'   Constitutional:  No acute distress. Sleepy/lethargic, nonverbal.  Eloina Morgans his eyes when stimulated  HENT: NG, oropharynx not examined as he does not open his mouth. Mucosa appears dry. Eyes:  Conjunctivae are normal. Pupils equal, round, and reactive to light. No scleral icterus. Neck: Short and large neck, no JVD. Cardiovascular: Normal rate, regular rhythm, distant heart sounds. No right ventricular heave. No lower extremity edema. Pulmonary/Chest: No wheezes. No rales. No accessory muscle usage or stridor. Abdominal: Soft, protuberant, fatty abdominal wall. Bowel sounds are brisk. No distension or tenderness. Musculoskeletal: No cyanosis. No clubbing. No obvious joint deformity except for short limbs. Lymphadenopathy: Deferred  Skin: Skin is warm and dry. No rash or nodules on the exposed extremities. Psychiatric: Could not be assessed.    Neurologic: Cannot be assessed        Results:  CBC:   Recent Labs     09/27/20  0515 09/28/20  0445 09/29/20  0616   WBC 11.4* 11.7* 10.1   HGB 8.7* 8.3* 8.2*   HCT 25.8* 25.4* 24.5*   MCV 94.8 96.1 95.5   PLT 95* 82* 101*     BMP:   Recent Labs 09/27/20  0515 09/28/20  0445 09/29/20  0616    139 136   K 4.0  4.0 3.7  3.7 3.8    102 100   CO2 26 29 30   PHOS 3.6 3.4 2.7   BUN 36* 27* 20   CREATININE 1.5* 1.2 1.1     UA:  Recent Labs     09/26/20  1850   COLORU Straw   PHUR 5.5   WBCUA 6-9*   RBCUA 3-4   MUCUS 1+*   BACTERIA 2+*   CLARITYU Clear   SPECGRAV 1.015   LEUKOCYTESUR Negative   UROBILINOGEN 0.2   BILIRUBINUR Negative   BLOODU TRACE-INTACT*   GLUCOSEU Negative   AMORPHOUS 1+       Imaging:  I have reviewed radiology images personally. XR ABDOMEN (KUB) (SINGLE AP VIEW)   Final Result   Unremarkable bowel gas pattern. NG tube extends well into the stomach. XR CHEST PORTABLE   Final Result   Bibasilar opacities, likely combination of atelectasis/airspace disease with   layered pleural effusions, larger on the left. There improvement in aeration   of the left lung in the interval, which may be related to differences in   positioning. XR CHEST PORTABLE   Final Result   Increased opacification within the left lung may represent a layering pleural   effusion. XR CHEST PORTABLE   Final Result   New small bilateral pleural effusions and bilateral airspace disease which   may be related to edema and/or pneumonia. XR ABDOMEN FOR NG/OG/NE TUBE PLACEMENT   Final Result   Gastric tube in mid stomach         US RENAL COMPLETE   Final Result   1. Unremarkable sonographic appearance of the kidneys. 2. Collapsed urinary bladder, limiting its evaluation. XR ABDOMEN (KUB) (SINGLE AP VIEW)   Final Result   Unchanged moderate amount of gaseous distention of the stomach. No other   findings to explain abdominal distension. Of note however these images were   obtained supine and early free air would not be detected.          FL MODIFIED BARIUM SWALLOW W VIDEO    (Results Pending)     Xr Abdomen (kub) (single Ap View)    Result Date: 9/23/2020  EXAMINATION: ONE SUPINE XRAY VIEW(S) OF THE ABDOMEN 9/23/2020 7:39 am COMPARISON: CT abdomen and pelvis September 22, 2020 HISTORY: ORDERING SYSTEM PROVIDED HISTORY: abdominal distention TECHNOLOGIST PROVIDED HISTORY: Reason for exam:->abdominal distention FINDINGS: There is unchanged gaseous distention of the stomach. No evidence of bowel distention elsewhere. No evidence of free air although this is very limited as these images were obtained supine     Unchanged moderate amount of gaseous distention of the stomach. No other findings to explain abdominal distension. Of note however these images were obtained supine and early free air would not be detected. Xr Abdomen (kub) (single Ap View)    Result Date: 9/22/2020  EXAMINATION: ONE SUPINE XRAY VIEW(S) OF THE ABDOMEN 9/22/2020 4:46 am COMPARISON: Chest radiograph earlier same date. HISTORY: ORDERING SYSTEM PROVIDED HISTORY: abdominal distension TECHNOLOGIST PROVIDED HISTORY: Reason for exam:->abdominal distension Reason for Exam: distention Acuity: Acute Type of Exam: Initial There is moderate gaseous distension of the stomach. Only a few gas-filled loops bowel are seen. Paucity of bowel gas rendering bowel gas pattern indeterminate. No focal dilated gas-filled loops of bowel are identified. There postsurgical changes in both hips. No acute osseous abnormality. FINDINGS: Moderate gaseous distension of the stomach. Correlation for gastric outlet obstruction recommended. Indeterminate bowel gas pattern. Ct Head Wo Contrast    Result Date: 9/23/2020  EXAMINATION: CT OF THE HEAD WITHOUT CONTRAST  9/22/2020 6:36 am TECHNIQUE: CT of the head was performed without the administration of intravenous contrast. Dose modulation, iterative reconstruction, and/or weight based adjustment of the mA/kV was utilized to reduce the radiation dose to as low as reasonably achievable. COMPARISON: None.  HISTORY: ORDERING SYSTEM PROVIDED HISTORY: decreased responsiveness TECHNOLOGIST PROVIDED HISTORY: Reason for exam:->decreased responsiveness Has a \"code stroke\" or \"stroke alert\" been called? ->No Reason for Exam: decreased responsiveness Acuity: Unknown Type of Exam: Initial Additional signs and symptoms: decreased responsiveness FINDINGS: BRAIN/VENTRICLES: Patient is tilted in the CT gantry. Mild motion/streak artifact is present on the examination. The ventricular system is normal in appearance. No evidence of mass effect or midline shift. Prominence of sulci overlying convexities of cerebral hemispheres and cerebellum consistent with atrophy. Tiny focus of low attenuation along the lateral margin of the head of the left caudate nucleus (image 34) suggest small focus of remote ischemic change. Very tiny focus of low attenuation in left basal ganglia region (image 36) could represent perivascular space versus tiny focus of remote ischemic change as well. There is a very tiny focus of low attenuation near the anterior limb of the right internal capsule (image 38). Finding may represent tiny focus of ischemic change which could be subacute to chronic in age. Very minimal low attenuation within periventricular/subcortical white matter suggest changes of minimal ischemic leukoencephalopathy. No abnormal extra-axial fluid collections are identified. There is atherosclerotic calcification of distal internal carotid arteries. There is asymmetric dolichoectasia of the distal right internal carotid artery as compared to the left. ORBITS: The visualized portion of the orbits demonstrate no acute abnormality. SINUSES: There is minimal mucosal thickening within a few bilateral ethmoid air cells. There is asymmetric thickening of the wall of the right maxillary sinus when compared to the right which raises possibility of changes associated with chronic sinus disease. There is mucosal thickening with minimally complex air-fluid/mucus level within the right maxillary sinus.  Presence of the fluid level suggest component of acute paranasal sinus disease. Abnormal lucency identified about several maxillary teeth suggest presence of dental caries. Abnormal lucency identified in the region of the roots of several maxillary teeth most pronounced along the 2nd maxillary incisor where there is cortical disruption (image 4). Findings may be on the basis of periodontal disease. Mastoid air cells are well aerated. SOFT TISSUES/SKULL:  No acute abnormality of the visualized skull or soft tissues. 1. No definite evidence of acute intracranial abnormality. 2. Tiny focus of low attenuation near anterior limb of right internal capsule. Finding may represent tiny focus of ischemic change which could be subacute to chronic in age. Follow-up MRI examination with diffusion imaging may be helpful for more complete evaluation if clinically indicated. 3. Paranasal sinus disease with visualization of small air-fluid/mucus level within the right maxillary sinus. 4. Findings suggestive of presence of dental caries with abnormal lucency surrounding roots of several teeth which raises possibility of associated periodontal disease as described above. Us Renal Complete    Result Date: 9/23/2020  EXAMINATION: RETROPERITONEAL ULTRASOUND OF THE KIDNEYS AND URINARY BLADDER 9/23/2020 COMPARISON: Abdominal CT 09/22/2020 HISTORY: ORDERING SYSTEM PROVIDED HISTORY: alba TECHNOLOGIST PROVIDED HISTORY: Reason for exam:->abla FINDINGS: Kidneys: The right kidney measures 8.1 x 5.4 x 4.7 cm in size and the left kidney measures 9.4 x 5.8 x 5.2 cm in size. Kidneys demonstrate normal cortical echogenicity. No evidence of hydronephrosis or intrarenal stones. Bladder: The urinary bladder is collapsed, limiting its evaluation. 1. Unremarkable sonographic appearance of the kidneys. 2. Collapsed urinary bladder, limiting its evaluation.      Ct Abdomen Pelvis W Iv Contrast Additional Contrast? None    Result Date: 9/22/2020  EXAMINATION: CT OF THE ABDOMEN AND PELVIS WITH CONTRAST 9/22/2020 10:11 am TECHNIQUE: CT of the abdomen and pelvis was performed with the administration of intravenous contrast. Multiplanar reformatted images are provided for review. Dose modulation, iterative reconstruction, and/or weight based adjustment of the mA/kV was utilized to reduce the radiation dose to as low as reasonably achievable. COMPARISON: None. HISTORY: ORDERING SYSTEM PROVIDED HISTORY: pancreatitis, abdominal distension TECHNOLOGIST PROVIDED HISTORY: Reason for exam:->pancreatitis, abdominal distension Additional Contrast?->None Reason for Exam: scan ran on the back end of a CTA head and neck stroke protocol,   75 second delay was more like 95 second delay. after contrast administration Acuity: Acute Type of Exam: Initial Additional signs and symptoms: presented with low body temp FINDINGS: Lower Chest: Bandlike opacities are seen at the lung bases. Bandlike morphology favors subsegmental atelectasis or scar. Small hiatal hernia seen. There is nonspecific thickening at the GE junction Organs: Trace perisplenic fluid is seen Adrenal glands appear normal No hydronephrosis on the right. No hydronephrosis on the left. A few punctate circumscribed hypodense nodule seen in the kidneys, too small to characterize, likely cyst.  There are clips from prior cholecystectomy. There is subtle injection of the fat surrounding pancreas. No discrete drainable fluid collection. Pancreas enhances normally. No evidence of pancreatic necrosis. GI/Bowel: There is mild distention of the stomach. No significant small bowel distention noted. A few colonic diverticula are seen. Descending and sigmoid colon is incompletely distended accentuating its wall thickness Pelvis: No free fluid is seen within the pelvis. Elliott catheter seen within the bladder. .  Bladder is partially contracted. Nodular soft tissue density seen in the right inguinal canal measuring 1.7 x 1.8 cm. Peritoneum/Retroperitoneum: No aortic aneurysm. No retroperitoneal hematoma. A few small lymph nodes are seen along the Bones/Soft Tissues: Spurring is seen in the spine. Streak artifact is seen from orthopedic hardware in the proximal right and left femur, as well as spinal stabilization rods. Gaseous distention of the stomach. No small bowel obstruction. Injection of fat is seen surrounding the pancreas suggestive of early pancreatitis Small nodular soft tissue density is seen in the right inguinal canal, most commonly reactive lymph node in the absence of a known primary     Xr Chest Portable    Result Date: 9/27/2020  EXAMINATION: ONE XRAY VIEW OF THE CHEST 9/27/2020 6:06 am COMPARISON: Yesterday HISTORY: ORDERING SYSTEM PROVIDED HISTORY: left pleural effusion TECHNOLOGIST PROVIDED HISTORY: Reason for exam:->left pleural effusion Reason for Exam: left pleural effusion Type of Exam: Subsequent/Follow-up FINDINGS: There is a left thoracic lumbar support maximus with sub laminar hooks. Scoliotic curvature of the thoracic spine with dextro is noted. Nasogastric tube has a normal course, extending at least to the mid stomach. PICC extends to the superior vena cava. The cardiac silhouette is stable. There is hazy opacity in the lung bases. There is improved aeration of the left upper lung. Bibasilar opacities, likely combination of atelectasis/airspace disease with layered pleural effusions, larger on the left. There improvement in aeration of the left lung in the interval, which may be related to differences in positioning. Xr Chest Portable    Result Date: 9/26/2020  EXAMINATION: ONE XRAY VIEW OF THE CHEST 9/26/2020 6:01 am COMPARISON: September 25, 2020 HISTORY: ORDERING SYSTEM PROVIDED HISTORY: pulm edema TECHNOLOGIST PROVIDED HISTORY: Reason for exam:->pulm edema Reason for Exam: pulm edema Acuity: Acute Type of Exam: Initial FINDINGS: Rotated positioning limits evaluation. Thoracic scoliosis maximus.   Enteric tube extends beyond the gastroesophageal junction. Right PICC with the tip near the mid SVC. Small right lung base opacity. Increased opacification throughout the left hemithorax. Cardiomegaly. No definite pulmonary edema. Increased opacification within the left lung may represent a layering pleural effusion. Xr Chest Portable    Result Date: 9/25/2020  EXAMINATION: ONE XRAY VIEW OF THE CHEST 9/25/2020 3:53 am COMPARISON: Chest radiograph September 22, 2020 and priors. HISTORY: ORDERING SYSTEM PROVIDED HISTORY: respiratory distress TECHNOLOGIST PROVIDED HISTORY: Reason for exam:->respiratory distress FINDINGS: There has been placement nasogastric tube with side hole beyond the GE junction and tip overlying the expected location the distal stomach. There has also been placement right upper extremity PICC with tip overlying the mid SVC. There has been development of small bilateral pleural effusions with mild bilateral hazy interstitial opacities and mild septal thickening. No pneumothorax. Evaluation of the superior mediastinum is limited due to portable technique. Superior mediastinum appears mildly widened but has been seen on multiple prior studies, likely related to vasculature. There are postsurgical changes throughout thoracic spine. No acute osseous abnormality. New small bilateral pleural effusions and bilateral airspace disease which may be related to edema and/or pneumonia. Xr Chest Portable    Result Date: 9/22/2020  EXAMINATION: ONE XRAY VIEW OF THE CHEST 9/22/2020 4:46 am COMPARISON: Chest radiograph November 9, 2009 and priors. HISTORY: ORDERING SYSTEM PROVIDED HISTORY: assess for pneumonia TECHNOLOGIST PROVIDED HISTORY: Reason for exam:->assess for pneumonia Reason for Exam: cold Acuity: Acute Type of Exam: Initial FINDINGS: There is minimal right basilar airspace disease. No pneumothorax or pleural effusion. Cardiac and mediastinal contours are unchanged.   Scoliosis and postsurgical changes are again seen within the thoracic spine. No acute osseous abnormality. Mild right basilar airspace disease, atelectasis versus pneumonia. Ir Picc Wo Sq Port/pump > 5 Years    Result Date: 9/22/2020  EXAMINATION: LIMITED ULTRASOUND OF THE ARM FOR PICC ACCESS, 9/22/2020 TECHNIQUE: The PICC team used ultrasound and VPS guidance to place a PICC line. HISTORY: ORDERING SYSTEM PROVIDED HISTORY: no access TECHNOLOGIST PROVIDED HISTORY: Reason for exam:->no access How many lumens are being requested?->2 What site is the preferred site? ->No preference What side should this line be placed? ->Either Reason for Exam: no access Acuity: Acute Type of Exam: Initial Additional signs and symptoms: 34 cm 2 out single lumen non tunneled power PICC right basilic US/VPS guidance FLUOROSCOPY DOSE AND TYPE OR TIME AND EXPOSURES: No fluoroscopy. 0 images. FINDINGS: Ultrasound images demonstrate patency of the right basilic vein which was used for access for placement of a PICC by the PICC team, without a radiologist present. VPS guidance was used by the PICC team By report, a single lumen PICC, trimmed to 34 cm was placed. Successful placement of PICC line. Xr Abdomen For Ng/og/ne Tube Placement    Result Date: 9/23/2020  EXAMINATION: ONE SUPINE XRAY VIEW(S) OF THE ABDOMEN 9/23/2020 10:41 am COMPARISON: None. HISTORY: ORDERING SYSTEM PROVIDED HISTORY: Confirmation of course of NG/OG/NE tube and location of tip of tube TECHNOLOGIST PROVIDED HISTORY: Reason for exam:->Confirmation of course of NG/OG/NE tube and location of tip of tube Portable? ->Yes FINDINGS: Gastric tube in the mid stomach. Gastric tube in mid stomach     Cta Head Neck W Contrast    Result Date: 9/22/2020  EXAMINATION: CTA OF THE HEAD AND NECK WITH CONTRAST 9/22/2020 10:11 am TECHNIQUE: CTA of the head and neck was performed with the administration of intravenous contrast. Multiplanar reformatted images are provided for review. MIP images are provided for review.  Stenosis of the internal carotid arteries measured using NASCET criteria. Dose modulation, iterative reconstruction, and/or weight based adjustment of the mA/kV was utilized to reduce the radiation dose to as low as reasonably achievable. COMPARISON: None HISTORY: ORDERING SYSTEM PROVIDED HISTORY: assess for stroke TECHNOLOGIST PROVIDED HISTORY: Reason for exam:->assess for stroke Reason for Exam: presented in ED with low body temp. pt from nursing home facility next door to hospital Acuity: Acute Type of Exam: Initial FINDINGS: CTA NECK: AORTIC ARCH/ARCH VESSELS: No dissection or arterial injury. No significant stenosis of the brachiocephalic or subclavian arteries. CAROTID ARTERIES: No dissection, arterial injury, or hemodynamically significant stenosis by NASCET criteria. VERTEBRAL ARTERIES: No dissection, arterial injury, or significant stenosis. SOFT TISSUES: The lung apices are clear. No cervical or superior mediastinal lymphadenopathy. The larynx and pharynx are unremarkable. No acute abnormality of the salivary and thyroid glands. BONES: Thoracic spinal hardware is incompletely imaged but includes a maximus with laminar hook beginning at T3. There is chronic right maxillary sinusitis. CTA HEAD: ANTERIOR CIRCULATION: No significant stenosis of the intracranial internal carotid, anterior cerebral, or middle cerebral arteries. No aneurysm. POSTERIOR CIRCULATION: No significant stenosis of the vertebral, basilar, or posterior cerebral arteries. No aneurysm. OTHER: No dural venous sinus thrombosis on this non-dedicated study. BRAIN: No mass effect or midline shift. No extra-axial fluid collection. The gray-white differentiation is maintained. No large vessel occlusion. Critical results were called by Dr. Cuauhtemoc Fountain to Dr. Candi Castellanos On 9/22/2020 at 11:03. Assessment and plan:  Acute encephalopathy. Likely due to hypercarbia. Acute on chronic respiratory failure with hypercapnia.   Repeat ABG slightly better with regards to pH. Acute pulmonary edema. Being diuresed, received 1 dose of Lasix yesterday. Still positive on I/O, +4620 mL, -670 mL in the last 24 hours. May consider repeat diuresis  Pulmonary infiltrates/atelectasis. Unimpressive on chest x-ray 9/27 septic shock. Resolved  Acute pancreatitis without infection or necrosis. Does not appear to be clinically relevant at this point  LINDA , hyperkalemia. Renal function stable/improved  Anemia. Normochromic, normocytic. Follow profile. Discontinue daily CBC. Thrombocytopenia. Mild. Improving. Discontinue daily CBC  HTN, hypothyroidism, GERD. Per IM  Suspected GRICEL. Evaluation as an outpatient  Morbid obesity. Address as an outpatient  PUD prophylaxis. PPI  DVT prophylaxis. Thrombocytopenic    Discussed with nursing. Limited treatment options in my opinion.       Electronically signed by:  Rob Salazar MD    9/29/2020    12:43 PM.

## 2020-09-29 NOTE — PROGRESS NOTES
Hospitalist Progress Note      PCP: No primary care provider on file. Date of Admission: 9/22/2020    Chief Complaint:  [address] Course:   HPI :   60 y.o. male with history of CHF, COPD, GERD,  MRDD who presented to  [facility] ER from group home with altered mental status. History obtained per chart as patient unable to provide any history due to AMS. Pt minimally verbal at baseline. Patient reportedly had reduced responsiveness at the ECF overnight and was sent to the ER. He was noted to be hypothermic in the 80s and hypotensive in the 60s. Initial concern for possible CVA - stroke team consulted. CT head was nonacute with tiny area of low attenuation near the anterior right  internal capsule which was thought to be likely subacute/chronic. CTA head/neck was unremarkable. PICC line placed in ER. Labs notable for severe hyperkalemia with potassium of 7.1 creatinine 1.5, TSH of 7.4. Lipase was  elevated at 1471. Temperature improved to 90's with karuna hugger and warm IVF. There was concern for myxedema coma and was started on IV Synthroid. He was given a dose of IV hydrocortisone, started on broad-spectrum antibiotics and  pressors. Pt was transferred to [facility] for further care. Subjective:      Pt non verbal at baseline. Pt somnolent, opened his eyes to voice today. Pt on BiPAP.          Medications:  Reviewed    Infusion Medications    dextrose 50 mL/hr at 09/29/20 0334     Scheduled Medications    pantoprazole  40 mg Intravenous Daily    levothyroxine  25 mcg Oral Daily    oxybutynin  2.5 mg Oral Nightly    sodium chloride flush  10 mL Intravenous 2 times per day     PRN Meds: dextrose, ipratropium-albuterol, bisacodyl, sodium chloride flush, [DISCONTINUED] acetaminophen **OR** acetaminophen, promethazine **OR** ondansetron      Intake/Output Summary (Last 24 hours) at 9/29/2020 1056  Last data filed at 9/29/2020 0926  Gross per 24 hour   Intake 2443 ml   Output 1075 ml   Net 1368 ml Physical Exam Performed:    /64   Pulse 78   Temp 97.3 °F (36.3 °C) (Axillary)   Resp 19   Ht 4' 8\" (1.422 m)   Wt 224 lb 13.9 oz (102 kg)   SpO2 98%   BMI 50.41 kg/m²   General appearance: lethargic and somnolent but arousable,  on BIPAP  in no acute resp distress, appears stated age   HEENT:  Normal cephalic, atraumatic without obvious deformity. Conjunctivae/corneas clear. Neck: Supple, with full range of motion. No jugular venous distention. Trachea midline. Respiratory:  Normal respiratory effort. Bibasilar crackles   Cardiovascular:  Regular rate and rhythm with normal S1/S2 without murmurs, rubs or gallops. Abdomen: Soft, distended,  non-tender,  with normal bowel sounds. Musculoskeletal:  No clubbing, cyanosis or edema bilaterally. Skin:  Warm and dry . Neurologic:  Limited due to lethargy. Following some commands, moving his extremities spontaneously    Psychiatric: somnolent          Labs:   Recent Labs     09/27/20  0515 09/28/20  0445 09/29/20  0616   WBC 11.4* 11.7* 10.1   HGB 8.7* 8.3* 8.2*   HCT 25.8* 25.4* 24.5*   PLT 95* 82* 101*     Recent Labs     09/27/20  0515 09/28/20  0445 09/29/20  0616    139 136   K 4.0  4.0 3.7  3.7 3.8    102 100   CO2 26 29 30   BUN 36* 27* 20   CREATININE 1.5* 1.2 1.1   CALCIUM 8.2* 8.2* 8.6   PHOS 3.6 3.4 2.7       Urinalysis:      Lab Results   Component Value Date    NITRU Negative 09/26/2020    WBCUA 6-9 09/26/2020    BACTERIA 2+ 09/26/2020    RBCUA 3-4 09/26/2020    BLOODU TRACE-INTACT 09/26/2020    SPECGRAV 1.015 09/26/2020    GLUCOSEU Negative 09/26/2020       Radiology:  XR ABDOMEN (KUB) (SINGLE AP VIEW)   Final Result   Unremarkable bowel gas pattern. NG tube extends well into the stomach. XR CHEST PORTABLE   Final Result   Bibasilar opacities, likely combination of atelectasis/airspace disease with   layered pleural effusions, larger on the left.   There improvement in aeration   of the left lung in the interval, which may be related to differences in   positioning. XR CHEST PORTABLE   Final Result   Increased opacification within the left lung may represent a layering pleural   effusion. XR CHEST PORTABLE   Final Result   New small bilateral pleural effusions and bilateral airspace disease which   may be related to edema and/or pneumonia. XR ABDOMEN FOR NG/OG/NE TUBE PLACEMENT   Final Result   Gastric tube in mid stomach         US RENAL COMPLETE   Final Result   1. Unremarkable sonographic appearance of the kidneys. 2. Collapsed urinary bladder, limiting its evaluation. XR ABDOMEN (KUB) (SINGLE AP VIEW)   Final Result   Unchanged moderate amount of gaseous distention of the stomach. No other   findings to explain abdominal distension. Of note however these images were   obtained supine and early free air would not be detected. Assessment/Plan:    Active Hospital Problems    Diagnosis    Hypernatremia [E87.0]    Thrombocytopenia ([address] [D69.6]    Acute encephalopathy [G93.40]    Acute respiratory failure with hypercapnia (HCC) [J96.02]    Pulmonary infiltrates [R91.8]    Acute pancreatitis without infection or necrosis [K85.90]    LINDA (acute kidney injury) ([address] [N17.9]    Hyperkalemia [E87.5]    Hypothyroidism [E03.9]    Shock ([address] [R57.9]    Hypothermia [S45. XXXA]    Class 2 obesity in adult [E66.9]    Suspected sleep apnea [R29.818]       Acute metabolic encephalopathy: Likely multifactorial due to septic shock, hyperkalemia, hypothermia, ? Myxedema coma. Non verbal at baseline per report. CT head was nonacute. Stroke work-up negative in ER. Neuro recs appreciated. Supportive care for acute morbidities as stated below        Septic shock: met SIRS criteria with tachycardia, tachypnea, hypothermia on arrival in ER. Initial concern for pneumonia as cause. Pneumonia ruled out per pulm. Empiric abx d/rich. Off pressors. BP stable. D/rich steroids.  Continue to hold home BP meds    Clinically resolved. Acute on chronic resp failure:likely due to acute pulm edema and bibasilar atelectasis/ hypoventilation. Received IV lasix. CXR today reviewed. Continue diuresis as needed per pulm. Continue 02 and wean as tolerated. Patient is currently on BiPAP, continue to monitor.     Hypothyroidism: TSH elevated at 7.4, FT3, FT4  normal. Given decreased responsiveness from baseline and hypothermia concern for possible myxedema coma. recieved IV synthroid - started on PO on 9/23. repeat TFTs in 4-6 weeks      Hypothermia: Likely due to sepsis, hypothyroidism. Improved with karuna hugger . Temp normalized         Hyperkalemia: likely due to LINDA ,  potassium supplements he is on chronically and lisinopril. Treated with improvement. Held K supplements and lisinopril. Resolved. Nephro assisting     LINDA: likely due to vol depletion, compounded by use of ACEi and lasix. Culprit meds held. Nephro assisting. Cr unchanged. Monitor Cr and avoid nephrotoxins. Likely due to ATN, clinically improving, nephrology input noted, monitor.       Acute pancreatitis: lipase was elevated with signs of early pancreatitis on CT. Unclear etiology. GI assisting. Continue NG tube to LIWS, NPO         GERD : Continue PPI          Acute on Chronic diastolic CHF: EF 55 to 54% with G1DD on echo in 9/3/2019. Received IVF for sepsis, hypernatremia. Pulm edema on CXR. IV lasix given. Monitor vol status closely         Hypernatremia: improving with IVF. nephro managing . Hypoglycemia: likely due to NPO status. BG's better since getting dextrose in IVF. Monitor with hypoglycemia protocol in place. Thrombocytopenia: cause unclear. No overt bleeding. Heparin d/rich. HIT ab neg.  Monitor      DVT Prophylaxis: SCDs  Diet: Diet NPO Effective Now Exceptions are: Sips with Meds  Code Status: Full Code    PT/OT Eval Status: not indicated at this time     Dispo -  Hard to say,  pending clinical course     Messi WASHINGTON Krzysztof Ferrari MD

## 2020-09-29 NOTE — PROGRESS NOTES
Speech Language Pathology  Attempt Note    SLP attempted dysphagia f/u, spoke with RN. Pt currently on CPAP, ABG drawn earlier this date. ST to continue to follow.     Modesto Martinez M.S. Maria Elena Boyd  Speech-language pathologist  [phone]

## 2020-09-29 NOTE — PROGRESS NOTES
09/29/20 1618   NIV Type   NIV Started/Stopped On   Equipment Type v60   Mode Bilevel   Mask Type Full face mask   Mask Size Small   Settings/Measurements   IPAP 16 cmH20   CPAP/EPAP 6 cmH2O   Vt Ordered 280 mL   Rate Ordered 16   Resp 17   FiO2  30 %   Vt Exhaled 256 mL   Minute Volume 4.3 Liters   Mask Leak (lpm) 5 lpm   Comfort Level Good   Using Accessory Muscles No   SpO2 97   Patient Observation   Observations mepilex on nose   Alarm Settings   Alarms On Y   Oxygen Therapy/Pulse Ox   SpO2 97 %

## 2020-09-29 NOTE — FLOWSHEET NOTE
09/28/20 0553   Assessment   Charting Type Shift assessment   Neurological   Neuro (WDL) X   Level of Consciousness 1   Orientation Level KAREN   [place] Coma Scale   Eye Opening 3   Best Verbal Response 2   Best Motor Response 5   Jose Cruz Coma Scale Score 10   HEENT   HEENT (WDL) X   Right Eye Impaired vision   Left Eye Impaired vision   Teeth Missing teeth   Respiratory   Respiratory (WDL) X   Respiratory Pattern Regular   Respiratory Depth Normal   Respiratory Quality/Effort Diaphragmatic   Chest Assessment Chest expansion symmetrical;Trachea midline   L Breath Sounds Diminished; Expiratory Wheezes   R Breath Sounds Diminished; Expiratory Wheezes   Breath Sounds   Right Upper Lobe Diminished; Expiratory Wheezes   Right Middle Lobe Diminished; Expiratory Wheezes   Right Lower Lobe Diminished; Expiratory Wheezes   Left Upper Lobe Diminished; Expiratory Wheezes   Left Lower Lobe Diminished; Expiratory Wheezes   Cardiac   Cardiac (WDL) WDL   Cardiac Regularity Regular   Cardiac Rhythm NSR   Cardiac Monitor   Telemetry Monitor On Yes   Telemetry Audible Yes   Telemetry Alarms Set Yes   Telemetry Box Number 25   Gastrointestinal   Abdominal (WDL) X   Abdomen Inspection Distended; Taut   RUQ Bowel Sounds Hypoactive   LUQ Bowel Sounds Hypoactive   RLQ Bowel Sounds Hypoactive   LLQ Bowel Sounds Hypoactive   Peripheral Vascular   Peripheral Vascular (WDL) X   Edema Right upper extremity; Left upper extremity   RUE Edema +2;Non-pitting   LUE Edema +2;Non-pitting   Skin Color/Condition   Skin Color/Condition (WDL) X   Skin Integrity   Skin Integrity (WDL) X   Skin Integrity Redness   Location sacrum and coccyx   Preventative Dressing Yes   Dressing Site Sacrum   Musculoskeletal   Musculoskeletal (WDL) X   RUE KAREN   LUE KAREN   RL Extremity KAREN   LL Extremity KAREN   Genitourinary   Genitourinary (WDL) X  (parnell)   Anus/Rectum   Anus/Rectum (WDL) WDL   Urethral Catheter Temperature probe 16 fr   Placement Date/Time: 09/22/20 2178 Urethral Catheter Timeout: Patient;Procedure;Sterile technique  Inserted by: kaushal flores  Catheter Type: Temperature probe  Tube Size (fr): 16 fr  Catheter Balloon Size: 10 mL  Collection Container: Other (Comment)  . ..    Catheter Indications Need for fluid management in critically ill patients in a critical care setting not able to be managed by other means such as BSC with hat, bedpan, urinal, condom catheter, or short term intermittent urethral catherization   Site Assessment No urethral drainage   Urine Color Yellow   Urine Appearance Hazy   Psychosocial   Psychosocial (WDL) X   Patient Behaviors Not interactive

## 2020-09-29 NOTE — PROGRESS NOTES
Orders placed for continuous tube feed via NGT. MD Nico Cruz notified. Per MD Nico Cruz, pt to receive feedings only when not on bipap.     bipap will be placed back on pt at 1600 for the remaining of the evening/night. (4 hrs on, 4 hrs off, continuous bipap at night). Will start feedings tomorrow morning once off bipap.

## 2020-09-29 NOTE — PLAN OF CARE
Problem: Skin Integrity:  Goal: Absence of new skin breakdown  Description: Absence of new skin breakdown  Outcome: Ongoing  Note: Pt is at risk for skin breakdown. Pt will have skin assessments every shift, Q2 turns, heels elevated off of the bed, and friction and shear prevented when possible. Will continue to monitor for signs of skin breakdown and enforce prevention measures. Problem: Falls - Risk of:  Goal: Will remain free from falls  Description: Will remain free from falls  Outcome: Ongoing  Note: Pt will remain free from falls throughout hospital stay. Fall precautions in place, bed alarm on, bed in lowest position with wheels locked and side rails 2/4 up. Room door open and hourly rounding completed. Will continue to monitor throughout shift.

## 2020-09-29 NOTE — PROGRESS NOTES
09/29/20 0810   NIV Type   NIV Started/Stopped On   Equipment Type V60   Mode Bilevel   Mask Type Full face mask   Mask Size Small   Bonnet size Medium   Settings/Measurements   IPAP 16 cmH20   CPAP/EPAP 6 cmH2O   Rate Ordered 16   Resp 19   FiO2  30 %   Vt Exhaled 380 mL   Minute Volume 6.6 Liters   Mask Leak (lpm) 16 lpm   Comfort Level Good   Using Accessory Muscles No   SpO2 97   Alarm Settings   Alarms On Y   Press Low Alarm 6 cmH2O   High Pressure Alarm 30 cmH2O   Delay Alarm 20 sec(s)   Apnea (secs) 20 secs   Resp Rate Low Alarm 6   High Respiratory Rate 40 br/min   Oxygen Therapy/Pulse Ox   O2 Therapy Oxygen   $Oxygen $Daily Charge   O2 Device PAP (positive airway pressure)   SpO2 97 %   $Pulse Oximeter $Spot check (multiple/continuous)

## 2020-09-30 NOTE — PROCEDURES
INSTRUMENTAL SWALLOW REPORT  MODIFIED BARIUM SWALLOW      Recommended Diet:  Solid consistency: Dysphagia Pureed (Dysphagia I)  Liquid consistency: Mildly Thick ([place])(VIA TSP ONLY)  Liquid administration via: Spoon  Medication administration: Meds in puree  *Total feed assist, 1:1 supervision      NAME: Mackey Nyhan   : 1965  MRN: [id number]       Date of Eval: 2020     Ordering Physician: Dr. Miki Cordova  Radiologist: Dr. Kulwinder Guerra     Referring Diagnosis(es): Referring Diagnosis: Dysphagia    Past Medical History:  has a past medical history of Acne, Anemia, Cataract, CHF (congestive heart failure) ([address] COPD (chronic obstructive pulmonary disease) ([facility] [address] [facility] GERD (gastroesophageal reflux disease), Hearing loss, Hernia, umbilical, Hip fracture, HTN, Mental retardation, and Peripheral vascular disease ([facility] [address] [facility] Past Surgical History:  has a past surgical history that includes Hip fracture surgery (); Umbilical hernia repair; Upper gastrointestinal endoscopy (2018); Colonoscopy (2018); and Femur fracture surgery (Left, 9/3/2019). Current Diet Solid Consistency: NPO(Currently has NG)  Current Diet Liquid Consistency: NPO(Currently has NG)    Date of Prior Study: n/a  Type of Study: Initial MBS  Results of Prior Study: n/a    Recent CXR (20): Impression    Bibasilar opacities, likely combination of atelectasis/airspace disease with    layered pleural effusions, larger on the left.  There improvement in aeration    of the left lung in the interval, which may be related to differences in    positioning.             Patient Complaints/Reason for Referral:  Mackey Nyhan was referred for a MBS to assess the efficiency of his/her swallow function, assess for aspiration, and to make recommendations regarding safe dietary consistencies, effective compensatory strategies, and safe eating environment.   Patient complaints: n/a; pt currently NPO with NG in place    General Comment: Pt admitted d/t AMS, pancreatitis. Pt has hx of COPD, GERD, CHF, and MR per chart. Pt is nonverbal at baseline. Pt currently with NG in place. Behavior/Cognition/Vision/Hearing:  Behavior/Cognition: Alert; Cooperative; Requires cueing  Vision: (KAREN)  Hearing: Exceptions to WFL(KAREN)    Impressions:  Treatment Dx: Dysphagia    Oral Preparation / Oral Phase  Impaired  Oral Phase - Major Contributing Deficits  Poor Mastication: Mechanical soft solid  Weak Lingual Manipulation: All  Reduced Posterior Propulsion: Mechanical soft solid  Reduced Bolus Control: All  Decreased Bolus Cohesion: All  Piecemeal Swallowing: Mechanical soft solid  Premature Bolus Loss to Pharynx: All  Oral Phase: NTL and TL trials attempted via cup and straw without success -- pt unable to form adequate labial seal despite cues / assistance. Pooling at anterior sulcus with TL and NTL. Noted poor mastication with mech soft trial with majority of bolus remaining in anterior portion of oral cavity throughout majority of mastication. Pharyngeal Phase  Impaired  Pharyngeal Phase - Major Contributing Deficits  Premature Spillage to Valleculae: All  Premature Spillage to Pyriform: Nectar teaspoon; Thin teaspoon  Reduced Laryngeal Elevation: All  Reduced Anterior Laryngeal Movement: All  Reduced Thyrohyoid Approximation: All  Pooling Valleculae: Thin teaspoon  Pooling Pyriform: Thin teaspoon  Reduced Tongue Base Retraction: All(Mildly reduced; however, pt also currently has NG in place)  Pharyngeal Residue - Valleculae: All(Minimal)  Pharyngeal Residue - Pyriform: All(Minimal)  Pharyngeal Phase: No penetration / aspiration noted with all PO trials during study. Increased premature spillage observed with TL vs NTL trials via tsp. Esophageal Phase  Appears WFL when viewed at the cervical level throughout the duration of the study      Consistencies Administered: Dysphagia Minced and Moist (Dysphagia II); Dysphagia Pureed (Dysphagia I); Thin teaspoon;Nectar  teaspoon(TL and NTL attempted via straw and cup without success; pt unable to form adequate labial seal despite cues / assistance)    Dysphagia Outcome Severity Scale: Level 3: Moderate dysphagia- Total assisstance, supervision or strategies. Two or more diet consistencies restricted  Penetration-Aspiration Scale (PAS): 1 - Material does not enter the airway    Safe Swallow Protocol:  Supervision: 1:1  Compensatory Swallowing Strategies: Alternate solids and liquids;Eat/Feed slowly;Upright as possible for all oral intake; No straws;Remain upright for 30-45 minutes after meals;Small bites/sips; Total feed    Recommendations/Treatment  Requires SLP Intervention: Yes  D/C Recommendations: SNF    Recommended Exercises:    Therapeutic Interventions: Patient/Family education;Oral care;Diet tolerance monitoring    Education: Images and recommendations were reviewed with pt and RN via phone following this exam.   Patient Education: Pt educated on MBSS procedure, assessment results and recommendations. RN notified of recs. Patient Education Response: No evidence of learning    Prognosis  Prognosis for safe diet advancement: fair  Barriers to reach goals: cognitive deficits;language deficits;time post onset;inconsistent alertness;severity of dysphagia  Duration/Frequency of Treatment  Duration/Frequency of Treatment: 3-5x/week for LOS  Safety Devices  Safety Devices in place: Yes  Type of devices: All fall risk precautions in place      Goals:    Long Term:   Timeframe for Long-term Goals: 10 days (10/8/20)  Goal 1: The pt will tolerate safest and least restrictive diet without s/s of aspiration. Short Term:  Dysphagia Goals: The patient will tolerate recommended diet without observed clinical signs of aspiration; The patient/caregiver will demonstrate understanding of compensatory strategies for improved swallowing safety. ;The patient will tolerate instrumental swallowing procedure; The patient will tolerate repeat BSE when able. ;The patient will tolerate mechanical soft foods 10/10. ;The patient will tolerate thin liquids without signs and symptoms of aspiration 10/10 via tsp.       Pain   Patient Currently in Pain: No  Pain Level: 0      Therapy Time:   Individual Concurrent Group Co-treatment   Time In 1400         Time Out 1415         Minutes 15           MBSS procedure    Tatyana Powell M.S. Alexander Lloyd  Speech-language pathologist  [phone]

## 2020-09-30 NOTE — PROGRESS NOTES
INPATIENT PULMONARY CRITICAL CARE PROGRESS NOTE      Reason for visit: Acute on chronic respiratory failure (hypoxemic), acute encephalopathy, acute pulmonary edema, bibasilar atelectasis, septic shock, LINDA, acute pancreatitis    SUBJECTIVE: The patient is nonverbal, afebrile and hemodynamically stable. The patient had hypercarbia, was on BiPAP intermittently, receiving tube feeds intermittently. As per nursing, he was awake, was watching a ball game with his father. He appears to be slightly more responsive. Unable to converse with the patient. He does not have increased inspiratory effort, does not appear to be in distress. He opens his eyes when his name is called, but otherwise is noncommunicative. He has an NG in place, receiving tube feeds. Physical Exam:  Blood pressure (!) 97/58, pulse 81, temperature 98.1 °F (36.7 °C), resp. rate 16, height 4' 8\" (1.422 m), weight 216 lb 4.3 oz (98.1 kg), SpO2 99 %.'   Constitutional:  No acute distress. Sleepy/lethargic, nonverbal.  Gavin Battles his eyes when stimulated  HENT: NG, oropharynx not examined as he does not open his mouth. Mucosa appears dry. Eyes:  Conjunctivae are normal. Pupils equal, round, and reactive to light. No scleral icterus. Neck: Short and large neck, no JVD. Cardiovascular: Normal rate, regular rhythm, distant heart sounds. No right ventricular heave. No lower extremity edema. Pulmonary/Chest: No wheezes. No rales. No accessory muscle usage or stridor. Abdominal: Soft, protuberant, fatty abdominal wall. Bowel sounds are brisk. No distension or tenderness. Musculoskeletal: No cyanosis. No clubbing. No obvious joint deformity except for short limbs. Lymphadenopathy: Deferred  Skin: Skin is warm and dry. No rash or nodules on the exposed extremities. Psychiatric: Could not be assessed.    Neurologic: Cannot be assessed        Results:  CBC:   Recent Labs     09/28/20  0445 09/29/20  0616   WBC 11.7* 10.1   HGB 8.3* 8.2*   HCT 25.4* 24.5*   MCV 96.1 95.5   PLT 82* 101*     BMP:   Recent Labs     09/28/20  0445 09/29/20  0616 09/30/20  0447    136 140   K 3.7  3.7 3.8 3.8    100 100   CO2 29 30 30   PHOS 3.4 2.7 2.3*   BUN 27* 20 18   CREATININE 1.2 1.1 1.0       Imaging:  I have reviewed radiology images personally. FL MODIFIED BARIUM SWALLOW W VIDEO   Final Result   No kaden aspiration. Please see separate speech pathology report for full discussion of findings   and recommendations. XR ABDOMEN (KUB) (SINGLE AP VIEW)   Final Result   Unremarkable bowel gas pattern. NG tube extends well into the stomach. XR CHEST PORTABLE   Final Result   Bibasilar opacities, likely combination of atelectasis/airspace disease with   layered pleural effusions, larger on the left. There improvement in aeration   of the left lung in the interval, which may be related to differences in   positioning. XR CHEST PORTABLE   Final Result   Increased opacification within the left lung may represent a layering pleural   effusion. XR CHEST PORTABLE   Final Result   New small bilateral pleural effusions and bilateral airspace disease which   may be related to edema and/or pneumonia. XR ABDOMEN FOR NG/OG/NE TUBE PLACEMENT   Final Result   Gastric tube in mid stomach         US RENAL COMPLETE   Final Result   1. Unremarkable sonographic appearance of the kidneys. 2. Collapsed urinary bladder, limiting its evaluation. XR ABDOMEN (KUB) (SINGLE AP VIEW)   Final Result   Unchanged moderate amount of gaseous distention of the stomach. No other   findings to explain abdominal distension. Of note however these images were   obtained supine and early free air would not be detected.            Xr Abdomen (kub) (single Ap View)    Result Date: 9/23/2020  EXAMINATION: ONE SUPINE XRAY VIEW(S) OF THE ABDOMEN 9/23/2020 7:39 am COMPARISON: CT abdomen and pelvis September 22, 2020 HISTORY: [id number] PROVIDED HISTORY: abdominal distention TECHNOLOGIST PROVIDED HISTORY: Reason for exam:->abdominal distention FINDINGS: There is unchanged gaseous distention of the stomach. No evidence of bowel distention elsewhere. No evidence of free air although this is very limited as these images were obtained supine     Unchanged moderate amount of gaseous distention of the stomach. No other findings to explain abdominal distension. Of note however these images were obtained supine and early free air would not be detected. Xr Abdomen (kub) (single Ap View)    Result Date: 9/22/2020  EXAMINATION: ONE SUPINE XRAY VIEW(S) OF THE ABDOMEN 9/22/2020 4:46 am COMPARISON: Chest radiograph earlier same date. HISTORY: ORDERING SYSTEM PROVIDED HISTORY: abdominal distension TECHNOLOGIST PROVIDED HISTORY: Reason for exam:->abdominal distension Reason for Exam: distention Acuity: Acute Type of Exam: Initial There is moderate gaseous distension of the stomach. Only a few gas-filled loops bowel are seen. Paucity of bowel gas rendering bowel gas pattern indeterminate. No focal dilated gas-filled loops of bowel are identified. There postsurgical changes in both hips. No acute osseous abnormality. FINDINGS: Moderate gaseous distension of the stomach. Correlation for gastric outlet obstruction recommended. Indeterminate bowel gas pattern. Ct Head Wo Contrast    Result Date: 9/23/2020  EXAMINATION: CT OF THE HEAD WITHOUT CONTRAST  9/22/2020 6:36 am TECHNIQUE: CT of the head was performed without the administration of intravenous contrast. Dose modulation, iterative reconstruction, and/or weight based adjustment of the mA/kV was utilized to reduce the radiation dose to as low as reasonably achievable. COMPARISON: None. HISTORY: ORDERING SYSTEM PROVIDED HISTORY: decreased responsiveness TECHNOLOGIST PROVIDED HISTORY: Reason for exam:->decreased responsiveness Has a \"code stroke\" or \"stroke alert\" been called? ->No Reason for Exam: decreased responsiveness Acuity: Unknown Type of Exam: Initial Additional signs and symptoms: decreased responsiveness FINDINGS: BRAIN/VENTRICLES: Patient is tilted in the CT gantry. Mild motion/streak artifact is present on the examination. The ventricular system is normal in appearance. No evidence of mass effect or midline shift. Prominence of sulci overlying convexities of cerebral hemispheres and cerebellum consistent with atrophy. Tiny focus of low attenuation along the lateral margin of the head of the left caudate nucleus (image 34) suggest small focus of remote ischemic change. Very tiny focus of low attenuation in left basal ganglia region (image 36) could represent perivascular space versus tiny focus of remote ischemic change as well. There is a very tiny focus of low attenuation near the anterior limb of the right internal capsule (image 38). Finding may represent tiny focus of ischemic change which could be subacute to chronic in age. Very minimal low attenuation within periventricular/subcortical white matter suggest changes of minimal ischemic leukoencephalopathy. No abnormal extra-axial fluid collections are identified. There is atherosclerotic calcification of distal internal carotid arteries. There is asymmetric dolichoectasia of the distal right internal carotid artery as compared to the left. ORBITS: The visualized portion of the orbits demonstrate no acute abnormality. SINUSES: There is minimal mucosal thickening within a few bilateral ethmoid air cells. There is asymmetric thickening of the wall of the right maxillary sinus when compared to the right which raises possibility of changes associated with chronic sinus disease. There is mucosal thickening with minimally complex air-fluid/mucus level within the right maxillary sinus. Presence of the fluid level suggest component of acute paranasal sinus disease.   Abnormal lucency identified about several maxillary teeth suggest presence of dental caries. Abnormal lucency identified in the region of the roots of several maxillary teeth most pronounced along the 2nd maxillary incisor where there is cortical disruption (image 4). Findings may be on the basis of periodontal disease. Mastoid air cells are well aerated. SOFT TISSUES/SKULL:  No acute abnormality of the visualized skull or soft tissues. 1. No definite evidence of acute intracranial abnormality. 2. Tiny focus of low attenuation near anterior limb of right internal capsule. Finding may represent tiny focus of ischemic change which could be subacute to chronic in age. Follow-up MRI examination with diffusion imaging may be helpful for more complete evaluation if clinically indicated. 3. Paranasal sinus disease with visualization of small air-fluid/mucus level within the right maxillary sinus. 4. Findings suggestive of presence of dental caries with abnormal lucency surrounding roots of several teeth which raises possibility of associated periodontal disease as described above. Us Renal Complete    Result Date: 9/23/2020  EXAMINATION: RETROPERITONEAL ULTRASOUND OF THE KIDNEYS AND URINARY BLADDER 9/23/2020 COMPARISON: Abdominal CT 09/22/2020 HISTORY: ORDERING SYSTEM PROVIDED HISTORY: alba TECHNOLOGIST PROVIDED HISTORY: Reason for exam:->alba FINDINGS: Kidneys: The right kidney measures 8.1 x 5.4 x 4.7 cm in size and the left kidney measures 9.4 x 5.8 x 5.2 cm in size. Kidneys demonstrate normal cortical echogenicity. No evidence of hydronephrosis or intrarenal stones. Bladder: The urinary bladder is collapsed, limiting its evaluation. 1. Unremarkable sonographic appearance of the kidneys. 2. Collapsed urinary bladder, limiting its evaluation.      Ct Abdomen Pelvis W Iv Contrast Additional Contrast? None    Result Date: 9/22/2020  EXAMINATION: CT OF THE ABDOMEN AND PELVIS WITH CONTRAST 9/22/2020 10:11 am TECHNIQUE: CT of the abdomen and pelvis was performed with the Tissues: Spurring is seen in the spine. Streak artifact is seen from orthopedic hardware in the proximal right and left femur, as well as spinal stabilization rods. Gaseous distention of the stomach. No small bowel obstruction. Injection of fat is seen surrounding the pancreas suggestive of early pancreatitis Small nodular soft tissue density is seen in the right inguinal canal, most commonly reactive lymph node in the absence of a known primary     Xr Chest Portable    Result Date: 9/27/2020  EXAMINATION: ONE XRAY VIEW OF THE CHEST 9/27/2020 6:06 am COMPARISON: Yesterday HISTORY: ORDERING SYSTEM PROVIDED HISTORY: left pleural effusion TECHNOLOGIST PROVIDED HISTORY: Reason for exam:->left pleural effusion Reason for Exam: left pleural effusion Type of Exam: Subsequent/Follow-up FINDINGS: There is a left thoracic lumbar support maximus with sub laminar hooks. Scoliotic curvature of the thoracic spine with dextro is noted. Nasogastric tube has a normal course, extending at least to the mid stomach. PICC extends to the superior vena cava. The cardiac silhouette is stable. There is hazy opacity in the lung bases. There is improved aeration of the left upper lung. Bibasilar opacities, likely combination of atelectasis/airspace disease with layered pleural effusions, larger on the left. There improvement in aeration of the left lung in the interval, which may be related to differences in positioning. Xr Chest Portable    Result Date: 9/26/2020  EXAMINATION: ONE XRAY VIEW OF THE CHEST 9/26/2020 6:01 am COMPARISON: September 25, 2020 HISTORY: ORDERING SYSTEM PROVIDED HISTORY: pulm edema TECHNOLOGIST PROVIDED HISTORY: Reason for exam:->pulm edema Reason for Exam: pulm edema Acuity: Acute Type of Exam: Initial FINDINGS: Rotated positioning limits evaluation. Thoracic scoliosis maximus. Enteric tube extends beyond the gastroesophageal junction. Right PICC with the tip near the mid SVC.  Small right lung base airspace disease, atelectasis versus pneumonia. Ir Picc Wo Sq Port/pump > 5 Years    Result Date: 9/22/2020  EXAMINATION: LIMITED ULTRASOUND OF THE ARM FOR PICC ACCESS, 9/22/2020 TECHNIQUE: The PICC team used ultrasound and VPS guidance to place a PICC line. HISTORY: ORDERING SYSTEM PROVIDED HISTORY: no access TECHNOLOGIST PROVIDED HISTORY: Reason for exam:->no access How many lumens are being requested?->2 What site is the preferred site? ->No preference What side should this line be placed? ->Either Reason for Exam: no access Acuity: Acute Type of Exam: Initial Additional signs and symptoms: 34 cm 2 out single lumen non tunneled power PICC right basilic US/VPS guidance FLUOROSCOPY DOSE AND TYPE OR TIME AND EXPOSURES: No fluoroscopy. 0 images. FINDINGS: Ultrasound images demonstrate patency of the right basilic vein which was used for access for placement of a PICC by the PICC team, without a radiologist present. VPS guidance was used by the PICC team By report, a single lumen PICC, trimmed to 34 cm was placed. Successful placement of PICC line. Xr Abdomen For Ng/og/ne Tube Placement    Result Date: 9/23/2020  EXAMINATION: ONE SUPINE XRAY VIEW(S) OF THE ABDOMEN 9/23/2020 10:41 am COMPARISON: None. HISTORY: ORDERING SYSTEM PROVIDED HISTORY: Confirmation of course of NG/OG/NE tube and location of tip of tube TECHNOLOGIST PROVIDED HISTORY: Reason for exam:->Confirmation of course of NG/OG/NE tube and location of tip of tube Portable? ->Yes FINDINGS: Gastric tube in the mid stomach. Gastric tube in mid stomach     Cta Head Neck W Contrast    Result Date: 9/22/2020  EXAMINATION: CTA OF THE HEAD AND NECK WITH CONTRAST 9/22/2020 10:11 am TECHNIQUE: CTA of the head and neck was performed with the administration of intravenous contrast. Multiplanar reformatted images are provided for review. MIP images are provided for review. Stenosis of the internal carotid arteries measured using NASCET criteria.  Dose modulation, iterative reconstruction, and/or weight based adjustment of the mA/kV was utilized to reduce the radiation dose to as low as reasonably achievable. COMPARISON: None HISTORY: ORDERING SYSTEM PROVIDED HISTORY: assess for stroke TECHNOLOGIST PROVIDED HISTORY: Reason for exam:->assess for stroke Reason for Exam: presented in ED with low body temp. pt from nursing home facility next door to hospital Acuity: Acute Type of Exam: Initial FINDINGS: CTA NECK: AORTIC ARCH/ARCH VESSELS: No dissection or arterial injury. No significant stenosis of the brachiocephalic or subclavian arteries. CAROTID ARTERIES: No dissection, arterial injury, or hemodynamically significant stenosis by NASCET criteria. VERTEBRAL ARTERIES: No dissection, arterial injury, or significant stenosis. SOFT TISSUES: The lung apices are clear. No cervical or superior mediastinal lymphadenopathy. The larynx and pharynx are unremarkable. No acute abnormality of the salivary and thyroid glands. BONES: Thoracic spinal hardware is incompletely imaged but includes a maximus with laminar hook beginning at T3. There is chronic right maxillary sinusitis. CTA HEAD: ANTERIOR CIRCULATION: No significant stenosis of the intracranial internal carotid, anterior cerebral, or middle cerebral arteries. No aneurysm. POSTERIOR CIRCULATION: No significant stenosis of the vertebral, basilar, or posterior cerebral arteries. No aneurysm. OTHER: No dural venous sinus thrombosis on this non-dedicated study. BRAIN: No mass effect or midline shift. No extra-axial fluid collection. The gray-white differentiation is maintained. No large vessel occlusion. Critical results were called by Dr. Olympia Ormond to Dr. Blaine Weir On 9/22/2020 at 11:03. Assessment and plan:  Acute encephalopathy. Likely due to hypercarbia, improved with BiPAP. Continue with intermittent BiPAP in the daytime, BiPAP at night. Have informed nursing not to feed him while he is on BiPAP.   Acute on chronic respiratory failure with hypercapnia. Repeat ABG slightly better with regards to pH. Acute pulmonary edema. Per IM  Pulmonary infiltrates/atelectasis. Unimpressive on chest x-ray 9/27   LINDA , hyperkalemia. Renal function improved  Anemia. Normochromic, normocytic. Follow profile. Discontinue daily CBC. Thrombocytopenia. Mild. Improving. Discontinue daily CBC  HTN, hypothyroidism, GERD. Per IM  Suspected GRICEL. Evaluation as an outpatient  Morbid obesity. Address as an outpatient  PUD prophylaxis. PPI  DVT prophylaxis. Thrombocytopenic    Discussed with nursing. Limited treatment options in my opinion.       Electronically signed by:  Svetlana Reid MD    9/30/2020    6:03 PM.

## 2020-09-30 NOTE — PROGRESS NOTES
09/30/20 0818   NIV Type   Skin Protection for O2 Device Yes   Equipment Type V60   Mode Bilevel   Mask Type Full face mask   Mask Size Small   Settings/Measurements   IPAP 16 cmH20   CPAP/EPAP 6 cmH2O   Rate Ordered 16   Resp 17   FiO2  25 %   Vt Exhaled 322 mL   Minute Volume 4.8 Liters   Mask Leak (lpm) 26 lpm   Comfort Level Good   Using Accessory Muscles No   SpO2 94   Breath Sounds   Right Upper Lobe Diminished   Right Middle Lobe Diminished   Right Lower Lobe Diminished   Left Upper Lobe Diminished   Left Lower Lobe Diminished   Alarm Settings   Alarms On Y   Press Low Alarm 6 cmH2O   High Pressure Alarm 30 cmH2O   Apnea (secs) 20 secs   Resp Rate Low Alarm 6   High Respiratory Rate 40 br/min   Oxygen Therapy/Pulse Ox   O2 Therapy Oxygen   $Oxygen $Daily Charge   O2 Device PAP (positive airway pressure)   SpO2 94 %   $Pulse Oximeter $Spot check (multiple/continuous)

## 2020-09-30 NOTE — PROGRESS NOTES
Entered room, pts eyes met mine. Pt is watching ball game. Checked residual on tf. 3 cc pulled back and entered back.  Tf continues

## 2020-09-30 NOTE — PROGRESS NOTES
Edna Singh   Patient: Ling Sapp   0'\"   9/30/20 4:28 PM   [phone] Hospital or Facility: [facility] From: [facility], [facility] RE: Shin Lau 1965 RM: 440 pt passed barium swallow. slp said he actually did very well. she recommended Recommended Diet: Solid consistency: Dysphagia Pureed (Dysphagia I) Liquid consistency: Mildly Thick ([place])(VIA TSP ONLY) Liquid administration via: Spoon Medication administration: Meds in puree *Total feed assist, 1:1 supervision. pt has ngt in place and has tube feed orders. I have called out to gi twice with no response. to we leave ngt in and start tube feeds. or can he resume oral diet and remove ngt. please review and advise. pt is awake and alert and wishes to eat.  ty Need Callback: NEEDS CALLBACK C4 PROGRESSIVE CARE  Unread

## 2020-09-30 NOTE — PROGRESS NOTES
09/30/20 0316   NIV Type   Skin Protection for O2 Device Yes   Equipment Type v60   Mode Bilevel   Mask Type Full face mask   Mask Size Medium  (changed to small)   Settings/Measurements   IPAP 16 cmH20   CPAP/EPAP 6 cmH2O   Rate Ordered 16   Resp 20   FiO2  25 %   Vt Exhaled 319 mL   Minute Volume 6.2 Liters   Mask Leak (lpm) 24 lpm   Comfort Level Good   Using Accessory Muscles No   SpO2 95   Alarm Settings   Alarms On Y   Press Low Alarm 6 cmH2O   High Pressure Alarm 30 cmH2O   Apnea (secs) 20 secs   Resp Rate Low Alarm 6   High Respiratory Rate 40 br/min   Oxygen Therapy/Pulse Ox   SpO2 95 %

## 2020-09-30 NOTE — PROGRESS NOTES
Hospitalist Progress Note      PCP: No primary care provider on file. Date of Admission: 9/22/2020    Chief Complaint:  [address] Course:   HPI :   60 y.o. male with history of CHF, COPD, GERD,  MRDD who presented to  [facility] ER from group home with altered mental status. History obtained per chart as patient unable to provide any history due to AMS. Pt minimally verbal at baseline. Patient reportedly had reduced responsiveness at the ECF overnight and was sent to the ER. He was noted to be hypothermic in the 80s and hypotensive in the 60s. Initial concern for possible CVA - stroke team consulted. CT head was nonacute with tiny area of low attenuation near the anterior right  internal capsule which was thought to be likely subacute/chronic. CTA head/neck was unremarkable. PICC line placed in ER. Labs notable for severe hyperkalemia with potassium of 7.1 creatinine 1.5, TSH of 7.4. Lipase was  elevated at 1471. Temperature improved to 90's with karuna hugger and warm IVF. There was concern for myxedema coma and was started on IV Synthroid. He was given a dose of IV hydrocortisone, started on broad-spectrum antibiotics and  pressors. Pt was transferred to [facility] for further care. Subjective:      Pt non verbal at baseline. Pt somnolent, opened his eyes to voice today. Pt on BiPAP overnight.          Medications:  Reviewed    Infusion Medications     Scheduled Medications    potassium phosphate IVPB  10 mmol Intravenous Once    bisacodyl  5 mg Oral Daily    pantoprazole  40 mg Intravenous Daily    levothyroxine  25 mcg Oral Daily    oxybutynin  2.5 mg Oral Nightly    sodium chloride flush  10 mL Intravenous 2 times per day     PRN Meds: dextrose, ipratropium-albuterol, bisacodyl, sodium chloride flush, [DISCONTINUED] acetaminophen **OR** acetaminophen, promethazine **OR** ondansetron      Intake/Output Summary (Last 24 hours) at 9/30/2020 1027  Last data filed at 9/30/2020 0932  Gross per 24 which may be related to differences in   positioning. XR CHEST PORTABLE   Final Result   Increased opacification within the left lung may represent a layering pleural   effusion. XR CHEST PORTABLE   Final Result   New small bilateral pleural effusions and bilateral airspace disease which   may be related to edema and/or pneumonia. XR ABDOMEN FOR NG/OG/NE TUBE PLACEMENT   Final Result   Gastric tube in mid stomach         US RENAL COMPLETE   Final Result   1. Unremarkable sonographic appearance of the kidneys. 2. Collapsed urinary bladder, limiting its evaluation. XR ABDOMEN (KUB) (SINGLE AP VIEW)   Final Result   Unchanged moderate amount of gaseous distention of the stomach. No other   findings to explain abdominal distension. Of note however these images were   obtained supine and early free air would not be detected. FL MODIFIED BARIUM SWALLOW W VIDEO    (Results Pending)           Assessment/Plan:    Active Hospital Problems    Diagnosis    Hypernatremia [E87.0]    Thrombocytopenia ([address] [D69.6]    Acute encephalopathy [G93.40]    Acute respiratory failure with hypercapnia (HCC) [J96.02]    Pulmonary infiltrates [R91.8]    Acute pancreatitis without infection or necrosis [K85.90]    LINDA (acute kidney injury) ([address] [N17.9]    Hyperkalemia [E87.5]    Hypothyroidism [E03.9]    Shock ([address] [R57.9]    Hypothermia [Q35. XXXA]    Class 2 obesity in adult [E66.9]    Suspected sleep apnea [R29.818]       Acute metabolic encephalopathy: Likely multifactorial due to septic shock, hyperkalemia, hypothermia, ? Myxedema coma. Non verbal at baseline per report. CT head was nonacute. Stroke work-up negative in ER. Neuro recs appreciated. Supportive care for acute morbidities as stated below        Septic shock: met SIRS criteria with tachycardia, tachypnea, hypothermia on arrival in ER. Initial concern for pneumonia as cause. Pneumonia ruled out per pulm.  Empiric abx d/rich. Off pressors. BP stable. D/rich steroids. Continue to hold home BP meds    Clinically resolved. Acute on chronic resp failure:likely due to acute pulm edema and bibasilar atelectasis/ hypoventilation. Received IV lasix. CXR today reviewed. Continue diuresis as needed per pulm. Continue 02 and wean as tolerated. Patient is currently on BiPAP, continue to monitor.     Hypothyroidism: TSH elevated at 7.4, FT3, FT4  normal. Given decreased responsiveness from baseline and hypothermia concern for possible myxedema coma. recieved IV synthroid - started on PO on 9/23. repeat TFTs in 4-6 weeks      Hypothermia: Likely due to sepsis, hypothyroidism. Improved with karuna hugger . Temp normalized         Hyperkalemia: likely due to LINDA ,  potassium supplements he is on chronically and lisinopril. Treated with improvement. Held K supplements and lisinopril. Resolved. Nephro assisting     LINDA: likely due to vol depletion, compounded by use of ACEi and lasix. Culprit meds held. Nephro assisting. Cr unchanged. Monitor Cr and avoid nephrotoxins. Likely due to ATN, clinically improving, nephrology input noted, monitor.       Acute pancreatitis: lipase was elevated with signs of early pancreatitis on CT. Unclear etiology. GI assisting. Continue NG tube to LIWS, NPO         GERD : Continue PPI          Acute on Chronic diastolic CHF: EF 55 to 20% with G1DD on echo in 9/3/2019. Received IVF for sepsis, hypernatremia. Pulm edema on CXR. IV lasix given. Monitor vol status closely         Hypernatremia: improving with IVF. nephro managing . Hypoglycemia: likely due to NPO status. BG's better since getting dextrose in IVF. Monitor with hypoglycemia protocol in place. Thrombocytopenia: cause unclear. No overt bleeding. Heparin d/rich. HIT ab neg.  Monitor      DVT Prophylaxis: SCDs  Diet: DIET TUBE FEED CONTINUOUS/CYCLIC NPO; 1.5 Calorie with Fiber; Nasogastric; Continuous; 20; 65; Exceptions are: Sips with Meds  Code Status: Full Code    PT/OT Eval Status: not indicated at this time     Dispo -  Hard to say,  pending clinical course     Damaris Fall MD

## 2020-09-30 NOTE — PROGRESS NOTES
09/30/20 0025   NIV Type   $NIV $Daily Charge   Skin Protection for O2 Device Yes   Equipment Type v60   Mode Bilevel   Mask Type Full face mask   Mask Size Small  (changed to medium)   Settings/Measurements   IPAP 16 cmH20   CPAP/EPAP 6 cmH2O   Rate Ordered 16   Resp 19   FiO2  25 %   Vt Exhaled 393 mL   Minute Volume 6.5 Liters   Mask Leak (lpm) 64 lpm  (best seal obtained)   Comfort Level Good   Using Accessory Muscles No   SpO2 95   Breath Sounds   Right Upper Lobe Diminished   Right Middle Lobe Diminished   Right Lower Lobe Diminished   Left Upper Lobe Diminished   Left Lower Lobe Diminished   Alarm Settings   Alarms On Y   Press Low Alarm 6 cmH2O   High Pressure Alarm 30 cmH2O   Apnea (secs) 20 secs   Resp Rate Low Alarm 6   High Respiratory Rate 40 br/min   Oxygen Therapy/Pulse Ox   SpO2 95 %

## 2020-09-30 NOTE — CARE COORDINATION
Chart reviewed day #8. Due to potential long length of stay, referral made to [address] [place] [address] with [facility]. [facility] states that after review, patient does not meet Medicare guidelines for LTAC. Will stick with original plan of discharging him back to [facility] where he has been a resident for over 10 years.

## 2020-09-30 NOTE — PROGRESS NOTES
Nephrology Progress Note   [email or web address]      This patient is a 60year old male whom we are following for LINDA. Subjective: The patient was seen and examined. Hypoglycemia episode last night. BIPAP on since yesterday afternoon. Decent urine output. Family History: Family at bedside  ROS: Unable to obtain      Vitals:  /67   Pulse 76   Temp 97.6 °F (36.4 °C) (Oral)   Resp 17   Ht 4' 8\" (1.422 m)   Wt 216 lb 4.3 oz (98.1 kg)   SpO2 95%   BMI 48.49 kg/m²   I/O last 3 completed shifts: In: 2433 [I.V.:2433]  Out: 1100 [Urine:1100]  No intake/output data recorded. Physical Exam:  Physical Exam  Vitals signs reviewed. Constitutional:       General: He is not in acute distress. HENT:      Head: Normocephalic and atraumatic. Mouth/Throat:      Mouth: Mucous membranes are moist.   Eyes:      General: No scleral icterus. Cardiovascular:      Rate and Rhythm: Normal rate and regular rhythm. Heart sounds: No friction rub. Pulmonary:      Effort: Pulmonary effort is normal. No respiratory distress. Breath sounds: No wheezing. Abdominal:      General: Bowel sounds are normal. There is no distension. Tenderness: There is no abdominal tenderness. Musculoskeletal:      Right lower leg: Edema present. Left lower leg: Edema present.            Medications:   bisacodyl  5 mg Oral Daily    pantoprazole  40 mg Intravenous Daily    levothyroxine  25 mcg Oral Daily    oxybutynin  2.5 mg Oral Nightly    sodium chloride flush  10 mL Intravenous 2 times per day         Labs:  Recent Labs     09/28/20 0445 09/29/20 0616   WBC 11.7* 10.1   HGB 8.3* 8.2*   HCT 25.4* 24.5*   MCV 96.1 95.5   PLT 82* 101*     Recent Labs     09/28/20 0445 09/29/20  0616 09/30/20 0447    136 140   K 3.7  3.7 3.8 3.8    100 100   CO2 29 30 30   GLUCOSE 140* 111* 82   PHOS 3.4 2.7 2.3*   MG 1.90 2.00 2.10   BUN 27* 20 18   CREATININE 1.2 1.1 1.0   LABGLOM >60 >60 >60   GFRAA >60 >60 >60           Assessment/Plan:    Acute Kidney Injury:    - Data:  UA bland.   Normal renal u/s  - Etiology:  ATN  - Clinical:  Improving.     Hyperkalemia:    - Due to acute kidney injury, Resolved      Altered Mental Status:    - Multifactorial.  Neurology following, favor metabolic process. Pulmonary following for CO2 retention.     Hypernatremia:    - Acute onset. Improved with hypotonic IV fluids.  - Noted plans to start TF today. Can add FWF if needed. Hypophosphatemia. - PRN replacement.     Heart Failure:    - History of diastolic heart failure.  EF normal    Please do not hesitate to contact me at [phone] if with questions. Thank you!     Trish Gonzalez MD  9/30/2020  [facility]

## 2020-09-30 NOTE — PROGRESS NOTES
Page sent to Dr. Stephen Pina regarding pt's current condition and order requests. Received call back @ [phone] with orders to continue warm compress and assessing pt's right eye. Ordered ABG per physician's okay via telephone. Will alert RT with results so settings on BiPAP can be adjusted is necessary. No orders to restart dextrose 5% solution at this time. Plan is to continue to monitor blood glucose Q2 hrs and cover with PRN protocol until tube feed is started later today. 9/30/20 5:13 AM   [phone] Hospital or Facility: [facility] From: Donna Parnell RE: Adri Camacho 1965 RM: [id number] Requesting ointment for pt's right eye; it has been swollen and slightly reddened at times with noticeable drainage. Warm compresses and frequent cleaning have lessened drainage and redness since beginning of shift. Also, RT reports pt's tidal volume is not exceeding the low 300's. Pt has been on BiPAP consistently since 1630 pm yesterday. Requesting a repeat ABG before RT changes settings on BiPAP. Finally, dextrose 5% IV fluid was discontinued yesterday afternoon. Pt's blood sugar reached 69 at one point overnight and came back up after 12.5 g D50. Current Blood sugar is 86. Would you like to restart the dextrose 5% solution until Tube Feed I initiated later today? Please advise. Thank you!  Need Callback: NO CALLBACK REQ C4 PROGRESSIVE CARE  Read @ 6403 AM

## 2020-09-30 NOTE — PROGRESS NOTES
09/29/20 2034   NIV Type   Skin Protection for O2 Device Yes   NIV Started/Stopped   (pt found on BIPAP)   Equipment Type v60   Mode Bilevel   Mask Type Full face mask   Mask Size Small   Settings/Measurements   IPAP 16 cmH20   CPAP/EPAP 6 cmH2O   Rate Ordered 16   Resp 18   FiO2  30 %  (decreased to 25%)   Vt Exhaled 273 mL   Mask Leak (lpm) 10 lpm   Comfort Level Good   Using Accessory Muscles No   SpO2 100   Breath Sounds   Right Upper Lobe Diminished   Right Middle Lobe Diminished   Right Lower Lobe Diminished   Left Upper Lobe Diminished   Left Lower Lobe Diminished   Alarm Settings   Alarms On Y   Press Low Alarm 6 cmH2O   High Pressure Alarm 30 cmH2O   Apnea (secs) 20 secs   Resp Rate Low Alarm 6   High Respiratory Rate 40 br/min

## 2020-09-30 NOTE — PROGRESS NOTES
Physical Therapy  Facility/Department: [facility]  Daily Treatment Note  NAME: Dulce Houston  : 1965  MRN: [id number]    Date of Service: 2020    Discharge Recommendations:  Subacute/Skilled Nursing Facility        Assessment   Body structures, Functions, Activity limitations: Decreased functional mobility ; Decreased strength;Decreased balance;Decreased safe awareness  Assessment: Pt particpated in BUE and BLE PROM/AAROM X 10 reps to assist in maintianing joint ROM and lessen burden of care. Pt is recommended for SNF wiht at D/C pending progress and participation. Treatment Diagnosis: Decreased mobility  Specific instructions for Next Treatment: ther ex, functional mob, progress to transfers as able  Prognosis: Fair  Decision Making: Medium Complexity  Patient Education: Pt is non verbal, hx MR, no evidence of learning  Barriers to Learning: MR  REQUIRES PT FOLLOW UP: Yes  Activity Tolerance  Activity Tolerance: Patient Tolerated treatment well     Patient Diagnosis(es): There were no encounter diagnoses. has a past medical history of Acne, Anemia, Cataract, CHF (congestive heart failure) ([address] COPD (chronic obstructive pulmonary disease) ([address] GERD (gastroesophageal reflux disease), Hearing loss, Hernia, umbilical, Hip fracture, HTN, Mental retardation, and Peripheral vascular disease ([address] has a past surgical history that includes Hip fracture surgery (); Umbilical hernia repair; Upper gastrointestinal endoscopy (2018); Colonoscopy (2018); and Femur fracture surgery (Left, 9/3/2019).     Restrictions  Restrictions/Precautions  Restrictions/Precautions: Strict Bedrest, Fall Risk, General Precautions  Position Activity Restriction  Other position/activity restrictions: NGT with wall suction parnell, non verbal with hx MR, per ED note pt does interact with people and amb with RW at baseline     Subjective   General  Chart Reviewed: Yes  Response To Previous Treatment: Patient with no complaints from previous session.(pt non- verbal during session, only making occassional sounds but no decernable words)  Family / Caregiver Present: No  Referring Practitioner: Noé  Subjective  Subjective: cooperative with therapy  General Comment  Comments: RN cleared pt for therapy, pt resting in bed on approach  Pain Screening  Patient Currently in Pain: Other (comment)  Pain Assessment  Pain Assessment: Faces  [place] Pain Rating: No hurt  Vital Signs  Patient Currently in Pain: Other (comment)          Objective   Bed mobility  Rolling to Left: Dependent/Total  Rolling to Right: Dependent/Total  Supine to Sit: Unable to assess  Transfers  Sit to Stand: Unable to assess  Stand to sit: Unable to assess  Bed to Chair: Unable to assess  Ambulation  Ambulation?: No        Exercises  Heelslides: X 10 BLE AA/PROM  Hip Abduction: X 10 BLE PROM, pt seems very tight in abduction  Knee Passive Range of Motion: Hip IR/ER: 10 x B AA/PROM  Ankle Pumps: X 15 BLE PROM  Upper Extremity: BUE PROM/AAROM finger grasp/release, elbow flex/ext, pronation/ supination, shld flex to 90 all X 10 reps        AM-PAC Score     AM-PAC Inpatient Mobility without Stair Climbing Raw Score : 7 (09/30/20 1315)  AM-PAC Inpatient without Stair Climbing T-Scale Score : 28.66 (09/30/20 1315)  Mobility Inpatient CMS 0-100% Score: 86.29 (09/30/20 1315)  Mobility Inpatient without Stair CMS G-Code Modifier : CM (09/30/20 1315)       Goals  Short term goals  Time Frame for Short term goals: 1 week (10/2) unless otherwise specified  Short term goal 1: pt to perform bed mob with min assist; ;9/30 dep for rolling R and L  Short term goal 2: pt to transfer to chair with mod assist; 9/30 KAREN  Short term goal 3: pt to participate in LE Ex 8-10 reps by 9/28; 9/30 PROM/AAROM X all 4  for 10 reps  Short term goal 4: Pt to stand with walker and min assist of 2; 9/30 KAREN  Patient Goals   Patient goals : Pt is non verbal , does not voice

## 2020-09-30 NOTE — FLOWSHEET NOTE
09/29/20 2110   Assessment   Charting Type Shift assessment   Neurological   Neuro (WDL) X   Level of Consciousness 0   Orientation Level KAREN   Cognition KAREN   Language KAREN   Size R Pupil (mm) 2   R Pupil Shape Round   R Pupil Reaction Brisk   Size L Pupil (mm) 2   L Pupil Shape Round   L Pupil Reaction Brisk   R Hand  KAREN   L Hand  KAREN   R Foot Dorsiflexion KAREN   L Foot Dorsiflexion KAREN   R Foot Plantar Flexion KAREN   L Foot Plantar Flexion KAREN   RUE Motor Response Movement to painful stimulus; Responds to command  (Will squeeze with hand and grab rail when rolling)   LUE Motor Response Movement to painful stimulus; Responds to command  (Will grab rail when rolling)   RLE Motor Response Movement to painful stimulus; Non-purposeful movement   LLE Motor Response Movement to painful stimulus; Non-purposeful movement   Gag KAREN   Tongue Deviation KAREN   [place] Coma Scale   Eye Opening 3   Best Verbal Response 1   Best Motor Response 5   [place] Coma Scale Score 9   NIH/MNHISS Stroke Scale   NIH/MNIHSS Stroke Scale Assessed No   HEENT   HEENT (WDL) X   Right Eye Drainage;Edema; Impaired vision   Left Eye Impaired vision   Nose Intact  (NGT)   Tongue Pink & moist   Voice Other (Comment)  (nonverbal at baseline)   Mucous Membrane Pink;Dry   Teeth Missing teeth   Respiratory   Respiratory (WDL) X   Respiratory Pattern Regular   Respiratory Depth Normal   Respiratory Quality/Effort Diaphragmatic   Chest Assessment Trachea midline; Chest expansion symmetrical   L Breath Sounds Diminished   R Breath Sounds Diminished   Breath Sounds   Right Upper Lobe Diminished   Right Middle Lobe Diminished   Right Lower Lobe Diminished   Left Upper Lobe Diminished   Left Lower Lobe Diminished   Cough/Sputum   Sputum How Obtained None   Cardiac   Cardiac (WDL) WDL   Cardiac Regularity Regular   Cardiac Rhythm NSR   Rhythm Interpretation   Pulse 77   Cardiac Monitor   Telemetry Monitor On Yes   Telemetry Audible Yes   Telemetry Alarms Set Yes   Telemetry Box Number 25   Gastrointestinal   Abdominal (WDL) X   Abdomen Inspection Distended   Tenderness No guarding   RUQ Bowel Sounds Active   LUQ Bowel Sounds Active   RLQ Bowel Sounds Active   LLQ Bowel Sounds Active   Peripheral Vascular   Peripheral Vascular (WDL) X   Edema Right upper extremity; Left upper extremity   RUE Edema +2;Pitting   LUE Edema +2;Pitting   Sensation RUE KAREN   Sensation LUE KAREN   Sensation RLE KAREN   Sensation LLE KAREN   Skin Color/Condition   Skin Color/Condition (WDL) X   Skin Color Pale; Appropriate for ethnicity   Skin Condition/Temp Cool;Dry;Swollen  (BUE)   Skin Integrity   Skin Integrity (WDL) X   Skin Integrity Redness  (Blanchable)   Location Sacrum/Coccyx   Preventative Dressing Yes   Multiple Skin Integrity Sites Yes   Skin Fold Management No   Dressing Site Sacrum; Heel   Date Applied 09/27/20   Assessed this shift? Yes   Skin Integrity Site 2   Skin Integrity Location 2 Excoriation; Redness   Location 2 Groin   Assessed this shift? Yes   Skin Integrity Site 3   Skin Integrity Location 3 Other (Comment)  (Surgical scars, Possible burn or skin graft sites)    Location 3 Back, Scattered BUE   Assessed this shift? Yes   Musculoskeletal   Musculoskeletal (WDL) X   RUE Weakness   LUE Weakness   RL Extremity KAREN   LL Extremity KAREN   Genitourinary   Genitourinary (WDL) X  (parnell)   Flank Tenderness KAREN   Suprapubic Tenderness KAREN   Dysuria KAREN  (f/c in place)   Anus/Rectum   Anus/Rectum (WDL) WDL   Urethral Catheter Temperature probe 16 fr   Placement Date/Time: 09/22/20 0601   Urethral Catheter Timeout: Patient;Procedure;Sterile technique  Inserted by: kaushal flores  Catheter Type: Temperature probe  Tube Size (fr): 16 fr  Catheter Balloon Size: 10 mL  Collection Container: Other (Comment)  . ..    Catheter Indications Need for fluid management in critically ill patients in a critical care setting not able to be managed by other means such as BSC with hat, bedpan, urinal, condom catheter, or short term intermittent urethral catherization   Site Assessment No urethral drainage   Urine Color Yellow   Urine Appearance Clear   Psychosocial   Psychosocial (WDL) X   Patient Behaviors Not interactive   Needs Expressed Other (Comment)  (pt unable to verbalize needs @ this time)   Rest/Sleep for Patient Fair

## 2020-09-30 NOTE — PROGRESS NOTES
dwight with pulm called to review pts poc, he stated that as long as pt is alert use bipap at night. See orders, tf to stop while pt is on bipap.

## 2020-09-30 NOTE — PROGRESS NOTES
GI Progress Note      SUBJECTIVE:  TFs not running at this time. Pt requires BiPap overnight. Hypoglycemic last night.     OBJECTIVE      Medications    Current Facility-Administered Medications: potassium phosphate 10 mmol in dextrose 5 % 250 mL IVPB, 10 mmol, Intravenous, Once  bisacodyl (DULCOLAX) EC tablet 5 mg, 5 mg, Oral, Daily  dextrose 50 % IV solution, 12.5 g, Intravenous, PRN  ipratropium-albuterol (DUONEB) nebulizer solution 1 ampule, 1 ampule, Inhalation, Q6H PRN  pantoprazole (PROTONIX) injection 40 mg, 40 mg, Intravenous, Daily  levothyroxine (SYNTHROID) tablet 25 mcg, 25 mcg, Oral, Daily  bisacodyl (DULCOLAX) suppository 10 mg, 10 mg, Rectal, Daily PRN  oxybutynin (DITROPAN) tablet 2.5 mg, 2.5 mg, Oral, Nightly  sodium chloride flush 0.9 % injection 10 mL, 10 mL, Intravenous, 2 times per day  sodium chloride flush 0.9 % injection 10 mL, 10 mL, Intravenous, PRN  [DISCONTINUED] acetaminophen (TYLENOL) tablet 650 mg, 650 mg, Oral, Q6H PRN **OR** acetaminophen (TYLENOL) suppository 650 mg, 650 mg, Rectal, Q6H PRN  promethazine (PHENERGAN) tablet 12.5 mg, 12.5 mg, Oral, Q6H PRN **OR** ondansetron (ZOFRAN) injection 4 mg, 4 mg, Intravenous, Q6H PRN  Physical    VITALS:  BP (!) 97/57   Pulse 82   Temp 98.1 °F (36.7 °C)   Resp 16   Ht 4' 8\" (1.422 m)   Wt 216 lb 4.3 oz (98.1 kg)   SpO2 94%   BMI 48.49 kg/m²   ABD: soft, NT, moderate non tympanic distention, NABs  Data    CBC with Differential:    Lab Results   Component Value Date    WBC 10.1 09/29/2020    RBC 2.57 09/29/2020    HGB 8.2 09/29/2020    HCT 24.5 09/29/2020     09/29/2020    MCV 95.5 09/29/2020    MCH 31.9 09/29/2020    MCHC 33.5 09/29/2020    RDW 16.5 09/29/2020    BANDSPCT 19 09/26/2020    LYMPHOPCT 4.2 09/29/2020    MONOPCT 5.7 09/29/2020    BASOPCT 0.2 09/29/2020    MONOSABS 0.6 09/29/2020    LYMPHSABS 0.4 09/29/2020    EOSABS 0.1 09/29/2020    BASOSABS 0.0 09/29/2020     CMP:    Lab Results   Component Value Date     09/30/2020    K 3.8 09/30/2020     09/30/2020    CO2 30 09/30/2020    BUN 18 09/30/2020    CREATININE 1.0 09/30/2020    GFRAA >60 09/30/2020    AGRATIO 1.2 09/22/2020    LABGLOM >60 09/30/2020    GLUCOSE 82 09/30/2020    PROT 5.7 09/24/2020    LABALBU 2.6 09/28/2020    CALCIUM 8.7 09/30/2020    BILITOT 0.5 09/24/2020    ALKPHOS 106 09/24/2020    AST 46 09/24/2020    ALT 50 09/24/2020       ASSESSMENT AND PLAN  The patient is a 60 y. o. male with significant past medical history of dCHF, COPD, HTN and MRDD (non verbal) who presents with MS changes and resp distress.  He maintains a diagnosis of acute pancreatitis based on the presence of hyperlipasemia and peripancreatic inflammation noted on CT.  ARF and hypotension have resolved.  An obvious etiology of pancreatitis is not noted.   He has also manifest significant gaseous distention of the stomach either reflecting delayed emptying due to neighboring pancreatic inflammation or perhaps mechanical obstruction.  This has improved with NGT as noted by KUB . Empiric ATBs have been discontinued.  Resp status remains an issue. SLP evaluation demonstrated the presence of moderate to severe OP dysphagia. It was their conclusion that he is unable to tolerate any PO intake safely.   - MBS to take place later today  - TFs via NGT not running at this time awaiting MBS, this will resume after the study but will be held when BiPap is applied. Glucose will need to be followed closely.   - continue daily Protonix

## 2020-09-30 NOTE — PROGRESS NOTES
Speech Language Pathology  MBSS    SLP spoke with RN and radiology regarding MBSS. MBSS tentatively scheduled for ~1130 this date. Results and recs to follow. 1200- Spoke with radiology. MBSS now re-scheduled for ~1300.      Nallely Pop M.S. [id number] [facility]  Speech-language pathologist  [phone]

## 2020-10-01 NOTE — PLAN OF CARE
Problem: Nutrition  Goal: Optimal nutrition therapy  10/1/2020 0953 by Tiarra Mejias RN  Outcome: Ongoing  10/1/2020 0316 by Quinten Fabry, RN  Outcome: Ongoing  10/1/2020 0316 by Quinten Fabry, RN  Outcome: Ongoing     Problem: Skin Integrity:  Goal: Will show no infection signs and symptoms  Description: Will show no infection signs and symptoms  10/1/2020 0953 by Tiarra Mejias RN  Outcome: Ongoing  Note: Patient skin kept clean and dry, cleansed if soiled. Patient turned every 2 hours if not able to turn self. No evidence of skin breakdown this shift, will continue to monitor. 10/1/2020 0316 by Quinten Fabry, RN  Outcome: Ongoing  10/1/2020 0316 by Quinten Fabry, RN  Outcome: Ongoing  Goal: Absence of new skin breakdown  Description: Absence of new skin breakdown  10/1/2020 0953 by Tiarar Mejias RN  Outcome: Ongoing  10/1/2020 0316 by Quinten Fabry, RN  Outcome: Ongoing  10/1/2020 0316 by Quinten Fabry, RN  Outcome: Ongoing     Problem: Falls - Risk of:  Goal: Will remain free from falls  Description: Will remain free from falls  10/1/2020 0953 by Tiarra Mejias RN  Outcome: Ongoing  Note: Pt in bed. Bed in lowest position, side rails up x2, call light and bedside table within reach. Will continue to monitor for falls on my shift. 10/1/2020 0316 by Quinten Fabry, RN  Outcome: Ongoing  10/1/2020 0316 by Quinten Fabry, RN  Outcome: Ongoing  Goal: Absence of physical injury  Description: Absence of physical injury  10/1/2020 0953 by Tiarra Mejias RN  Outcome: Ongoing  10/1/2020 0316 by Quinten Fabry, RN  Outcome: Ongoing  10/1/2020 0316 by Quinten Fabry, RN  Outcome: Ongoing     Problem: Pain:  Goal: Pain level will decrease  Description: Pain level will decrease  10/1/2020 0953 by Tiarra Mejias RN  Outcome: Ongoing  Note: Patient educated on verbalizing complaints of pain utilizing the pain scale, pharmacological and non pharmacological pain control methods utilized. Will continue to monitor.     10/1/2020 0316 by Son Porter Rosas Rivera RN  Outcome: Ongoing  10/1/2020 0316 by Guillermo Wallis RN  Outcome: Ongoing  Goal: Control of acute pain  Description: Control of acute pain  10/1/2020 0953 by Fatuma Long RN  Outcome: Ongoing  10/1/2020 0316 by Guillermo Wallis RN  Outcome: Ongoing  10/1/2020 0316 by Guillermo Wallis RN  Outcome: Ongoing  Goal: Control of chronic pain  Description: Control of chronic pain  10/1/2020 0953 by Fatuma Long RN  Outcome: Ongoing  10/1/2020 0316 by Guillermo Wallis RN  Outcome: Ongoing  10/1/2020 0316 by Guillermo Wallis RN  Outcome: Ongoing

## 2020-10-01 NOTE — PROGRESS NOTES
Page sent to Dr. El Bonds via perfect serve, making him aware of morning lab results. 10/1/20 5:44 AM   [phone] Hospital or Facility: [facility] From: Iris Johnson RE: Jared Wallace 1965 RM: [id number] FYI: Lab results in chart. Phosphorus low, resulted as 2.2. CO2 is 33 this morning.  Patient has been on BiPAP since 02:09 AM Need Callback: NO CALLBACK REQ C4 PROGRESSIVE CARE  Read 5:49 AM

## 2020-10-01 NOTE — PROGRESS NOTES
Hospitalist Progress Note      PCP: No primary care provider on file. Date of Admission: 9/22/2020    Chief Complaint:  [address] Course:   HPI :   60 y.o. male with history of CHF, COPD, GERD,  MRDD who presented to  [facility] ER from group home with altered mental status. History obtained per chart as patient unable to provide any history due to AMS. Pt minimally verbal at baseline. Patient reportedly had reduced responsiveness at the ECF overnight and was sent to the ER. He was noted to be hypothermic in the 80s and hypotensive in the 60s. Initial concern for possible CVA - stroke team consulted. CT head was nonacute with tiny area of low attenuation near the anterior right  internal capsule which was thought to be likely subacute/chronic. CTA head/neck was unremarkable. PICC line placed in ER. Labs notable for severe hyperkalemia with potassium of 7.1 creatinine 1.5, TSH of 7.4. Lipase was  elevated at 1471. Temperature improved to 90's with karuna hugger and warm IVF. There was concern for myxedema coma and was started on IV Synthroid. He was given a dose of IV hydrocortisone, started on broad-spectrum antibiotics and  pressors. Pt was transferred to [facility] for further care. Subjective:      Pt non verbal at baseline. Pt somnolent, opened his eyes to voice today. Pt on BiPAP overnight.          Medications:  Reviewed    Infusion Medications     Scheduled Medications    potassium phosphate IVPB  15 mmol Intravenous Once    bisacodyl  5 mg Oral Daily    pantoprazole  40 mg Intravenous Daily    levothyroxine  25 mcg Oral Daily    oxybutynin  2.5 mg Oral Nightly    sodium chloride flush  10 mL Intravenous 2 times per day     PRN Meds: dextrose, ipratropium-albuterol, bisacodyl, sodium chloride flush, [DISCONTINUED] acetaminophen **OR** acetaminophen, promethazine **OR** ondansetron      Intake/Output Summary (Last 24 hours) at 10/1/2020 1136  Last data filed at 10/1/2020 0918  Gross per 24 atelectasis/airspace disease with   layered pleural effusions, larger on the left. There improvement in aeration   of the left lung in the interval, which may be related to differences in   positioning. XR CHEST PORTABLE   Final Result   Increased opacification within the left lung may represent a layering pleural   effusion. XR CHEST PORTABLE   Final Result   New small bilateral pleural effusions and bilateral airspace disease which   may be related to edema and/or pneumonia. XR ABDOMEN FOR NG/OG/NE TUBE PLACEMENT   Final Result   Gastric tube in mid stomach         US RENAL COMPLETE   Final Result   1. Unremarkable sonographic appearance of the kidneys. 2. Collapsed urinary bladder, limiting its evaluation. XR ABDOMEN (KUB) (SINGLE AP VIEW)   Final Result   Unchanged moderate amount of gaseous distention of the stomach. No other   findings to explain abdominal distension. Of note however these images were   obtained supine and early free air would not be detected. Assessment/Plan:    Active Hospital Problems    Diagnosis    Hypernatremia [E87.0]    Thrombocytopenia ([address] [D69.6]    Acute encephalopathy [G93.40]    Acute respiratory failure with hypercapnia (HCC) [J96.02]    Pulmonary infiltrates [R91.8]    Acute pancreatitis without infection or necrosis [K85.90]    LINDA (acute kidney injury) ([address] [N17.9]    Hyperkalemia [E87.5]    Hypothyroidism [E03.9]    Shock ([address] [R57.9]    Hypothermia [P92. XXXA]    Class 2 obesity in adult [E66.9]    Suspected sleep apnea [R29.818]       Acute metabolic encephalopathy: Likely multifactorial due to septic shock, hyperkalemia, hypothermia, ? Myxedema coma. Non verbal at baseline per report. CT head was nonacute. Stroke work-up negative in ER. Neuro recs appreciated.  Supportive care for acute morbidities as stated below        Septic shock: met SIRS criteria with tachycardia, tachypnea, hypothermia on SCDs  Diet: DIET DYSPHAGIA PUREED;  Dysphagia Pureed; Mildly Thick (Nectar)  Dietary Nutrition Supplements: Frozen Oral Supplement  Code Status: Full Code    PT/OT Eval Status: not indicated at this time     Dispo -  Hard to say,  pending clinical course     Damaris Fall MD

## 2020-10-01 NOTE — PROGRESS NOTES
Message left for Dr. Shabana Soto with his office in regard to tube feed and barium swallow, awaiting callback. Per Dr. Shabana Soto, order pureed dysphagia diet. Continue tube feed at 20 ml/hr for one more hour and then discontinue. Tube feed adjusted, will continue to monitor.

## 2020-10-01 NOTE — PROGRESS NOTES
NG placement checked, 0 mL residual noted upon assessment. Tube feed rate increased by 25 mL from initial rate of 20 mL/hr to 45 mL/hr.

## 2020-10-01 NOTE — PROGRESS NOTES
Tube feed stopped in order to place pt on BiPAP for the night. Intake documented and pump cleared. Will check Blood sugar in 30 mins and call RT to place pt on BiPAP.

## 2020-10-01 NOTE — PROGRESS NOTES
INPATIENT PULMONARY CRITICAL CARE PROGRESS NOTE      Reason for visit: Acute on chronic respiratory failure (hypoxemic), acute encephalopathy, acute pulmonary edema, bibasilar atelectasis, septic shock, LINDA, acute pancreatitis    SUBJECTIVE: The patient is now awake and conversant, slow responses but is answering simple questions. Remains afebrile and hemodynamically stable. Oxygen weaned to 1 LPM with SaO2 97%, can be weaned off oxygen    Physical Exam:  Blood pressure 100/63, pulse 82, temperature 97.8 °F (36.6 °C), temperature source Oral, resp. rate 16, height 4' 8\" (1.422 m), weight 216 lb 4.3 oz (98.1 kg), SpO2 97 %.'   Constitutional:  No acute distress. Communicative with slow responses  HENT: NG, oropharynx dry mucosa without obvious lesions  Eyes:  Conjunctivae are normal. Pupils equal, round, and reactive to light. No scleral icterus. Neck: Short and large neck, no JVD. Cardiovascular: Normal rate, regular rhythm, distant heart sounds. No lower extremity edema. Pulmonary/Chest: No wheezes. No rales. No accessory muscle usage or stridor. Abdominal: Deferred  Musculoskeletal: No cyanosis. No clubbing. No obvious joint deformity except for short limbs. Lymphadenopathy: Deferred  Skin: Skin is warm and dry. No rash or nodules on the exposed extremities. Psychiatric: Could not be assessed. Neurologic: Cannot be assessed        Results:  CBC:   Recent Labs     09/29/20  0616   WBC 10.1   HGB 8.2*   HCT 24.5*   MCV 95.5   *     BMP:   Recent Labs     09/29/20  0616 09/30/20  0447 10/01/20  0425    140 141   K 3.8 3.8 3.5    100 102   CO2 30 30 33*   PHOS 2.7 2.3* 2.2*   BUN 20 18 19   CREATININE 1.1 1.0 0.9       Imaging:  I have reviewed radiology images personally. XR CHEST PORTABLE   Final Result   Bibasilar opacification and probable effusion with mild progression on the   right. FL MODIFIED BARIUM SWALLOW W VIDEO   Final Result   No kaden aspiration.       Please see separate speech pathology report for full discussion of findings   and recommendations. XR ABDOMEN (KUB) (SINGLE AP VIEW)   Final Result   Unremarkable bowel gas pattern. NG tube extends well into the stomach. XR CHEST PORTABLE   Final Result   Bibasilar opacities, likely combination of atelectasis/airspace disease with   layered pleural effusions, larger on the left. There improvement in aeration   of the left lung in the interval, which may be related to differences in   positioning. XR CHEST PORTABLE   Final Result   Increased opacification within the left lung may represent a layering pleural   effusion. XR CHEST PORTABLE   Final Result   New small bilateral pleural effusions and bilateral airspace disease which   may be related to edema and/or pneumonia. XR ABDOMEN FOR NG/OG/NE TUBE PLACEMENT   Final Result   Gastric tube in mid stomach         US RENAL COMPLETE   Final Result   1. Unremarkable sonographic appearance of the kidneys. 2. Collapsed urinary bladder, limiting its evaluation. XR ABDOMEN (KUB) (SINGLE AP VIEW)   Final Result   Unchanged moderate amount of gaseous distention of the stomach. No other   findings to explain abdominal distension. Of note however these images were   obtained supine and early free air would not be detected. Xr Abdomen (kub) (single Ap View)    Result Date: 9/23/2020  EXAMINATION: ONE SUPINE XRAY VIEW(S) OF THE ABDOMEN 9/23/2020 7:39 am COMPARISON: CT abdomen and pelvis September 22, 2020 HISTORY: ORDERING SYSTEM PROVIDED HISTORY: abdominal distention TECHNOLOGIST PROVIDED HISTORY: Reason for exam:->abdominal distention FINDINGS: There is unchanged gaseous distention of the stomach. No evidence of bowel distention elsewhere. No evidence of free air although this is very limited as these images were obtained supine     Unchanged moderate amount of gaseous distention of the stomach.   No other findings to explain abdominal distension. Of note however these images were obtained supine and early free air would not be detected. Xr Abdomen (kub) (single Ap View)    Result Date: 9/22/2020  EXAMINATION: ONE SUPINE XRAY VIEW(S) OF THE ABDOMEN 9/22/2020 4:46 am COMPARISON: Chest radiograph earlier same date. HISTORY: ORDERING SYSTEM PROVIDED HISTORY: abdominal distension TECHNOLOGIST PROVIDED HISTORY: Reason for exam:->abdominal distension Reason for Exam: distention Acuity: Acute Type of Exam: Initial There is moderate gaseous distension of the stomach. Only a few gas-filled loops bowel are seen. Paucity of bowel gas rendering bowel gas pattern indeterminate. No focal dilated gas-filled loops of bowel are identified. There postsurgical changes in both hips. No acute osseous abnormality. FINDINGS: Moderate gaseous distension of the stomach. Correlation for gastric outlet obstruction recommended. Indeterminate bowel gas pattern. Ct Head Wo Contrast    Result Date: 9/23/2020  EXAMINATION: CT OF THE HEAD WITHOUT CONTRAST  9/22/2020 6:36 am TECHNIQUE: CT of the head was performed without the administration of intravenous contrast. Dose modulation, iterative reconstruction, and/or weight based adjustment of the mA/kV was utilized to reduce the radiation dose to as low as reasonably achievable. COMPARISON: None. HISTORY: ORDERING SYSTEM PROVIDED HISTORY: decreased responsiveness TECHNOLOGIST PROVIDED HISTORY: Reason for exam:->decreased responsiveness Has a \"code stroke\" or \"stroke alert\" been called? ->No Reason for Exam: decreased responsiveness Acuity: Unknown Type of Exam: Initial Additional signs and symptoms: decreased responsiveness FINDINGS: BRAIN/VENTRICLES: Patient is tilted in the CT gantry. Mild motion/streak artifact is present on the examination. The ventricular system is normal in appearance. No evidence of mass effect or midline shift.   Prominence of sulci overlying convexities of cerebral evidence of acute intracranial abnormality. 2. Tiny focus of low attenuation near anterior limb of right internal capsule. Finding may represent tiny focus of ischemic change which could be subacute to chronic in age. Follow-up MRI examination with diffusion imaging may be helpful for more complete evaluation if clinically indicated. 3. Paranasal sinus disease with visualization of small air-fluid/mucus level within the right maxillary sinus. 4. Findings suggestive of presence of dental caries with abnormal lucency surrounding roots of several teeth which raises possibility of associated periodontal disease as described above. Us Renal Complete    Result Date: 9/23/2020  EXAMINATION: RETROPERITONEAL ULTRASOUND OF THE KIDNEYS AND URINARY BLADDER 9/23/2020 COMPARISON: Abdominal CT 09/22/2020 HISTORY: ORDERING SYSTEM PROVIDED HISTORY: alba TECHNOLOGIST PROVIDED HISTORY: Reason for exam:->alba FINDINGS: Kidneys: The right kidney measures 8.1 x 5.4 x 4.7 cm in size and the left kidney measures 9.4 x 5.8 x 5.2 cm in size. Kidneys demonstrate normal cortical echogenicity. No evidence of hydronephrosis or intrarenal stones. Bladder: The urinary bladder is collapsed, limiting its evaluation. 1. Unremarkable sonographic appearance of the kidneys. 2. Collapsed urinary bladder, limiting its evaluation. Ct Abdomen Pelvis W Iv Contrast Additional Contrast? None    Result Date: 9/22/2020  EXAMINATION: CT OF THE ABDOMEN AND PELVIS WITH CONTRAST 9/22/2020 10:11 am TECHNIQUE: CT of the abdomen and pelvis was performed with the administration of intravenous contrast. Multiplanar reformatted images are provided for review. Dose modulation, iterative reconstruction, and/or weight based adjustment of the mA/kV was utilized to reduce the radiation dose to as low as reasonably achievable. COMPARISON: None.  HISTORY: ORDERING SYSTEM PROVIDED HISTORY: pancreatitis, abdominal distension TECHNOLOGIST PROVIDED HISTORY: Reason for exam:->pancreatitis, abdominal distension Additional Contrast?->None Reason for Exam: scan ran on the back end of a CTA head and neck stroke protocol,   75 second delay was more like 95 second delay. after contrast administration Acuity: Acute Type of Exam: Initial Additional signs and symptoms: presented with low body temp FINDINGS: Lower Chest: Bandlike opacities are seen at the lung bases. Bandlike morphology favors subsegmental atelectasis or scar. Small hiatal hernia seen. There is nonspecific thickening at the GE junction Organs: Trace perisplenic fluid is seen Adrenal glands appear normal No hydronephrosis on the right. No hydronephrosis on the left. A few punctate circumscribed hypodense nodule seen in the kidneys, too small to characterize, likely cyst.  There are clips from prior cholecystectomy. There is subtle injection of the fat surrounding pancreas. No discrete drainable fluid collection. Pancreas enhances normally. No evidence of pancreatic necrosis. GI/Bowel: There is mild distention of the stomach. No significant small bowel distention noted. A few colonic diverticula are seen. Descending and sigmoid colon is incompletely distended accentuating its wall thickness Pelvis: No free fluid is seen within the pelvis. Elliott catheter seen within the bladder. .  Bladder is partially contracted. Nodular soft tissue density seen in the right inguinal canal measuring 1.7 x 1.8 cm. Peritoneum/Retroperitoneum: No aortic aneurysm. No retroperitoneal hematoma. A few small lymph nodes are seen along the Bones/Soft Tissues: Spurring is seen in the spine. Streak artifact is seen from orthopedic hardware in the proximal right and left femur, as well as spinal stabilization rods. Gaseous distention of the stomach. No small bowel obstruction.  Injection of fat is seen surrounding the pancreas suggestive of early pancreatitis Small nodular soft tissue density is seen in the right inguinal canal, most commonly reactive lymph node in the absence of a known primary     Xr Chest Portable    Result Date: 9/27/2020  EXAMINATION: ONE XRAY VIEW OF THE CHEST 9/27/2020 6:06 am COMPARISON: Yesterday HISTORY: ORDERING SYSTEM PROVIDED HISTORY: left pleural effusion TECHNOLOGIST PROVIDED HISTORY: Reason for exam:->left pleural effusion Reason for Exam: left pleural effusion Type of Exam: Subsequent/Follow-up FINDINGS: There is a left thoracic lumbar support maximus with sub laminar hooks. Scoliotic curvature of the thoracic spine with dextro is noted. Nasogastric tube has a normal course, extending at least to the mid stomach. PICC extends to the superior vena cava. The cardiac silhouette is stable. There is hazy opacity in the lung bases. There is improved aeration of the left upper lung. Bibasilar opacities, likely combination of atelectasis/airspace disease with layered pleural effusions, larger on the left. There improvement in aeration of the left lung in the interval, which may be related to differences in positioning. Xr Chest Portable    Result Date: 9/26/2020  EXAMINATION: ONE XRAY VIEW OF THE CHEST 9/26/2020 6:01 am COMPARISON: September 25, 2020 HISTORY: ORDERING SYSTEM PROVIDED HISTORY: pulm edema TECHNOLOGIST PROVIDED HISTORY: Reason for exam:->pulm edema Reason for Exam: pulm edema Acuity: Acute Type of Exam: Initial FINDINGS: Rotated positioning limits evaluation. Thoracic scoliosis maximus. Enteric tube extends beyond the gastroesophageal junction. Right PICC with the tip near the mid SVC. Small right lung base opacity. Increased opacification throughout the left hemithorax. Cardiomegaly. No definite pulmonary edema. Increased opacification within the left lung may represent a layering pleural effusion. Xr Chest Portable    Result Date: 9/25/2020  EXAMINATION: ONE XRAY VIEW OF THE CHEST 9/25/2020 3:53 am COMPARISON: Chest radiograph September 22, 2020 and priors.  HISTORY: ORDERING SYSTEM PROVIDED HISTORY: respiratory distress TECHNOLOGIST PROVIDED HISTORY: Reason for exam:->respiratory distress FINDINGS: There has been placement nasogastric tube with side hole beyond the GE junction and tip overlying the expected location the distal stomach. There has also been placement right upper extremity PICC with tip overlying the mid SVC. There has been development of small bilateral pleural effusions with mild bilateral hazy interstitial opacities and mild septal thickening. No pneumothorax. Evaluation of the superior mediastinum is limited due to portable technique. Superior mediastinum appears mildly widened but has been seen on multiple prior studies, likely related to vasculature. There are postsurgical changes throughout thoracic spine. No acute osseous abnormality. New small bilateral pleural effusions and bilateral airspace disease which may be related to edema and/or pneumonia. Xr Chest Portable    Result Date: 9/22/2020  EXAMINATION: ONE XRAY VIEW OF THE CHEST 9/22/2020 4:46 am COMPARISON: Chest radiograph November 9, 2009 and priors. HISTORY: ORDERING SYSTEM PROVIDED HISTORY: assess for pneumonia TECHNOLOGIST PROVIDED HISTORY: Reason for exam:->assess for pneumonia Reason for Exam: cold Acuity: Acute Type of Exam: Initial FINDINGS: There is minimal right basilar airspace disease. No pneumothorax or pleural effusion. Cardiac and mediastinal contours are unchanged. Scoliosis and postsurgical changes are again seen within the thoracic spine. No acute osseous abnormality. Mild right basilar airspace disease, atelectasis versus pneumonia. Ir Picc Wo Sq Port/pump > 5 Years    Result Date: 9/22/2020  EXAMINATION: LIMITED ULTRASOUND OF THE ARM FOR PICC ACCESS, 9/22/2020 TECHNIQUE: The PICC team used ultrasound and VPS guidance to place a PICC line.  HISTORY: ORDERING SYSTEM PROVIDED HISTORY: no access TECHNOLOGIST PROVIDED HISTORY: Reason for exam:->no access How many lumens are being requested?->2 What site is the preferred site? ->No preference What side should this line be placed? ->Either Reason for Exam: no access Acuity: Acute Type of Exam: Initial Additional signs and symptoms: 34 cm 2 out single lumen non tunneled power PICC right basilic US/VPS guidance FLUOROSCOPY DOSE AND TYPE OR TIME AND EXPOSURES: No fluoroscopy. 0 images. FINDINGS: Ultrasound images demonstrate patency of the right basilic vein which was used for access for placement of a PICC by the PICC team, without a radiologist present. VPS guidance was used by the PICC team By report, a single lumen PICC, trimmed to 34 cm was placed. Successful placement of PICC line. Xr Abdomen For Ng/og/ne Tube Placement    Result Date: 9/23/2020  EXAMINATION: ONE SUPINE XRAY VIEW(S) OF THE ABDOMEN 9/23/2020 10:41 am COMPARISON: None. HISTORY: ORDERING SYSTEM PROVIDED HISTORY: Confirmation of course of NG/OG/NE tube and location of tip of tube TECHNOLOGIST PROVIDED HISTORY: Reason for exam:->Confirmation of course of NG/OG/NE tube and location of tip of tube Portable? ->Yes FINDINGS: Gastric tube in the mid stomach. Gastric tube in mid stomach     Cta Head Neck W Contrast    Result Date: 9/22/2020  EXAMINATION: CTA OF THE HEAD AND NECK WITH CONTRAST 9/22/2020 10:11 am TECHNIQUE: CTA of the head and neck was performed with the administration of intravenous contrast. Multiplanar reformatted images are provided for review. MIP images are provided for review. Stenosis of the internal carotid arteries measured using NASCET criteria. Dose modulation, iterative reconstruction, and/or weight based adjustment of the mA/kV was utilized to reduce the radiation dose to as low as reasonably achievable. COMPARISON: None HISTORY: ORDERING SYSTEM PROVIDED HISTORY: assess for stroke TECHNOLOGIST PROVIDED HISTORY: Reason for exam:->assess for stroke Reason for Exam: presented in ED with low body temp.    pt from nursing home facility next door to hospital Acuity: Acute Type of Exam: Initial FINDINGS: CTA NECK: AORTIC ARCH/ARCH VESSELS: No dissection or arterial injury. No significant stenosis of the brachiocephalic or subclavian arteries. CAROTID ARTERIES: No dissection, arterial injury, or hemodynamically significant stenosis by NASCET criteria. VERTEBRAL ARTERIES: No dissection, arterial injury, or significant stenosis. SOFT TISSUES: The lung apices are clear. No cervical or superior mediastinal lymphadenopathy. The larynx and pharynx are unremarkable. No acute abnormality of the salivary and thyroid glands. BONES: Thoracic spinal hardware is incompletely imaged but includes a maximus with laminar hook beginning at T3. There is chronic right maxillary sinusitis. CTA HEAD: ANTERIOR CIRCULATION: No significant stenosis of the intracranial internal carotid, anterior cerebral, or middle cerebral arteries. No aneurysm. POSTERIOR CIRCULATION: No significant stenosis of the vertebral, basilar, or posterior cerebral arteries. No aneurysm. OTHER: No dural venous sinus thrombosis on this non-dedicated study. BRAIN: No mass effect or midline shift. No extra-axial fluid collection. The gray-white differentiation is maintained. No large vessel occlusion. Critical results were called by Dr. Olympia Ormond to Dr. Blaine Weir On 9/22/2020 at 11:03. Assessment and plan:  Acute encephalopathy. Likely due to hypercarbia, improved with BiPAP. Continue with intermittent BiPAP in the daytime, BiPAP at night. Have informed nursing not to feed him while he is on BiPAP. Much more alert today  Acute on chronic respiratory failure with hypercapnia. Repeat ABG slightly better with regards to pH. Acute pulmonary edema. Per IM  Pulmonary infiltrates/atelectasis. Unimpressive on chest x-ray 9/27   LINDA , hyperkalemia. Renal function improved  Anemia. Normochromic, normocytic. Follow profile. Discontinue daily CBC. Thrombocytopenia. Mild. Improving.   Discontinue daily CBC  HTN, hypothyroidism, GERD. Per IM  Suspected GRICEL. Evaluation as an outpatient  Morbid obesity. Address as an outpatient  PUD prophylaxis. PPI  DVT prophylaxis. Thrombocytopenic    Discussed with nursing. Limited treatment options in my opinion. We will sign off, please call with questions and thank you for the consultation.       Electronically signed by:  Jolanta Bangura MD    10/1/2020    4:21 PM.

## 2020-10-01 NOTE — PROGRESS NOTES
4 Eyes Skin Assessment     The patient is being assess for   Low omid    I agree that 2 RN's have performed a thorough Head to Toe Skin Assessment on the patient. ALL assessment sites listed below have been assessed. Areas assessed by both nurses:   [x]   Head, Face, and Ears   [x]   Shoulders, Back, and Chest, Abdomen  [x]   Arms, Elbows, and Hands   [x]   Coccyx, Sacrum, and Ischium  [x]   Legs, Feet, and Heels        Sore on nose. Blanchable redness on coccyx. **SHARE this note so that the co-signing nurse is able to place an eSignature**    Co-signer eSignature: Electronically signed by Ritchie Aase, RN on 10/1/20 at 2:59 PM EDT    Does the Patient have Skin Breakdown?   No          Omid Prevention initiated:  Yes   Wound Care Orders initiated:  No      C nurse consulted for Pressure Injury (Stage 3,4, Unstageable, DTI, NWPT, Complex wounds)and New or Established Ostomies:  No      Primary Nurse eSignature: Electronically signed by Osman Daly RN on 10/1/20 at 1:38 PM EDT

## 2020-10-01 NOTE — PROGRESS NOTES
Physical Therapy  Facility/Department: [facility] C4 PCU  Daily Treatment Note  NAME: Benny Sheth  : 1965  MRN: [id number]    Date of Service: 10/1/2020    Discharge Recommendations:  Subacute/Skilled Nursing Facility   PT Equipment Recommendations  Equipment Needed: No    Assessment   Body structures, Functions, Activity limitations: Decreased functional mobility ; Decreased strength;Decreased balance;Decreased safe awareness;Decreased endurance  Assessment: PT tx session focused on BUE/BLE strengthening, bed mobility, and overall activity tolerance. Pt is able to inconsistently participate in active exercise and requires max verbal and tactile cues to maintain attention to task. RN assisted PT to sit patient at EOB x 5 minutes to improve upright tolerance and progress to eventual standing. Pt able to maintain static sitting with CGA-min A. Pt's father was present and confirmed that patient was ambulatory with RW at baseline. Will continue to follow, continue to recommend SNF at discharge to progress functional mobility and maximize functional independence. Treatment Diagnosis: Decreased mobility  Specific instructions for Next Treatment: ther ex, functional mob, progress to transfers as able  Prognosis: Fair  Decision Making: Low Complexity  PT Education: Goals; General Safety;Gait Training;PT Role;Disease Specific Education;Plan of Care; Functional Mobility Training  Patient Education: Pt is non verbal, hx MR, no evidence of learning. Pt's father present and educated regarding PT POC and goals - verbalizes understanding. Barriers to Learning: MR  REQUIRES PT FOLLOW UP: Yes  Activity Tolerance  Activity Tolerance: Patient Tolerated treatment well  Activity Tolerance: Intermittent verbal and tactile cues to maintain attention to task. Eye contact/alertness improved sitting EOB but was limited by possible dizziness. Patient Diagnosis(es): There were no encounter diagnoses.      has a past medical history of Acne, Anemia, Cataract, CHF (congestive heart failure) ([facility] [address] COPD (chronic obstructive pulmonary disease) ([facility] [address] GERD (gastroesophageal reflux disease), Hearing loss, Hernia, umbilical, Hip fracture, HTN, Mental retardation, and Peripheral vascular disease ([facility] [address] has a past surgical history that includes Hip fracture surgery (2008); Umbilical hernia repair; Upper gastrointestinal endoscopy (06/29/2018); Colonoscopy (06/29/2018); and Femur fracture surgery (Left, 9/3/2019). Restrictions  Restrictions/Precautions  Restrictions/Precautions: Fall Risk, General Precautions  Position Activity Restriction  Other position/activity restrictions: MRDD, nonverbal, tube feed stopped this PM, parnell, peripheral IV. Subjective   General  Chart Reviewed: Yes  Response To Previous Treatment: Patient with no complaints from previous session. Family / Caregiver Present: Yes(Father)  Referring Practitioner: Noé  Subjective  Subjective: Pt lying in bed upon arrival, indicates agreeable to session, able to verbalize yes/no at times. Father is present and confirms that patient ambulated with RW at baseline. \"Don't go easy on him! \"  General Comment  Comments: RN cleared pt for therapy and to sit EOB if able. Pain Screening  Patient Currently in Pain: Denies  Vital Signs  Patient Currently in Pain: Denies  Oxygen Therapy  SpO2: 98 %  Pulse Oximeter Device Mode: Intermittent  O2 Device: Nasal cannula  O2 Flow Rate (L/min): 2 L/min       Orientation  Orientation  Overall Orientation Status: (Unable to determine, nonverbal. Pt does respond to \"Sukhi\")     Cognition   Cognition  Overall Cognitive Status: Exceptions  Arousal/Alertness: Inconsistent responses to stimuli  Following Commands: Inconsistently follows commands  Attention Span: Difficulty attending to directions  Initiation: Requires cues for all  Sequencing: Requires cues for all  Cognition Comment: Non verbal at baseline.  Occasionally grunts in response to questions and appears to verbalize yes/no intermittently. Objective   Bed mobility  Supine to Sit: 2 Person assistance;Maximum assistance;Dependent/Total(HOB slightly elevated, max A x 2 with assist for BLE and trunk control. Pt with good head control during.)  Sit to Supine: Dependent/Total;Maximum assistance;2 Person assistance(HOB flat, assist for BLE management into bed and trunk control)  Scootin Person assistance;Dependent/Total  Transfers  Sit to Stand: Unable to assess        Balance  Posture: Fair  Sitting - Static: Poor;+  Comments: Pt sat EOB approximately 5 minutes with initial min A progressing to CGA with BUE supported on bed. 2 person present for safety. Pt indicates dizziness and after 5 minutes initiates laying back down on his own. Exercises  Knee Short Arc Quad: x10 B A/AROM  Ankle Pumps: x10 B A/AROM  Upper Extremity: BUE A/AROM elbow flex/ext, shoulder flex/ext x 10 B  Comments: Patient participates inconsistently with BUE/BLE exercise, demonstrates ability to actively perform exercises but requires max cueing.      AM-PAC Score     AM-PAC Inpatient Mobility without Stair Climbing Raw Score : 7 (10/01/20 150)  AM-PAC Inpatient without Stair Climbing T-Scale Score : 28.66 (10/01/20 150)  Mobility Inpatient CMS 0-100% Score: 86.29 (10/01/20 150)  Mobility Inpatient without Stair CMS G-Code Modifier : CM (10/01/20 1506)       Goals  Short term goals  Time Frame for Short term goals: 1 week (10/2) unless otherwise specified  Short term goal 1: pt to perform bed mob with min assist; ; dep for rolling R and L  Short term goal 2: pt to transfer to chair with mod assist;  KAREN  Short term goal 3: pt to participate in LE Ex 8-10 reps by ;  PROM/AAROM X all 4  for 10 reps  Short term goal 4: Pt to stand with walker and min assist of 2;  KAREN  Patient Goals   Patient goals : Pt is non verbal , does not voice goals    Plan    Plan  Times per week: 3-5 x week  Specific instructions for Next Treatment: ther ex, functional mob, progress to transfers as able  Current Treatment Recommendations: Strengthening, Balance Training, Endurance Training, Functional Mobility Training, Transfer Training, Gait Training, Safety Education & Training  Safety Devices  Type of devices: Left in bed, Nurse notified  Restraints  Initially in place: No     Therapy Time   Individual Concurrent Group Co-treatment   Time In 1330         Time Out 1356         Minutes 26         Timed Code Treatment Minutes: [address], PT, DPT    If pt is unable to be seen after this session, please let this note serve as discharge summary. Please see case management note for discharge disposition. Thank you.

## 2020-10-01 NOTE — PROGRESS NOTES
10/01/20 0319   NIV Type   Skin Protection for O2 Device Yes   Equipment Type v60   Mode Bilevel   Mask Type Full face mask   Mask Size Small   Settings/Measurements   IPAP 16 cmH20   CPAP/EPAP 6 cmH2O   Rate Ordered 16   Resp 16   FiO2  25 %   Vt Exhaled 409 mL   Mask Leak (lpm) 14 lpm   Comfort Level Good   Using Accessory Muscles No   SpO2 96   Breath Sounds   Right Upper Lobe Diminished   Right Middle Lobe Diminished   Right Lower Lobe Diminished   Left Upper Lobe Diminished   Left Lower Lobe Diminished   Alarm Settings   Alarms On Y   Press Low Alarm 6 cmH2O   High Pressure Alarm 30 cmH2O   Apnea (secs) 20 secs   Resp Rate Low Alarm 6   High Respiratory Rate 40 br/min   Oxygen Therapy/Pulse Ox   SpO2 95 %

## 2020-10-01 NOTE — PROGRESS NOTES
Speech Language Pathology  Facility/Department: [facility] C4 PCU  Dysphagia Daily Treatment Note    NAME: Jerod Disla  : 1965  MRN: [id number]    Patient Diagnosis(es):   Patient Active Problem List    Diagnosis Date Noted    Hypernatremia 2020    Thrombocytopenia ([address] 2020    Acute encephalopathy 2020    Acute respiratory failure with hypercapnia ([address] 2020    Pulmonary infiltrates 2020    Acute pancreatitis without infection or necrosis 2020    LINDA (acute kidney injury) ([address] 2020    Hyperkalemia 2020    Hypothyroidism 2020    Shock ([address] 2020    Hypothermia 2020    Class 2 obesity in adult 2020    Suspected sleep apnea 2020    Abnormal echocardiogram 2019    Closed displaced intertrochanteric fracture of left femur ([address] 2019    HTN (hypertension) 2010     Allergies: Allergies   Allergen Reactions    Chocolate     Eggs [Egg White] Hives    Food      Orange juice, peanut butter, coke    Orange Juice [Orange Oil]     Peanut Butter Flavor [Flavoring Agent]     Penicillins Hives    Strawberry (Diagnostic)     Tape Price [place] Tape]      Eco-tape     Onset Date: 20  Current Diet Level:  Dysphagia Pureed (Dysphagia I) Mildly Thick (Nectar)(VIA TSP ONLY) Meds in puree    Pain:  Pain Assessment  Pain Assessment: 0-10  Pain Level: 0  [place] Pain Rating: No hurt  Patient's Stated Pain Goal: No pain  RASS Score: Light Sedation - Patient awakens with eye opening and eye contact, but not sustained    Diet Tolerance:  Per chart review the pt's    P.O. Trials:  Nectar   x X 9 sips via tsp   Honey       Puree   x X 9 bites of applesauce   Solid         Impressions: The pt was seen this afternoon for dysphagia treatment. He was non-verbal as baseline. Multiple PO trials of nectar-thick liquids via spoon and applesauce were given. The pt tolerated all PO trials without s/s of aspiration. RR remained steady throughout at around 16 and O2 sats remained stable at 97%. There was no coughing or throat clearing. The pt continues to demonstrate reduced A-P bolus propulsion, delayed swallow onset and reduced laryngeal elevation. Continuing his current diet is recommended at this time. Recommended Diet:  Solid consistency: Dysphagia Pureed (Dysphagia I)  Liquid consistency: Mildly Thick (Ozan)(VIA TSP ONLY)  Liquid administration via: Spoon  Medication administration: Meds in puree  *Total feed assist, 1:1 supervision      Patient/Family/Caregiver Education:  Attempted Education but the pt is unable to demonstrate understanding. Compensatory Strategies: Alternate solids and liquids;Eat/Feed slowly;Upright as possible for all oral intake; No straws;Remain upright for 30-45 minutes after meals;Small bites/sips; Total feed    Goals:    Long Term:   Timeframe for Long-term Goals: 10 days (10/8/20)  Goal 1: The pt will tolerate safest and least restrictive diet without s/s of aspiration: 10/01:Ongoing; Progressing    Short Term:  1. The patient will tolerate recommended diet without observed clinical signs of aspiration: : 10/01:Ongoing; Progressing  2. The patient/caregiver will demonstrate understanding of compensatory strategies for improved swallowing safety: : 10/01: Not progressing. The pt is unable to demonstrate understanding. 3 The patient will tolerate instrumental swallowing procedure: Goal Met 9/30  4. The patient will tolerate repeat BSE when able: Goal Met  5. The patient will tolerate mechanical soft foods 10/10 Did not target today  The patient will tolerate thin liquids without signs and symptoms of aspiration 10/10 via tsp Did not target today       If pt is discharged prior to next follow-up, this treatment note will serve as discharge summary. Plan:  Continued daily Dysphagia treatment with goals per current plan of care. Treatment time: 21 minutes (7182-5544);  Dysphagia treatment    Kecia Ramirez, [facility], [facility]  Speech-Language Pathologist  Phone: [phone]  Speech Desk: [phone]

## 2020-10-01 NOTE — PLAN OF CARE
Problem: Nutrition  Goal: Optimal nutrition therapy  10/1/2020 0316 by Wendi Gilman RN  Outcome: Ongoing     Problem: Skin Integrity:  Goal: Will show no infection signs and symptoms  Description: Will show no infection signs and symptoms  10/1/2020 0316 by Wendi Gilman RN  Outcome: Ongoing     Problem: Skin Integrity:  Goal: Absence of new skin breakdown  Description: Absence of new skin breakdown  10/1/2020 0316 by Wendi Gilman RN  Outcome: Ongoing

## 2020-10-01 NOTE — FLOWSHEET NOTE
10/01/20 1418   Assessment   Charting Type Reassessment   Neurological   Neuro (WDL) X   Level of Consciousness 0   Orientation Level KAREN   Cognition KAREN   Language KAREN   Size R Pupil (mm) 2   R Pupil Shape Round   R Pupil Reaction Brisk   Size L Pupil (mm) 2   L Pupil Shape Round   L Pupil Reaction Brisk   R Hand  KAREN   L Hand  Weak   R Foot Dorsiflexion KAREN   L Foot Dorsiflexion KAREN   R Foot Plantar Flexion KAREN   L Foot Plantar Flexion KAREN   RUE Motor Response Movement to painful stimulus; Responds to command  (Will squeeze with hand and grab rail when rolling)   LUE Motor Response Movement to painful stimulus; Responds to command  (Will grab rail when rolling)   RLE Motor Response Movement to painful stimulus; Non-purposeful movement   LLE Motor Response Movement to painful stimulus; Non-purposeful movement   Gag KAREN   [place] Coma Scale   Eye Opening 4   Best Verbal Response 1   Best Motor Response 6   [place] Coma Scale Score 11   HEENT   HEENT (WDL) X   Right Eye Drainage;Edema; Impaired vision; Intact   Left Eye Impaired vision; Intact   Nose Intact  (NGT)   Tongue Pink & moist   Voice Other (Comment)  (nonverbal at baseline)   Mucous Membrane Pink;Dry   Teeth Missing teeth   Respiratory   Respiratory (WDL) X   Respiratory Pattern Regular   Respiratory Depth Normal   Respiratory Quality/Effort Diaphragmatic   Chest Assessment Chest expansion symmetrical;Trachea midline   L Breath Sounds Diminished   R Breath Sounds Diminished   Breath Sounds   Right Upper Lobe Diminished   Right Middle Lobe Diminished   Right Lower Lobe Diminished   Left Upper Lobe Diminished   Left Lower Lobe Diminished   Cough/Sputum   Sputum How Obtained None   Cough Weak;Non-productive;Moist   Frequency Occasional   Cardiac   Cardiac (WDL) WDL   Cardiac Regularity Regular   Cardiac Rhythm NSR   Cardiac Monitor   Telemetry Monitor On Yes   Telemetry Audible Yes   Telemetry Alarms Set Yes   Telemetry Box Number 25   Gastrointestinal Abdominal (WDL) X   Abdomen Inspection Distended; Taut   Tenderness No guarding   GI Symptoms Flatus   RUQ Bowel Sounds Active;Distant   LUQ Bowel Sounds Active;Distant   RLQ Bowel Sounds Active;Distant   LLQ Bowel Sounds Active;Distant   Peripheral Vascular   Peripheral Vascular (WDL) X   Edema Right upper extremity; Left upper extremity   Edema Generalized +2   RUE Edema +2;Pitting   LUE Edema +2;Pitting   Sensation RUE KAREN   Sensation LUE KAREN   Sensation RLE KAREN   Sensation LLE KAREN   Skin Color/Condition   Skin Color/Condition (WDL) X   Skin Color Pale;Ecchymosis   Skin Condition/Temp Cool;Dry;Warm;Swollen   Skin Integrity   Skin Integrity (WDL) X   Skin Integrity Redness  (Blanchable)   Location Coccyx/Sacrum   Preventative Dressing Yes   Skin Fold Management No   Dressing Site Sacrum   Date Applied 09/30/20   Assessed this shift? Yes   Skin Integrity Site 2   Skin Integrity Location 2 Excoriation; Redness   Location 2 Groin   Assessed this shift? Yes   Skin Integrity Site 3   Skin Integrity Location 3 Other (Comment)  (Sore)    Location 3 Nose   Musculoskeletal   Musculoskeletal (WDL) X   RUE Weakness   LUE Weakness   RL Extremity KAREN   LL Extremity KAREN   Genitourinary   Genitourinary (WDL) X  (parnell)   Flank Tenderness KAREN   Suprapubic Tenderness KAREN   Dysuria KAREN  (f/c in place)   Anus/Rectum   Anus/Rectum (WDL) WDL   Urethral Catheter Temperature probe 16 fr   Placement Date/Time: 09/22/20 0601   Urethral Catheter Timeout: Patient;Procedure;Sterile technique  Inserted by: kaushal flores  Catheter Type: Temperature probe  Tube Size (fr): 16 fr  Catheter Balloon Size: 10 mL  Collection Container: Other (Comment)  . ..    Catheter Indications Need for fluid management in critically ill patients in a critical care setting not able to be managed by other means such as BSC with hat, bedpan, urinal, condom catheter, or short term intermittent urethral catherization   Site Assessment No urethral drainage   Urine Color Yellow   Urine Appearance Clear   Psychosocial   Psychosocial (WDL) X   Patient Behaviors Not interactive  (Nonverbal at baseline)   Needs Expressed Other (Comment)  (pt unable to verbalize needs @ this time)   Rest/Sleep for Patient Fair

## 2020-10-01 NOTE — PLAN OF CARE
Nutrition Problem #1: Inadequate oral intake  Intervention: Food and/or Nutrient Delivery: Continue Current Diet, Start Oral Nutrition Supplement  Nutritional Goals: Patient will eat 50% or greater of meals and supplements without dysphagia.

## 2020-10-01 NOTE — PROGRESS NOTES
GI Progress Note      SUBJECTIVE:  TFs provided intermittently due to the use of BiPaP have been well tolerated. A BM was recorded earlier today.       OBJECTIVE      Medications    Current Facility-Administered Medications: bisacodyl (DULCOLAX) EC tablet 5 mg, 5 mg, Oral, Daily  dextrose 50 % IV solution, 12.5 g, Intravenous, PRN  ipratropium-albuterol (DUONEB) nebulizer solution 1 ampule, 1 ampule, Inhalation, Q6H PRN  pantoprazole (PROTONIX) injection 40 mg, 40 mg, Intravenous, Daily  levothyroxine (SYNTHROID) tablet 25 mcg, 25 mcg, Oral, Daily  bisacodyl (DULCOLAX) suppository 10 mg, 10 mg, Rectal, Daily PRN  oxybutynin (DITROPAN) tablet 2.5 mg, 2.5 mg, Oral, Nightly  sodium chloride flush 0.9 % injection 10 mL, 10 mL, Intravenous, 2 times per day  sodium chloride flush 0.9 % injection 10 mL, 10 mL, Intravenous, PRN  [DISCONTINUED] acetaminophen (TYLENOL) tablet 650 mg, 650 mg, Oral, Q6H PRN **OR** acetaminophen (TYLENOL) suppository 650 mg, 650 mg, Rectal, Q6H PRN  promethazine (PHENERGAN) tablet 12.5 mg, 12.5 mg, Oral, Q6H PRN **OR** ondansetron (ZOFRAN) injection 4 mg, 4 mg, Intravenous, Q6H PRN  Physical    VITALS:  /63   Pulse 82   Temp 97.8 °F (36.6 °C) (Oral)   Resp 16   Ht 4' 8\" (1.422 m)   Wt 216 lb 4.3 oz (98.1 kg)   SpO2 97%   BMI 48.49 kg/m²   ABD: soft, NT, NABS, moderate nontympanic distention (improved)  Data    CBC with Differential:    Lab Results   Component Value Date    WBC 10.1 09/29/2020    RBC 2.57 09/29/2020    HGB 8.2 09/29/2020    HCT 24.5 09/29/2020     09/29/2020    MCV 95.5 09/29/2020    MCH 31.9 09/29/2020    MCHC 33.5 09/29/2020    RDW 16.5 09/29/2020    BANDSPCT 19 09/26/2020    LYMPHOPCT 4.2 09/29/2020    MONOPCT 5.7 09/29/2020    BASOPCT 0.2 09/29/2020    MONOSABS 0.6 09/29/2020    LYMPHSABS 0.4 09/29/2020    EOSABS 0.1 09/29/2020    BASOSABS 0.0 09/29/2020     BMP:    Lab Results   Component Value Date     10/01/2020    K 3.5 10/01/2020     10/01/2020    CO2 33 10/01/2020    BUN 19 10/01/2020    LABALBU 2.6 09/28/2020    CREATININE 0.9 10/01/2020    CALCIUM 8.8 10/01/2020    GFRAA >60 10/01/2020    LABGLOM >60 10/01/2020    GLUCOSE 108 10/01/2020       ASSESSMENT AND PLAN  The patient is a 60 y. o. male with significant past medical history of dCHF, COPD, HTN and MRDD (non verbal) who presents with MS changes and resp distress.  He maintains a diagnosis of acute pancreatitis based on the presence of hyperlipasemia and peripancreatic inflammation noted on CT.  ARF and hypotension have resolved.  An obvious etiology of pancreatitis is not noted.   He has also manifest significant gaseous distention of the stomach thought to reflect delayed emptying due to neighboring pancreatic inflammation. This has improved with NGT as noted by KUB. He has tolerated TFs well.   Empiric ATBs have been discontinued.  Resp status remains an issue.  MBS results and SLP recs noted.   - d/c NGT  - start oral intake as guided by SLP recs  - continue daily Protonix  - discussed details of care with pt's father today

## 2020-10-01 NOTE — FLOWSHEET NOTE
10/01/20 0840   Assessment   Charting Type Shift assessment   Neurological   Neuro (WDL) X   Level of Consciousness 0   Orientation Level KAREN   Cognition KAREN   Language KAREN   Size R Pupil (mm) 2   R Pupil Shape Round   R Pupil Reaction Brisk   Size L Pupil (mm) 2   L Pupil Shape Round   L Pupil Reaction Brisk   R Hand  KAREN   L Hand  Weak   R Foot Dorsiflexion KAREN   L Foot Dorsiflexion KAREN   R Foot Plantar Flexion KAREN   L Foot Plantar Flexion KAREN   RUE Motor Response Movement to painful stimulus; Responds to command  (Will squeeze with hand and grab rail when rolling)   LUE Motor Response Movement to painful stimulus; Responds to command  (Will grab rail when rolling)   RLE Motor Response Movement to painful stimulus; Non-purposeful movement   LLE Motor Response Movement to painful stimulus; Non-purposeful movement   Gag KAREN   [place] Coma Scale   Eye Opening 4   Best Verbal Response 1   Best Motor Response 5   [place] Coma Scale Score 10   HEENT   HEENT (WDL) X   Right Eye Drainage;Edema; Impaired vision; Intact   Left Eye Impaired vision; Intact   Nose Intact  (NGT)   Tongue Pink & moist   Voice Other (Comment)  (nonverbal at baseline)   Mucous Membrane Pink;Dry   Teeth Missing teeth   Respiratory   Respiratory (WDL) X   Respiratory Pattern Regular   Respiratory Depth Normal   Respiratory Quality/Effort Diaphragmatic   Chest Assessment Chest expansion symmetrical;Trachea midline   L Breath Sounds Diminished   R Breath Sounds Diminished   Breath Sounds   Right Upper Lobe Diminished   Right Middle Lobe Diminished   Right Lower Lobe Diminished   Left Upper Lobe Diminished   Left Lower Lobe Diminished   Cough/Sputum   Sputum How Obtained None   Cardiac   Cardiac (WDL) WDL   Cardiac Regularity Regular   Cardiac Rhythm NSR   Rhythm Interpretation   Pulse 82   Cardiac Monitor   Telemetry Monitor On Yes   Telemetry Audible Yes   Telemetry Alarms Set Yes   Telemetry Box Number 25   Gastrointestinal   Abdominal (WDL) X Abdomen Inspection Distended; Taut   Tenderness No guarding   GI Symptoms Flatus   RUQ Bowel Sounds Active;Distant   LUQ Bowel Sounds Active;Distant   RLQ Bowel Sounds Active;Distant   LLQ Bowel Sounds Active;Distant   Peripheral Vascular   Peripheral Vascular (WDL) X   Edema Right upper extremity; Left upper extremity   Edema Generalized +2   RUE Edema +2;Pitting   LUE Edema +2;Pitting   Sensation RUE KAREN   Sensation LUE KAREN   Sensation RLE KAREN   Sensation LLE KAREN   Skin Color/Condition   Skin Color/Condition (WDL) X   Skin Color Pale;Ecchymosis   Skin Condition/Temp Cool;Dry;Warm;Swollen   Skin Integrity   Skin Integrity (WDL) X   Skin Integrity Redness  (Blanchable)   Location Coccyx/Sacrum   Preventative Dressing Yes   Skin Fold Management No   Dressing Site Sacrum   Date Applied 09/30/20   Assessed this shift? Yes   Skin Integrity Site 2   Skin Integrity Location 2 Excoriation; Redness   Location 2 Groin   Musculoskeletal   Musculoskeletal (WDL) X   RUE Weakness   LUE Weakness   RL Extremity KAREN   LL Extremity KAREN   Genitourinary   Genitourinary (WDL) X  (parnell)   Flank Tenderness KAREN   Suprapubic Tenderness KAREN   Dysuria KAREN  (f/c in place)   Anus/Rectum   Anus/Rectum (WDL) WDL   Urethral Catheter Temperature probe 16 fr   Placement Date/Time: 09/22/20 0601   Urethral Catheter Timeout: Patient;Procedure;Sterile technique  Inserted by: kaushal flores  Catheter Type: Temperature probe  Tube Size (fr): 16 fr  Catheter Balloon Size: 10 mL  Collection Container: Other (Comment)  . ..    Catheter Indications Need for fluid management in critically ill patients in a critical care setting not able to be managed by other means such as BSC with hat, bedpan, urinal, condom catheter, or short term intermittent urethral catherization   Site Assessment No urethral drainage   Urine Color Yellow   Urine Appearance Clear   Psychosocial   Psychosocial (WDL) X   Patient Behaviors Not interactive  (Nonverbal at baseline)   Needs Expressed Other (Comment)  (pt unable to verbalize needs @ this time)   Rest/Sleep for Patient Fair

## 2020-10-01 NOTE — PROGRESS NOTES
Nephrology Progress Note   [email or web address]      This patient is a 60year old male whom we are following for LINDA. Subjective: The patient was seen and examined. Had MBS yesterday but unable to get an order for diet hence TF was started. On BIPAP last night and more awake this morning. Family History: No family at bedside  ROS: Unable to obtain      Vitals:  BP 93/67   Pulse 74   Temp 97.5 °F (36.4 °C) (Oral)   Resp 17   Ht 4' 8\" (1.422 m)   Wt 216 lb 4.3 oz (98.1 kg)   SpO2 94%   BMI 48.49 kg/m²   I/O last 3 completed shifts: In: 601 [I.V.:20; NG/GT:331; IV Piggyback:250]  Out: 750 [Urine:750]  No intake/output data recorded. Physical Exam:  Physical Exam  Vitals signs reviewed. Constitutional:       General: He is not in acute distress. HENT:      Head: Normocephalic and atraumatic. Mouth/Throat:      Mouth: Mucous membranes are moist.   Eyes:      General: No scleral icterus. Cardiovascular:      Rate and Rhythm: Normal rate and regular rhythm. Heart sounds: No friction rub. Pulmonary:      Effort: Pulmonary effort is normal. No respiratory distress. Breath sounds: No wheezing. Abdominal:      General: Bowel sounds are normal. There is no distension. Tenderness: There is no abdominal tenderness. Musculoskeletal:      Right lower leg: Edema present. Left lower leg: Edema present.            Medications:   bisacodyl  5 mg Oral Daily    pantoprazole  40 mg Intravenous Daily    levothyroxine  25 mcg Oral Daily    oxybutynin  2.5 mg Oral Nightly    sodium chloride flush  10 mL Intravenous 2 times per day         Labs:  Recent Labs     09/29/20  0616   WBC 10.1   HGB 8.2*   HCT 24.5*   MCV 95.5   *     Recent Labs     09/29/20  0616 09/30/20  0447 10/01/20  0425    140 141   K 3.8 3.8 3.5    100 102   CO2 30 30 33*   GLUCOSE 111* 82 108*   PHOS 2.7 2.3* 2.2*   MG 2.00 2.10 2.20   BUN 20 18 19   CREATININE 1.1 1.0 0.9   LABGLOM >60 >60 >60 GFRAA >60 >60 >60           Assessment/Plan:    Acute Kidney Injury:    - Data:  UA bland.   Normal renal u/s  - Etiology:  ATN  - Improved.     Hyperkalemia:    - Due to acute kidney injury, Resolved      Altered Mental Status:    - Multifactorial.  Metabolic process and CO2 retention. Pulmonary following, on PRN BIPAP.     Hypernatremia:    - Acute onset. Improved with hypotonic IV fluids.  - Can add FWF with TF if needed or encourage PO fluid intake if started on a diet. Hypophosphatemia. - PRN replacement.     Heart Failure:    - History of diastolic heart failure.  EF normal.    Little to add at this point. Will sign off. Please do not hesitate to contact me at [phone] if with questions. Thank you!     Ashish Woody MD  10/1/2020  [facility]

## 2020-10-01 NOTE — PROGRESS NOTES
Comprehensive Nutrition Assessment    Type and Reason for Visit:  Reassess    Nutrition Recommendations/Plan:   1. Continue Dysphagia 1 mildly thick liquids, liquids by spoon  2. Magic cup with meals, vanilla only  3. Tube feeding to stop today per nurse, will monitor po intake and tolerance    Nutrition Assessment:  Follow up:  MBS completed on 9/30, recommended Dysphagia 1 mildy thick liquids, liquids by spoon and medications in puree to start today. Tube feeding to stop by. Last BM x 2 on 9/30/20. Will continue to monitor nutrition progression. Malnutrition Assessment:  Malnutrition Status: At risk for malnutrition (Comment)      Estimated Daily Nutrient Needs:  Energy (kcal):  2732-1180; Weight Used for Energy Requirements:  Current(71 kg)     Protein (g):  85-99; Weight Used for Protein Requirements:  Current(1.2-1.4)        Fluid (ml/day):  1 mL/kcal; Weight Used for Fluid Requirements:  Current      Nutrition Related Findings:  BM x 1 on 9/27; minimal NG output. Wounds:  (redness on sacrum, excoriation groin)       Current Nutrition Therapies:    DIET DYSPHAGIA PUREED;  Dysphagia Pureed; Mildly Thick (Nectar)  Dietary Nutrition Supplements: Frozen Oral Supplement    Anthropometric Measures:  · Height: 4' 8\" (142.2 cm)  · Current Body Weight: 216 lb 4 oz (98.1 kg)   · Admission Body Weight: 216 lb 4 oz (98.1 kg)     · Ideal Body Weight: 82 lbs; % Ideal Body Weight 190.2 %   · BMI: 48.5  · BMI Categories: Obese Class 2 (BMI 35.0 -39.9)       Nutrition Diagnosis:   · Inadequate oral intake related to cognitive or neurological impairment as evidenced by NPO or clear liquid status due to medical condition    Nutrition Interventions:   Food and/or Nutrient Delivery:  Continue Current Diet, Start Oral Nutrition Supplement  Nutrition Education/Counseling:  No recommendation at this time   Coordination of Nutrition Care:  Continued Inpatient Monitoring    Goals:  Patient will eat 50% or greater of

## 2020-10-02 PROBLEM — J96.01 ACUTE RESPIRATORY FAILURE WITH HYPOXIA AND HYPERCAPNIA (HCC): Status: ACTIVE | Noted: 2020-01-01

## 2020-10-02 PROBLEM — R65.21 SEPTIC SHOCK (HCC): Status: ACTIVE | Noted: 2020-01-01

## 2020-10-02 PROBLEM — I46.9 CARDIAC ARREST (HCC): Status: ACTIVE | Noted: 2020-01-01

## 2020-10-02 PROBLEM — T17.908A ASPIRATION INTO AIRWAY: Status: ACTIVE | Noted: 2020-01-01

## 2020-10-02 PROBLEM — A41.9 SEPTIC SHOCK (HCC): Status: ACTIVE | Noted: 2020-01-01

## 2020-10-02 PROBLEM — I46.9 CARDIOPULMONARY ARREST WITH SUCCESSFUL RESUSCITATION (HCC): Status: ACTIVE | Noted: 2020-01-01

## 2020-10-02 NOTE — PROGRESS NOTES
10/02/20 1140   Vent Information   Skin Assessment Clean, dry, & intact   Equipment Changed HME   Vent Type 840   Vent Mode AC/PC   Pressure Ordered 30   Rate Set 20 bmp   FiO2  100 %   SpO2 (!) 86 %   SpO2/FiO2 ratio 86   Sensitivity 3   PEEP/CPAP 12   I Time/ I Time % 1.05 s   Humidification Source HME   Vent Patient Data   Peak Inspiratory Pressure 42 cmH2O   Mean Airway Pressure 22 cmH20   Rate Measured 20 br/min   Vt Exhaled 470 mL   Minute Volume 9.41 Liters   I:E Ratio 1:1.9   Cough/Sputum   Sputum Amount Small   Sputum Color Clear;Red   Tenacity Thick   Spontaneous Breathing Trial (SBT) RT Doc   Pulse 115   Breath Sounds   Right Upper Lobe Diminished   Right Middle Lobe Diminished   Right Lower Lobe Diminished   Left Upper Lobe Diminished   Left Lower Lobe Diminished   Additional Respiratory  Assessments   Resp 20   $End Tidal CO2 39   Patient Observation   Observations ambu @ bedside   ETT (adult)   Placement Date/Time: 10/01/20 2100   Preoxygenation: Yes  Type: Cuffed  Tube Size: 7.5 mm  Laryngoscope: GlideScope  Blade Size: 3  Location: Oral  Insertion attempts: 1  Placement Verified By[de-identified] Auscultation;Capnometry  Secured at: 24 cm  Placed By: FOX Becerra    Secured at 24 cm   Measured From Lips   ET Placement Right   Secured By Commercial tube wilkins   Site Condition Dry

## 2020-10-02 NOTE — PROGRESS NOTES
ventricular heave. 1-2+ lower extremity edema. Pulmonary/Chest: Bilateral wheezes. Bilateral rales. Chest wall is not dull to percussion. No accessory muscle usage or stridor. Decreased breath sound density  Abdominal: Soft. Bowel sounds present. Distended and obese  Musculoskeletal: No cyanosis. No clubbing. No obvious joint deformity. Lymphadenopathy: No cervical or supraclavicular adenopathy. Skin: Skin is warm and dry. Areas of hypopigmentation present  Neurologic:  Intubated and sedated      Results:  CBC:   Recent Labs     10/01/20  2058 10/02/20  0115 10/02/20  0546   WBC 21.0*  --  5.5   HGB 9.0* 10.8* 10.0*   HCT 28.5*  --  30.5*   MCV 98.6  --  96.8     --  402     BMP:   Recent Labs     10/01/20  0425 10/01/20  2058 10/02/20  0546 10/02/20  1437    139 142 140   K 3.5 4.9 3.8 3.5    98* 104 101   CO2 33* 29 28 25   PHOS 2.2*  --  3.9 3.0   BUN 19 19 20 23*   CREATININE 0.9 1.1 1.2 1.5*     LIVER PROFILE:   Recent Labs     10/02/20  0546   AST 28   ALT 31   BILITOT 0.5   ALKPHOS 144*       Imaging:  I have reviewed radiology images personally. XR CHEST PORTABLE   Final Result   Worsening consolidation within the right upper lobe and infrahilar region,   otherwise similar appearing chest.         XR CHEST PORTABLE   Final Result   1. Endotracheal tube tip projects over the trachea, tip at the level of the   aortic knob, 6.3 cm superior to the oliverio. Life support appliances appear   appropriately positioned. 2. Interval progression of consolidation and pleural effusion over the upper   right hemithorax. Lung volumes are low. 3. Slightly diminished opacities in the left lung which predominate medially   throughout possibly related to atelectasis or infiltrate. 4. Vascular engorgement and perivascular haziness typically associated with   congestive heart failure.    5. Prominent soft tissue fullness about the mediastinum and paz concerning   for underlying adenopathy. Consider IV contrast-enhanced CT chest.      RECOMMENDATION:   IV contrast-enhanced CT chest         XR CHEST PORTABLE   Final Result   Bibasilar opacification and probable effusion with mild progression on the   right. FL MODIFIED BARIUM SWALLOW W VIDEO   Final Result   No kaden aspiration. Please see separate speech pathology report for full discussion of findings   and recommendations. XR ABDOMEN (KUB) (SINGLE AP VIEW)   Final Result   Unremarkable bowel gas pattern. NG tube extends well into the stomach. XR CHEST PORTABLE   Final Result   Bibasilar opacities, likely combination of atelectasis/airspace disease with   layered pleural effusions, larger on the left. There improvement in aeration   of the left lung in the interval, which may be related to differences in   positioning. XR CHEST PORTABLE   Final Result   Increased opacification within the left lung may represent a layering pleural   effusion. XR CHEST PORTABLE   Final Result   New small bilateral pleural effusions and bilateral airspace disease which   may be related to edema and/or pneumonia. XR ABDOMEN FOR NG/OG/NE TUBE PLACEMENT   Final Result   Gastric tube in mid stomach         US RENAL COMPLETE   Final Result   1. Unremarkable sonographic appearance of the kidneys. 2. Collapsed urinary bladder, limiting its evaluation. XR ABDOMEN (KUB) (SINGLE AP VIEW)   Final Result   Unchanged moderate amount of gaseous distention of the stomach. No other   findings to explain abdominal distension. Of note however these images were   obtained supine and early free air would not be detected. Results for [facility] (MRN [id number]) as of 10/2/2020 19:53   Ref.  Range 10/1/2020 20:55 10/1/2020 20:58 10/2/2020 00:11 10/2/2020 00:17 10/2/2020 01:15 10/2/2020 05:46 10/2/2020 08:02 10/2/2020 14:37   Sodium Latest Ref Range: 136 - 145 mmol/L  139    142  140   Potassium Latest Ref Range: 3.5 - 5.1 mmol/L  4.9    3.8  3.5   Chloride Latest Ref Range: 99 - 110 mmol/L  98 (L)    104  101   CO2 Latest Ref Range: 21 - 32 mmol/L  29    28  25   BUN Latest Ref Range: 7 - 20 mg/dL  19    20  23 (H)   Creatinine Latest Ref Range: 0.9 - 1.3 mg/dL  1.1    1.2  1.5 (H)   Anion Gap Latest Ref Range: 3 - 16   12    10  14   Calcium, Ion Latest Ref Range: 1.12 - 1.32 mmol/L     1.20      GFR Non- Latest Ref Range: >60   >60    >60  48 (A)   GFR  Latest Ref Range: >60   >60    >60  58 (A)   Magnesium Latest Ref Range: 1.80 - 2.40 mg/dL      1.90     Lactic Acid Latest Ref Range: 0.4 - 2.0 mmol/L  6.8 (HH)  0.9       Lactate, ser/plas Latest Ref Range: 0.40 - 2.00 mmol/L     3.56 (H)  1.80    Glucose Latest Ref Range: 70 - 99 mg/dL  170 (H)    144 (H)  211 (H)   POC Glucose Latest Ref Range: 70 - 99 mg/dl 156 (H)  118 (H)  120 (H)  161 (H)    Calcium Latest Ref Range: 8.3 - 10.6 mg/dL  9.4    8.2 (L)  8.0 (L)   Phosphorus Latest Ref Range: 2.5 - 4.9 mg/dL      3.9  3.0   Total Protein Latest Ref Range: 6.4 - 8.2 g/dL      5.7 (L)     POC Sodium Latest Ref Range: 136 - 145 mmol/L     140      POC Potassium Latest Ref Range: 3.5 - 5.1 mmol/L     6.2 (HH)      POC Hematocrit Latest Ref Range: 40.5 - 52.5 %     32.0 (L)        Results for [facility] (MRN [id number]) as of 10/2/2020 19:53   Ref. Range 9/27/2020 05:15 9/28/2020 04:45 10/2/2020 05:46 10/2/2020 14:37   Albumin Latest Ref Range: 3.4 - 5.0 g/dL 2.8 (L) 2.6 (L) 2.4 (L) 2.2 (L)   Globulin Latest Units: g/dL   3.3    Albumin/Globulin Ratio Latest Ref Range: 1.1 - 2.2    0.7 (L)    Alk Phos Latest Ref Range: 40 - 129 U/L   144 (H)    ALT Latest Ref Range: 10 - 40 U/L   31    AST Latest Ref Range: 15 - 37 U/L   28    Bilirubin Latest Ref Range: 0.0 - 1.0 mg/dL   0.5    Total Protein Latest Ref Range: 6.4 - 8.2 g/dL   5.7 (L)      Results for [facility] (MRN [id number]) as of 10/2/2020 19:53   Ref.  Range Methemoglobin, Arterial Latest Ref Range: <1.5 % 0.5  0.2     Carboxyhgb, Arterial Latest Ref Range: 0.0 - 1.5 % 0.1  0.4     Sample Type Unknown  ART  ART ART     ONE XRAY VIEW OF THE CHEST         10/2/2020 2:01 am         COMPARISON:    10/01/2020         HISTORY:    ORDERING SYSTEM PROVIDED HISTORY: Progressive hypoxemic respiratory failure. Bloody secretions per ET tube. TECHNOLOGIST PROVIDED HISTORY:    Reason for exam:->Progressive hypoxemic respiratory failure.  Bloody    secretions per ET tube. Reason for Exam: Progressive hypoxemic respiratory failure.  Bloody    secretions per ET tube.         FINDINGS:    The endotracheal tube appears in appropriate position.  Postoperative changes    are seen overlying the spine.  A right IJ approach central venous catheter    terminates overlying the proximal SVC.  A right-sided PICC is also seen    terminating in the similar region.  Worsening airspace consolidations seen    within the right lower lobe, as well as right upper lobe when compared to the    previous examination.  Marked perihilar infiltrates seen on the left. Osseous structures appear similar.  Vascular congestion.              Impression    Worsening consolidation within the right upper lobe and infrahilar region,    otherwise similar appearing chest.        ECHO- Summary   Technically difficult study due to body surface area and poor acoustic   window. Patient on vent positioned on right side. Normal left ventricular systolic function with ejection fraction of 55-60%. No regional wall motion abnormalites are seen. Mildly reduced size of left ventricle. Left ventricular diastolic filling pressure is normal.   Compared to previous study from 9-5-2019 no changes noted in left   ventricular function.       Assessment:  Active Problems:    Acute encephalopathy    Acute respiratory failure with hypoxia and hypercapnia (HCC)    Pulmonary infiltrates    Acute pancreatitis without infection or necrosis    LINDA (acute kidney injury) ([facility] [address]    Hyperkalemia    Hypothyroidism    Septic shock (HCC)    Hypothermia    Class 2 obesity in adult    Suspected sleep apnea    Hypernatremia    Thrombocytopenia ([facility] [address]    Cardiac arrest [facility])    Cardiopulmonary arrest with successful resuscitation ([facility] [address]    Aspiration into airway  Resolved Problems:    * No resolved hospital problems.  *          Plan:   · S/p cardiopulmonary resuscitation as per ACLS protocol  · Ventilatory support  to keep saturation between 90 and 94% only  · Patient when seen was behaving as ARDS-like picture  · Patient was on pressure control ventilation and then also patient was having persistent hypercarbia along with hypoxemia  · Patient's ventilator settings were changed to VC plus  · Pulmonary toilet  · Patient's overall clinical picture is precarious which was informed to patient's family  · Patient is continued to be hypoxemic in spite of significant support from the ventilator  · Can attempt a bronchoscopy for therapeutic/diagnostic purposes as it appears that patient may have had an aspiration event which may have triggered patient's current cardiopulmonary arrest  · Bronchoscopy is a high risk procedure given the patient's clinical status  · Patient's family wanted to proceed with a bronchoscopy-we will arrange that for today with the hope that it improves patient's oxygenation  · Bronchodilators  · Patient has been started on [facility] protocol  · IV hydrocortisone to continue  · Patient is on IV meropenem which can be continued  · No need for any vancomycin at this time   · IV sedation to maintain patient ventilator synchrony  · Patient is in kaden septic shock at this time and is on 3 pressors to maintain hemodynamics when evaluated this morning to maintain mean arterial pressure more than 65 mmHg  · Patient is developing progressive renal failure along with that patient has oliguria  · Patient also has septic shock  · If patient's clinical status does not improve with the fluids being given by the nephrologist-patient may require CRRT which may not change the overall prognosis as it appears at this time  · Monitor for any cardiac arrhythmias  · Monitor renal function and electrolytes closely especially potassium levels  · Monitor input output and BMP  · Patient to be kept n.p.o. at this time  · Monitor for any hypoglycemia  · PUD and DVT prophylaxis as per IM     Patient's clinical status is precarious and has potential for further decompensation and there is a high probability that patient may not survive this insult  Patient's overall prognosis appears to be guarded  Patient's clinical status, prognosis and options discussed with patient's family in detail  Patient's family has made the patient DNR CC arrest but wants to maintain the current level of care without any de-escalation for now    Patient was seen and evaluated and managed actively multiple times during the course of the day     Critical care time spent on the patient was 90 minutes exclusive of any procedures    Case discussed with ICU team      Electronically signed by:  Usman Chandler MD    10/2/2020    7:50 PM.

## 2020-10-02 NOTE — PROGRESS NOTES
Sepsis Focus Note     Suspected source:  Aspiration pneumonia  Blood cultures collected before antibiotics:  Yes  Empiric Antibiotics given:  Yes  Fluid resuscitation (30 ml/kg) given:  Yes, but clinically he didn't need the full bolus amount  Initial Lactate:  high  Repeat Lactate: pending     Initial fluid resuscitation has been given. The following was performed to re-assess volume status and tissue perfusion. Focused Sepsis Exam Performed:  BP (!) 214/148   Pulse 119   Temp 97.8 °F (36.6 °C) (Oral)   Resp 11   Ht 4' 8\" (1.422 m)   Wt 216 lb 4.3 oz (98.1 kg)   SpO2 93%   BMI 48.49 kg/m²   Cardiovascular exam shows regular rate and rhythm with normal S1/S2 without murmurs, rubs or gallops. Pulmonary exam shows a normal respiratory effort, clear to auscultation, bilaterally without Rales/Wheezes/Rhonchi. Skin exam shows normal color/perfusion. Extremity exam shows brisk capillary refill. Peripheral pulses were 2+ in the lower extremities.       If Central Line present  CVP:    ScvO2:    CVP post fluid Challenge:

## 2020-10-02 NOTE — SIGNIFICANT EVENT
HOSPITAL MEDICINE  Nocturnist / Cross-Cover Note  Dr. Jeni Castro MD    SITUATION:  Another cardiac arrest event. O2 saturation dropped followed by PEA. ROSC attained in < 4 minutes. ABG following initial code earlier in the night was obtained but significantly delayed. This resulted at 00:17. Vent settings were changed in response to results with a RR setting of 16/min increased to 24/min. These changes were made about 20 minutes prior to the second cardiac arrest.  RN called RT back because bloody secretions were backing up into the ET tube and his O2 saturation was dropping. The vent was also cycling out of inspiration because his PIP was exceeding the alarm setting. Arrest occurred at that point. After ROSC vent settings were adjusted at the bedside settling at first on  PCV 24 / 30 / 8 / 100% w/ Ti ~ 1 sec. Tidal volumes averaged in the 250mL range. Over the next 1-2 hours patient became increasingly difficult to oxygenate. Vent settings had to be changed to an inverted I:E ratio at high pressures (Plo = 10 Phi = 40) in order to maintain an acceptable O2 saturation. He also had to be disconnected from the vent several times to ventilate manually via BVM. Pneumothorax was ruled out by bedside ultrasound and by repeat CXR. Repeat CXR did reveal blossoming opacities most obvious in the RUL. Preparations to prone the patient were made quickly. The patient's father was contacted several times by telephone and came to the hospital around 2:30am.  The situation was explained to him and he was understanding. I explained that the patient's O2 saturation levels were barely acceptable and may abruptly decline when he is proned. His father requested that if the patient arrests again that no further efforts at resuscitation be undertaken. Code status was changed to DNR-CCA. Father later visited the patient's bedside.   He tearfully requested that the patient not be allowed to suffer anymore and requested that the patient not be proned. Proning was cancelled. Vent settings will be left as is and not escalated. Pressor support will not be escalated. I attest that this patient's condition was sufficiently critical to warrant urgent intervention due to progressive hypoxemic respiratory failure. A total of 60 minutes was spent in direct patient care at the bedside and entering orders. This time did not include separately billable procedures.

## 2020-10-02 NOTE — CONSULTS
Pharmacy to Dose: Ascorbic Acid    Ascorbic acid 1500mg IV q6h x 16 doses for septic shock per [facility] et al    Mar Pore.  Margaux MackeyD, BCPS   10/2/2020 8:26 AM

## 2020-10-02 NOTE — CONSULTS
Palliative Care Initial Note  Palliative Care Admit date:  10/2/2020  Reason for c/s:  GOC, Family support    Advance Directives: We do not have AD's in this EMR and pt is unable to have discussion 2/2 being sedated & on MV support. Pt has a DNRCC-A code status per provider d/w father/NOK. The chart also reflects the father as pts \"psychiatric decision maker. \"    Plan of care/goals:  Pt in in [address] @ [facility]. Have d/w Pulm NP and aware of father's wish to maintain present level of support for now.       Social/Spiritual:  Not assessed    Plan:  Aware of referral and will continue to follow       Reason for consult:    ___ Advance Care Planning  ___ Transition of Care Planning  ___ Psychosocial/Spiritual Support  ___ Symptom Management                                                                                                                      Magnus Homans, RN

## 2020-10-02 NOTE — PROGRESS NOTES
Nephrology Progress Note   [email or web address]      This patient is a 60year old male whom we are following for LINDA. Subjective: The patient was seen and examined. Events overnight reviewed. Coded several times last night, received CPR, intubated then transferred to the ICU. Received IVF bolus for hypotension and started on 2 pressors. Patient remained hypoxic and hypotensive after the event. FiO2 100%. Code status changed to DNR-CCA. Father at bedside and said he does not want any CPR if he codes again but would like to continue current level of care. No oliguric. Family History: Family at bedside  ROS: Unable to obtain      Vitals:  BP (!) 65/34   Pulse 123   Temp 97.6 °F (36.4 °C) (Axillary)   Resp 20   Ht 4' 8\" (1.422 m)   Wt 154 lb 15.7 oz (70.3 kg)   SpO2 (!) 83%   BMI 34.75 kg/m²   I/O last 3 completed shifts: In: 56 [P.O.:120; NG/GT:370]  Out: 435 [Urine:435]  No intake/output data recorded. Physical Exam:  Physical Exam  Vitals signs reviewed. Constitutional:       Interventions: He is intubated. Comments: unresponsive   HENT:      Head: Normocephalic and atraumatic. Mouth/Throat:      Comments: ET tube in place  Eyes:      General: No scleral icterus. Cardiovascular:      Rate and Rhythm: Tachycardia present. Rhythm irregular. Pulmonary:      Effort: He is intubated. Comments: Equal chest expansion, coarse breath sounds bilateral  Abdominal:      General: There is distension. Musculoskeletal:      Right lower leg: Edema present. Left lower leg: Edema present.            Medications:   hydrocortisone sodium succinate PF  50 mg Intravenous Q6H    thiamine (VITAMIN B1) IVPB  200 mg Intravenous Q12H    ascorbic acid  1,500 mg Intravenous Q6H    carboxymethylcellulose PF  1 drop Both Eyes 6 times per day    mupirocin   Nasal BID    midazolam  2 mg Intravenous Once    chlorhexidine  15 mL Mouth/Throat BID    famotidine (PEPCID) injection  20 mg Intravenous BID    meropenem  1 g Intravenous Q8H    bisacodyl  5 mg Oral Daily    levothyroxine  25 mcg Oral Daily    oxybutynin  2.5 mg Oral Nightly    sodium chloride flush  10 mL Intravenous 2 times per day         Labs:  Recent Labs     10/01/20  2058 10/02/20  0115 10/02/20  0546   WBC 21.0*  --  5.5   HGB 9.0* 10.8* 10.0*   HCT 28.5*  --  30.5*   MCV 98.6  --  96.8     --  402     Recent Labs     09/30/20  0447 10/01/20  0425 10/01/20  2058 10/02/20  0546    141 139 142   K 3.8 3.5 4.9 3.8    102 98* 104   CO2 30 33* 29 28   GLUCOSE 82 108* 170* 144*   PHOS 2.3* 2.2*  --  3.9   MG 2.10 2.20  --  1.90   BUN 18 19 19 20   CREATININE 1.0 0.9 1.1 1.2   LABGLOM >60 >60 >60 >60   GFRAA >60 >60 >60 >60           Assessment/Plan:    Acute Kidney Injury:    - Initial bout resolved but now likely has ATN in the setting of CP arrest/persistent hypotension.  - Not making much urine after the event but so far electrolytes are stable. Has acidosis but primarily respiratory in nature. - Continue vasopressor and IVF to improve hemodynamics. - Will monitor closely. May need dialysis but explained to the father that unless hemodynamics improve, will not tolerate RRT.     Acid-base Disorder.  - Primarily acute respiratory acidosis with mild metabolic acidosis. - Metabolic acidosis from LINDA and lactic acidosis. Received sodium bicarb IVP x 2.  - Vent adjustment per pulmonary service.     Acute Hypoxic Respiratory Failure/Septic Shock.  - S/P CP arrest. Suspect aspiration pneumonia. - On antibiotics and Hydrocortisone per primary service. - On Levophed and vasopressin but remains hypotensive. Will add epi drip.     Hypophosphatemia. - improved with replacement.     Heart Failure:    - History of diastolic heart failure.  EF normal.    Critical care time=40mins. Please do not hesitate to contact me at [phone] if with questions. Thank you!     Diogenes Butterfield MD  10/2/2020  [facility]

## 2020-10-02 NOTE — PROGRESS NOTES
10/02/20 0822   Vent Information   Skin Assessment Clean, dry, & intact   Vent Type 840   Vent Mode AC/PC   Pressure Ordered 30   Rate Set 20 bmp   FiO2  100 %   SpO2 (!) 83 %   SpO2/FiO2 ratio 83   Sensitivity 3   PEEP/CPAP 12   I Time/ I Time % 1.05 s   Humidification Source HME   Vent Patient Data   Peak Inspiratory Pressure 42 cmH2O   Mean Airway Pressure 22 cmH20   Rate Measured 20 br/min   Vt Exhaled 418 mL   Minute Volume 8.35 Liters   I:E Ratio 1:1.9   Plateau Pressure 38 VHG55   Static Compliance 16 mL/cmH2O   Dynamic Compliance 13 mL/cmH2O   Cough/Sputum   Sputum Amount Small   Sputum Color Clear   Tenacity Thick   Breath Sounds   Right Upper Lobe Diminished   Right Middle Lobe Diminished   Right Lower Lobe Diminished   Left Upper Lobe Diminished   Left Lower Lobe Diminished   Additional Respiratory  Assessments   Resp 20   Position Semi-Mooney's   Patient Observation   Observations ambu @ bedside   ETT (adult)   Placement Date/Time: 10/01/20 2100   Preoxygenation: Yes  Type: Cuffed  Tube Size: 7.5 mm  Laryngoscope: GlideScope  Blade Size: 3  Location: Oral  Insertion attempts: 1  Placement Verified By[de-identified] Auscultation;Capnometry  Secured at: 24 cm  Placed By: FOX Becerra    Secured at 24 cm   Measured From [address] [place] [address],[address] By Commercial tube wilkins   Site Condition Dry

## 2020-10-02 NOTE — PROGRESS NOTES
10/02/20 0207   Vent Information   Equipment Changed HME   Vent Type 840   Vent Mode AC/PC   Pressure Ordered 30   Rate Set 20 bmp   FiO2  100 %   SpO2 (!) 83 %   SpO2/FiO2 ratio 83   PEEP/CPAP 10   Humidification Source HME   Vent Patient Data   Peak Inspiratory Pressure 45 cmH2O   Mean Airway Pressure 26 cmH20   Rate Measured 20 br/min   Vt Exhaled 413 mL   Minute Volume 8.25 Liters   I:E Ratio 2.0:1   Cough/Sputum   Sputum How Obtained Endotracheal   Cough Non-productive   Sputum Amount Moderate   Sputum Color Red;Frothy   Tenacity Thin   Spontaneous Breathing Trial (SBT) RT Doc   Pulse 112   Breath Sounds   Right Upper Lobe Diminished; Expiratory Wheezes   Right Middle Lobe Diminished; Expiratory Wheezes   Right Lower Lobe Diminished; Expiratory Wheezes   Left Upper Lobe Diminished; Expiratory Wheezes   Left Lower Lobe Diminished; Expiratory Wheezes   Additional Respiratory  Assessments   Resp 23   Position Semi-Mooney's   Oral Care Completed? Yes   Oral Care Mouth suctioned   Subglottic Suction Done?  Yes

## 2020-10-02 NOTE — PROGRESS NOTES
Hospitalist Progress Note      PCP: No primary care provider on file. Date of Admission: 9/22/2020    Chief Complaint:  [address] Course:   HPI :   60 y.o. male with history of CHF, COPD, GERD,  MRDD who presented to  [facility] ER from group home with altered mental status. History obtained per chart as patient unable to provide any history due to AMS. Pt minimally verbal at baseline. Patient reportedly had reduced responsiveness at the ECF overnight and was sent to the ER. He was noted to be hypothermic in the 80s and hypotensive in the 60s. Initial concern for possible CVA - stroke team consulted. CT head was nonacute with tiny area of low attenuation near the anterior right  internal capsule which was thought to be likely subacute/chronic. CTA head/neck was unremarkable. PICC line placed in ER. Labs notable for severe hyperkalemia with potassium of 7.1 creatinine 1.5, TSH of 7.4. Lipase was  elevated at 1471. Temperature improved to 90's with karuna hugger and warm IVF. There was concern for myxedema coma and was started on IV Synthroid. He was given a dose of IV hydrocortisone, started on broad-spectrum antibiotics and  pressors. Pt was transferred to [facility] for further care. Subjective:   Patient had CODE BLUE overnight and patient was subsequently intubated.         Medications:  Reviewed    Infusion Medications    vasopressin (Septic Shock) infusion 0.04 Units/min (10/02/20 1040)    EPINEPHrine infusion 5 mcg/min (10/02/20 0904)    fentaNYL (SUBLIMAZE) infusion 25 mcg/hr (10/02/20 0946)    sodium chloride 75 mL/hr at 10/02/20 0954    norepinephrine 30 mcg/min (10/02/20 1046)    midazolam 2 mg/hr (10/02/20 0245)     Scheduled Medications    hydrocortisone sodium succinate PF  50 mg Intravenous Q6H    thiamine (VITAMIN B1) IVPB  200 mg Intravenous Q12H    ascorbic acid  1,500 mg Intravenous Q6H    carboxymethylcellulose PF  1 drop Both Eyes 6 times per day    mupirocin   Nasal BID SILDENAFIL 100MG TABLETS         Last Written Prescription Date:  4/27/20  Last Fill Quantity: 30,   # refills: 0  Last Office Visit: 7/15/19  Future Office visit:       Routing refill request to provider for review/approval because:          midazolam  2 mg Intravenous Once    chlorhexidine  15 mL Mouth/Throat BID    famotidine (PEPCID) injection  20 mg Intravenous BID    meropenem  1 g Intravenous Q8H    bisacodyl  5 mg Oral Daily    levothyroxine  25 mcg Oral Daily    oxybutynin  2.5 mg Oral Nightly    sodium chloride flush  10 mL Intravenous 2 times per day     PRN Meds: perflutren lipid microspheres, midazolam, dextrose, ipratropium-albuterol, bisacodyl, sodium chloride flush, [DISCONTINUED] acetaminophen **OR** acetaminophen, promethazine **OR** ondansetron      Intake/Output Summary (Last 24 hours) at 10/2/2020 1159  Last data filed at 10/2/2020 1000  Gross per 24 hour   Intake 330 ml   Output 605 ml   Net -275 ml       Physical Exam Performed:    /66   Pulse 115   Temp 97.6 °F (36.4 °C) (Axillary)   Resp 20   Ht 4' 8\" (1.422 m)   Wt 154 lb 15.7 oz (70.3 kg)   SpO2 (!) 86%   BMI 34.75 kg/m²   General appearance: lethargic and somnolent but arousable,  on ventilator. HEENT:  Normal cephalic, atraumatic without obvious deformity. Conjunctivae/corneas clear. Neck: Supple, with full range of motion. No jugular venous distention. Trachea midline. Respiratory:  Normal respiratory effort. Bibasilar crackles   Cardiovascular:  Regular rate and rhythm with normal S1/S2 without murmurs, rubs or gallops. Abdomen: Soft, distended,  non-tender,  with normal bowel sounds. Musculoskeletal:  No clubbing, cyanosis or edema bilaterally. Skin:  Warm and dry .   Neurologic:   On ventilator  Psychiatric:  On ventilator         Labs:   Recent Labs     10/01/20  2058 10/02/20  0115 10/02/20  0546   WBC 21.0*  --  5.5   HGB 9.0* 10.8* 10.0*   HCT 28.5*  --  30.5*     --  402     Recent Labs     09/30/20  0447 10/01/20  0425 10/01/20  2058 10/02/20  0546    141 139 142   K 3.8 3.5 4.9 3.8    102 98* 104   CO2 30 33* 29 28   BUN 18 19 19 20   CREATININE 1.0 0.9 1.1 1.2   CALCIUM 8.7 8.8 9.4 8.2*   PHOS 2.3* 2.2*  --  3.9 Urinalysis:      Lab Results   Component Value Date    NITRU Negative 09/26/2020    WBCUA 6-9 09/26/2020    BACTERIA 2+ 09/26/2020    RBCUA 3-4 09/26/2020    BLOODU TRACE-INTACT 09/26/2020    SPECGRAV 1.015 09/26/2020    GLUCOSEU Negative 09/26/2020       Radiology:  XR CHEST PORTABLE   Final Result   Worsening consolidation within the right upper lobe and infrahilar region,   otherwise similar appearing chest.         XR CHEST PORTABLE   Final Result   1. Endotracheal tube tip projects over the trachea, tip at the level of the   aortic knob, 6.3 cm superior to the oliverio. Life support appliances appear   appropriately positioned. 2. Interval progression of consolidation and pleural effusion over the upper   right hemithorax. Lung volumes are low. 3. Slightly diminished opacities in the left lung which predominate medially   throughout possibly related to atelectasis or infiltrate. 4. Vascular engorgement and perivascular haziness typically associated with   congestive heart failure. 5. Prominent soft tissue fullness about the mediastinum and paz concerning   for underlying adenopathy. Consider IV contrast-enhanced CT chest.      RECOMMENDATION:   IV contrast-enhanced CT chest         XR CHEST PORTABLE   Final Result   Bibasilar opacification and probable effusion with mild progression on the   right. FL MODIFIED BARIUM SWALLOW W VIDEO   Final Result   No kaden aspiration. Please see separate speech pathology report for full discussion of findings   and recommendations. XR ABDOMEN (KUB) (SINGLE AP VIEW)   Final Result   Unremarkable bowel gas pattern. NG tube extends well into the stomach. XR CHEST PORTABLE   Final Result   Bibasilar opacities, likely combination of atelectasis/airspace disease with   layered pleural effusions, larger on the left. There improvement in aeration   of the left lung in the interval, which may be related to differences in   positioning. XR CHEST PORTABLE   Final Result   Increased opacification within the left lung may represent a layering pleural   effusion. XR CHEST PORTABLE   Final Result   New small bilateral pleural effusions and bilateral airspace disease which   may be related to edema and/or pneumonia. XR ABDOMEN FOR NG/OG/NE TUBE PLACEMENT   Final Result   Gastric tube in mid stomach         US RENAL COMPLETE   Final Result   1. Unremarkable sonographic appearance of the kidneys. 2. Collapsed urinary bladder, limiting its evaluation. XR ABDOMEN (KUB) (SINGLE AP VIEW)   Final Result   Unchanged moderate amount of gaseous distention of the stomach. No other   findings to explain abdominal distension. Of note however these images were   obtained supine and early free air would not be detected. Assessment/Plan:    Active Hospital Problems    Diagnosis    Cardiac arrest ([address] [I46.9]    Hypernatremia [E87.0]    Thrombocytopenia ([address] [D69.6]    Acute encephalopathy [G93.40]    Acute respiratory failure with hypoxia and hypercapnia (HCC) [J96.01, J96.02]    Pulmonary infiltrates [R91.8]    Acute pancreatitis without infection or necrosis [K85.90]    LINDA (acute kidney injury) ([address] [N17.9]    Hyperkalemia [E87.5]    Hypothyroidism [E03.9]    Shock ([address] [R57.9]    Hypothermia [I21. XXXA]    Class 2 obesity in adult [E66.9]    Suspected sleep apnea [R29.818]       Acute metabolic encephalopathy: Likely multifactorial due to septic shock, hyperkalemia, hypothermia, ? Myxedema coma. Non verbal at baseline per report. CT head was nonacute. Stroke work-up negative in ER. Neuro recs appreciated. Supportive care for acute morbidities as stated below        Septic shock: met SIRS criteria with tachycardia, tachypnea, hypothermia on arrival in ER. Initial concern for pneumonia as cause. Pneumonia ruled out per pulm. Empiric abx d/rich. Off pressors. BP stable. D/rich steroids.  Continue to hold home BP meds    Clinically resolved. Acute on chronic resp failure:likely due to acute pulm edema and bibasilar atelectasis/ hypoventilation. Received IV lasix. CXR today reviewed. Continue diuresis as needed per pulm. Continue 02 and wean as tolerated. Patient is currently on BiPAP, continue to monitor. Pulmonary following. Patient was intubated on 10/1/2020 and nighttime. Currently on ventilator. Further management as per pulmonary.     Hypothyroidism: TSH elevated at 7.4, FT3, FT4  normal. Given decreased responsiveness from baseline and hypothermia concern for possible myxedema coma. recieved IV synthroid - started on PO on 9/23. repeat TFTs in 4-6 weeks      Hypothermia: Likely due to sepsis, hypothyroidism. Improved with karuna hugger . Temp normalized     Hyperkalemia: likely due to LINDA ,  potassium supplements he is on chronically and lisinopril. Treated with improvement. Held K supplements and lisinopril. Resolved. Nephro assisting     LINDA: likely due to vol depletion, compounded by use of ACEi and lasix. Culprit meds held. Nephro assisting. Cr unchanged. Monitor Cr and avoid nephrotoxins. Likely due to ATN, clinically improving, nephrology input noted, monitor.       Acute pancreatitis: lipase was elevated with signs of early pancreatitis on CT. Unclear etiology. GI assisting. Continue NG tube to LIWS, NPO  . Start feeding gradually.       GERD : Continue PPI          Acute on Chronic diastolic CHF: EF 55 to 81% with G1DD on echo in 9/3/2019. Received IVF for sepsis, hypernatremia. Pulm edema on CXR. IV lasix given. Monitor vol status closely         Hypernatremia: improving with IVF. nephro managing . Hypoglycemia: likely due to NPO status. BG's better since getting dextrose in IVF. Monitor with hypoglycemia protocol in place. Thrombocytopenia: cause unclear. No overt bleeding. Heparin d/rich. HIT ab neg.  Monitor      DVT Prophylaxis: SCDs  Diet: Diet NPO Effective Now Exceptions are: Sips with Meds  Code Status: DNR-CCA    PT/OT Eval Status: not indicated at this time     Dispo -  Hard to say,  pending clinical course     Shayne Dooley MD

## 2020-10-02 NOTE — PROGRESS NOTES
Paged MD \"Pt's BP 65/57 MAP 60. O2 sat 78-80%. RT already came to bedside and bagged pt. Do you want to add another med for BP support? \"     No new orders.

## 2020-10-02 NOTE — PROGRESS NOTES
Jamie at bedside to place central line.  Okay to use per MD. Radiology at bedside to take CXR for placement

## 2020-10-02 NOTE — PROGRESS NOTES
Referral made to [address] [place] [address], spoke with Vaughn (EMMA) and Du Amado (GREGORIO)

## 2020-10-02 NOTE — PROGRESS NOTES
Speech Language Pathology    Pt has been transferred to ICU, now intubated. SLP spoke with RN. ST to sign-off at this time, please re-refer ST as pt is able and appropriate.     Modesto Martinez M.S. Maria Elena Boyd  Speech-language pathologist  [phone]

## 2020-10-02 NOTE — PROGRESS NOTES
10/01/20 2236   Vent Information   Vt Ordered 300 mL   Rate Set 16 bmp   SpO2 96 %   PEEP/CPAP 8   Spontaneous Breathing Trial (SBT) RT Doc   Pulse 108   Additional Respiratory  Assessments   Resp 16

## 2020-10-02 NOTE — PROGRESS NOTES
Spoke with Dr. Daniel Conway on the phone and provided update, new orders for renal panel. MD aware of low urine output.

## 2020-10-02 NOTE — PROGRESS NOTES
Occupational Therapy/Physical Therapy  Pt transferred to ICU. Please reorder OT/PT when pt is able to tolerate therapy activity. Thank you.   [address] PT

## 2020-10-02 NOTE — PROGRESS NOTES
Spoke with father, Shiva العراقي, at bedside regarding plan of care. Pt's father would not like to continue with proning pt. Notified Huma Ashraf MD face to face. D/c nimbex per Huma Ashraf.

## 2020-10-02 NOTE — FLOWSHEET NOTE
10/02/20 1124   Encounter Summary   Services provided to: Family   Referral/Consult From: Other    Support System Parent; Family members   Continue Visiting   (10/2: prayer with family, tearful, processing)   Complexity of Encounter High   Length of Encounter 30 minutes   Spiritual Assessment Completed Yes   Grief and Life Adjustment   Type End of life   Assessment Grieving;Tearful   Intervention Active listening;Prayer   Outcome Expressed gratitude; Shared life review;Engaged in conversation

## 2020-10-02 NOTE — PROGRESS NOTES
Comprehensive Nutrition Assessment    Type and Reason for Visit:  Reassess, Consult    Nutrition Recommendations/Plan:   1. Nutrition on hold for now, continue NPO  2. Monitor for improvements in hemodynamic status, GI fxn, and ability to initiate nutrition support when medically appropriate   1. If initiated over the weekend, recommend Jevity 1.5 at 10 mL/hr to start. As tolerated, titrate to goal rate of 65 ml/hr. Water flush start with 60 ml every 3 hours and monitor Na trends  3. Monitor nutrition adequacy, pertinent labs, bowel habits, wt changes, and clinical progress      Nutrition Assessment:  Follow up: Pt coded last night, went asystole. S/p CPR and trx to ICU. Intubated in the ICU and currently requiring 100% FiO2 with O2 saturations less than 90%. PEEP 12. Hypotensive on 3 vasopressors: Levo at 40, Vaso at .04 and epi at 5. NG in place which was hooked to [facility] 2/2 emesis during code and distended abd. Recommend holding nutrition for now. Monitor improvements in medical stability. Malnutrition Assessment:  Malnutrition Status: At risk for malnutrition (Comment)      Estimated Daily Nutrient Needs:  Energy (kcal):  8365-6371; Weight Used for Energy Requirements:  Current(71 kg)     Protein (g):  85-99; Weight Used for Protein Requirements:  Current(1.2-1.4)        Fluid (ml/day):  1 mL/kcal; Weight Used for Fluid Requirements:  Current      Nutrition Related Findings:  BM x 1 on 9/27; minimal NG output. Wounds:  (redness on sacrum, excoriation groin)       Current Nutrition Therapies:    Diet NPO Effective Now Exceptions are: Sips with Meds  Current Tube Feeding (TF) Orders:  · Feeding Route: Nasogastric  · Formula: 1.5 Calorie with Fiber  · Schedule: Continuous  · Goal TF & Flush Orders Provides: Jevity 1.5 at 65 mL/hr x 20 hrs to provide 1300 mL TV, 1950 kcals, 83 g protein, and 988 mL free fluid. 120 mL free water flush q 3 hrs.  Additional free water may be needed to correct

## 2020-10-02 NOTE — PROGRESS NOTES
10/01/20 2323   Vent Information   Vent Type 840   Vent Mode AC/VC   Vt Ordered 300 mL   Rate Set 16 bmp   FiO2  90 %   SpO2 95 %   SpO2/FiO2 ratio 105.56   Sensitivity 3   PEEP/CPAP 8   Humidification Source HME   Vent Patient Data   Peak Inspiratory Pressure 44 cmH2O   Mean Airway Pressure 14 cmH20   Rate Measured 16 br/min   Vt Exhaled 313 mL   Minute Volume 5.02 Liters   I:E Ratio 1:4.0   Plateau Pressure 30 IAF18   Static Compliance 14.22 mL/cmH2O   Dynamic Compliance 8.69 mL/cmH2O   Cough/Sputum   Sputum How Obtained Endotracheal   Cough Non-productive   Sputum Amount Small   Sputum Color Clear   Tenacity Thin   Spontaneous Breathing Trial (SBT) RT Doc   Pulse 108   Breath Sounds   Right Upper Lobe Diminished; Expiratory Wheezes   Right Middle Lobe Diminished; Expiratory Wheezes   Right Lower Lobe Diminished; Expiratory Wheezes   Left Upper Lobe Diminished; Expiratory Wheezes   Left Lower Lobe Diminished; Expiratory Wheezes   Additional Respiratory  Assessments   Resp 16   Cuff Pressure (cm H2O)   (MOV)   Alarm Settings   High Pressure Alarm 50 cmH2O   Low Minute Volume Alarm 2 L/min   Apnea (secs) 20 secs   High Respiratory Rate 40 br/min   Low Exhaled Vt  180 mL   ETT (adult)   Placement Date/Time: 10/01/20 2100   Preoxygenation: Yes  Type: Cuffed  Tube Size: 7.5 mm  Laryngoscope: GlideScope  Blade Size: 3  Location: Oral  Insertion attempts: 1  Placement Verified By[de-identified] Auscultation;Capnometry  Secured at: 24 cm  Placed By: FOX Becerra    Secured at 24 cm   Measured From [address] [place] [address],[address] By Commercial tube wilkins   Site Condition Dry

## 2020-10-02 NOTE — SIGNIFICANT EVENT
Responded to code blue. Nursing tells me that [address] [place] [address] noted that he was in asystole and prompted the code. High quality CPR was already in progress. When I arrived. Nursing had performed the first pulse check and there was none, with asystole on the monitor. 1 mg epinephrine was given. After two more minutes of CPR the patient had a narrow complex, regular rhythm on the monitor with rate of about 100. It appeared to be sinus. Pulse was palpable. BP was hypertensive. The patient had agonal breathing. Full code was confirmed a second time with nursing. Bag mask ventilated until appropriate medications prepared. The patient had pulse ox in the high 90's during this time. After 2 midazolam and 25 fentanyl he was still clamping his jaw shut. Then gave 20 etomidate and 50 rocuronium. Cords visualized with glidescope, intubated on first attempt, good color change and bilateral breath sounds. CXR and ABG ordered. Will use propofol gtt. The patient required suctioning of vomitus during the resuscitation. Abdomen was reportedly distended before the code, more distended after the code. His NG was attached to continuous wall suction. Transferred to . I will review the chart some more and try to contact family.

## 2020-10-02 NOTE — CARE COORDINATION
10/2/20 Pt is LTC at [facility], may return skilled, Pt now on a vent and might need HD, pt critically ill, PT/OT signed off, might need new PT/OT orders for skilled at [facility], when appropriate. [facility] will take rapid COVID on day of D/C.

## 2020-10-02 NOTE — PROGRESS NOTES
10/02/20 0414   Vent Information   Vent Mode AC/PC   Pressure Ordered 30   Rate Set 20 bmp   FiO2  100 %   SpO2 (!) 75 %   SpO2/FiO2 ratio 75   Sensitivity 3   PEEP/CPAP 6   I Time/ I Time % 1.05 s   Humidification Source HME   Vent Patient Data   Peak Inspiratory Pressure 36 cmH2O   Mean Airway Pressure 16 cmH20   Rate Measured 20 br/min   Vt Exhaled 334 mL   Minute Volume 6.68 Liters   I:E Ratio 1:1.9   Cough/Sputum   Sputum How Obtained None; Other (Comment)  (family in room with  at this time)   Spontaneous Breathing Trial (SBT) RT Doc   Pulse 126   Additional Respiratory  Assessments   Resp 20   Position Semi-Mooney's   Alarm Settings   High Pressure Alarm 49 cmH2O   Low Minute Volume Alarm 2 L/min   Apnea (secs) 20 secs   High Respiratory Rate 40 br/min   Low Exhaled Vt  175 mL   ETT (adult)   Placement Date/Time: 10/01/20 2100   Preoxygenation: Yes  Type: Cuffed  Tube Size: 7.5 mm  Laryngoscope: GlideScope  Blade Size: 3  Location: Oral  Insertion attempts: 1  Placement Verified By[de-identified] Auscultation;Capnometry  Secured at: 24 cm  Placed By: FOX Becerra    Secured at 24 cm   Measured From [address] [place] [address],[address] By Commercial tube wilkins

## 2020-10-02 NOTE — PROGRESS NOTES
New orders noted for bicarb IV push x2 and bicarb gtt, epi gtt ordered by Dr. Ethelyn Angelucci. Pt's father at bedside and updated on plan. ABG drawn after bicarb given, CO2 and pH worsened. RN updated Mikayla CNP.  Vent changes made

## 2020-10-02 NOTE — PROGRESS NOTES
GI Progress Note      SUBJECTIVE:  Chart reviewed, events noted.   Pt is currently intubated and on pressors    OBJECTIVE      Medications    Current Facility-Administered Medications: vasopressin 20 Units in dextrose 5 % 100 mL infusion, 0.04 Units/min, Intravenous, Continuous  hydrocortisone sodium succinate PF (SOLU-CORTEF) injection 50 mg, 50 mg, Intravenous, Q6H  thiamine (B-1) 200 mg in sodium chloride 0.9 % 100 mL IVPB, 200 mg, Intravenous, Q12H  ascorbic acid 1,500 mg in sodium chloride 0.9 % 100 mL IVPB, 1,500 mg, Intravenous, Q6H  EPINEPHrine (EPINEPHrine HCL) 5 mg in dextrose 5 % 250 mL infusion, 1 mcg/min, Intravenous, Continuous  carboxymethylcellulose PF (THERATEARS) 1 % ophthalmic gel 1 drop, 1 drop, Both Eyes, 6 times per day  mupirocin (BACTROBAN) 2 % ointment, , Nasal, BID  fentaNYL (SUBLIMAZE) 1,000 mcg in sodium chloride 0.9 % 100 mL infusion, 25 mcg/hr, Intravenous, Titrated  0.9 % sodium chloride infusion, , Intravenous, Continuous  midazolam (VERSED) injection 2 mg, 2 mg, Intravenous, Once  chlorhexidine (PERIDEX) 0.12 % solution 15 mL, 15 mL, Mouth/Throat, BID  famotidine (PEPCID) injection 20 mg, 20 mg, Intravenous, BID  norepinephrine (LEVOPHED) 16 mg in dextrose 5 % 250 mL infusion, 2 mcg/min, Intravenous, Continuous  meropenem (MERREM) 1 g in sodium chloride 0.9 % 100 mL IVPB (mini-bag), 1 g, Intravenous, Q8H  midazolam (VERSED) 100 mg in dextrose 5 % 100 mL infusion, 2 mg/hr, Intravenous, Continuous  midazolam (VERSED) injection 2 mg, 2 mg, Intravenous, Q30 Min PRN  bisacodyl (DULCOLAX) EC tablet 5 mg, 5 mg, Oral, Daily  dextrose 50 % IV solution, 12.5 g, Intravenous, PRN  ipratropium-albuterol (DUONEB) nebulizer solution 1 ampule, 1 ampule, Inhalation, Q6H PRN  levothyroxine (SYNTHROID) tablet 25 mcg, 25 mcg, Oral, Daily  bisacodyl (DULCOLAX) suppository 10 mg, 10 mg, Rectal, Daily PRN  oxybutynin (DITROPAN) tablet 2.5 mg, 2.5 mg, Oral, Nightly  sodium chloride flush 0.9 % injection 10 mL, 10 mL, Intravenous, 2 times per day  sodium chloride flush 0.9 % injection 10 mL, 10 mL, Intravenous, PRN  [DISCONTINUED] acetaminophen (TYLENOL) tablet 650 mg, 650 mg, Oral, Q6H PRN **OR** acetaminophen (TYLENOL) suppository 650 mg, 650 mg, Rectal, Q6H PRN  promethazine (PHENERGAN) tablet 12.5 mg, 12.5 mg, Oral, Q6H PRN **OR** ondansetron (ZOFRAN) injection 4 mg, 4 mg, Intravenous, Q6H PRN  Physical    VITALS:  /82   Pulse 107   Temp 97.6 °F (36.4 °C) (Axillary)   Resp 20   Ht 4' 8\" (1.422 m)   Wt 154 lb 15.7 oz (70.3 kg)   SpO2 (!) 85%   BMI 34.75 kg/m²   ABD: soft, moderate distention with tympany, NABs  Data    CBC with Differential:    Lab Results   Component Value Date    WBC 5.5 10/02/2020    RBC 3.16 10/02/2020    HGB 10.0 10/02/2020    HCT 30.5 10/02/2020     10/02/2020    MCV 96.8 10/02/2020    MCH 31.8 10/02/2020    MCHC 32.8 10/02/2020    RDW 16.3 10/02/2020    BANDSPCT 13 10/01/2020    LYMPHOPCT 7.5 10/02/2020    MONOPCT 4.4 10/02/2020    BASOPCT 0.2 10/02/2020    MONOSABS 0.2 10/02/2020    LYMPHSABS 0.4 10/02/2020    EOSABS 0.0 10/02/2020    BASOSABS 0.0 10/02/2020     CMP:    Lab Results   Component Value Date     10/02/2020    K 3.8 10/02/2020    K 4.9 10/01/2020     10/02/2020    CO2 28 10/02/2020    BUN 20 10/02/2020    CREATININE 1.2 10/02/2020    GFRAA >60 10/02/2020    AGRATIO 0.7 10/02/2020    LABGLOM >60 10/02/2020    GLUCOSE 144 10/02/2020    PROT 5.7 10/02/2020    LABALBU 2.4 10/02/2020    CALCIUM 8.2 10/02/2020    BILITOT 0.5 10/02/2020    ALKPHOS 144 10/02/2020    AST 28 10/02/2020    ALT 31 10/02/2020       ASSESSMENT AND PLAN  The patient is a 60 y. o. male with significant past medical history of dCHF, COPD, HTN and MRDD (non verbal) who was admitted with Resp distress, ARF and acute pancreatitis of unclear etiology. Cardiac arrest x 2 last night. Pt presently intubated and on pressors. Palliative care involved.  TFs on hold  - will continue to follow with you

## 2020-10-02 NOTE — PROCEDURES
Hospital Medicine  Procedure Note    Procedure: Central Vein Catheter     - 7Fr x 16cm triple lumen    Site: Right internal jugular vein   Central veins were surveyed by ultrasound to assess venous anatomy and patency. Site was chosen based on findings and patient-specific factors. Description:  Patient was placed in the trendelenberg position. The site was prepped and draped in sterile fashion. The skin was infiltrated with 1% lidocaine. The vein was cannulated using real-time ultrasound guidance. A wire was threaded through the needle into the vein. Correct wire placement was verified by ultrasound. The soft tissue tract was then dilated. The catheter was then passed over the wire. The wire was withdrawn intact. All catheter ports were drawn and flushed. The catheter was secured in place with sutures at a depth of 16 cm at the skin. The site was covered with a sterile dressing. The right anterior chest was then interrogated by ultrasound. Lung sliding was still present . A portable CXR was ordered to verify appropriate catheter tip placement. There was no immediately apparent complication.   Estimated Blood Loss: < 20mL

## 2020-10-02 NOTE — PROGRESS NOTES
10/01/20 2127   Vent Information   $Ventilation $Initial Day   Skin Assessment Clean, dry, & intact   Vent Type 840   Vent Mode AC/VC   Vt Ordered 350 mL   Rate Set 16 bmp   FiO2  50 %   SpO2 93 %   SpO2/FiO2 ratio 186   Sensitivity 3   PEEP/CPAP 5   Humidification Source HME   Vent Patient Data   Peak Inspiratory Pressure 38 cmH2O   Mean Airway Pressure 11 cmH20   Rate Measured 16 br/min   Vt Exhaled 370 mL   Minute Volume 5.8 Liters   I:E Ratio 1:4   Spontaneous Breathing Trial (SBT) RT Doc   Pulse 119   Breath Sounds   Right Upper Lobe Diminished   Right Middle Lobe Diminished   Right Lower Lobe Diminished   Left Upper Lobe Diminished   Left Lower Lobe Diminished   Additional Respiratory  Assessments   Resp 11   $End Tidal CO2 48   Alarm Settings   High Pressure Alarm 45 cmH2O   Low Minute Volume Alarm 2 L/min   Apnea (secs) 20 secs   High Respiratory Rate 45 br/min   Low Exhaled Vt  200 mL   Patient Observation   Observations   (ambu @ bs)

## 2020-10-03 NOTE — PROGRESS NOTES
10/03/20 0015   Vent Information   $Ventilation $Subsequent Day   Vent Type 840   Vent Mode AC/VC+   Vt Ordered 450 mL   Rate Set 22 bmp   FiO2  100 %   SpO2 94 %   SpO2/FiO2 ratio 94   Sensitivity 3   PEEP/CPAP 12   I Time/ I Time % 1 s   Humidification Source HME   Vent Patient Data   Peak Inspiratory Pressure 44 cmH2O   Mean Airway Pressure 23 cmH20   Rate Measured 22 br/min   Vt Exhaled 477 mL   Minute Volume 11 Liters   I:E Ratio 1:1.7   Cough/Sputum   Sputum How Obtained Endotracheal   Cough Congested;Productive   Sputum Amount Large   Sputum Color Cloudy   Tenacity Thick; Tenacious   Spontaneous Breathing Trial (SBT) RT Doc   Pulse 101   Breath Sounds   Right Upper Lobe Expiratory Wheezes; Coarse Crackles   Right Middle Lobe Coarse Crackles   Right Lower Lobe Coarse Crackles   Left Upper Lobe Expiratory Wheezes   Left Lower Lobe Coarse Crackles   Additional Respiratory  Assessments   Resp 22   $End Tidal CO2 27   Position Semi-Mooney's   Alarm Settings   High Pressure Alarm 50 cmH2O   Press Low Alarm 17.5 cmH2O   Low Minute Volume Alarm 2 L/min   Apnea (secs) 20 secs   High Respiratory Rate 40 br/min   Low Exhaled Vt  200 mL   ETT (adult)   Placement Date/Time: 10/01/20 2100   Preoxygenation: Yes  Type: Cuffed  Tube Size: 7.5 mm  Laryngoscope: GlideScope  Blade Size: 3  Location: Oral  Insertion attempts: 1  Placement Verified By[de-identified] Auscultation;Capnometry  Secured at: 24 cm  Placed By: L. ..    Secured at 24 cm   Measured From Lips   ET Placement Right   Secured By Commercial tube wilkins   Site Condition Dry   Cuff Pressure 35 cm H2O   ambu/sx hob

## 2020-10-03 NOTE — PROGRESS NOTES
10/03/20 1534   Vent Information   Equipment Changed HME   Vent Type 840   Vent Mode AC/VC+   Vt Ordered 450 mL   Rate Set 22 bmp   FiO2  100 %   SpO2 95 %   SpO2/FiO2 ratio 95   Sensitivity 3   PEEP/CPAP 9   Vent Patient Data   Peak Inspiratory Pressure 41 cmH2O   Mean Airway Pressure 20 cmH20   Rate Measured 22 br/min   Minute Volume 9.15 Liters   I:E Ratio 1:1.7   Spontaneous Breathing Trial (SBT) RT Doc   Pulse 84   Breath Sounds   Right Upper Lobe Diminished   Right Middle Lobe Diminished   Right Lower Lobe Diminished   Left Upper Lobe Diminished   Left Lower Lobe Diminished   Alarm Settings   High Pressure Alarm 50 cmH2O   Delay Alarm 20 sec(s)   Low Minute Volume Alarm 2 L/min   Apnea (secs) 20 secs   High Respiratory Rate 40 br/min   Resp Rate Low Alarm 6   Low Exhaled Vt  200 mL   ETT (adult)   Placement Date/Time: 10/01/20 2100   Preoxygenation: Yes  Type: Cuffed  Tube Size: 7.5 mm  Laryngoscope: GlideScope  Blade Size: 3  Location: Oral  Insertion attempts: 1  Placement Verified By[de-identified] Auscultation;Capnometry  Secured at: 24 cm  Placed By: FOX Becerra    Secured at 24 cm   ET Placement Left

## 2020-10-03 NOTE — PROGRESS NOTES
4 Eyes Skin Assessment     The patient is being assess for   Shift Handoff    I agree that 2 RN's have performed a thorough Head to Toe Skin Assessment on the patient. ALL assessment sites listed below have been assessed. Areas assessed by both nurses:   [x]   Head, Face, and Ears   [x]   Shoulders, Back, and Chest, Abdomen  [x]   Arms, Elbows, and Hands   [x]   Coccyx, Sacrum, and Ischium  [x]   Legs, Feet, and Heels            **SHARE this note so that the co-signing nurse is able to place an eSignature**    Co-signer eSignature: Electronically signed by Quyen Perez RN on 10/4/20 at 6:45 AM EDT    Does the Patient have Skin Breakdown?   No          Omid Prevention initiated:  Yes   Wound Care Orders initiated:  NA      [facility] nurse consulted for Pressure Injury (Stage 3,4, Unstageable, DTI, NWPT, Complex wounds)and New or Established Ostomies:  NA      Primary Nurse eSignature: Electronically signed by Priyanka Weber RN on 10/3/20 at 6:29 PM EDT

## 2020-10-03 NOTE — PLAN OF CARE
Problem: Nutrition  Goal: Optimal nutrition therapy  Description: Nutrition Problem #1: Inadequate oral intake  Intervention: Food and/or Nutrient Delivery: NPO for now. Resume TF once hemodynamically stable   Nutritional Goals:  Tolerate most appropriate form of nutrition therapy this admission      10/3/2020 1509 by Alana Dukes RN  Outcome: Ongoing     Problem: Skin Integrity:  Goal: Will show no infection signs and symptoms  Description: Will show no infection signs and symptoms  10/3/2020 1509 by Alana Dukes RN  Outcome: Ongoing     Problem: Skin Integrity:  Goal: Absence of new skin breakdown  Description: Absence of new skin breakdown  10/3/2020 1509 by Alana Dukes RN  Outcome: Ongoing     Problem: Falls - Risk of:  Goal: Will remain free from falls  Description: Will remain free from falls  10/3/2020 1509 by Alana Dukes RN  Outcome: Ongoing     Problem: Falls - Risk of:  Goal: Absence of physical injury  Description: Absence of physical injury  10/3/2020 1509 by Alana Dukes RN  Outcome: Ongoing     Problem: Pain:  Goal: Pain level will decrease  Description: Pain level will decrease  10/3/2020 1509 by Alana Dukes RN  Outcome: Ongoing     Problem: Pain:  Goal: Control of acute pain  Description: Control of acute pain  10/3/2020 1509 by Alana Dukes RN  Outcome: Ongoing     Problem: Pain:  Goal: Control of chronic pain  Description: Control of chronic pain  10/3/2020 1509 by Alana Dukes RN  Outcome: Ongoing       Alana Dukes RN

## 2020-10-03 NOTE — PROGRESS NOTES
Dr. René Jacobs informed blood culture positive for staph. [facility] updated on condition. Father updated over the phone.

## 2020-10-03 NOTE — PROGRESS NOTES
Hospitalist Progress Note      PCP: No primary care provider on file. Date of Admission: 9/22/2020    Chief Complaint:  [address] Course:   HPI :   60 y.o. male with history of CHF, COPD, GERD,  MRDD who presented to  [facility] ER from group home with altered mental status. History obtained per chart as patient unable to provide any history due to AMS. Pt minimally verbal at baseline. Patient reportedly had reduced responsiveness at the ECF overnight and was sent to the ER. He was noted to be hypothermic in the 80s and hypotensive in the 60s. Initial concern for possible CVA - stroke team consulted. CT head was nonacute with tiny area of low attenuation near the anterior right  internal capsule which was thought to be likely subacute/chronic. CTA head/neck was unremarkable. PICC line placed in ER. Labs notable for severe hyperkalemia with potassium of 7.1 creatinine 1.5, TSH of 7.4. Lipase was  elevated at 1471. Temperature improved to 90's with karuna hugger and warm IVF. There was concern for myxedema coma and was started on IV Synthroid. He was given a dose of IV hydrocortisone, started on broad-spectrum antibiotics and  pressors. Pt was transferred to [facility] for further care. Subjective:   Patient had CODE BLUE overnight and patient was subsequently intubated.         Medications:  Reviewed    Infusion Medications    vasopressin (Septic Shock) infusion 0.04 Units/min (10/03/20 0321)    EPINEPHrine infusion Stopped (10/02/20 1405)    fentaNYL (SUBLIMAZE) infusion 50 mcg/hr (10/03/20 0113)    sodium chloride 75 mL/hr at 10/03/20 0115    norepinephrine 4 mcg/min (10/03/20 0847)    midazolam 2 mg/hr (10/02/20 0245)     Scheduled Medications    hydrocortisone sodium succinate PF  50 mg Intravenous Q6H    thiamine (VITAMIN B1) IVPB  200 mg Intravenous Q12H    ascorbic acid  1,500 mg Intravenous Q6H    carboxymethylcellulose PF  1 drop Both Eyes 6 times per day    mupirocin   Nasal BID    Value Date    NITRU Negative 09/26/2020    WBCUA 6-9 09/26/2020    BACTERIA 2+ 09/26/2020    RBCUA 3-4 09/26/2020    BLOODU TRACE-INTACT 09/26/2020    SPECGRAV 1.015 09/26/2020    GLUCOSEU Negative 09/26/2020       Radiology:  XR CHEST PORTABLE   Final Result   Worsening consolidation within the right upper lobe and infrahilar region,   otherwise similar appearing chest.         XR CHEST PORTABLE   Final Result   1. Endotracheal tube tip projects over the trachea, tip at the level of the   aortic knob, 6.3 cm superior to the oliverio. Life support appliances appear   appropriately positioned. 2. Interval progression of consolidation and pleural effusion over the upper   right hemithorax. Lung volumes are low. 3. Slightly diminished opacities in the left lung which predominate medially   throughout possibly related to atelectasis or infiltrate. 4. Vascular engorgement and perivascular haziness typically associated with   congestive heart failure. 5. Prominent soft tissue fullness about the mediastinum and paz concerning   for underlying adenopathy. Consider IV contrast-enhanced CT chest.      RECOMMENDATION:   IV contrast-enhanced CT chest         XR CHEST PORTABLE   Final Result   Bibasilar opacification and probable effusion with mild progression on the   right. FL MODIFIED BARIUM SWALLOW W VIDEO   Final Result   No kaden aspiration. Please see separate speech pathology report for full discussion of findings   and recommendations. XR ABDOMEN (KUB) (SINGLE AP VIEW)   Final Result   Unremarkable bowel gas pattern. NG tube extends well into the stomach. XR CHEST PORTABLE   Final Result   Bibasilar opacities, likely combination of atelectasis/airspace disease with   layered pleural effusions, larger on the left. There improvement in aeration   of the left lung in the interval, which may be related to differences in   positioning.          XR CHEST PORTABLE   Final Result Increased opacification within the left lung may represent a layering pleural   effusion. XR CHEST PORTABLE   Final Result   New small bilateral pleural effusions and bilateral airspace disease which   may be related to edema and/or pneumonia. XR ABDOMEN FOR NG/OG/NE TUBE PLACEMENT   Final Result   Gastric tube in mid stomach         US RENAL COMPLETE   Final Result   1. Unremarkable sonographic appearance of the kidneys. 2. Collapsed urinary bladder, limiting its evaluation. XR ABDOMEN (KUB) (SINGLE AP VIEW)   Final Result   Unchanged moderate amount of gaseous distention of the stomach. No other   findings to explain abdominal distension. Of note however these images were   obtained supine and early free air would not be detected. Assessment/Plan:    Active Hospital Problems    Diagnosis    Cardiac arrest [facility]) [I46.9]    Cardiopulmonary arrest with successful resuscitation ([facility] [address] [I46.9]    Aspiration into airway [T17.908A]    Hypernatremia [E87.0]    Thrombocytopenia ([address] [D69.6]    Acute encephalopathy [G93.40]    Acute respiratory failure with hypoxia and hypercapnia (HCC) [J96.01, J96.02]    Pulmonary infiltrates [R91.8]    Acute pancreatitis without infection or necrosis [K85.90]    LINDA (acute kidney injury) ([facility] [address] [N17.9]    Hyperkalemia [E87.5]    Hypothyroidism [E03.9]    Septic shock ([address] [A41.9, R65.21]    Hypothermia [T68. XXXA]    Class 2 obesity in adult [E66.9]    Suspected sleep apnea [R29.818]       Acute metabolic encephalopathy: Likely multifactorial due to septic shock, hyperkalemia, hypothermia, ? Myxedema coma. Non verbal at baseline per report. CT head was nonacute. Stroke work-up negative in ER. Neuro recs appreciated. Supportive care for acute morbidities as stated below        Septic shock: met SIRS criteria with tachycardia, tachypnea, hypothermia on arrival in ER. Initial concern for pneumonia as cause.  Pneumonia ruled out per pulm. Empiric abx d/rich. Off pressors. BP stable. D/rich steroids. Continue to hold home BP meds    Clinically resolved. Acute on chronic resp failure:likely due to acute pulm edema and bibasilar atelectasis/ hypoventilation. Received IV lasix. CXR today reviewed. Continue diuresis as needed per pulm. Continue 02 and wean as tolerated. Patient is currently on BiPAP, continue to monitor. Pulmonary following. Patient was intubated on 10/1/2020 and nighttime. Currently on ventilator. Further management as per pulmonary.     Hypothyroidism: TSH elevated at 7.4, FT3, FT4  normal. Given decreased responsiveness from baseline and hypothermia concern for possible myxedema coma. recieved IV synthroid - started on PO on 9/23. repeat TFTs in 4-6 weeks      Hypothermia: Likely due to sepsis, hypothyroidism. Improved with karuna hugger . Temp normalized     Hyperkalemia: likely due to LINDA ,  potassium supplements he is on chronically and lisinopril. Treated with improvement. Held K supplements and lisinopril. Resolved. Nephro assisting     LINDA: likely due to vol depletion, compounded by use of ACEi and lasix. Culprit meds held. Nephro assisting. Cr unchanged. Monitor Cr and avoid nephrotoxins. Likely due to ATN, clinically improving, nephrology input noted, monitor.       Acute pancreatitis: lipase was elevated with signs of early pancreatitis on CT. Unclear etiology. GI assisting. Continue NG tube to LIWS, NPO  . Start feeding gradually.       GERD : Continue PPI          Acute on Chronic diastolic CHF: EF 55 to 40% with G1DD on echo in 9/3/2019. Received IVF for sepsis, hypernatremia. Pulm edema on CXR. IV lasix given. Monitor vol status closely         Hypernatremia: improving with IVF. nephro managing . Hypoglycemia: likely due to NPO status. BG's better since getting dextrose in IVF. Monitor with hypoglycemia protocol in place. Thrombocytopenia: cause unclear. No overt bleeding. Heparin d/rich.  HIT ab neg. Monitor      DVT Prophylaxis: SCDs  Diet: Diet NPO Effective Now Exceptions are: Sips with Meds  Code Status: DNR-CCA    PT/OT Eval Status: not indicated at this time     Dispo -  Hard to say,  pending clinical course     Ximena Talley MD

## 2020-10-03 NOTE — PROGRESS NOTES
Assessment completed. Patient cleaned, turned and repositioned. Appears comfortable. Labs done. Reported to Rosendo Daniels MD. Will continue to monitor.

## 2020-10-03 NOTE — PROGRESS NOTES
Nephrology Progress Note   [email or web address]      This patient is a 60year old male whom we are following for LINDA. Subjective: The patient was seen and examined. Coded several times recently, received CPR, intubated then transferred to the ICU. Received IVF bolus for hypotension and started on 2 pressors. Patient remained hypoxic and hypotensive after the event. FiO2 100%. Code status changed to DNR-CCA. Father at bedside and said he does not want any CPR if he codes again but would like to continue current level of care. No oliguric. Family History: No family at bedside  ROS: Unable to obtain    Vitals:  /75   Pulse 90   Temp 99.3 °F (37.4 °C) (Bladder)   Resp 19   Ht 4' 8\" (1.422 m)   Wt 154 lb 15.7 oz (70.3 kg)   SpO2 96%   BMI 34.75 kg/m²   I/O last 3 completed shifts: In: 5372.4 [I.V.:5272; IV Piggyback:100.4]  Out: 524 [Urine:474; Emesis/NG output:50]  I/O this shift:  In: 10 [I.V.:10]  Out: -     Physical Exam:  Physical Exam  Vitals signs reviewed. Constitutional:       Interventions: He is intubated. Comments: unresponsive   HENT:      Head: Normocephalic and atraumatic. Mouth/Throat:      Comments: ET tube in place  Eyes:      General: No scleral icterus. Cardiovascular:      Rate and Rhythm: Tachycardia present. Rhythm irregular. Pulmonary:      Effort: He is intubated. Comments: Equal chest expansion, coarse breath sounds bilateral  Abdominal:      General: There is distension. Musculoskeletal:      Right lower leg: Edema present. Left lower leg: Edema present.            Medications:   hydrocortisone sodium succinate PF  50 mg Intravenous Q6H    thiamine (VITAMIN B1) IVPB  200 mg Intravenous Q12H    ascorbic acid  1,500 mg Intravenous Q6H    carboxymethylcellulose PF  1 drop Both Eyes 6 times per day    mupirocin   Nasal BID    ipratropium-albuterol  1 ampule Inhalation Q4H    midazolam  2 mg Intravenous Once    chlorhexidine  15 mL Mouth/Throat BID    famotidine (PEPCID) injection  20 mg Intravenous BID    meropenem  1 g Intravenous Q8H    bisacodyl  5 mg Oral Daily    levothyroxine  25 mcg Oral Daily    sodium chloride flush  10 mL Intravenous 2 times per day         Labs:  Recent Labs     10/01/20  2058 10/02/20  0115 10/02/20  0546 10/03/20  0510   WBC 21.0*  --  5.5 21.0*   HGB 9.0* 10.8* 10.0* 7.9*   HCT 28.5*  --  30.5* 23.9*   MCV 98.6  --  96.8 95.5     --  402 263     Recent Labs     10/01/20  0425  10/02/20  0546 10/02/20  1437 10/03/20  0510      < > 142 140 142   K 3.5   < > 3.8 3.5 3.3*      < > 104 101 102   CO2 33*   < > 28 25 26   GLUCOSE 108*   < > 144* 211* 254*   PHOS 2.2*  --  3.9 3.0 2.9   MG 2.20  --  1.90  --  1.70*   BUN 19   < > 20 23* 26*   CREATININE 0.9   < > 1.2 1.5* 1.6*   LABGLOM >60   < > >60 48* 45*   GFRAA >60   < > >60 58* 54*    < > = values in this interval not displayed. Assessment/Plan:    Acute Kidney Injury:    - Initial bout resolved but now likely has ATN in the setting of CP arrest/persistent hypotension.  - Urine output marginal and serum creatinine stabilized at 1.5 to 1.6 mg/dl  - Continue vasopressor to improve hemodynamics. - Will administer a single dose of IV Lasix along with IV Albumin  - No indication for dialysis at this time.     Acid-base Disorder.  - Primarily acute respiratory acidosis with mild metabolic acidosis. - Metabolic acidosis from LINDA and lactic acidosis. Received sodium bicarb IVP x 2.  - Vent adjustment per pulmonary service.     Acute Hypoxic Respiratory Failure/Septic Shock.  - S/P CP arrest. Suspect aspiration pneumonia. - On antibiotics and Hydrocortisone per primary service.   - On pressors.     Heart Failure:    - History of diastolic heart failure.  EF normal.

## 2020-10-03 NOTE — PROCEDURES
Bronchoscopy note    Patient with cardiopulmonary arrest with successful resuscitation, acute respiratory failure with hypoxemia, ARDS-like picture, aspiration into the airways, pulmonary infiltrates, patient having significant hypoxemia which is not been corrected with high oxygen and PEEP at this time and patient has significant pulmonary infiltrates on x-ray chest and given the patient's clinical status which was deteriorating patient's family was given option of bronchoscopy for therapeutic purposes and patient family wanted to proceed with that knowing that it is a high risk procedure given the patient's oxygen requirements being high, after informed consent the endoscopy team was requested to come to the bedside, patient was on IV sedation to maintain patient ventilator synchrony, patient was not given any IV sedation with the procedure, the bronchoscope was introduced through the endotracheal tube using a T-piece adapter after timeout, the distal endotracheal tube and the entire right tracheobronchial tree was full of thick mucous plugs and food particles which were therapeutically aspirated out and the bronchial washing was sent for routine culture, patient did not have any endobronchial lesion  Estimated blood loss of 0  Patient's bronchoscopy findings were discussed with patient's family  Further management depending on patient's clinical status and the bronchoscopy results    Sascha Castrejon MD

## 2020-10-03 NOTE — PROGRESS NOTES
10/03/20 0738   Vent Information   Equipment Changed HME   Vent Type 840   Vent Mode AC/VC+   Vt Ordered 450 mL   Rate Set 22 bmp   FiO2  100 %   SpO2 97 %   SpO2/FiO2 ratio 97   Sensitivity 3   PEEP/CPAP 12   Humidification Source HME   Vent Patient Data   Peak Inspiratory Pressure 43 cmH2O   Mean Airway Pressure 22 cmH20   Rate Measured 22 br/min   Vt Exhaled 472 mL   Minute Volume 10.4 Liters   I:E Ratio 1:1.7   Plateau Pressure 38 MPX98   Static Compliance 17 mL/cmH2O   Dynamic Compliance 15 mL/cmH2O   Cough/Sputum   Sputum How Obtained Endotracheal   Sputum Amount Small   Sputum Color Cloudy   Spontaneous Breathing Trial (SBT) RT Doc   Pulse 90   Breath Sounds   Right Upper Lobe Rhonchi   Right Middle Lobe Diminished   Right Lower Lobe Rhonchi   Left Upper Lobe Diminished   Left Lower Lobe Diminished   Additional Respiratory  Assessments   $End Tidal CO2 25   Alarm Settings   High Pressure Alarm 50 cmH2O   Delay Alarm 20 sec(s)   Low Minute Volume Alarm 2 L/min   Apnea (secs) 20 secs   High Respiratory Rate 40 br/min   Resp Rate Low Alarm 6   Low Exhaled Vt  200 mL   ETT (adult)   Placement Date/Time: 10/01/20 2100   Preoxygenation: Yes  Type: Cuffed  Tube Size: 7.5 mm  Laryngoscope: GlideScope  Blade Size: 3  Location: Oral  Insertion attempts: 1  Placement Verified By[de-identified] Auscultation;Capnometry  Secured at: 24 cm  Placed By: FOX Becerra    Secured at 24 cm   Measured From [address] [place] [address],[address] By Commercial tube wilkins

## 2020-10-03 NOTE — PROGRESS NOTES
Dr. Mitchell Law unsuccessful with arterial line placement. To titrate medications to BP cuff pressures. Will continue to monitor.     Alan Alberto RN

## 2020-10-03 NOTE — PROGRESS NOTES
Progress Note  Date:10/3/2020       Room:0264/0264-01  Patient Name:Boyd Piña     YOB: 1965     Age:55 y.o. Subjective    Subjective:  Symptoms:  Stable. Pain:  He reports no pain. Review of Systems  Objective         Vitals Last 24 Hours:  TEMPERATURE:  Temp  Av.1 °F (37.8 °C)  Min: 99.3 °F (37.4 °C)  Max: 101.6 °F (38.7 °C)  RESPIRATIONS RANGE: Resp  Av.7  Min: 18  Max: 22  PULSE OXIMETRY RANGE: SpO2  Av.4 %  Min: 84 %  Max: 97 %  PULSE RANGE: Pulse  Av.2  Min: 90  Max: 122  BLOOD PRESSURE RANGE: Systolic (42XAP), UCA:655 , Min:85 , QE   ; Diastolic (80UVG), CSE:56, Min:37, Max:82    I/O (24Hr): Intake/Output Summary (Last 24 hours) at 10/3/2020 0957  Last data filed at 10/3/2020 0847  Gross per 24 hour   Intake 5382.37 ml   Output 399 ml   Net 4983.37 ml     Objective:  General Appearance:  Not in pain. Vital signs: (most recent): Blood pressure 116/75, pulse 90, temperature 99.3 °F (37.4 °C), temperature source Bladder, resp. rate 19, height 4' 8\" (1.422 m), weight 154 lb 15.7 oz (70.3 kg), SpO2 96 %. Abdomen: Abdomen is soft and distended. Hypoactive bowel sounds. There is no abdominal tenderness. Labs/Imaging/Diagnostics    Labs:  CBC:  Recent Labs     10/01/20  2058 10/02/20  0115 10/02/20  0546 10/03/20  0510   WBC 21.0*  --  5.5 21.0*   RBC 2.89*  --  3.16* 2.50*   HGB 9.0* 10.8* 10.0* 7.9*   HCT 28.5*  --  30.5* 23.9*   MCV 98.6  --  96.8 95.5   RDW 16.1*  --  16.3* 16.1*     --  402 263     CHEMISTRIES:  Recent Labs     10/01/20  0425  10/02/20  0546 10/02/20  1437 10/03/20  0510      < > 142 140 142   K 3.5   < > 3.8 3.5 3.3*      < > 104 101 102   CO2 33*   < > 28 25 26   BUN 19   < > 20 23* 26*   CREATININE 0.9   < > 1.2 1.5* 1.6*   GLUCOSE 108*   < > 144* 211* 254*   PHOS 2.2*  --  3.9 3.0 2.9   MG 2.20  --  1.90  --  1.70*    < > = values in this interval not displayed.      PT/INR:No results for input(s): PROTIME, INR in the last 72 hours. APTT:No results for input(s): APTT in the last 72 hours. LIVER PROFILE:  Recent Labs     10/02/20  0546 10/03/20  0510   AST 28 29   ALT 31 32   BILITOT 0.5 0.5   ALKPHOS 144* 127       Imaging Last 24 Hours:  Xr Chest Portable    Result Date: 10/2/2020  EXAMINATION: ONE XRAY VIEW OF THE CHEST 10/2/2020 2:01 am COMPARISON: 10/01/2020 HISTORY: ORDERING SYSTEM PROVIDED HISTORY: Progressive hypoxemic respiratory failure. Bloody secretions per ET tube. TECHNOLOGIST PROVIDED HISTORY: Reason for exam:->Progressive hypoxemic respiratory failure. Bloody secretions per ET tube. Reason for Exam: Progressive hypoxemic respiratory failure. Bloody secretions per ET tube. FINDINGS: The endotracheal tube appears in appropriate position. Postoperative changes are seen overlying the spine. A right IJ approach central venous catheter terminates overlying the proximal SVC. A right-sided PICC is also seen terminating in the similar region. Worsening airspace consolidations seen within the right lower lobe, as well as right upper lobe when compared to the previous examination. Marked perihilar infiltrates seen on the left. Osseous structures appear similar. Vascular congestion. Worsening consolidation within the right upper lobe and infrahilar region, otherwise similar appearing chest.     Xr Chest Portable    Result Date: 10/1/2020  EXAMINATION: ONE XRAY VIEW OF THE CHEST 10/1/2020 10:17 pm COMPARISON: Chest radiograph 10/01/2020 at 1:13 p.m. HISTORY: Respiratory failure, endotracheal intubation FINDINGS: *Endotracheal tube tip projects over the trachea, tip at the level of the aortic knob, 6.3 cm superior to the oliverio. Life support appliances appear appropriately positioned. *Interval progression of consolidation and pleural effusion over the upper right hemithorax. Lung volumes are low.  *Slightly diminished opacities in the left lung which predominate medially throughout possibly related to atelectasis or infiltrate. *Vascular engorgement and perivascular haziness typically associated with congestive heart failure. *Prominent soft tissue fullness about the mediastinum and pza concerning for underlying adenopathy. Consider IV contrast-enhanced CT chest.     1.  Endotracheal tube tip projects over the trachea, tip at the level of the aortic knob, 6.3 cm superior to the oliverio. Life support appliances appear appropriately positioned. 2. Interval progression of consolidation and pleural effusion over the upper right hemithorax. Lung volumes are low. 3. Slightly diminished opacities in the left lung which predominate medially throughout possibly related to atelectasis or infiltrate. 4. Vascular engorgement and perivascular haziness typically associated with congestive heart failure. 5. Prominent soft tissue fullness about the mediastinum and paz concerning for underlying adenopathy. Consider IV contrast-enhanced CT chest. RECOMMENDATION: IV contrast-enhanced CT chest     Xr Chest Portable    Result Date: 10/1/2020  EXAMINATION: ONE XRAY VIEW OF THE CHEST 10/1/2020 1:16 pm COMPARISON: 09/27/2020. HISTORY: ORDERING SYSTEM PROVIDED HISTORY: cough TECHNOLOGIST PROVIDED HISTORY: Reason for exam:->cough FINDINGS: The cardiomediastinal silhouette is stable. There is mild increased dependent right basilar opacification with possible small effusion. Fluid appears to track along the lateral pleural surface and along the major fissure on the right. Mild dependent left basilar opacification is stable. Enteric catheter extends below the lower margin of the image. Right-sided PICC line. Bibasilar opacification and probable effusion with mild progression on the right.      Assessment//Plan           Hospital Problems           Last Modified POA    Acute encephalopathy 9/22/2020 Yes    Acute respiratory failure with hypoxia and hypercapnia ([address] 10/2/2020 Yes    Pulmonary infiltrates 9/22/2020 Yes    Acute pancreatitis without infection or necrosis 9/22/2020 Yes    LINDA (acute kidney injury) [address] 9/22/2020 Yes    Hyperkalemia 9/22/2020 Yes    Hypothyroidism 9/22/2020 Yes    Septic shock ([address] 10/2/2020 Yes    Hypothermia 9/22/2020 Yes    Class 2 obesity in adult 9/22/2020 Yes    Suspected sleep apnea 9/22/2020 Yes    Hypernatremia 9/24/2020 Yes    Thrombocytopenia ([address] 9/24/2020 Yes    Cardiac arrest ([address] 10/2/2020 Yes    Cardiopulmonary arrest with successful resuscitation ([address] 10/2/2020 Yes    Aspiration into airway 10/2/2020 Yes        Assessment & Plan  60 y. o. male with significant past medical history of dCHF, COPD, HTN and MRDD (non verbal) who was admitted with Resp distress, ARF and acute pancreatitis of unclear etiology. Cardiac arrest x 2. Pt presently intubated and on pressors. Palliative care involved.  TFs on hold  - Supportive care  - Will continue to follow with you    Bing Adler MD       [phone]) [phone]        Electronically signed by Bing Adler MD on 10/3/20 at 9:57 AM EDT

## 2020-10-03 NOTE — PROGRESS NOTES
R radial arterial line waveform dampened. Attempted repositioning with no improvement. Attempted to return blood which was unsuccessful. Call placed to Dr. Emily Lopez, awaiting response to bedside.     Beatriz Lord RN

## 2020-10-04 NOTE — PROGRESS NOTES
Hospitalist Progress Note      PCP: No primary care provider on file. Date of Admission: 9/22/2020    Chief Complaint:  [address] Course:   HPI :   60 y.o. male with history of CHF, COPD, GERD,  MRDD who presented to  [facility] ER from group home with altered mental status. History obtained per chart as patient unable to provide any history due to AMS. Pt minimally verbal at baseline. Patient reportedly had reduced responsiveness at the ECF overnight and was sent to the ER. He was noted to be hypothermic in the 80s and hypotensive in the 60s. Initial concern for possible CVA - stroke team consulted. CT head was nonacute with tiny area of low attenuation near the anterior right  internal capsule which was thought to be likely subacute/chronic. CTA head/neck was unremarkable. PICC line placed in ER. Labs notable for severe hyperkalemia with potassium of 7.1 creatinine 1.5, TSH of 7.4. Lipase was  elevated at 1471. Temperature improved to 90's with karuna hugger and warm IVF. There was concern for myxedema coma and was started on IV Synthroid. He was given a dose of IV hydrocortisone, started on broad-spectrum antibiotics and  pressors. Pt was transferred to [facility] for further care. Subjective:   Patient is up in bed, comfortable on ventilator.   No new event overnight noted        Medications:  Reviewed    Infusion Medications    vasopressin (Septic Shock) infusion 0.04 Units/min (10/04/20 0758)    EPINEPHrine infusion Stopped (10/02/20 1405)    fentaNYL (SUBLIMAZE) infusion 50 mcg/hr (10/04/20 0012)    norepinephrine 3 mcg/min (10/04/20 0705)    midazolam 2 mg/hr (10/03/20 2049)     Scheduled Medications    hydrocortisone sodium succinate PF  50 mg Intravenous Q6H    thiamine (VITAMIN B1) IVPB  200 mg Intravenous Q12H    ascorbic acid  1,500 mg Intravenous Q6H    carboxymethylcellulose PF  1 drop Both Eyes 6 times per day    mupirocin   Nasal BID    ipratropium-albuterol  1 ampule Inhalation Q4H    midazolam  2 mg Intravenous Once    chlorhexidine  15 mL Mouth/Throat BID    famotidine (PEPCID) injection  20 mg Intravenous BID    meropenem  1 g Intravenous Q8H    bisacodyl  5 mg Oral Daily    levothyroxine  25 mcg Oral Daily    sodium chloride flush  10 mL Intravenous 2 times per day     PRN Meds: potassium chloride, magnesium sulfate, midazolam, dextrose, bisacodyl, sodium chloride flush, [DISCONTINUED] acetaminophen **OR** acetaminophen, promethazine **OR** ondansetron      Intake/Output Summary (Last 24 hours) at 10/4/2020 0935  Last data filed at 10/4/2020 0610  Gross per 24 hour   Intake 3413.96 ml   Output 1224 ml   Net 2189.96 ml       Physical Exam Performed:    /72   Pulse 100   Temp 99.6 °F (37.6 °C) (Core)   Resp 24   Ht 4' 8\" (1.422 m)   Wt 165 lb 9.1 oz (75.1 kg)   SpO2 96%   BMI 37.12 kg/m²   General appearance: lethargic and somnolent but arousable,  on ventilator. HEENT:  Normal cephalic, atraumatic without obvious deformity. Conjunctivae/corneas clear. Neck: Supple, with full range of motion. No jugular venous distention. Trachea midline. Respiratory:  Normal respiratory effort. Bibasilar crackles   Cardiovascular:  Regular rate and rhythm with normal S1/S2 without murmurs, rubs or gallops. Abdomen: Soft, distended,  non-tender,  with normal bowel sounds. Musculoskeletal:  No clubbing, cyanosis or edema bilaterally. Skin:  Warm and dry .   Neurologic:   On ventilator  Psychiatric:  On ventilator         Labs:   Recent Labs     10/02/20  0546 10/03/20  0510 10/04/20  0540   WBC 5.5 21.0* 20.2*   HGB 10.0* 7.9* 7.2*   HCT 30.5* 23.9* 21.7*    263 238     Recent Labs     10/02/20  1437 10/03/20  0510 10/04/20  0540    142 143   K 3.5 3.3* 3.7    102 102   CO2 25 26 29   BUN 23* 26* 34*   CREATININE 1.5* 1.6* 1.4*   CALCIUM 8.0* 7.7* 8.1*   PHOS 3.0 2.9 2.6       Urinalysis:      Lab Results   Component Value Date    NITRU Negative 09/26/2020    WBCUA 6-9 09/26/2020    BACTERIA 2+ 09/26/2020    RBCUA 3-4 09/26/2020    BLOODU TRACE-INTACT 09/26/2020    SPECGRAV 1.015 09/26/2020    GLUCOSEU Negative 09/26/2020       Radiology:  XR CHEST PORTABLE   Final Result   Worsening consolidation within the right upper lobe and infrahilar region,   otherwise similar appearing chest.         XR CHEST PORTABLE   Final Result   1. Endotracheal tube tip projects over the trachea, tip at the level of the   aortic knob, 6.3 cm superior to the oliverio. Life support appliances appear   appropriately positioned. 2. Interval progression of consolidation and pleural effusion over the upper   right hemithorax. Lung volumes are low. 3. Slightly diminished opacities in the left lung which predominate medially   throughout possibly related to atelectasis or infiltrate. 4. Vascular engorgement and perivascular haziness typically associated with   congestive heart failure. 5. Prominent soft tissue fullness about the mediastinum and paz concerning   for underlying adenopathy. Consider IV contrast-enhanced CT chest.      RECOMMENDATION:   IV contrast-enhanced CT chest         XR CHEST PORTABLE   Final Result   Bibasilar opacification and probable effusion with mild progression on the   right. FL MODIFIED BARIUM SWALLOW W VIDEO   Final Result   No kaden aspiration. Please see separate speech pathology report for full discussion of findings   and recommendations. XR ABDOMEN (KUB) (SINGLE AP VIEW)   Final Result   Unremarkable bowel gas pattern. NG tube extends well into the stomach. XR CHEST PORTABLE   Final Result   Bibasilar opacities, likely combination of atelectasis/airspace disease with   layered pleural effusions, larger on the left. There improvement in aeration   of the left lung in the interval, which may be related to differences in   positioning.          XR CHEST PORTABLE   Final Result   Increased opacification within the pressors. BP stable. D/rich steroids. Continue to hold home BP meds    Clinically resolved. Acute on chronic resp failure:likely due to acute pulm edema and bibasilar atelectasis/ hypoventilation. Received IV lasix. CXR today reviewed. Continue diuresis as needed per pulm. Continue 02 and wean as tolerated. Patient is currently on BiPAP, continue to monitor. Pulmonary following. Patient was intubated on 10/1/2020 and nighttime. Currently on ventilator. Further management as per pulmonary.     Hypothyroidism: TSH elevated at 7.4, FT3, FT4  normal. Given decreased responsiveness from baseline and hypothermia concern for possible myxedema coma. recieved IV synthroid - started on PO on 9/23. repeat TFTs in 4-6 weeks      Hypothermia: Likely due to sepsis, hypothyroidism. Improved with karuna hugger . Temp normalized     Hyperkalemia: likely due to LINDA ,  potassium supplements he is on chronically and lisinopril. Treated with improvement. Held K supplements and lisinopril. Resolved. Nephro assisting     LINDA: likely due to vol depletion, compounded by use of ACEi and lasix. Culprit meds held. Nephro assisting. Cr unchanged. Monitor Cr and avoid nephrotoxins. Likely due to ATN, clinically improving, nephrology input noted, monitor.       Acute pancreatitis: lipase was elevated with signs of early pancreatitis on CT. Unclear etiology. GI assisting. Continue NG tube to LIWS, NPO  . Start feeding gradually.       GERD : Continue PPI          Acute on Chronic diastolic CHF: EF 55 to 32% with G1DD on echo in 9/3/2019. Received IVF for sepsis, hypernatremia. Pulm edema on CXR. IV lasix given. Monitor vol status closely         Hypernatremia: improving with IVF. nephro managing . Hypoglycemia: likely due to NPO status. BG's better since getting dextrose in IVF. Monitor with hypoglycemia protocol in place. Thrombocytopenia: cause unclear. No overt bleeding. Heparin d/rich. HIT ab neg.  Monitor      Overall prognosis guarded. Palliative care consulted.     DVT Prophylaxis: SCDs  Diet: Diet NPO Effective Now Exceptions are: Sips with Meds  Code Status: DNR-CCA    PT/OT Eval Status: not indicated at this time     Dispo -  Hard to say,  pending clinical course     Jeremy Davidson MD

## 2020-10-04 NOTE — PROGRESS NOTES
10/04/20 1900   Vent Information   Skin Assessment Clean, dry, & intact   Vent Type 840   Vent Mode AC/VC+   Vt Ordered 450 mL   Rate Set 22 bmp   FiO2  100 %   SpO2 99 %   SpO2/FiO2 ratio 99   Sensitivity 3   PEEP/CPAP 8   I Time/ I Time % 1 s   Humidification Source HME   Vent Patient Data   Peak Inspiratory Pressure 45 cmH2O   Mean Airway Pressure 21 cmH20   Rate Measured 22 br/min   Vt Exhaled 485 mL   Minute Volume 10.6 Liters   I:E Ratio 1:1.7   Plateau Pressure 41 UUL12   Static Compliance 14 mL/cmH2O   Dynamic Compliance 13 mL/cmH2O   Spontaneous Breathing Trial (SBT) RT Doc   Pulse 78   Breath Sounds   Right Upper Lobe Clear   Right Middle Lobe Clear   Right Lower Lobe Clear   Left Upper Lobe Clear   Left Lower Lobe Clear   Additional Respiratory  Assessments   Resp 22   $End Tidal CO2 24   Position Semi-Mooney's   Patient Observation   Observations ambu @ bedside   ETT (adult)   Placement Date/Time: 10/01/20 2100   Preoxygenation: Yes  Type: Cuffed  Tube Size: 7.5 mm  Laryngoscope: GlideScope  Blade Size: 3  Location: Oral  Insertion attempts: 1  Placement Verified By[de-identified] Auscultation;Capnometry  Secured at: 24 cm  Placed By: FOX Becerra    Secured at 24 cm   Measured From Lips   ET Placement Right   Secured By Commercial tube wilkins   Site Condition Dry

## 2020-10-04 NOTE — PROGRESS NOTES
This note also relates to the following rows which could not be included:  SpO2 - Cannot attach notes to unvalidated device data  Pulse - Cannot attach notes to unvalidated device data       10/03/20 2359   Vent Information   Vent Type 840   Vent Mode AC/VC+   Vt Ordered 450 mL   Rate Set 22 bmp   FiO2  100 %   Sensitivity 3   PEEP/CPAP 6   I Time/ I Time % 1 s   Humidification Source HME   Vent Patient Data   Peak Inspiratory Pressure 39 cmH2O   Mean Airway Pressure 19 cmH20   Rate Measured 22 br/min   Vt Exhaled 480 mL   Minute Volume 10.5 Liters   I:E Ratio 1:1.7   Cough/Sputum   Sputum How Obtained Endotracheal   Cough Non-productive   Breath Sounds   Right Upper Lobe Diminished   Right Middle Lobe Diminished   Right Lower Lobe Diminished   Left Upper Lobe Diminished   Left Lower Lobe Diminished   Additional Respiratory  Assessments   Position Semi-Mooney's   Cuff Pressure (cm H2O) 30 cm H2O   Alarm Settings   High Pressure Alarm 50 cmH2O   Press Low Alarm 17.5 cmH2O   High Respiratory Rate 40 br/min   ETT (adult)   Placement Date/Time: 10/01/20 2100   Preoxygenation: Yes  Type: Cuffed  Tube Size: 7.5 mm  Laryngoscope: GlideScope  Blade Size: 3  Location: Oral  Insertion attempts: 1  Placement Verified By[de-identified] Auscultation;Capnometry  Secured at: 24 cm  Placed By: FOX Becerra    Secured at 24 cm   Measured From [address] [place] [address],[address] By Commercial tube wilkins   Site Condition Dry

## 2020-10-04 NOTE — PROGRESS NOTES
Pulmonary & Critical Care Inpatient Progress Note   Amy Mensah MD     REASON FOR TODAY'S VISIT:  Acute resp failure    SUBJECTIVE:   Remains on vent support with high PEEP and FIO2 requirements  Hypotensive on vasopressor gtt,     Scheduled Meds:   furosemide  40 mg Intravenous Daily    hydrocortisone sodium succinate PF  50 mg Intravenous Q6H    thiamine (VITAMIN B1) IVPB  200 mg Intravenous Q12H    ascorbic acid  1,500 mg Intravenous Q6H    carboxymethylcellulose PF  1 drop Both Eyes 6 times per day    mupirocin   Nasal BID    ipratropium-albuterol  1 ampule Inhalation Q4H    midazolam  2 mg Intravenous Once    chlorhexidine  15 mL Mouth/Throat BID    famotidine (PEPCID) injection  20 mg Intravenous BID    meropenem  1 g Intravenous Q8H    bisacodyl  5 mg Oral Daily    levothyroxine  25 mcg Oral Daily    sodium chloride flush  10 mL Intravenous 2 times per day       Continuous Infusions:   vasopressin (Septic Shock) infusion 0.04 Units/min (10/04/20 0758)    EPINEPHrine infusion Stopped (10/02/20 1405)    fentaNYL (SUBLIMAZE) infusion 50 mcg/hr (10/04/20 0012)    norepinephrine 3 mcg/min (10/04/20 0705)    midazolam 2 mg/hr (10/03/20 2049)       PRN Meds:  potassium chloride, magnesium sulfate, midazolam, dextrose, bisacodyl, sodium chloride flush, [DISCONTINUED] acetaminophen **OR** acetaminophen, promethazine **OR** ondansetron    ALLERGIES:  Patient is allergic to chocolate; eggs [egg white]; food; orange juice [orange oil]; peanut butter flavor [flavoring agent]; penicillins; strawberry (diagnostic); and tape [adhesive tape]. Objective:   PHYSICAL EXAM:  /72   Pulse 100   Temp 99.6 °F (37.6 °C) (Core)   Resp 24   Ht 4' 8\" (1.422 m)   Wt 165 lb 9.1 oz (75.1 kg)   SpO2 96%   BMI 37.12 kg/m²    Physical Exam  Constitutional:       General: He is not in acute distress. Appearance: He is well-developed. He is not diaphoretic. HENT:      Head: Normocephalic and atraumatic. Mouth/Throat:      Pharynx: No oropharyngeal exudate. Eyes:      Pupils: Pupils are equal, round, and reactive to light. Neck:      Musculoskeletal: Neck supple. Vascular: No JVD. Cardiovascular:      Heart sounds: Normal heart sounds. No murmur. No friction rub. No gallop. Pulmonary:      Effort: Pulmonary effort is normal.      Breath sounds: No wheezing or rales. Abdominal:      General: Bowel sounds are normal. There is no distension. Palpations: Abdomen is soft. Tenderness: There is no abdominal tenderness. Musculoskeletal: Normal range of motion. Lymphadenopathy:      Cervical: No cervical adenopathy. Skin:     General: Skin is warm and dry. Findings: No rash. Neurological:      Mental Status: He is alert. Cranial Nerves: No cranial nerve deficit. Comments: CN 2-12 grossly intact            Data Reviewed:   LABS:  CBC:  Recent Labs     10/02/20  0546 10/03/20  0510 10/04/20  0540   WBC 5.5 21.0* 20.2*   HGB 10.0* 7.9* 7.2*   HCT 30.5* 23.9* 21.7*   MCV 96.8 95.5 93.8    263 238     BMP:  Recent Labs     10/02/20  1437 10/03/20  0510 10/04/20  0540    142 143   K 3.5 3.3* 3.7    102 102   CO2 25 26 29   PHOS 3.0 2.9 2.6   BUN 23* 26* 34*   CREATININE 1.5* 1.6* 1.4*     LIVER PROFILE:   Recent Labs     10/02/20  0546 10/03/20  0510 10/04/20  0540   AST 28 29 32   ALT 31 32 31   BILITOT 0.5 0.5 0.7   ALKPHOS 144* 127 147*     PT/INR:  No results for input(s): PROTIME, INR in the last 72 hours. APTT:   No results for input(s): APTT in the last 72 hours. UA:  No results for input(s): NITRITE, COLORU, PHUR, LABCAST, WBCUA, RBCUA, MUCUS, TRICHOMONAS, YEAST, BACTERIA, CLARITYU, SPECGRAV, LEUKOCYTESUR, UROBILINOGEN, BILIRUBINUR, BLOODU, GLUCOSEU, AMORPHOUS in the last 72 hours.     Invalid input(s): Yesi Paddy  Recent Labs     10/03/20  0504 10/04/20  0550   PHART 7.498* 7.526*   YFM0TZQ 35.1 35.0   PO2ART 88.9 171.1*       Vent Information  $Ventilation: $Subsequent Day  Skin Assessment: Clean, dry, & intact  Equipment Changed: HME  Vent Type: 840  Vent Mode: AC/VC+  Vt Ordered: 450 mL  Pressure Ordered: 0  Rate Set: 22 bmp  FiO2 : 100 %  SpO2: 96 %  SpO2/FiO2 ratio: 96  Sensitivity: 3  PEEP/CPAP: 9  I Time/ I Time %: 1 s  Humidification Source: HME  Mask Type: Full face mask  Mask Size: Small  Bonnet size: Medium    CXR personally reviewed, right upper lobe infiltrate           Assessment:     1. Acute on chronic resp failure, hypoxic   -Acute pulm edema    -right sided pneumonia   2. Septic shock  3. LINDA  4. Acute pancreatitis   5. Acute encephalopathy, metabolic     Plan:      -Vent support, titrate PEEP and FIO2   -Titrate sedation as needed  -Monitor hemodynamics  -Atb, follow up on culture results  -Hold on tube feeds while unstable  -Serial labs, nephrology involved  -stress dose steroids and thyroid replacement treatment   -Overall guarded prognosis, discussed with family at the bedside.  Palliative care input pending for tomorrow     Due to life threatening resp failure, hemodynamic instability, renal failure this patient is critically ill, total critical care time involved in his care was 90 mins     Norman Hernandez MD

## 2020-10-04 NOTE — PROGRESS NOTES
10/03/20 2011   Vent Information   Equipment Changed HME   Vent Type 840   Vent Mode AC/VC+   Vt Ordered 450 mL   Rate Set 22 bmp   FiO2  100 %   SpO2 95 %   SpO2/FiO2 ratio 95   Sensitivity 3   PEEP/CPAP 9   I Time/ I Time % 1 s   Humidification Source HME   Vent Patient Data   Peak Inspiratory Pressure 38 cmH2O   Mean Airway Pressure 19 cmH20   Rate Measured 22 br/min   Vt Exhaled 480 mL   Minute Volume 10.5 Liters   I:E Ratio 1:1.7   Plateau Pressure 35 DWW25   Static Compliance 18.4 mL/cmH2O   Dynamic Compliance 16.5 mL/cmH2O   Cough/Sputum   Sputum How Obtained Endotracheal   Cough Productive   Frequency Occasional   Sputum Amount Small   Sputum Color Cloudy   Tenacity Thick   Spontaneous Breathing Trial (SBT) RT Doc   Pulse 81   Breath Sounds   Right Upper Lobe Diminished   Right Middle Lobe Diminished   Right Lower Lobe Diminished   Left Upper Lobe Diminished   Left Lower Lobe Diminished   Additional Respiratory  Assessments   Position Semi-Mooney's   Alarm Settings   High Pressure Alarm 50 cmH2O   Press Low Alarm 17.5 cmH2O   High Respiratory Rate 40 br/min   Low Exhaled Vt  200 mL   ETT (adult)   Placement Date/Time: 10/01/20 2100   Preoxygenation: Yes  Type: Cuffed  Tube Size: 7.5 mm  Laryngoscope: GlideScope  Blade Size: 3  Location: Oral  Insertion attempts: 1  Placement Verified By[de-identified] Auscultation;Capnometry  Secured at: 24 cm  Placed By: FOX Becerra    Secured at 24 cm   Measured From Lips   ET Placement Right   Secured By Commercial tube wilkins   Site Condition Dry

## 2020-10-04 NOTE — PROGRESS NOTES
10/04/20 1217   Vent Information   Equipment Changed HME   Vent Type 840   Vent Mode AC/VC+   Vt Ordered 450 mL   Rate Set 22 bmp   FiO2  100 %   Sensitivity 3   PEEP/CPAP 8   I Time/ I Time % 1 s   Humidification Source HME   Vent Patient Data   Peak Inspiratory Pressure 41 cmH2O   Mean Airway Pressure 12 cmH20   Rate Measured 22 br/min   Vt Exhaled 475 mL   Minute Volume 10.4 Liters   I:E Ratio 11   Cough/Sputum   Sputum How Obtained Endotracheal   Cough Productive   Frequency Occasional   Sputum Amount Moderate   Sputum Color Tan   Tenacity Thick   Spontaneous Breathing Trial (SBT) RT Doc   Pulse 86   Breath Sounds   Right Upper Lobe Diminished   Right Middle Lobe Diminished   Right Lower Lobe Diminished   Left Upper Lobe Diminished   Left Lower Lobe Diminished   Additional Respiratory  Assessments   Resp 22   $End Tidal CO2 30   Position Semi-Mooney's   Cuff Pressure (cm H2O) 30 cm H2O   ETT (adult)   Placement Date/Time: 10/01/20 2100   Preoxygenation: Yes  Type: Cuffed  Tube Size: 7.5 mm  Laryngoscope: GlideScope  Blade Size: 3  Location: Oral  Insertion attempts: 1  Placement Verified By[de-identified] Auscultation;Capnometry  Secured at: 24 cm  Placed By: L. ..    Secured at 24 cm   Measured From Lips   ET Placement Left   Secured By Commercial tube wilkins   Site Condition Dry

## 2020-10-04 NOTE — PROGRESS NOTES
injection  20 mg Intravenous BID    meropenem  1 g Intravenous Q8H    bisacodyl  5 mg Oral Daily    levothyroxine  25 mcg Oral Daily    sodium chloride flush  10 mL Intravenous 2 times per day         Labs:  Recent Labs     10/02/20  0546 10/03/20  0510 10/04/20  0540   WBC 5.5 21.0* 20.2*   HGB 10.0* 7.9* 7.2*   HCT 30.5* 23.9* 21.7*   MCV 96.8 95.5 93.8    263 238     Recent Labs     10/02/20  0546 10/02/20  1437 10/03/20  0510 10/04/20  0540    140 142 143   K 3.8 3.5 3.3* 3.7    101 102 102   CO2 28 25 26 29   GLUCOSE 144* 211* 254* 131*   PHOS 3.9 3.0 2.9 2.6   MG 1.90  --  1.70* 1.80   BUN 20 23* 26* 34*   CREATININE 1.2 1.5* 1.6* 1.4*   LABGLOM >60 48* 45* 52*   GFRAA >60 58* 54* >60           Assessment/Plan:    Acute Kidney Injury:    - Initial bout resolved but now likely has ATN in the setting of CP arrest/persistent hypotension.  - Urine output improved and serum creatinine is slowly trending down  - Continue vasopressor to improve hemodynamics. - Will continue daily IV Lasix and monitor  - No indication for dialysis at this time.     Acid-base Disorder.  - Primarily acute respiratory acidosis with mild metabolic acidosis. - Metabolic acidosis from LINDA and lactic acidosis. Received sodium bicarb IVP x 2.  - Vent adjustment per pulmonary service.     Acute Hypoxic Respiratory Failure/Septic Shock.  - S/P CP arrest. Suspect aspiration pneumonia. - On antibiotics and Hydrocortisone per primary service.   - On pressors.     Heart Failure:    - History of diastolic heart failure.  EF normal.

## 2020-10-04 NOTE — PROGRESS NOTES
Progress Note  Date:10/4/2020       Room:0264/0264-01  Patient Name:Boyd Piña     YOB: 1965     Age:55 y.o. Subjective    Subjective:  Symptoms:  Stable. Pain:  He reports no pain. Review of Systems  Objective         Vitals Last 24 Hours:  TEMPERATURE:  Temp  Av °F (37.2 °C)  Min: 98.2 °F (36.8 °C)  Max: 99.6 °F (37.6 °C)  RESPIRATIONS RANGE: Resp  Av.7  Min: 19  Max: 24  PULSE OXIMETRY RANGE: SpO2  Av.4 %  Min: 94 %  Max: 98 %  PULSE RANGE: Pulse  Av.1  Min: 80  Max: 100  BLOOD PRESSURE RANGE: Systolic (77NBG), KSZ:478 , Min:67 , OMS:893   ; Diastolic (96KWI), UIT:26, Min:35, Max:91    I/O (24Hr): Intake/Output Summary (Last 24 hours) at 10/4/2020 1119  Last data filed at 10/4/2020 0610  Gross per 24 hour   Intake 3413.96 ml   Output 1187 ml   Net 2226.96 ml     Objective:  General Appearance:  Not in pain. Vital signs: (most recent): Blood pressure 115/72, pulse 100, temperature 99.6 °F (37.6 °C), temperature source Core, resp. rate 24, height 4' 8\" (1.422 m), weight 165 lb 9.1 oz (75.1 kg), SpO2 96 %. Abdomen: Abdomen is soft and distended. Hypoactive bowel sounds. There is no abdominal tenderness. Labs/Imaging/Diagnostics    Labs:  CBC:  Recent Labs     10/02/20  0546 10/03/20  0510 10/04/20  0540   WBC 5.5 21.0* 20.2*   RBC 3.16* 2.50* 2.31*   HGB 10.0* 7.9* 7.2*   HCT 30.5* 23.9* 21.7*   MCV 96.8 95.5 93.8   RDW 16.3* 16.1* 16.1*    263 238     CHEMISTRIES:  Recent Labs     10/02/20  0546 10/02/20  1437 10/03/20  0510 10/04/20  0540    140 142 143   K 3.8 3.5 3.3* 3.7    101 102 102   CO2 28 25 26 29   BUN 20 23* 26* 34*   CREATININE 1.2 1.5* 1.6* 1.4*   GLUCOSE 144* 211* 254* 131*   PHOS 3.9 3.0 2.9 2.6   MG 1.90  --  1.70* 1.80     PT/INR:No results for input(s): PROTIME, INR in the last 72 hours. APTT:No results for input(s): APTT in the last 72 hours.   LIVER PROFILE:  Recent Labs     10/02/20  0546 10/03/20  0510 10/04/20  0540   AST 28 29 32   ALT 31 32 31   BILITOT 0.5 0.5 0.7   ALKPHOS 144* 127 147*       Imaging Last 24 Hours:  No results found. Assessment//Plan           Hospital Problems           Last Modified POA    Acute encephalopathy 9/22/2020 Yes    Acute respiratory failure with hypoxia and hypercapnia (HCC) 10/2/2020 Yes    Pulmonary infiltrates 9/22/2020 Yes    Acute pancreatitis without infection or necrosis 9/22/2020 Yes    LINDA (acute kidney injury) ([address] 9/22/2020 Yes    Hyperkalemia 9/22/2020 Yes    Hypothyroidism 9/22/2020 Yes    Septic shock ([address] 10/2/2020 Yes    Hypothermia 9/22/2020 Yes    Class 2 obesity in adult 9/22/2020 Yes    Suspected sleep apnea 9/22/2020 Yes    Hypernatremia 9/24/2020 Yes    Thrombocytopenia ([address] 9/24/2020 Yes    Cardiac arrest ([address] 10/2/2020 Yes    Cardiopulmonary arrest with successful resuscitation ([address] 10/2/2020 Yes    Aspiration into airway 10/2/2020 Yes        Assessment & Plan  60 y. o. male with significant past medical history of dCHF, COPD, HTN and MRDD (non verbal) who was admitted with Resp distress, ARF and acute pancreatitis of unclear etiology. Cardiac arrest x 2. Pt presently intubated and on pressors. Palliative care involved.  TFs on hold  - Supportive care  - Consider restarting tube feeding  - Will continue to follow with you     Brodie Carpenter MD       (O) [phone]  Electronically signed by Brodie Carpenter MD on 10/4/20 at 11:19 AM EDT

## 2020-10-04 NOTE — PROGRESS NOTES
Received bedside report from off going RN. Pt currently sedated and on vent. Pts skin was assessed. Turned and repositioned. Elliott in place. Orders verified. Pt unable to follow commands, life center following. OG in place CLWS. Call light in reach, bed in lowest position, will continue to monitor.

## 2020-10-04 NOTE — PROGRESS NOTES
10/04/20 1613   Vent Information   Equipment Changed HME   Vent Type 840   Vent Mode AC/VC+   Vt Ordered 450 mL   Rate Set 22 bmp   FiO2  100 %   SpO2 97 %   SpO2/FiO2 ratio 97   Sensitivity 3   PEEP/CPAP 8   I Time/ I Time % 1 s   Humidification Source HME   Vent Patient Data   Peak Inspiratory Pressure 43 cmH2O   Mean Airway Pressure 21 cmH20   Rate Measured 22 br/min   Vt Exhaled 441 mL   Minute Volume 9.25 Liters   I:E Ratio 1:1.7   Plateau Pressure 33 YJG11   Static Compliance 18 mL/cmH2O   Dynamic Compliance 12 mL/cmH2O   Cough/Sputum   Sputum How Obtained Endotracheal   Cough Productive   Frequency Infrequent   Sputum Amount Large   Sputum Color Tan   Tenacity Thick   Spontaneous Breathing Trial (SBT) RT Doc   Pulse 100   Breath Sounds   Right Upper Lobe Diminished   Right Middle Lobe Diminished   Right Lower Lobe Diminished   Left Upper Lobe Diminished   Left Lower Lobe Diminished   Additional Respiratory  Assessments   Resp 22   $End Tidal CO2 31   Position Semi-Mooney's   Cuff Pressure (cm H2O) 30 cm H2O   ETT (adult)   Placement Date/Time: 10/01/20 2100   Preoxygenation: Yes  Type: Cuffed  Tube Size: 7.5 mm  Laryngoscope: GlideScope  Blade Size: 3  Location: Oral  Insertion attempts: 1  Placement Verified By[de-identified] Auscultation;Capnometry  Secured at: 24 cm  Placed By: FOX Becerra    Secured at 24 cm   Measured From [address] [place] [address],[address] By Commercial tube wilkins   Site Condition Dry   Cuff Pressure 30 cm H2O

## 2020-10-04 NOTE — PROGRESS NOTES
10/04/20 0851   Vent Information   Vent Type 840   Vent Mode AC/VC+   Vt Ordered 450 mL   Pressure Ordered 0   Rate Set 22 bmp   FiO2  100 %   SpO2 96 %   SpO2/FiO2 ratio 96   Sensitivity 3   PEEP/CPAP 9   I Time/ I Time % 1 s   Humidification Source HME   Vent Patient Data   Peak Inspiratory Pressure 45 cmH2O   Mean Airway Pressure 21 cmH20   Rate Measured 22 br/min   Vt Exhaled 482 mL   Minute Volume 10.8 Liters   I:E Ratio 1:1.7   Plateau Pressure 39 EHG19   Static Compliance 13 mL/cmH2O   Dynamic Compliance 11.5 mL/cmH2O   Cough/Sputum   Sputum How Obtained Endotracheal   Cough Non-productive   Spontaneous Breathing Trial (SBT) RT Doc   Pulse 100   Breath Sounds   Right Upper Lobe Diminished   Right Middle Lobe Diminished   Right Lower Lobe Diminished   Left Upper Lobe Diminished   Left Lower Lobe Diminished   Additional Respiratory  Assessments   Resp 24   $End Tidal CO2 29   Position Semi-Mooney's   Cuff Pressure (cm H2O) 30 cm H2O   Patient Observation   Observations [email or web address]   ETT (adult)   Placement Date/Time: 10/01/20 2100   Preoxygenation: Yes  Type: Cuffed  Tube Size: 7.5 mm  Laryngoscope: GlideScope  Blade Size: 3  Location: Oral  Insertion attempts: 1  Placement Verified By[de-identified] Auscultation;Capnometry  Secured at: 24 cm  Placed By: LRalph Becerra    Secured at 24 cm   Measured From Lips   ET Placement Right   Secured By Commercial tube wilkins   Site Condition Dry   Cuff Pressure 30 cm H2O

## 2020-10-05 NOTE — CARE COORDINATION
CM aware that palliative care met with patient family this morning. Plans are to continue with current plan of care. CM aware that patient on VENT currently. Family does not wish to change this. Lives LTC at [facility] and [facility]. Will continue to follow.  Jasmine Phelps RN

## 2020-10-05 NOTE — PROGRESS NOTES
heart failure.  EF normal.    Hypokalemia:  - Prn replacement per protocol.       Overall prognosis poor

## 2020-10-05 NOTE — PROGRESS NOTES
10/05/20 1135   Vent Information   Vent Type 840   Vent Mode AC/VC+   Vt Ordered 450 mL   Rate Set 22 bmp   FiO2  100 %   SpO2 97 %   SpO2/FiO2 ratio 97   Sensitivity 3   PEEP/CPAP 8   Humidification Source HME   Vent Patient Data   Peak Inspiratory Pressure 47 cmH2O   Mean Airway Pressure 21 cmH20   Rate Measured 22 br/min   Vt Exhaled 479 mL   Minute Volume 10 Liters   I:E Ratio 1:1.7   Cough/Sputum   Sputum How Obtained Suctioned;Endotracheal   Cough Productive   Frequency Occasional   Sputum Amount Small   Sputum Color Tan   Tenacity Thick   Spontaneous Breathing Trial (SBT) RT Doc   Pulse 69   Breath Sounds   Right Upper Lobe Diminished   Right Middle Lobe Diminished   Right Lower Lobe Diminished   Left Upper Lobe Diminished   Left Lower Lobe Diminished   Additional Respiratory  Assessments   Position Semi-Mooney's   Alarm Settings   High Pressure Alarm 55 cmH2O   Low Minute Volume Alarm 2 L/min   Apnea (secs) 20 secs   High Respiratory Rate 40 br/min   Low Exhaled Vt  200 mL   ETT (adult)   Placement Date/Time: 10/01/20 2100   Preoxygenation: Yes  Type: Cuffed  Tube Size: 7.5 mm  Laryngoscope: GlideScope  Blade Size: 3  Location: Oral  Insertion attempts: 1  Placement Verified By[de-identified] Auscultation;Capnometry  Secured at: 24 cm  Placed By: L. ..    Secured at 24 cm   Measured From Lips   ET Placement Right   Secured By Commercial tube wilkins   Site Condition Dry

## 2020-10-05 NOTE — PROGRESS NOTES
10/04/20 0577   Vent Information   Skin Assessment Clean, dry, & intact   Vent Type 840   Vent Mode AC/VC+   Vt Ordered 450 mL   Rate Set 22 bmp   FiO2  100 %   SpO2 98 %   SpO2/FiO2 ratio 98   Sensitivity 3   PEEP/CPAP 8   I Time/ I Time % 1 s   Humidification Source HME   Vent Patient Data   Peak Inspiratory Pressure 40 cmH2O   Mean Airway Pressure 19 cmH20   Rate Measured 22 br/min   Vt Exhaled 477 mL   Minute Volume 10.5 Liters   I:E Ratio 1:1.7   Spontaneous Breathing Trial (SBT) RT Doc   Pulse 71   Breath Sounds   Right Upper Lobe Diminished   Right Middle Lobe Diminished   Right Lower Lobe Diminished   Left Upper Lobe Diminished   Left Lower Lobe Diminished   Additional Respiratory  Assessments   Resp 22   $End Tidal CO2 23   Position Semi-Mooney's   Cuff Pressure (cm H2O) 30 cm H2O   Patient Observation   Observations ambu @ bedside   ETT (adult)   Placement Date/Time: 10/01/20 2100   Preoxygenation: Yes  Type: Cuffed  Tube Size: 7.5 mm  Laryngoscope: GlideScope  Blade Size: 3  Location: Oral  Insertion attempts: 1  Placement Verified By[de-identified] Auscultation;Capnometry  Secured at: 24 cm  Placed By: FOX Becerra    Secured at 24 cm   Measured From [address] [place] [address],[address] By Commercial tube wilkins   Site Condition Dry

## 2020-10-05 NOTE — PROGRESS NOTES
Assumed care of patient. Shift assessment complete and documented on flowsheets. VSS. Four eyes skin assessment and MAR handoff completed with previous RN. Repositioned for comfort. Will continue to monitor.

## 2020-10-05 NOTE — PLAN OF CARE
Patient's EF (Ejection Fraction) is greater than 40%    Heart Failure Medications:  Diuretics[de-identified] Furosemide    (One of the following REQUIRED for EF <40%/SYSTOLIC FAILURE but MAY be used in EF% >40%/DIASTOLIC FAILURE)        ACE[de-identified] None        ARB[de-identified] None         ARNI[de-identified] None    (Beta Blockers)  NON- Evidenced Based Beta Blocker (for EF% >40%/DIASTOLIC FAILURE): None    Evidenced Based Beta Blocker::(REQUIRED for EF% <40%/SYSTOLIC FAILURE) None  . .................................................................................................................................................. Patient's Last Weight: 73.1  kg obtained by bed scale. Difference in weight is 2 kg less than last documented weight. Intake/Output Summary (Last 24 hours) at 10/5/2020 0852  Last data filed at 10/5/2020 0830  Gross per 24 hour   Intake 1644.88 ml   Output 1205 ml   Net 439.88 ml       Comorbidities Reviewed No  Patient has a past medical history of Acne, Anemia, Cataract, CHF (congestive heart failure) ([address] COPD (chronic obstructive pulmonary disease) ([address] GERD (gastroesophageal reflux disease), Hearing loss, Hernia, umbilical, Hip fracture, HTN, Mental retardation, and Peripheral vascular disease ([address] >> For CHF and Comorbidity Education Time and Topics, please see Education Tab. Progressive Mobility Assessment:  What is this patient's Current Level of Mobility?: Requires Bed Rest  How was this patient Mobilized today?: Unable to Mobilize                 With Whom? Nurse                 Level of Difficulty/Assistance: pt intubated/sedated & usually w/c bound    Pt is currently intubated on a ventilator. Pt with pitting lower extremity edema.  Patient's weights and intake/output reviewed:      Patient and/or Family's stated Goal of Care this Admission: may be going to palliative/withdraw of care prior to discharge

## 2020-10-05 NOTE — PROGRESS NOTES
Hospitalist Progress Note      PCP: No primary care provider on file. Date of Admission: 9/22/2020    Chief Complaint:  [address] Course:   HPI :   60 y.o. male with history of CHF, COPD, GERD,  MRDD who presented to  [facility] ER from group home with altered mental status. History obtained per chart as patient unable to provide any history due to AMS. Pt minimally verbal at baseline. Patient reportedly had reduced responsiveness at the ECF overnight and was sent to the ER. He was noted to be hypothermic in the 80s and hypotensive in the 60s. Initial concern for possible CVA - stroke team consulted. CT head was nonacute with tiny area of low attenuation near the anterior right  internal capsule which was thought to be likely subacute/chronic. CTA head/neck was unremarkable. PICC line placed in ER. Labs notable for severe hyperkalemia with potassium of 7.1 creatinine 1.5, TSH of 7.4. Lipase was  elevated at 1471. Temperature improved to 90's with karuna hugger and warm IVF. There was concern for myxedema coma and was started on IV Synthroid. He was given a dose of IV hydrocortisone, started on broad-spectrum antibiotics and  pressors. Pt was transferred to [facility] for further care. Subjective:   Patient is up in bed, comfortable on ventilator.   No new event overnight noted        Medications:  Reviewed    Infusion Medications    dexmedetomidine 0.2 mcg/kg/hr (10/05/20 9633)    vasopressin (Septic Shock) infusion Stopped (10/04/20 1730)    EPINEPHrine infusion Stopped (10/02/20 1405)    fentaNYL (SUBLIMAZE) infusion 50 mcg/hr (10/04/20 4212)    norepinephrine 3 mcg/min (10/05/20 0732)    midazolam 1 mg/hr (10/05/20 4152)     Scheduled Medications    furosemide  40 mg Intravenous Daily    hydrocortisone sodium succinate PF  50 mg Intravenous Q6H    thiamine (VITAMIN B1) IVPB  200 mg Intravenous Q12H    ascorbic acid  1,500 mg Intravenous Q6H    carboxymethylcellulose PF  1 drop Both Eyes 6 Urinalysis:      Lab Results   Component Value Date    NITRU Negative 09/26/2020    WBCUA 6-9 09/26/2020    BACTERIA 2+ 09/26/2020    RBCUA 3-4 09/26/2020    BLOODU TRACE-INTACT 09/26/2020    SPECGRAV 1.015 09/26/2020    GLUCOSEU Negative 09/26/2020       Radiology:  XR CHEST PORTABLE   Final Result   Endotracheal tube tip is 6.9 cm above the oliverio. Improving right upper lobe airspace opacity. XR CHEST PORTABLE   Final Result   Worsening consolidation within the right upper lobe and infrahilar region,   otherwise similar appearing chest.         XR CHEST PORTABLE   Final Result   1. Endotracheal tube tip projects over the trachea, tip at the level of the   aortic knob, 6.3 cm superior to the oliverio. Life support appliances appear   appropriately positioned. 2. Interval progression of consolidation and pleural effusion over the upper   right hemithorax. Lung volumes are low. 3. Slightly diminished opacities in the left lung which predominate medially   throughout possibly related to atelectasis or infiltrate. 4. Vascular engorgement and perivascular haziness typically associated with   congestive heart failure. 5. Prominent soft tissue fullness about the mediastinum and paz concerning   for underlying adenopathy. Consider IV contrast-enhanced CT chest.      RECOMMENDATION:   IV contrast-enhanced CT chest         XR CHEST PORTABLE   Final Result   Bibasilar opacification and probable effusion with mild progression on the   right. FL MODIFIED BARIUM SWALLOW W VIDEO   Final Result   No kaden aspiration. Please see separate speech pathology report for full discussion of findings   and recommendations. XR ABDOMEN (KUB) (SINGLE AP VIEW)   Final Result   Unremarkable bowel gas pattern. NG tube extends well into the stomach.          XR CHEST PORTABLE   Final Result   Bibasilar opacities, likely combination of atelectasis/airspace disease with   layered pleural effusions, larger on the left. There improvement in aeration   of the left lung in the interval, which may be related to differences in   positioning. XR CHEST PORTABLE   Final Result   Increased opacification within the left lung may represent a layering pleural   effusion. XR CHEST PORTABLE   Final Result   New small bilateral pleural effusions and bilateral airspace disease which   may be related to edema and/or pneumonia. XR ABDOMEN FOR NG/OG/NE TUBE PLACEMENT   Final Result   Gastric tube in mid stomach         US RENAL COMPLETE   Final Result   1. Unremarkable sonographic appearance of the kidneys. 2. Collapsed urinary bladder, limiting its evaluation. XR ABDOMEN (KUB) (SINGLE AP VIEW)   Final Result   Unchanged moderate amount of gaseous distention of the stomach. No other   findings to explain abdominal distension. Of note however these images were   obtained supine and early free air would not be detected. Assessment/Plan:    Active Hospital Problems    Diagnosis    Cardiac arrest [facility]) [I46.9]    Cardiopulmonary arrest with successful resuscitation ([facility] [I46.9]    Aspiration into airway [T17.908A]    Hypernatremia [E87.0]    Thrombocytopenia ([address] [D69.6]    Acute encephalopathy [G93.40]    Acute respiratory failure with hypoxia and hypercapnia (HCC) [J96.01, J96.02]    Pulmonary infiltrates [R91.8]    Acute pancreatitis without infection or necrosis [K85.90]    LINDA (acute kidney injury) ([facility] [address] [N17.9]    Hyperkalemia [E87.5]    Hypothyroidism [E03.9]    Septic shock ([address] [A41.9, R65.21]    Hypothermia [T68. XXXA]    Class 2 obesity in adult [E66.9]    Suspected sleep apnea [R29.818]       Acute metabolic encephalopathy: Likely multifactorial due to septic shock, hyperkalemia, hypothermia, ? Myxedema coma. Non verbal at baseline per report. CT head was nonacute. Stroke work-up negative in ER. Neuro recs appreciated.  Supportive care for acute morbidities as stated below        Septic shock: met SIRS criteria with tachycardia, tachypnea, hypothermia on arrival in ER. Initial concern for pneumonia as cause. Pneumonia ruled out per pulm. Empiric abx d/rich. Off pressors. BP stable. D/rich steroids. Continue to hold home BP meds    Clinically resolved. Attempt to wean off pressure support. Acute on chronic resp failure:likely due to acute pulm edema and bibasilar atelectasis/ hypoventilation. Received IV lasix. CXR today reviewed. Continue diuresis as needed per pulm. Continue 02 and wean as tolerated. Patient is currently on BiPAP, continue to monitor. Pulmonary following. Patient was intubated on 10/1/2020 and nighttime. Currently on ventilator. Pulmonary following.     Hypothyroidism: TSH elevated at 7.4, FT3, FT4  normal. Given decreased responsiveness from baseline and hypothermia concern for possible myxedema coma. recieved IV synthroid - started on PO on 9/23. repeat TFTs in 4-6 weeks      Hypothermia: Likely due to sepsis, hypothyroidism. Improved with karuna hugger . Temp normalized     Hyperkalemia: likely due to LINDA ,  potassium supplements he is on chronically and lisinopril. Treated with improvement. Held K supplements and lisinopril. Resolved. Nephro assisting     LINDA: likely due to vol depletion, compounded by use of ACEi and lasix. Culprit meds held. Nephro assisting. Cr unchanged. Monitor Cr and avoid nephrotoxins. Likely due to ATN, clinically improving, nephrology input noted, monitor.       Acute pancreatitis: lipase was elevated with signs of early pancreatitis on CT. Unclear etiology. GI assisting. Continue NG tube to LIWS, NPO  . Start feeding gradually.       GERD : Continue PPI          Acute on Chronic diastolic CHF: EF 55 to 44% with G1DD on echo in 9/3/2019. Received IVF for sepsis, hypernatremia. Pulm edema on CXR. IV lasix given. Monitor vol status closely         Hypernatremia: improving with IVF. nephro managing .

## 2020-10-05 NOTE — PROGRESS NOTES
This note also relates to the following rows which could not be included:  SpO2 - Cannot attach notes to unvalidated device data  Pulse - Cannot attach notes to unvalidated device data       10/05/20 1914   Vent Information   Vent Type 840   Vent Mode AC/VC+   Vt Ordered 450 mL   Rate Set 22 bmp   FiO2  60 %   Sensitivity 3   PEEP/CPAP 8   I Time/ I Time % 1 s   Humidification Source HME   Vent Patient Data   Peak Inspiratory Pressure 45 cmH2O   Mean Airway Pressure 21 cmH20   Rate Measured 22 br/min   Vt Exhaled 475 mL   Minute Volume 10.5 Liters   I:E Ratio 1:1.17   Plateau Pressure 41 HDI97   Breath Sounds   Right Upper Lobe Fine Crackles;Diminished   Right Middle Lobe Fine Crackles;Diminished   Right Lower Lobe Fine Crackles;Diminished   Left Upper Lobe Fine Crackles;Diminished   Left Lower Lobe Fine Crackles;Diminished   Alarm Settings   High Pressure Alarm 55 cmH2O   Low Minute Volume Alarm 3 L/min   High Respiratory Rate 40 br/min   Low Exhaled Vt  200 mL   ETT (adult)   Placement Date/Time: 10/01/20 2100   Preoxygenation: Yes  Type: Cuffed  Tube Size: 7.5 mm  Laryngoscope: GlideScope  Blade Size: 3  Location: Oral  Insertion attempts: 1  Placement Verified By[de-identified] Auscultation;Capnometry  Secured at: 24 cm  Placed By: FOX Becerra    Secured at 24 cm   Measured From [address] [place] [address],[address] By Commercial tube wilkins   Cuff Pressure 23 cm H2O

## 2020-10-05 NOTE — PROGRESS NOTES
10/05/20 0347   Vent Information   Skin Assessment Clean, dry, & intact   Vent Type 840   Vent Mode AC/VC+   Vt Ordered 450 mL   Rate Set 22 bmp   FiO2  100 %   SpO2 98 %   SpO2/FiO2 ratio 98   Sensitivity 3   PEEP/CPAP 8   I Time/ I Time % 1 s   Humidification Source HME   Vent Patient Data   Peak Inspiratory Pressure 48 cmH2O   Mean Airway Pressure 21 cmH20   Rate Measured 22 br/min   Vt Exhaled 481 mL   Minute Volume 10.6 Liters   I:E Ratio 1:1.7   Cough/Sputum   Sputum Amount Small   Sputum Color Clear;Cloudy   Tenacity Thin   Spontaneous Breathing Trial (SBT) RT Doc   Pulse 70   Breath Sounds   Right Upper Lobe Diminished   Right Middle Lobe Diminished   Right Lower Lobe Diminished   Left Upper Lobe Diminished   Left Lower Lobe Diminished   Additional Respiratory  Assessments   Resp 22   $End Tidal CO2 25   Position Semi-Mooney's   Cuff Pressure (cm H2O) 30 cm H2O   Patient Observation   Observations ambu @ bedside   ETT (adult)   Placement Date/Time: 10/01/20 2100   Preoxygenation: Yes  Type: Cuffed  Tube Size: 7.5 mm  Laryngoscope: GlideScope  Blade Size: 3  Location: Oral  Insertion attempts: 1  Placement Verified By[de-identified] Auscultation;Capnometry  Secured at: 24 cm  Placed By: L. ..    Secured at 24 cm   Measured From Lips   ET Placement Right   Secured By Commercial tube wilkins

## 2020-10-05 NOTE — PLAN OF CARE
PALLIATIVE CARE UPDATE NOTE    This writer met at bedside with patient's father (POA) ΝΕΑ ∆ΗΜΜΑΤΑ. We discussed patient's overall status and the fact that he has continued to deteriorate. Patient is currently DNR-CC, however is on max ventilator support and continues with pressor support. ΝΕΑ ∆ΗΜΜΑΤΑ seems to understand that Kevan Dawson has a grim prognosis, but states he still wants everything possible done, up to the point of arrest. He does not want to even consider withdrawal at this point. He is waiting for pulmonology to round and give input. Palliative Care will remain available for further discussion and support as needed.       Ligia Arnett RN

## 2020-10-05 NOTE — PROGRESS NOTES
This note also relates to the following rows which could not be included:  SpO2 - Cannot attach notes to unvalidated device data  Pulse - Cannot attach notes to unvalidated device data       10/05/20 1515   Vent Information   Vent Type 840   Vent Mode AC/VC+   Vt Ordered 450 mL   Rate Set 22 bmp   FiO2  70 %   Sensitivity 3   PEEP/CPAP 8   I Time/ I Time % 1 s   Humidification Source HME   Vent Patient Data   Peak Inspiratory Pressure 45 cmH2O   Mean Airway Pressure 21 cmH20   Rate Measured 22 br/min   Vt Exhaled 479 mL   Minute Volume 10.5 Liters   I:E Ratio 1:1.17   Plateau Pressure 42 NTC32   Cough/Sputum   Sputum How Obtained Endotracheal   Cough Congested;Productive   Sputum Amount Small   Sputum Color Yellow   Tenacity Thick   Breath Sounds   Right Upper Lobe Fine Crackles;Diminished   Right Middle Lobe Fine Crackles;Diminished   Right Lower Lobe Fine Crackles;Diminished   Left Upper Lobe Fine Crackles;Diminished   Left Lower Lobe Fine Crackles;Diminished   Additional Respiratory  Assessments   $End Tidal CO2 23   Alarm Settings   High Pressure Alarm 55 cmH2O   Low Minute Volume Alarm 2 L/min   High Respiratory Rate 40 br/min   Low Exhaled Vt  200 mL   ETT (adult)   Placement Date/Time: 10/01/20 2100   Preoxygenation: Yes  Type: Cuffed  Tube Size: 7.5 mm  Laryngoscope: GlideScope  Blade Size: 3  Location: Oral  Insertion attempts: 1  Placement Verified By[de-identified] Auscultation;Capnometry  Secured at: 24 cm  Placed By: L. ..    Secured at 24 cm   Measured From Lips   ET Placement Left   Secured By Commercial tube wilkins   Cuff Pressure 21 cm H2O   ambu bag with peep valve at bedside

## 2020-10-05 NOTE — PROGRESS NOTES
Wasted 30 ml of fentanyl witnessed by Jairo Manjarrez RN    30 mL Fentanyl wasted with Maria M Espinosa RN.

## 2020-10-05 NOTE — PROGRESS NOTES
Dr. Jamal Curtis made aware of pt H&H  6.9/20.6. No new orders at this time for PRBC. Will continue to monitor.

## 2020-10-05 NOTE — PROGRESS NOTES
10/05/20 0809   Vent Information   Vent Type 840   Vent Mode AC/VC+   Vt Ordered 450 mL   Rate Set 22 bmp   FiO2  100 %   SpO2 98 %   SpO2/FiO2 ratio 98   Sensitivity 3   PEEP/CPAP 8   I Time/ I Time % 1 s   Humidification Source HME   Vent Patient Data   Peak Inspiratory Pressure 52 cmH2O   Mean Airway Pressure 23 cmH20   Rate Measured 22 br/min   Vt Exhaled 486 mL   Minute Volume 10.1 Liters   I:E Ratio 1:1.7   Plateau Pressure 44 RZO64   Static Compliance 14 mL/cmH2O   Dynamic Compliance 11 mL/cmH2O   Cough/Sputum   Sputum How Obtained Suctioned;Endotracheal   Cough Productive   Frequency Occasional   Sputum Amount Small   Sputum Color Tan   Tenacity Thick   Spontaneous Breathing Trial (SBT) RT Doc   Pulse 91   Breath Sounds   Right Upper Lobe Diminished   Right Middle Lobe Diminished   Right Lower Lobe Diminished   Left Upper Lobe Diminished   Left Lower Lobe Diminished   Additional Respiratory  Assessments   $End Tidal CO2 26   Position Semi-Mooney's   Alarm Settings   High Pressure Alarm 57 cmH2O   Low Minute Volume Alarm 2 L/min   Apnea (secs) 20 secs   High Respiratory Rate 40 br/min   Low Exhaled Vt  200 mL   ETT (adult)   Placement Date/Time: 10/01/20 2100   Preoxygenation: Yes  Type: Cuffed  Tube Size: 7.5 mm  Laryngoscope: GlideScope  Blade Size: 3  Location: Oral  Insertion attempts: 1  Placement Verified By[de-identified] Auscultation;Capnometry  Secured at: 24 cm  Placed By: FOX Becerra    Secured at 24 cm   Measured From Lips   ET Placement Left   Secured By Commercial tube wilkins   Site Condition Dry   Cuff Pressure 30 cm H2O

## 2020-10-05 NOTE — PROGRESS NOTES
GI Progress Note      SUBJECTIVE:  No reports of melena, hematochezia or bloody NG output    OBJECTIVE      Medications    Current Facility-Administered Medications: furosemide (LASIX) injection 40 mg, 40 mg, Intravenous, Daily  potassium chloride 20 mEq/50 mL IVPB (Central Line), 20 mEq, Intravenous, PRN  magnesium sulfate 1 g in dextrose 5% 100 mL IVPB, 1 g, Intravenous, PRN  vasopressin 20 Units in dextrose 5 % 100 mL infusion, 0.04 Units/min, Intravenous, Continuous  hydrocortisone sodium succinate PF (SOLU-CORTEF) injection 50 mg, 50 mg, Intravenous, Q6H  thiamine (B-1) 200 mg in sodium chloride 0.9 % 100 mL IVPB, 200 mg, Intravenous, Q12H  ascorbic acid 1,500 mg in sodium chloride 0.9 % 100 mL IVPB, 1,500 mg, Intravenous, Q6H  EPINEPHrine (EPINEPHrine HCL) 5 mg in dextrose 5 % 250 mL infusion, 1 mcg/min, Intravenous, Continuous  carboxymethylcellulose PF (THERATEARS) 1 % ophthalmic gel 1 drop, 1 drop, Both Eyes, 6 times per day  mupirocin (BACTROBAN) 2 % ointment, , Nasal, BID  fentaNYL (SUBLIMAZE) 1,000 mcg in sodium chloride 0.9 % 100 mL infusion, 25 mcg/hr, Intravenous, Titrated  ipratropium-albuterol (DUONEB) nebulizer solution 1 ampule, 1 ampule, Inhalation, Q4H  midazolam (VERSED) injection 2 mg, 2 mg, Intravenous, Once  chlorhexidine (PERIDEX) 0.12 % solution 15 mL, 15 mL, Mouth/Throat, BID  famotidine (PEPCID) injection 20 mg, 20 mg, Intravenous, BID  norepinephrine (LEVOPHED) 16 mg in dextrose 5 % 250 mL infusion, 2 mcg/min, Intravenous, Continuous  meropenem (MERREM) 1 g in sodium chloride 0.9 % 100 mL IVPB (mini-bag), 1 g, Intravenous, Q8H  midazolam (VERSED) 100 mg in dextrose 5 % 100 mL infusion, 2 mg/hr, Intravenous, Continuous  midazolam (VERSED) injection 2 mg, 2 mg, Intravenous, Q30 Min PRN  bisacodyl (DULCOLAX) EC tablet 5 mg, 5 mg, Oral, Daily  dextrose 50 % IV solution, 12.5 g, Intravenous, PRN  levothyroxine (SYNTHROID) tablet 25 mcg, 25 mcg, Oral, Daily  bisacodyl (DULCOLAX) suppository

## 2020-10-05 NOTE — PLAN OF CARE
Problem: Infection - Central Venous Catheter-Associated Bloodstream Infection:  Goal: Will show no infection signs and symptoms  Will show no infection signs and symptoms   Outcome: Ongoing  Pt w/ RIJ CVC. Dressing CDI, biopatch in place, no s/s of infection to site at this time. Will con't to monitor CVC sites closely. Problem: Falls - Risk of  Goal: Absence of falls  Outcome: Ongoing  Pt remains a fall risk. See Lawayne Fraction Fall Score. Fall risk bundle in place. Pt bed is in low position with bed alarm on, side rails up, fall risk bracelet and non-skid footwear in use. Will continue to monitor and reassess gibson fall risk. Problem: Pain:  Goal: Control of acute pain  Outcome: Ongoing  Pt intubated/sedated/unable to respond appropriately to pain assessments; CPOT scale being used. See doc flow for pain assessment details. Will cont to monitor.        Problem: Risk for Impaired Skin Integrity  Goal: Tissue integrity - skin and mucous membranes  Structural intactness and normal physiological function of skin and  mucous membranes. Outcome: Ongoing  Pt remains at risk for skin breakdown- see Omid score. Pt turned q2h, preventative sacral heart mepilex in place, and heels elevated off bed. Skin is kept clean and dry. Pt has parnell in place to prevent skin breakdown. Will continue to assess skin and monitor for any signs of breakdown. Problem: Infection - Ventilator-Associated Pneumonia:  Goal: Prevent a ventilator associated event (VAE)  Prevent a ventilator associated event (VAE)   Outcome: Ongoing  Pt remains on ventilator. To decrease risk of VAP oral care performed q4 hrs and PRN. Head of bed kept >30 degrees. Hand hygiene performed before and after suctioning.  ET tube rotated q2 hrs to prevent skin breakdown.

## 2020-10-05 NOTE — CONSULTS
Pharmacy to Dose Vancomycin     Dx: MRSA PNA   Goal trough = 15 mcg/mL  Goal Vancomycin -600  Pt wt =  73 kg   SCr 1.4 mg/kg      Vancomycin 1250 mg IVPB x 1  Vancomycin random level tomorrow AM    Junius Pill. Margaux PharmMICHAEL, BCPS   10/5/2020 10:43 AM    Day 2  SCr 1.4 mg/dL  Vancomycin random level 12.9 mcg/mL  Vancomycin 500mg IVPB x 1  Vancomycin random level tomorrow AM.  Clementeius Pill. Margaux Garcia, BCPS   10/6/2020 7:30 AM    Day 3  SCr 1.2  Vancomycin random level 7.4 mcg/mL this AM.  Calculated    Vancomycin 1250mg IVPB q24h  Vancomycin trough 10/10 at [facility].  Margaux Garcia, BCPS   10/7/2020 7:49 AM

## 2020-10-06 NOTE — PROGRESS NOTES
Comprehensive Nutrition Assessment    Type and Reason for Visit:  Reassess    Nutrition Recommendations/Plan:  1. Nutrition on hold for now, d/t gastric decompression w/ NGT. KUB ordered. 2. Monitor for improvements in GI fxn, and ability to initiate nutrition support when medically appropriate   3. Bowel regimen escalated. No BM since 9/30  1. When appropriate to resume TF, Recommend Formula change to promote GI tolerance. Vital AF 1.2 at 10 mL/hr to start. As tolerated, titrate towards goal of 70 ml/hr.  Water flush 100 ml every 4 hours and monitor Na trends  4. Monitor nutrition adequacy, pertinent labs, bowel habits, wt changes, and clinical progress    Nutrition Assessment:  Pt declining nutritionally AEB not tolerating TF. Pt is NPO with NGT to suction. Abd distended. KUB ordered today. No BM since 9/30. Bowel regimen ordered. Pt remains intubated, sedation stopped. Hypotensive on 1 mcg of levophed. Palliative care following. Malnutrition Assessment:  Malnutrition Status: At risk for malnutrition (Comment)      Estimated Daily Nutrient Needs:  Energy (kcal):  7932-8318; Weight Used for Energy Requirements:  Current(71 kg)     Protein (g):  85-99; Weight Used for Protein Requirements:  Current(1.2-1.4)        Fluid (ml/day):  1 mL/kcal; Weight Used for Fluid Requirements:  Current      Nutrition Related Findings:  BM 9/30. Na 147 mmol/L today. Diuresing on lasix. Wounds:  (redness on sacrum, excoriation groin)       Current Nutrition Therapies:    Current Tube Feeding (TF) Orders:  · Feeding Route: Nasogastric  · Formula: Semi-Elemental  · Schedule: Continuous  · Goal TF & Flush Orders Provides: To promote GI tolerance. recommend formula change to Vital AF. Goal is 70 mL/hr x 20 hrs to provide 1400 mL TV, 1680 kcals, 105 g AA, 1135 mL TV. + 100 mL free water flush q 4 hrs.       Anthropometric Measures:  · Height: 4' 8\" (142.2 cm)  · Current Body Weight: 164 lb (74.4 kg)   · Admission Body

## 2020-10-06 NOTE — PROGRESS NOTES
This note also relates to the following rows which could not be included:  Pulse - Cannot attach notes to unvalidated device data       10/06/20 1220   Vent Information   Vent Type 840   Vent Mode AC/PC   Pressure Ordered 30   Rate Set 16 bmp   FiO2  55 %   SpO2 96 %   SpO2/FiO2 ratio 174.55   Sensitivity 3   PEEP/CPAP 7   I Time/ I Time % 0.9 s   Humidification Source HME   Vent Patient Data   Peak Inspiratory Pressure 37 cmH2O   Mean Airway Pressure 14 cmH20   Rate Measured 17 br/min   Vt Exhaled 382 mL   Minute Volume 6.51 Liters   I:E Ratio 1:2.9   Cough/Sputum   Sputum How Obtained Endotracheal;Suctioned   Cough Productive   Sputum Amount Moderate   Sputum Color Creamy   Tenacity Thick   Breath Sounds   Right Upper Lobe Diminished   Right Middle Lobe Diminished   Right Lower Lobe Diminished   Left Upper Lobe Diminished   Left Lower Lobe Diminished   Additional Respiratory  Assessments   $End Tidal CO2 44   Position Semi-Mooney's   Alarm Settings   High Pressure Alarm 45 cmH2O   Low Minute Volume Alarm 2 L/min   High Respiratory Rate 40 br/min   Low Exhaled Vt  250 mL   ETT (adult)   Placement Date/Time: 10/01/20 2100   Preoxygenation: Yes  Type: Cuffed  Tube Size: 7.5 mm  Laryngoscope: GlideScope  Blade Size: 3  Location: Oral  Insertion attempts: 1  Placement Verified By[de-identified] Auscultation;Capnometry  Secured at: 24 cm  Placed By: FOX Becerra    Secured at 23 cm   Measured From [address] [place] [address],[address] By Commercial tube wilkins   Site Condition Dry

## 2020-10-06 NOTE — PROGRESS NOTES
This note also relates to the following rows which could not be included:  SpO2 - Cannot attach notes to unvalidated device data  Pulse - Cannot attach notes to unvalidated device data       10/05/20 2011   Vent Information   Vent Type 840   Vent Mode AC/PC   Pressure Ordered 30   Rate Set 22 bmp   FiO2  50 %   Sensitivity 3   PEEP/CPAP 8   I Time/ I Time % 0.9 s   Humidification Source HME   Patient with plateau pressures 41. Placed in PCV 22/ Pip-30 Peep-8 Ti-. 9 Fi02 50%. Patient yeilding -410. Sp02 97%. RN aware.

## 2020-10-06 NOTE — PROGRESS NOTES
Pulmonary & Critical Care Inpatient Progress Note   Marbella Sanchez MD     REASON FOR TODAY'S VISIT:  Acute resp failure    SUBJECTIVE:   Remains on vent support with high PEEP and FIO2 requirements  Hypotensive on vasopressor gtts  Sedated     Scheduled Meds:   vancomycin (VANCOCIN) intermittent dosing (placeholder)   Other RX Placeholder    furosemide  40 mg Intravenous Daily    hydrocortisone sodium succinate PF  50 mg Intravenous Q6H    carboxymethylcellulose PF  1 drop Both Eyes 6 times per day    mupirocin   Nasal BID    ipratropium-albuterol  1 ampule Inhalation Q4H    midazolam  2 mg Intravenous Once    chlorhexidine  15 mL Mouth/Throat BID    famotidine (PEPCID) injection  20 mg Intravenous BID    meropenem  1 g Intravenous Q8H    bisacodyl  5 mg Oral Daily    levothyroxine  25 mcg Oral Daily    sodium chloride flush  10 mL Intravenous 2 times per day       Continuous Infusions:   dexmedetomidine 0.3 mcg/kg/hr (10/05/20 1142)    vasopressin (Septic Shock) infusion Stopped (10/04/20 1730)    EPINEPHrine infusion Stopped (10/02/20 1405)    fentaNYL (SUBLIMAZE) infusion 50 mcg/hr (10/04/20 2232)    norepinephrine Stopped (10/05/20 1530)    midazolam Stopped (10/05/20 1314)       PRN Meds:  potassium chloride, magnesium sulfate, midazolam, dextrose, bisacodyl, sodium chloride flush, [DISCONTINUED] acetaminophen **OR** acetaminophen, promethazine **OR** ondansetron    ALLERGIES:  Patient is allergic to chocolate; eggs [egg white]; food; orange juice [orange oil]; peanut butter flavor [flavoring agent]; penicillins; strawberry (diagnostic); and tape [adhesive tape]. Objective:   PHYSICAL EXAM:  /65   Pulse 79   Temp 97.1 °F (36.2 °C) (Bladder)   Resp 22   Ht 4' 8\" (1.422 m)   Wt 161 lb 2.5 oz (73.1 kg)   SpO2 97%   BMI 36.13 kg/m²    Physical Exam  Constitutional:       General: He is not in acute distress. Appearance: He is well-developed. He is not diaphoretic.    HENT: Head: Normocephalic and atraumatic. Mouth/Throat:      Pharynx: No oropharyngeal exudate. Eyes:      Pupils: Pupils are equal, round, and reactive to light. Neck:      Musculoskeletal: Neck supple. Vascular: No JVD. Cardiovascular:      Heart sounds: Normal heart sounds. No murmur. No friction rub. No gallop. Pulmonary:      Effort: Pulmonary effort is normal.      Breath sounds: No wheezing or rales. Abdominal:      General: Bowel sounds are normal. There is no distension. Palpations: Abdomen is soft. Tenderness: There is no abdominal tenderness. Musculoskeletal: Normal range of motion. Lymphadenopathy:      Cervical: No cervical adenopathy. Skin:     General: Skin is warm and dry. Findings: No rash. Neurological:      Mental Status: He is alert. Cranial Nerves: No cranial nerve deficit. Comments: CN 2-12 grossly intact            Data Reviewed:   LABS:  CBC:  Recent Labs     10/03/20  0510 10/04/20  0540 10/05/20  0420   WBC 21.0* 20.2* 22.0*   HGB 7.9* 7.2* 6.9*   HCT 23.9* 21.7* 20.6*   MCV 95.5 93.8 94.0    238 231     BMP:  Recent Labs     10/03/20  0510 10/04/20  0540 10/05/20  0420    143 143   K 3.3* 3.7 3.0*    102 100   CO2 26 29 30   PHOS 2.9 2.6 2.6   BUN 26* 34* 40*   CREATININE 1.6* 1.4* 1.4*     LIVER PROFILE:   Recent Labs     10/03/20  0510 10/04/20  0540 10/05/20  0420   AST 29 32 29   ALT 32 31 33   BILITOT 0.5 0.7 0.7   ALKPHOS 127 147* 170*     PT/INR:  No results for input(s): PROTIME, INR in the last 72 hours. APTT:   No results for input(s): APTT in the last 72 hours. UA:  No results for input(s): NITRITE, COLORU, PHUR, LABCAST, WBCUA, RBCUA, MUCUS, TRICHOMONAS, YEAST, BACTERIA, CLARITYU, SPECGRAV, LEUKOCYTESUR, UROBILINOGEN, BILIRUBINUR, BLOODU, GLUCOSEU, AMORPHOUS in the last 72 hours.     Invalid input(s): [address]     10/04/20  0550 10/05/20  1441   PHART 7.526* 7.594*   FPY2WQY 35.0 33.9*   PO2ART 171.1* 198.8*       Vent Information  $Ventilation: $Subsequent Day  Skin Assessment: Clean, dry, & intact  Equipment Changed: HME  Vent Type: 840  Vent Mode: (S) AC/PC  Vt Ordered: 450 mL  Pressure Ordered: 30  Rate Set: 22 bmp  FiO2 : (S) 50 %  SpO2: 97 %  SpO2/FiO2 ratio: 194  Sensitivity: 3  PEEP/CPAP: 8  I Time/ I Time %: 0.9 s  Humidification Source: HME  Mask Type: Full face mask  Mask Size: Small  Bonnet size: Medium    CXR personally reviewed, right upper lobe infiltrate           Assessment:     1. Acute on chronic resp failure, hypoxic   -Acute pulm edema    -right sided pneumonia, MRSA  2. Septic shock  3. LINDA  4. Acute pancreatitis   5. Acute encephalopathy, metabolic     Plan:      -Vent support, titrate PEEP and FIO2   -Wean sedation as able, start precedex to help   -Monitor hemodynamics  -Change atb to vanc   -Restart tube feeds while unstable  -Serial labs, nephrology involved  -stress dose steroids and thyroid replacement treatment   -Overall guarded prognosis, I had a lengthy discussion with the family at the bedside and so did palliative care.  He wishes to continue full support at this time, I explained that the patient will require trach/peg soon; he wanted to think about this more    Due to life threatening resp failure, hemodynamic instability, renal failure this patient is critically ill, total critical care time involved in his care was 90 mins     Joanne Peng MD

## 2020-10-06 NOTE — PROGRESS NOTES
Pulmonary & Critical Care Inpatient Progress Note   Cherelle Duque MD     REASON FOR TODAY'S VISIT:  Acute resp failure    SUBJECTIVE:   Remains on vent support with high PEEP and FIO2 requirements  Hypotensive on vasopressor gtts  Sedated     Scheduled Meds:   [START ON 10/7/2020] famotidine (PEPCID) injection  20 mg Intravenous Daily    hydrocortisone sodium succinate PF  25 mg Intravenous Q8H    docusate  100 mg Per NG tube BID    senna  2 tablet Per NG tube BID    furosemide  40 mg Intravenous BID    chlorothiazide  500 mg Intravenous Once    vancomycin (VANCOCIN) intermittent dosing (placeholder)   Other RX Placeholder    carboxymethylcellulose PF  1 drop Both Eyes 6 times per day    mupirocin   Nasal BID    ipratropium-albuterol  1 ampule Inhalation Q4H    midazolam  2 mg Intravenous Once    chlorhexidine  15 mL Mouth/Throat BID    meropenem  1 g Intravenous Q8H    bisacodyl  5 mg Oral Daily    levothyroxine  25 mcg Oral Daily    sodium chloride flush  10 mL Intravenous 2 times per day       Continuous Infusions:   dextrose 75 mL/hr at 10/06/20 1107    fentaNYL (SUBLIMAZE) infusion 25 mcg/hr (10/05/20 2150)    norepinephrine 1.5 mcg/min (10/06/20 1301)       PRN Meds:  potassium chloride, magnesium sulfate, midazolam, dextrose, bisacodyl, sodium chloride flush, [DISCONTINUED] acetaminophen **OR** acetaminophen, promethazine **OR** ondansetron    ALLERGIES:  Patient is allergic to chocolate; eggs [egg white]; food; orange juice [orange oil]; peanut butter flavor [flavoring agent]; penicillins; strawberry (diagnostic); and tape [adhesive tape]. Objective:   PHYSICAL EXAM:  BP (!) 82/44   Pulse 108   Temp 96.9 °F (36.1 °C) (Bladder)   Resp 17   Ht 4' 8\" (1.422 m)   Wt 164 lb 0.4 oz (74.4 kg)   SpO2 99%   BMI 36.77 kg/m²    Physical Exam  Constitutional:       General: He is not in acute distress. Appearance: He is well-developed. He is not diaphoretic.    HENT:      Head: Normocephalic and atraumatic. Mouth/Throat:      Pharynx: No oropharyngeal exudate. Eyes:      Pupils: Pupils are equal, round, and reactive to light. Neck:      Musculoskeletal: Neck supple. Vascular: No JVD. Cardiovascular:      Heart sounds: Normal heart sounds. No murmur. No friction rub. No gallop. Pulmonary:      Effort: Pulmonary effort is normal.      Breath sounds: No wheezing or rales. Abdominal:      General: Bowel sounds are normal. There is no distension. Palpations: Abdomen is soft. Tenderness: There is no abdominal tenderness. Musculoskeletal: Normal range of motion. Lymphadenopathy:      Cervical: No cervical adenopathy. Skin:     General: Skin is warm and dry. Findings: No rash. Neurological:      Mental Status: He is alert. Cranial Nerves: No cranial nerve deficit. Comments: CN 2-12 grossly intact            Data Reviewed:   LABS:  CBC:  Recent Labs     10/04/20  0540 10/05/20  0420 10/06/20  0332   WBC 20.2* 22.0* 13.7*   HGB 7.2* 6.9* 6.9*   HCT 21.7* 20.6* 20.4*   MCV 93.8 94.0 94.0    231 209     BMP:  Recent Labs     10/04/20  0540 10/05/20  0420 10/06/20  0332    143 147*   K 3.7 3.0* 3.9    100 104   CO2 29 30 30   PHOS 2.6 2.6 3.9   BUN 34* 40* 44*   CREATININE 1.4* 1.4* 1.4*     LIVER PROFILE:   Recent Labs     10/04/20  0540 10/05/20  0420 10/06/20  0332   AST 32 29 17   ALT 31 33 27   BILITOT 0.7 0.7 0.5   ALKPHOS 147* 170* 187*     PT/INR:  No results for input(s): PROTIME, INR in the last 72 hours. APTT:   No results for input(s): APTT in the last 72 hours. UA:  No results for input(s): NITRITE, COLORU, PHUR, LABCAST, WBCUA, RBCUA, MUCUS, TRICHOMONAS, YEAST, BACTERIA, CLARITYU, SPECGRAV, LEUKOCYTESUR, UROBILINOGEN, BILIRUBINUR, BLOODU, GLUCOSEU, AMORPHOUS in the last 72 hours.     Invalid input(s): [address]     10/05/20  1441 10/06/20  0351   PHART 7.594* 7.411   QKD4GGT 33.9* 49.1*   PO2ART 198.8* 78.3       Vent Information  $Ventilation: $Subsequent Day  Skin Assessment: Clean, dry, & intact  Equipment Changed: HME  Vent Type: 840  Vent Mode: AC/PC  Vt Ordered: 450 mL  Pressure Ordered: 30  Rate Set: 16 bmp  FiO2 : (S) 50 %  SpO2: 99 %  SpO2/FiO2 ratio: 198  Sensitivity: 3  PEEP/CPAP: 7  I Time/ I Time %: 0.9 s  Humidification Source: HME  Mask Type: Full face mask  Mask Size: Small  Bonnet size: Medium    CXR personally reviewed, right upper lobe infiltrate           Assessment:     1. Acute on chronic resp failure, hypoxic   -Acute pulm edema    -right sided pneumonia, MRSA  2. Septic shock  3. LINDA  4. Acute pancreatitis   5. Acute encephalopathy, metabolic     Plan:      -Vent support, titrate PEEP and FIO2. Decreased RR due to alkalosis  -Wean sedation as able, precedex gtt to help with this  -Monitor hemodynamics  -Continue vanc for 7 days  -Try to resume tube feeds and free water  -Serial labs, nephrology involved. Diuresis started today due to being net positive 13L. Will also add one dose of diuril given hypernatremia   -wean steroids  -Overall guarded prognosis,further discussion held today with the father. He is now leaning towards withdrawal of support but needs additional time to consider this.  Does not want to pursue trach or reintubation     Due to life threatening resp failure, hemodynamic instability, renal failure this patient is critically ill, total critical care time involved in his care was 90 mins     Kiarra Salgado MD

## 2020-10-06 NOTE — PROGRESS NOTES
10/06/20 1546   Vent Information   Skin Assessment Clean, dry, & intact   Vent Type 840   Vent Mode AC/PC   Pressure Ordered 30   Rate Set 16 bmp   FiO2  50 %   SpO2 97 %   SpO2/FiO2 ratio 194   Sensitivity 3   PEEP/CPAP 8   I Time/ I Time % 0.9 s   Humidification Source HME   Vent Patient Data   Peak Inspiratory Pressure 38 cmH2O   Mean Airway Pressure 15 cmH20   Rate Measured 16 br/min   Vt Exhaled 347 mL   Minute Volume 5.44 Liters   I:E Ratio 1:3   Cough/Sputum   Sputum How Obtained Endotracheal;Suctioned   Cough Productive   Sputum Amount Moderate   Sputum Color Creamy   Tenacity Thick   Spontaneous Breathing Trial (SBT) RT Doc   Pulse 97   Breath Sounds   Right Upper Lobe Diminished   Right Middle Lobe Diminished   Right Lower Lobe Diminished   Left Upper Lobe Diminished   Left Lower Lobe Diminished   Additional Respiratory  Assessments   Resp 16   $End Tidal CO2 44   Alarm Settings   High Pressure Alarm 45 cmH2O   Low Minute Volume Alarm 2 L/min   Apnea (secs) 20 secs   High Respiratory Rate 40 br/min   Low Exhaled Vt  200 mL   ETT (adult)   Placement Date/Time: 10/01/20 2100   Preoxygenation: Yes  Type: Cuffed  Tube Size: 7.5 mm  Laryngoscope: GlideScope  Blade Size: 3  Location: Oral  Insertion attempts: 1  Placement Verified By[de-identified] Auscultation;Capnometry  Secured at: 24 cm  Placed By: L. .Ralph    Secured at 24 cm   Measured From Lips   ET Placement Left   Secured By Commercial tube wilkins   Site Condition Dry   Cuff Pressure 30 cm H2O

## 2020-10-06 NOTE — PROGRESS NOTES
10/06/20 0915   Vent Information   Skin Assessment Clean, dry, & intact   Equipment Changed HME   Vent Type 840   Vent Mode AC/PC   Pressure Ordered 30   Rate Set 16 bmp  (found rate at 16)   FiO2  65 %  (found fio2 at 65)   SpO2 (!) 89 %   SpO2/FiO2 ratio 136.92   Sensitivity 3   PEEP/CPAP 7  (found peep at 7)   I Time/ I Time % 0.9 s   Humidification Source HME   Vent Patient Data   Peak Inspiratory Pressure 37 cmH2O   Mean Airway Pressure 16 cmH20   Rate Measured 21 br/min   Vt Exhaled 266 mL   Minute Volume 5.92 Liters   I:E Ratio 1:2.3   Plateau Pressure 34 LMO76   Static Compliance 9.85 mL/cmH2O   Dynamic Compliance 8.86 mL/cmH2O   Cough/Sputum   Sputum How Obtained Endotracheal;Oral;Suctioned   Cough Productive   Sputum Amount Small   Sputum Color Creamy   Tenacity Thick   Spontaneous Breathing Trial (SBT) RT Doc   Pulse 101   Breath Sounds   Right Upper Lobe Diminished   Right Middle Lobe Diminished   Right Lower Lobe Diminished   Left Upper Lobe Diminished   Left Lower Lobe Diminished   Additional Respiratory  Assessments   $End Tidal CO2 47   Oral Care Mouth suctioned   Cuff Pressure (cm H2O) 32 cm H2O   Alarm Settings   High Pressure Alarm 55 cmH2O   Low Minute Volume Alarm 3 L/min   High Respiratory Rate 40 br/min   Resp Rate Low Alarm 6   Low Exhaled Vt  150 mL   Patient Observation   Observations hhn with aerogen, ambu bag and mask at bedside, alarms on and audible, apnea parameters on   ETT (adult)   Placement Date/Time: 10/01/20 2100   Preoxygenation: Yes  Type: Cuffed  Tube Size: 7.5 mm  Laryngoscope: GlideScope  Blade Size: 3  Location: Oral  Insertion attempts: 1  Placement Verified By[de-identified] Auscultation;Capnometry  Secured at: 24 cm  Placed By: FOX Becerra    Secured at 23 cm   Measured From [address] [place] [address],[address] By Commercial tube wilkins   Site Condition Dry

## 2020-10-06 NOTE — PROGRESS NOTES
4 Eyes Skin Assessment     The patient is being assess for   Shift Handoff    I agree that 2 RN's have performed a thorough Head to Toe Skin Assessment on the patient. ALL assessment sites listed below have been assessed. Areas assessed by both nurses:   [x]   Head, Face, and Ears   [x]   Shoulders, Back, and Chest, Abdomen  [x]   Arms, Elbows, and Hands   [x]   Coccyx, Sacrum, and Ischium  [x]   Legs, Feet, and Heels        Abrasion bridge of nose, scattered bruising/abrasions    **SHARE this note so that the co-signing nurse is able to place an eSignature**    Co-signer eSignature: {[email or web address]:[id number]}    Does the Patient have Skin Breakdown?   No          Omid Prevention initiated:  Yes   Wound Care Orders initiated:  NA      WOC nurse consulted for Pressure Injury (Stage 3,4, Unstageable, DTI, NWPT, Complex wounds)and New or Established Ostomies:  NA      Primary Nurse eSignature: Electronically signed by Cyndee Monique RN on 10/6/20 at 5:41 PM EDT

## 2020-10-06 NOTE — PROGRESS NOTES
pressors.     Heart Failure:    - History of diastolic heart failure.  EF normal.    Hypernatremia:  - Start free water replacement D5W 75 ml/hour    Hypokalemia:  - Prn replacement per protocol.       Overall prognosis poor

## 2020-10-06 NOTE — PROGRESS NOTES
Hospitalist Progress Note      PCP: No primary care provider on file. Date of Admission: 9/22/2020    Chief Complaint:  [address] Course:   HPI :   60 y.o. male with history of CHF, COPD, GERD,  MRDD who presented to  [facility] ER from group home with altered mental status. History obtained per chart as patient unable to provide any history due to AMS. Pt minimally verbal at baseline. Patient reportedly had reduced responsiveness at the ECF overnight and was sent to the ER. He was noted to be hypothermic in the 80s and hypotensive in the 60s. Initial concern for possible CVA - stroke team consulted. CT head was nonacute with tiny area of low attenuation near the anterior right  internal capsule which was thought to be likely subacute/chronic. CTA head/neck was unremarkable. PICC line placed in ER. Labs notable for severe hyperkalemia with potassium of 7.1 creatinine 1.5, TSH of 7.4. Lipase was  elevated at 1471. Temperature improved to 90's with karuna hugger and warm IVF. There was concern for myxedema coma and was started on IV Synthroid. He was given a dose of IV hydrocortisone, started on broad-spectrum antibiotics and  pressors. Pt was transferred to [facility] for further care. Subjective:   Patient is up in bed, comfortable on ventilator.   No new event overnight noted        Medications:  Reviewed    Infusion Medications    fentaNYL (SUBLIMAZE) infusion 25 mcg/hr (10/05/20 3290)    norepinephrine 1 mcg/min (10/06/20 3976)     Scheduled Medications    [START ON 10/7/2020] famotidine (PEPCID) injection  20 mg Intravenous Daily    hydrocortisone sodium succinate PF  25 mg Intravenous Q8H    docusate  100 mg Per NG tube BID    senna  2 tablet Per NG tube BID    vancomycin (VANCOCIN) intermittent dosing (placeholder)   Other RX Placeholder    furosemide  40 mg Intravenous Daily    carboxymethylcellulose PF  1 drop Both Eyes 6 times per day    mupirocin   Nasal BID    ipratropium-albuterol 1 ampule Inhalation Q4H    midazolam  2 mg Intravenous Once    chlorhexidine  15 mL Mouth/Throat BID    meropenem  1 g Intravenous Q8H    bisacodyl  5 mg Oral Daily    levothyroxine  25 mcg Oral Daily    sodium chloride flush  10 mL Intravenous 2 times per day     PRN Meds: potassium chloride, magnesium sulfate, midazolam, dextrose, bisacodyl, sodium chloride flush, [DISCONTINUED] acetaminophen **OR** acetaminophen, promethazine **OR** ondansetron      Intake/Output Summary (Last 24 hours) at 10/6/2020 1003  Last data filed at 10/6/2020 0931  Gross per 24 hour   Intake 1317 ml   Output 1320 ml   Net -3 ml       Physical Exam Performed:    BP (!) 94/52   Pulse 113   Temp 97.3 °F (36.3 °C) (Bladder)   Resp 22   Ht 4' 8\" (1.422 m)   Wt 164 lb 0.4 oz (74.4 kg)   SpO2 95%   BMI 36.77 kg/m²   General appearance: lethargic and somnolent but arousable,  on ventilator. HEENT:  Normal cephalic, atraumatic without obvious deformity. Conjunctivae/corneas clear. Neck: Supple, with full range of motion. No jugular venous distention. Trachea midline. Respiratory:  Normal respiratory effort. Bibasilar crackles   Cardiovascular:  Regular rate and rhythm with normal S1/S2 without murmurs, rubs or gallops. Abdomen: Soft, distended,  non-tender,  with normal bowel sounds. Musculoskeletal:  No clubbing, cyanosis or edema bilaterally. Skin:  Warm and dry .   Neurologic:   On ventilator  Psychiatric:  On ventilator         Labs:   Recent Labs     10/04/20  0540 10/05/20  0420 10/06/20  0332   WBC 20.2* 22.0* 13.7*   HGB 7.2* 6.9* 6.9*   HCT 21.7* 20.6* 20.4*    231 209     Recent Labs     10/04/20  0540 10/05/20  0420 10/06/20  0332    143 147*   K 3.7 3.0* 3.9    100 104   CO2 29 30 30   BUN 34* 40* 44*   CREATININE 1.4* 1.4* 1.4*   CALCIUM 8.1* 8.3 8.2*   PHOS 2.6 2.6 3.9       Urinalysis:      Lab Results   Component Value Date    NITRU Negative 09/26/2020    WBCUA 6-9 09/26/2020    BACTERIA 2+ 09/26/2020    RBCUA 3-4 09/26/2020    BLOODU TRACE-INTACT 09/26/2020    SPECGRAV 1.015 09/26/2020    GLUCOSEU Negative 09/26/2020       Radiology:  XR ABDOMEN (KUB) (SINGLE AP VIEW)   Preliminary Result   1. Nonspecific, nonobstructive bowel gas pattern. 2. Nasogastric tube remains in place with distal tip located in the region   the stomach. XR CHEST PORTABLE   Final Result   Endotracheal tube tip is 6.9 cm above the oliverio. Improving right upper lobe airspace opacity. XR CHEST PORTABLE   Final Result   Worsening consolidation within the right upper lobe and infrahilar region,   otherwise similar appearing chest.         XR CHEST PORTABLE   Final Result   1. Endotracheal tube tip projects over the trachea, tip at the level of the   aortic knob, 6.3 cm superior to the oliverio. Life support appliances appear   appropriately positioned. 2. Interval progression of consolidation and pleural effusion over the upper   right hemithorax. Lung volumes are low. 3. Slightly diminished opacities in the left lung which predominate medially   throughout possibly related to atelectasis or infiltrate. 4. Vascular engorgement and perivascular haziness typically associated with   congestive heart failure. 5. Prominent soft tissue fullness about the mediastinum and paz concerning   for underlying adenopathy. Consider IV contrast-enhanced CT chest.      RECOMMENDATION:   IV contrast-enhanced CT chest         XR CHEST PORTABLE   Final Result   Bibasilar opacification and probable effusion with mild progression on the   right. FL MODIFIED BARIUM SWALLOW W VIDEO   Final Result   No kaden aspiration. Please see separate speech pathology report for full discussion of findings   and recommendations. XR ABDOMEN (KUB) (SINGLE AP VIEW)   Final Result   Unremarkable bowel gas pattern. NG tube extends well into the stomach.          XR CHEST PORTABLE   Final Result   Bibasilar opacities, likely combination of atelectasis/airspace disease with   layered pleural effusions, larger on the left. There improvement in aeration   of the left lung in the interval, which may be related to differences in   positioning. XR CHEST PORTABLE   Final Result   Increased opacification within the left lung may represent a layering pleural   effusion. XR CHEST PORTABLE   Final Result   New small bilateral pleural effusions and bilateral airspace disease which   may be related to edema and/or pneumonia. XR ABDOMEN FOR NG/OG/NE TUBE PLACEMENT   Final Result   Gastric tube in mid stomach         US RENAL COMPLETE   Final Result   1. Unremarkable sonographic appearance of the kidneys. 2. Collapsed urinary bladder, limiting its evaluation. XR ABDOMEN (KUB) (SINGLE AP VIEW)   Final Result   Unchanged moderate amount of gaseous distention of the stomach. No other   findings to explain abdominal distension. Of note however these images were   obtained supine and early free air would not be detected. Assessment/Plan:    Active Hospital Problems    Diagnosis    Cardiac arrest [facility]) [I46.9]    Cardiopulmonary arrest with successful resuscitation ([facility] [I46.9]    Aspiration into airway [T17.908A]    Hypernatremia [E87.0]    Thrombocytopenia ([address] [D69.6]    Acute encephalopathy [G93.40]    Acute respiratory failure with hypoxia and hypercapnia (HCC) [J96.01, J96.02]    Pulmonary infiltrates [R91.8]    Acute pancreatitis without infection or necrosis [K85.90]    LINDA (acute kidney injury) ([address] [N17.9]    Hyperkalemia [E87.5]    Hypothyroidism [E03.9]    Septic shock ([address] [A41.9, R65.21]    Hypothermia [T68. XXXA]    Class 2 obesity in adult [E66.9]    Suspected sleep apnea [R29.818]       Acute metabolic encephalopathy: Likely multifactorial due to septic shock, hyperkalemia, hypothermia, ? Myxedema coma. Non verbal at baseline per report. CT head was nonacute. Stroke work-up negative in ER. Neuro recs appreciated. Supportive care for acute morbidities as stated below        Septic shock: met SIRS criteria with tachycardia, tachypnea, hypothermia on arrival in ER. Initial concern for pneumonia as cause. Pneumonia ruled out per pulm. Empiric abx d/rich. Off pressors. BP stable. D/rich steroids. Continue to hold home BP meds    Clinically resolved. Attempt to wean off pressure support. Acute on chronic resp failure:likely due to acute pulm edema and bibasilar atelectasis/ hypoventilation. Received IV lasix. CXR today reviewed. Continue diuresis as needed per pulm. Continue 02 and wean as tolerated. Patient is currently on BiPAP, continue to monitor. Pulmonary following. Patient was intubated on 10/1/2020 and nighttime. Currently on ventilator. Pulmonary following.     Hypothyroidism: TSH elevated at 7.4, FT3, FT4  normal. Given decreased responsiveness from baseline and hypothermia concern for possible myxedema coma. recieved IV synthroid - started on PO on 9/23. repeat TFTs in 4-6 weeks      Hypothermia: Likely due to sepsis, hypothyroidism. Improved with karuna hugger . Temp normalized     Hyperkalemia: likely due to LINDA ,  potassium supplements he is on chronically and lisinopril. Treated with improvement. Held K supplements and lisinopril. Resolved. Nephro assisting     LINDA: likely due to vol depletion, compounded by use of ACEi and lasix. Culprit meds held. Nephro assisting. Cr unchanged. Monitor Cr and avoid nephrotoxins. Likely due to ATN, clinically improving, nephrology input noted, monitor.       Acute pancreatitis: lipase was elevated with signs of early pancreatitis on CT. Unclear etiology. GI assisting. Continue NG tube to LIWS, NPO  . Start feeding gradually.       GERD : Continue PPI          Acute on Chronic diastolic CHF: EF 55 to 00% with G1DD on echo in 9/3/2019. Received IVF for sepsis, hypernatremia. Pulm edema on CXR. IV lasix given.  Monitor vol status closely         Hypernatremia: improving with IVF. nephro managing . Hypoglycemia: likely due to NPO status. BG's better since getting dextrose in IVF. Monitor with hypoglycemia protocol in place. Thrombocytopenia: cause unclear. No overt bleeding. Heparin d/rich. HIT ab neg. Monitor      Overall prognosis guarded. Palliative care consulted.     DVT Prophylaxis: SCDs  Diet: Diet NPO Effective Now Exceptions are: Sips with Meds  Code Status: DNR-CCA    PT/OT Eval Status: not indicated at this time     Dispo -  Hard to say,  pending clinical course     Amando Dickson MD

## 2020-10-06 NOTE — PROGRESS NOTES
10/06/20 0412   Vent Information   Vent Type 840   Vent Mode AC/PC   Pressure Ordered 30   Rate Set 22 bmp   FiO2  50 %   SpO2 97 %   SpO2/FiO2 ratio 194   Sensitivity 3   PEEP/CPAP 8   I Time/ I Time % 0.9 s   Humidification Source HME   Vent Patient Data   Peak Inspiratory Pressure 38 cmH2O   Mean Airway Pressure 17 cmH20   Rate Measured 22 br/min   Vt Exhaled 332 mL   Minute Volume 7.32 Liters   I:E Ratio 1:2.0   Cough/Sputum   Sputum How Obtained None   Spontaneous Breathing Trial (SBT) RT Doc   Pulse 90   Breath Sounds   Right Upper Lobe Diminished   Right Middle Lobe Diminished   Right Lower Lobe Diminished   Left Upper Lobe Diminished   Left Lower Lobe Diminished   Additional Respiratory  Assessments   Position Semi-Mooney's   Cuff Pressure (cm H2O) 30 cm H2O   Alarm Settings   High Pressure Alarm 55 cmH2O   Low Minute Volume Alarm 3 L/min   High Respiratory Rate 40 br/min   Low Exhaled Vt  200 mL   ETT (adult)   Placement Date/Time: 10/01/20 2100   Preoxygenation: Yes  Type: Cuffed  Tube Size: 7.5 mm  Laryngoscope: GlideScope  Blade Size: 3  Location: Oral  Insertion attempts: 1  Placement Verified By[de-identified] Auscultation;Capnometry  Secured at: 24 cm  Placed By: L. ..    Secured at 24 cm   Measured From Lips   ET Placement Left   Secured By Commercial tube wilkins   Site Condition Dry

## 2020-10-06 NOTE — PLAN OF CARE
Patient's EF (Ejection Fraction) is greater than 40%    Heart Failure Medications:  Diuretics[de-identified] Furosemide    (One of the following REQUIRED for EF <40%/SYSTOLIC FAILURE but MAY be used in EF% >40%/DIASTOLIC FAILURE)        ACE[de-identified] None        ARB[de-identified] None         ARNI[de-identified] None    (Beta Blockers)  NON- Evidenced Based Beta Blocker (for EF% >40%/DIASTOLIC FAILURE): None    Evidenced Based Beta Blocker::(REQUIRED for EF% <40%/SYSTOLIC FAILURE) None  . .................................................................................................................................................. Patient's Last Weight: 74.4kg obtained by bed scale. Difference in weight is 1.3 kgmore than last documented weight. Intake/Output Summary (Last 24 hours) at 10/6/2020 0511  Last data filed at 10/6/2020 0931  Gross per 24 hour   Intake 1317 ml   Output 1820 ml   Net -503 ml       Comorbidities Reviewed No  Patient has a past medical history of Acne, Anemia, Cataract, CHF (congestive heart failure) ([address] COPD (chronic obstructive pulmonary disease) ([address] GERD (gastroesophageal reflux disease), Hearing loss, Hernia, umbilical, Hip fracture, HTN, Mental retardation, MRSA (methicillin resistant staph aureus) culture positive, and Peripheral vascular disease ([address] >> For CHF and Comorbidity Education Time and Topics, please see Education Tab. Progressive Mobility Assessment:  What is this patient's Current Level of Mobility?: Requires Bed Rest  How was this patient Mobilized today?: Unable to Mobilize                 With Whom? Nurse                 Level of Difficulty/Assistance:      Pt is currently intubated on a ventilator. Pt with pitting lower extremity edema.  Patient's weights and intake/output reviewed:      Patient and/or Family's stated Goal of Care this Admission:  KAREN-no family present  prior to discharge

## 2020-10-06 NOTE — PROGRESS NOTES
10/05/20  0420 10/06/20  0332   AST 32 29 17   ALT 31 33 27   BILITOT 0.7 0.7 0.5   ALKPHOS 147* 170* 187*       Imaging Last 24 Hours:  Xr Abdomen (kub) (single Ap View)    Result Date: 10/6/2020  EXAMINATION: ONE SUPINE XRAY VIEW(S) OF THE ABDOMEN 10/6/2020 9:07 am COMPARISON: Prior studies most recent 09/28/2020. HISTORY: ORDERING SYSTEM PROVIDED HISTORY: distended abdomen TECHNOLOGIST PROVIDED HISTORY: Reason for exam:->distended abdomen FINDINGS: Entirety of the abdomen has not been included on the examination. Nasogastric tube remains in place with distal tip located in the region of the distal stomach. There has been interval decompression of the stomach. The abdomen is relatively gasless. Small amount of contrast material is identified within bowel/colon associated with recent modified barium swallow. No plain film findings to suggest presence of obstruction. There is persistent bibasilar parenchymal disease. Gomez maximus about the thoracolumbar spine and left femoral orthopedic hardware associated with prior fixation procedure again identified. 1. Nonspecific, nonobstructive bowel gas pattern. 2. Nasogastric tube remains in place with distal tip located in the region of the stomach. Xr Chest Portable    Result Date: 10/5/2020  EXAMINATION: ONE XRAY VIEW OF THE CHEST 10/5/2020 5:07 am COMPARISON: 10/02/2020, 10/01/2020 HISTORY: ORDERING SYSTEM PROVIDED HISTORY: resp failure TECHNOLOGIST PROVIDED HISTORY: Reason for exam:->resp failure Reason for Exam: resp failure Type of Exam: Subsequent/Follow-up FINDINGS: Endotracheal tube tip is 6.9 cm above the oliverio. Right central venous catheter scratched a right internal jugular central venous catheter right PICC line are stable in positioning. Improving right upper lobe airspace opacity. There is a left upper lobe airspace opacity which has worsened since 10/01/2020 but was not well seen on 10/02/2020 due to overlying defibrillator pad.   Stable cardiac silhouette. Perihilar bibasilar apathy is otherwise not significantly changed. Trace bilateral pleural effusions. No pneumothorax     Endotracheal tube tip is 6.9 cm above the oliverio. Improving right upper lobe airspace opacity. Assessment//Plan           Hospital Problems           Last Modified POA    Acute encephalopathy 9/22/2020 Yes    Acute respiratory failure with hypoxia and hypercapnia (HCC) 10/2/2020 Yes    Pulmonary infiltrates 9/22/2020 Yes    Acute pancreatitis without infection or necrosis 9/22/2020 Yes    LINDA (acute kidney injury) ([address] 9/22/2020 Yes    Hyperkalemia 9/22/2020 Yes    Hypothyroidism 9/22/2020 Yes    Septic shock ([address] 10/2/2020 Yes    Hypothermia 9/22/2020 Yes    Class 2 obesity in adult 9/22/2020 Yes    Suspected sleep apnea 9/22/2020 Yes    Hypernatremia 9/24/2020 Yes    Thrombocytopenia ([address] 9/24/2020 Yes    Cardiac arrest ([address] 10/2/2020 Yes    Cardiopulmonary arrest with successful resuscitation ([address] 10/2/2020 Yes    Aspiration into airway 10/2/2020 Yes        Assessment & Plan  The patient is a 60 y. o. male with significant past medical history of dCHF, COPD, HTN and MRDD (non verbal) who was admitted with Resp distress, ARF and acute pancreatitis of unclear etiology.  Cardiac arrest x 2. Currently intubated and on pressors. Palliative care involved. TFs on hold. Anemia w/o signs of active bleeding noted.  The presence of an ileus is suspected, but KUB non revealing.  - awaiting palliative care input before pursuing further evaluation/mngmnt of anemia and abd distention, such as CT and EGD  - will continue to follow with you  Electronically signed by Cristina Kaminski MD on 10/6/20 at 11:58 AM EDT

## 2020-10-07 NOTE — PROGRESS NOTES
Shift assessment completed, see flow sheet. Evening medications passed, Levophed and fent drip continues see MAR. VSS and afebrile. Pt did not follow commands but does withdraws to pain. Pupils are reactive and equal. Lung sounds are diminished all over remain on vent support. abd is rounds, distended and taut, NG bridled at 65cm and to low wall suction. Pedal pulses palpable. Pt dose have +3 pitting edema on BLE and +2 BUE. Pt has scattered bruising and abrasions noted. Elliott in place with adequate output. Will continue to monitor.

## 2020-10-07 NOTE — PROGRESS NOTES
Message send to MD to updated on critical values received from lab, waiting for new orders. After reviewing ABG RT increased FIO2 to 70% from 50% and RR to 20 from 16. Will continue to monitor.

## 2020-10-07 NOTE — PROGRESS NOTES
[address] [facility], \"Alba\" notified of pt's death.   [address] [facility]

## 2020-10-07 NOTE — FLOWSHEET NOTE
Father of patient present, withdrawal of care.  explored father's emotions and thoughts, prayed with father as requested; appropriately grieving, processing stories of patient's upbringing and the difficulties with care, \"he's faced so much pain already, it's hard to say goodbye, but I know that it's better that he doesn't suffer. Santi Llanos  [phone]       10/07/20 1347   Encounter Summary   Services provided to: Family   Referral/Consult From: Nurse   Support System Family members;Home care staff   Continue Visiting   (10/7: w/d of care, prayer, support for Cayetano)   Complexity of Encounter High   Length of Encounter 15 minutes   Spiritual Assessment Completed Yes   Grief and Life Adjustment   Type End of life   Assessment Approachable;Tearful;Grieving;Loneliness   Intervention Nurtured hope; Active listening;Prayer   Outcome Expressed gratitude;Coping;Tearful; Shared reminiscences;Grieving

## 2020-10-07 NOTE — PROGRESS NOTES
Hospitalist Progress Note      PCP: No primary care provider on file. Date of Admission: 9/22/2020    Chief Complaint:  [address] Course:   HPI :   60 y.o. male with history of CHF, COPD, GERD,  MRDD who presented to  [facility] ER from group home with altered mental status. History obtained per chart as patient unable to provide any history due to AMS. Pt minimally verbal at baseline. Patient reportedly had reduced responsiveness at the ECF overnight and was sent to the ER. He was noted to be hypothermic in the 80s and hypotensive in the 60s. Initial concern for possible CVA - stroke team consulted. CT head was nonacute with tiny area of low attenuation near the anterior right  internal capsule which was thought to be likely subacute/chronic. CTA head/neck was unremarkable. PICC line placed in ER. Labs notable for severe hyperkalemia with potassium of 7.1 creatinine 1.5, TSH of 7.4. Lipase was  elevated at 1471. Temperature improved to 90's with karuna hugger and warm IVF. There was concern for myxedema coma and was started on IV Synthroid. He was given a dose of IV hydrocortisone, started on broad-spectrum antibiotics and  pressors. Pt was transferred to [facility] for further care. Subjective:   Patient is up in bed, comfortable on ventilator.   No new event overnight noted        Medications:  Reviewed    Infusion Medications    dextrose Stopped (10/07/20 0957)    fentaNYL (SUBLIMAZE) infusion 35 mcg/hr (10/07/20 0834)    norepinephrine 1 mcg/min (10/07/20 0845)     Scheduled Medications    sodium chloride  20 mL Intravenous Once    vancomycin  1,250 mg Intravenous Q24H    pantoprazole  40 mg Intravenous BID    hydrocortisone sodium succinate PF  25 mg Intravenous Q8H    docusate  100 mg Per NG tube BID    senna  2 tablet Per NG tube BID    furosemide  40 mg Intravenous BID    carboxymethylcellulose PF  1 drop Both Eyes 6 times per day    ipratropium-albuterol  1 ampule Inhalation Q4H    midazolam  2 mg Intravenous Once    chlorhexidine  15 mL Mouth/Throat BID    meropenem  1 g Intravenous Q8H    bisacodyl  5 mg Oral Daily    levothyroxine  25 mcg Oral Daily    sodium chloride flush  10 mL Intravenous 2 times per day     PRN Meds: potassium chloride, magnesium sulfate, midazolam, dextrose, bisacodyl, sodium chloride flush, [DISCONTINUED] acetaminophen **OR** acetaminophen, promethazine **OR** ondansetron      Intake/Output Summary (Last 24 hours) at 10/7/2020 1029  Last data filed at 10/7/2020 0400  Gross per 24 hour   Intake 1407.1 ml   Output 1260 ml   Net 147.1 ml       Physical Exam Performed:    BP (!) 106/58   Pulse 78   Temp 96.4 °F (35.8 °C) (Core)   Resp 20   Ht 4' 8\" (1.422 m)   Wt 164 lb 0.4 oz (74.4 kg)   SpO2 96%   BMI 36.77 kg/m²   General appearance: lethargic and somnolent but arousable,  on ventilator. HEENT:  Normal cephalic, atraumatic without obvious deformity. Conjunctivae/corneas clear. Neck: Supple, with full range of motion. No jugular venous distention. Trachea midline. Respiratory:  Normal respiratory effort. Bibasilar crackles   Cardiovascular:  Regular rate and rhythm with normal S1/S2 without murmurs, rubs or gallops. Abdomen: Soft, distended,  non-tender,  with normal bowel sounds. Musculoskeletal:  No clubbing, cyanosis or edema bilaterally. Skin:  Warm and dry .   Neurologic:   On ventilator  Psychiatric:  On ventilator         Labs:   Recent Labs     10/05/20  0420 10/06/20  0332 10/07/20  0419   WBC 22.0* 13.7* 10.0   HGB 6.9* 6.9* 6.6*   HCT 20.6* 20.4* 19.6*    209 201     Recent Labs     10/05/20  0420 10/06/20  0332 10/07/20  0419    147* 142   K 3.0* 3.9 3.6    104 100   CO2 30 30 33*   BUN 40* 44* 46*   CREATININE 1.4* 1.4* 1.2   CALCIUM 8.3 8.2* 8.0*   PHOS 2.6 3.9 4.7       Urinalysis:      Lab Results   Component Value Date    NITRU Negative 09/26/2020    WBCUA 6-9 09/26/2020    BACTERIA 2+ 09/26/2020    RBCUA 3-4 09/26/2020    BLOODU TRACE-INTACT 09/26/2020    SPECGRAV 1.015 09/26/2020    GLUCOSEU Negative 09/26/2020       Radiology:  XR ABDOMEN (KUB) (SINGLE AP VIEW)   Preliminary Result   1. Nonspecific, nonobstructive bowel gas pattern. 2. Nasogastric tube remains in place with distal tip located in the region of   the stomach. XR CHEST PORTABLE   Final Result   Endotracheal tube tip is 6.9 cm above the oliverio. Improving right upper lobe airspace opacity. XR CHEST PORTABLE   Final Result   Worsening consolidation within the right upper lobe and infrahilar region,   otherwise similar appearing chest.         XR CHEST PORTABLE   Final Result   1. Endotracheal tube tip projects over the trachea, tip at the level of the   aortic knob, 6.3 cm superior to the oliverio. Life support appliances appear   appropriately positioned. 2. Interval progression of consolidation and pleural effusion over the upper   right hemithorax. Lung volumes are low. 3. Slightly diminished opacities in the left lung which predominate medially   throughout possibly related to atelectasis or infiltrate. 4. Vascular engorgement and perivascular haziness typically associated with   congestive heart failure. 5. Prominent soft tissue fullness about the mediastinum and paz concerning   for underlying adenopathy. Consider IV contrast-enhanced CT chest.      RECOMMENDATION:   IV contrast-enhanced CT chest         XR CHEST PORTABLE   Final Result   Bibasilar opacification and probable effusion with mild progression on the   right. FL MODIFIED BARIUM SWALLOW W VIDEO   Final Result   No kaden aspiration. Please see separate speech pathology report for full discussion of findings   and recommendations. XR ABDOMEN (KUB) (SINGLE AP VIEW)   Final Result   Unremarkable bowel gas pattern. NG tube extends well into the stomach.          XR CHEST PORTABLE   Final Result   Bibasilar opacities, likely combination of atelectasis/airspace disease with   layered pleural effusions, larger on the left. There improvement in aeration   of the left lung in the interval, which may be related to differences in   positioning. XR CHEST PORTABLE   Final Result   Increased opacification within the left lung may represent a layering pleural   effusion. XR CHEST PORTABLE   Final Result   New small bilateral pleural effusions and bilateral airspace disease which   may be related to edema and/or pneumonia. XR ABDOMEN FOR NG/OG/NE TUBE PLACEMENT   Final Result   Gastric tube in mid stomach         US RENAL COMPLETE   Final Result   1. Unremarkable sonographic appearance of the kidneys. 2. Collapsed urinary bladder, limiting its evaluation. XR ABDOMEN (KUB) (SINGLE AP VIEW)   Final Result   Unchanged moderate amount of gaseous distention of the stomach. No other   findings to explain abdominal distension. Of note however these images were   obtained supine and early free air would not be detected. Assessment/Plan:    Active Hospital Problems    Diagnosis    Cardiac arrest [facility]) [I46.9]    Cardiopulmonary arrest with successful resuscitation ([facility] [address] [I46.9]    Aspiration into airway [T17.908A]    Hypernatremia [E87.0]    Thrombocytopenia ([address] [D69.6]    Acute encephalopathy [G93.40]    Acute respiratory failure with hypoxia and hypercapnia (HCC) [J96.01, J96.02]    Pulmonary infiltrates [R91.8]    Acute pancreatitis without infection or necrosis [K85.90]    LINDA (acute kidney injury) ([address] [N17.9]    Hyperkalemia [E87.5]    Hypothyroidism [E03.9]    Septic shock ([address] [A41.9, R65.21]    Hypothermia [T68. XXXA]    Class 2 obesity in adult [E66.9]    Suspected sleep apnea [R29.818]       Acute metabolic encephalopathy: Likely multifactorial due to septic shock, hyperkalemia, hypothermia, ? Myxedema coma. Non verbal at baseline per report. CT head was nonacute.   Stroke work-up negative in ER. Neuro recs appreciated. Supportive care for acute morbidities as stated below        Septic shock: met SIRS criteria with tachycardia, tachypnea, hypothermia on arrival in ER. Initial concern for pneumonia as cause. Pneumonia ruled out per pulm. Empiric abx d/rich. Off pressors. BP stable. D/rich steroids. Continue to hold home BP meds    Clinically resolved. Attempt to wean off pressure support. Acute on chronic resp failure:likely due to acute pulm edema and bibasilar atelectasis/ hypoventilation. Received IV lasix. CXR today reviewed. Continue diuresis as needed per pulm. Continue 02 and wean as tolerated. Patient is currently on BiPAP, continue to monitor. Pulmonary following. Patient was intubated on 10/1/2020 and nighttime. Currently on ventilator. Pulmonary following.     Hypothyroidism: TSH elevated at 7.4, FT3, FT4  normal. Given decreased responsiveness from baseline and hypothermia concern for possible myxedema coma. recieved IV synthroid - started on PO on 9/23. repeat TFTs in 4-6 weeks      Hypothermia: Likely due to sepsis, hypothyroidism. Improved with karuna hugger . Temp normalized     Hyperkalemia: likely due to LINDA ,  potassium supplements he is on chronically and lisinopril. Treated with improvement. Held K supplements and lisinopril. Resolved. Nephro assisting     LINDA: likely due to vol depletion, compounded by use of ACEi and lasix. Culprit meds held. Nephro assisting. Cr unchanged. Monitor Cr and avoid nephrotoxins. Likely due to ATN, clinically improving, nephrology input noted, monitor.       Acute pancreatitis: lipase was elevated with signs of early pancreatitis on CT. Unclear etiology. GI assisting. Continue NG tube to LIWS, NPO  . Start feeding gradually.       GERD : Continue PPI          Acute on Chronic diastolic CHF: EF 55 to 25% with G1DD on echo in 9/3/2019. Received IVF for sepsis, hypernatremia. Pulm edema on CXR. IV lasix given.  Monitor vol status closely         Hypernatremia: improving with IVF. nephro managing . Hypoglycemia: likely due to NPO status. BG's better since getting dextrose in IVF. Monitor with hypoglycemia protocol in place. Thrombocytopenia: cause unclear. No overt bleeding. Heparin d/rich. HIT ab neg. Monitor      Overall prognosis guarded. Palliative care consulted.     DVT Prophylaxis: SCDs  Diet: Diet NPO Effective Now Exceptions are: Sips with Meds  Code Status: DNR-CCA    PT/OT Eval Status: not indicated at this time     Dispo -  Hard to say,  pending clinical course     Alfreda Reinoso MD

## 2020-10-07 NOTE — PROGRESS NOTES
Spoke with [facility] NP about orders for 1 unit of RBC and decision made to not transfuse pt. Will continue to monitor.

## 2020-10-07 NOTE — PROGRESS NOTES
10/06/20 1955   Vent Information   Skin Assessment Clean, dry, & intact   Vent Type 840   Vent Mode AC/PC   Pressure Ordered 30   Rate Set 16 bmp   FiO2  50 %   SpO2 95 %   SpO2/FiO2 ratio 190   Sensitivity 3   PEEP/CPAP 8   I Time/ I Time % 0.9 s   Humidification Source HME   Vent Patient Data   Peak Inspiratory Pressure 38 cmH2O   Mean Airway Pressure 15 cmH20   Rate Measured 17 br/min   Vt Exhaled 369 mL   Minute Volume 6.19 Liters   I:E Ratio 1:2.5   Cough/Sputum   Sputum How Obtained Endotracheal;Suctioned   Cough Productive   Sputum Amount Small   Sputum Color Creamy   Tenacity Thick   Spontaneous Breathing Trial (SBT) RT Doc   Pulse 99   Breath Sounds   Right Upper Lobe Diminished   Right Middle Lobe Diminished   Right Lower Lobe Diminished   Left Upper Lobe Diminished   Left Lower Lobe Diminished   Additional Respiratory  Assessments   Resp 17   $End Tidal CO2 42   Alarm Settings   High Pressure Alarm 45 cmH2O   Low Minute Volume Alarm 2 L/min   Apnea (secs) 20 secs   High Respiratory Rate 40 br/min   Low Exhaled Vt  200 mL   ETT (adult)   Placement Date/Time: 10/01/20 2100   Preoxygenation: Yes  Type: Cuffed  Tube Size: 7.5 mm  Laryngoscope: GlideScope  Blade Size: 3  Location: Oral  Insertion attempts: 1  Placement Verified By[de-identified] Auscultation;Capnometry  Secured at: 24 cm  Placed By: FOX Becerra    Secured at 24 cm   Measured From [address] [place] [address],[address] By Commercial tube wilkins   Site Condition Dry   Cuff Pressure 30 cm H2O

## 2020-10-07 NOTE — CARE COORDINATION
Writer spoke to Nubia BARRON and plan is to extubated/withdrawal care on patient today about noon. Updated Laisha Marinelli with [facility] of the above.   Taylor Salcido RN

## 2020-10-07 NOTE — PROGRESS NOTES
Pulmonary & Critical Care Inpatient Progress Note   Lorrie Jimenez MD     REASON FOR TODAY'S VISIT:  Acute resp failure    SUBJECTIVE:   Ongoing vent support, levophed dependence     Scheduled Meds:   sodium chloride  20 mL Intravenous Once    vancomycin  1,250 mg Intravenous Q24H    pantoprazole  40 mg Intravenous BID    hydrocortisone sodium succinate PF  25 mg Intravenous Q8H    docusate  100 mg Per NG tube BID    senna  2 tablet Per NG tube BID    furosemide  40 mg Intravenous BID    carboxymethylcellulose PF  1 drop Both Eyes 6 times per day    ipratropium-albuterol  1 ampule Inhalation Q4H    midazolam  2 mg Intravenous Once    chlorhexidine  15 mL Mouth/Throat BID    meropenem  1 g Intravenous Q8H    bisacodyl  5 mg Oral Daily    levothyroxine  25 mcg Oral Daily    sodium chloride flush  10 mL Intravenous 2 times per day       Continuous Infusions:   dextrose Stopped (10/07/20 0957)    fentaNYL (SUBLIMAZE) infusion Stopped (10/07/20 1255)    norepinephrine Stopped (10/07/20 1255)       PRN Meds:  potassium chloride, magnesium sulfate, midazolam, dextrose, bisacodyl, sodium chloride flush, [DISCONTINUED] acetaminophen **OR** acetaminophen, promethazine **OR** ondansetron    ALLERGIES:  Patient is allergic to chocolate; eggs [egg white]; food; orange juice [orange oil]; peanut butter flavor [flavoring agent]; penicillins; strawberry (diagnostic); and tape [adhesive tape]. Objective:   PHYSICAL EXAM:  BP (!) 119/55   Pulse 148   Temp 96.4 °F (35.8 °C) (Bladder)   Resp 18   Ht 4' 8\" (1.422 m)   Wt 164 lb 0.4 oz (74.4 kg)   SpO2 (!) 65%   BMI 36.77 kg/m²    Physical Exam  Constitutional:       General: He is not in acute distress. Appearance: He is well-developed. He is not diaphoretic. HENT:      Head: Normocephalic and atraumatic. Mouth/Throat:      Pharynx: No oropharyngeal exudate. Eyes:      Pupils: Pupils are equal, round, and reactive to light.    Neck: Musculoskeletal: Neck supple. Vascular: No JVD. Cardiovascular:      Heart sounds: Normal heart sounds. No murmur. No friction rub. No gallop. Pulmonary:      Effort: Pulmonary effort is normal.      Breath sounds: No wheezing or rales. Abdominal:      General: Bowel sounds are normal. There is no distension. Palpations: Abdomen is soft. Tenderness: There is no abdominal tenderness. Musculoskeletal: Normal range of motion. Lymphadenopathy:      Cervical: No cervical adenopathy. Skin:     General: Skin is warm and dry. Findings: No rash. Neurological:      Mental Status: He is alert. Cranial Nerves: No cranial nerve deficit. Comments: CN 2-12 grossly intact            Data Reviewed:   LABS:  CBC:  Recent Labs     10/05/20  0420 10/06/20  0332 10/07/20  0419   WBC 22.0* 13.7* 10.0   HGB 6.9* 6.9* 6.6*   HCT 20.6* 20.4* 19.6*   MCV 94.0 94.0 94.9    209 201     BMP:  Recent Labs     10/05/20  0420 10/06/20  0332 10/07/20  0419    147* 142   K 3.0* 3.9 3.6    104 100   CO2 30 30 33*   PHOS 2.6 3.9 4.7   BUN 40* 44* 46*   CREATININE 1.4* 1.4* 1.2     LIVER PROFILE:   Recent Labs     10/05/20  0420 10/06/20  0332 10/07/20  0419   AST 29 17 11*   ALT 33 27 20   BILITOT 0.7 0.5 0.3   ALKPHOS 170* 187* 162*     PT/INR:  No results for input(s): PROTIME, INR in the last 72 hours. APTT:   No results for input(s): APTT in the last 72 hours. UA:  No results for input(s): NITRITE, COLORU, PHUR, LABCAST, WBCUA, RBCUA, MUCUS, TRICHOMONAS, YEAST, BACTERIA, CLARITYU, SPECGRAV, LEUKOCYTESUR, UROBILINOGEN, BILIRUBINUR, BLOODU, GLUCOSEU, AMORPHOUS in the last 72 hours.     Invalid input(s): KETONESU  Recent Labs     10/06/20  0351 10/07/20  0419   PHART 7.411 7.274*   MES3BER 49.1* 74.5*   PO2ART 78.3 43.8*       Vent Information  $Ventilation: $Subsequent Day  Skin Assessment: Clean, dry, & intact  Equipment Changed: HME  Vent Type: 840  Vent Mode: AC/PC  Vt Ordered: 450 mL  Pressure Ordered: 30  Rate Set: 20 bmp  FiO2 : 70 %  SpO2: (!) 65 %  SpO2/FiO2 ratio: 140  Sensitivity: 3  PEEP/CPAP: 5  I Time/ I Time %: 1.25 s  Humidification Source: HME  Mask Type: Full face mask  Mask Size: Small  Bonnet size: Medium    CXR personally reviewed, right upper lobe infiltrate           Assessment:     1. Acute on chronic resp failure, hypoxic   -Acute pulm edema    -right sided pneumonia, MRSA  2. Septic shock  3. LINDA  4. Acute pancreatitis   5.  Acute encephalopathy, metabolic     Plan:      -Plan for withdrawal of support later today when family arrives  -Continue vent support, atb, and vasopressors for now  -If he survives for a significant time post extubation will ask hospice for eval   -Supportive medications post extubation    Amna Roberto MD

## 2020-10-07 NOTE — PROGRESS NOTES
Pt  with family member at bedside. Pulmonary service notified of same. Attending MD to be notified as well.

## 2020-10-07 NOTE — PROGRESS NOTES
Progress Note  Date:10/7/2020       Room:0264/0264-01  Patient Name:Boyd Piña     YOB: 1965     Age:55 y.o. Subjective    Subjective:  Symptoms:  Stable. Pain:  He reports no pain. Review of Systems  Objective         Vitals Last 24 Hours:  TEMPERATURE:  Temp  Av.8 °F (36 °C)  Min: 96.3 °F (35.7 °C)  Max: 97.3 °F (36.3 °C)  RESPIRATIONS RANGE: Resp  Av.6  Min: 16  Max: 22  PULSE OXIMETRY RANGE: SpO2  Av.9 %  Min: 90 %  Max: 100 %  PULSE RANGE: Pulse  Av.9  Min: 78  Max: 114  BLOOD PRESSURE RANGE: Systolic (88OVD), AK , Min:82 , HYI:044   ; Diastolic (73TJA), LMN:28, Min:44, Max:73    I/O (24Hr): Intake/Output Summary (Last 24 hours) at 10/7/2020 0832  Last data filed at 10/7/2020 0400  Gross per 24 hour   Intake 1467.1 ml   Output 1635 ml   Net -167.9 ml     Objective:  General Appearance:  Not in pain. Vital signs: (most recent): Blood pressure 105/63, pulse 84, temperature 96.8 °F (36 °C), temperature source Core, resp. rate 19, height 4' 8\" (1.422 m), weight 164 lb 0.4 oz (74.4 kg), SpO2 97 %. Heart: Normal rate. Regular rhythm. S1 normal and S2 normal.    Abdomen: Abdomen is distended. Hypoactive bowel sounds. There is no abdominal tenderness. Labs/Imaging/Diagnostics    Labs:  CBC:  Recent Labs     10/05/20  0420 10/06/20  0332 10/07/20  0419   WBC 22.0* 13.7* 10.0   RBC 2.19* 2.18* 2.06*   HGB 6.9* 6.9* 6.6*   HCT 20.6* 20.4* 19.6*   MCV 94.0 94.0 94.9   RDW 15.9* 16.1* 16.1*    209 201     CHEMISTRIES:  Recent Labs     10/05/20  0420 10/06/20  0332 10/07/20  0419    147* 142   K 3.0* 3.9 3.6    104 100   CO2 30 30 33*   BUN 40* 44* 46*   CREATININE 1.4* 1.4* 1.2   GLUCOSE 83 88 140*   PHOS 2.6 3.9 4.7   MG 1.90 2.10 2.10     PT/INR:No results for input(s): PROTIME, INR in the last 72 hours. APTT:No results for input(s): APTT in the last 72 hours.   LIVER PROFILE:  Recent Labs     10/05/20  0420 10/06/20  4302 Aspiration into airway 10/2/2020 Yes        Assessment & Plan  The patient is a 60 y. o. male with significant past medical history of dCHF, COPD, HTN and MRDD (non verbal) who was admitted with Resp distress, ARF and acute pancreatitis of unclear etiology.  Cardiac arrest x 2. Currently intubated and on pressors. Palliative care involved. TFs on hold.  Anemia w/o signs of active bleeding noted.  The presence of an ileus is suspected, but KUB non revealing.  - awaiting palliative care input before pursuing further evaluation/mngmnt of anemia and abd distention, such as CT and EGD  - will replace Pepcid w bid Protonix  - will continue to follow with you    Facundo Henley MD       [phone]) [phone]        Electronically signed by Facundo Henley MD on 10/7/20 at 8:32 AM EDT

## 2020-10-07 NOTE — PROGRESS NOTES
Pt's body prepared for morgue. Elliott catheter discontinued. Pt being released to [facility].

## 2020-10-07 NOTE — PROGRESS NOTES
Pt extubated in compliance with his father's request.  Levophed and Fentanyl also discontinued as pt is no longer intubated. Pt receiving supplemental oxygen via nasal cannula at 6 lpm, tolerating same well.

## 2020-10-07 NOTE — PROGRESS NOTES
10/07/20 0435   Vent Information   Vent Type 840   Vent Mode AC/PC   Pressure Ordered 30   Rate Set 16 bmp   FiO2  50 %   SpO2 94 %   SpO2/FiO2 ratio 188   Sensitivity 3   PEEP/CPAP 8   I Time/ I Time % 0.9 s   Humidification Source HME   Vent Patient Data   Peak Inspiratory Pressure 39 cmH2O   Mean Airway Pressure 16 cmH20   Rate Measured 19 br/min   Vt Exhaled 344 mL   Minute Volume 6.28 Liters   I:E Ratio 1:2.6   Spontaneous Breathing Trial (SBT) RT Doc   Pulse 96   Breath Sounds   Right Upper Lobe Diminished   Right Middle Lobe Diminished   Right Lower Lobe Diminished   Left Upper Lobe Diminished   Left Lower Lobe Diminished   Additional Respiratory  Assessments   Resp 19   Position Semi-Mooney's   Cuff Pressure (cm H2O) 30 cm H2O   Patient Observation   Observations ambu @ bedside   ETT (adult)   Placement Date/Time: 10/01/20 2100   Preoxygenation: Yes  Type: Cuffed  Tube Size: 7.5 mm  Laryngoscope: GlideScope  Blade Size: 3  Location: Oral  Insertion attempts: 1  Placement Verified By[de-identified] Auscultation;Capnometry  Secured at: 24 cm  Placed By: FOX Becerra    Secured at 24 cm   Measured From Lips   ET Placement Right   Secured By Commercial tube wilkins   Site Condition Dry

## 2020-10-07 NOTE — PROGRESS NOTES
[facility]       Progress Note           Subjective/   60y.o. year old male who we are seeing in consultation for LINDA. HPI:  Renal function remains stable, urine output of 1.5 liters over last 24 hours with diuresis. Hypotensive requiring pressors, being weaned. ROS:  Remain on ventilator. No fevers. Objective/   GEN:  Chronically ill, BP (!) 105/57   Pulse 91   Temp 96.4 °F (35.8 °C) (Bladder)   Resp 20   Ht 4' 8\" (1.422 m)   Wt 164 lb 0.4 oz (74.4 kg)   SpO2 92%   BMI 36.77 kg/m²   HEENT: non-icteric, no JVD  CV: S1, S2 without m/r/g; + LE edema  RESP: CTA B without w/r/r; breathing wnl  ABD: +bs, soft, nt, distended, no hsm  SKIN: warm, no rashes    Data/  Recent Labs     10/05/20  0420 10/06/20  0332 10/07/20  0419   WBC 22.0* 13.7* 10.0   HGB 6.9* 6.9* 6.6*   HCT 20.6* 20.4* 19.6*   MCV 94.0 94.0 94.9    209 201     Recent Labs     10/05/20  0420 10/06/20  0332 10/07/20  0419    147* 142   K 3.0* 3.9 3.6    104 100   CO2 30 30 33*   GLUCOSE 83 88 140*   PHOS 2.6 3.9 4.7   MG 1.90 2.10 2.10   BUN 40* 44* 46*   CREATININE 1.4* 1.4* 1.2   LABGLOM 52* 52* >60   GFRAA >60 >60 >60       Assessment/     Acute Kidney Injury:    - Initial bout resolved but now likely has ATN in the setting of CP arrest/persistent hypotension.  - Urine output improved and serum creatinine is slowly trending down  - Continue vasopressor to improve hemodynamics. - Will continue IV Lasix - increased to 40 mg bid, monitor  - No indication for dialysis at this time.     Acid-base Disorder.  - Primarily acute respiratory acidosis with mild metabolic acidosis. - Metabolic acidosis from LINDA and lactic acidosis. Received sodium bicarb IVP x 2.  - Vent adjustment per pulmonary service.     Acute Hypoxic Respiratory Failure/Septic Shock.  - S/P CP arrest. Suspect aspiration pneumonia. - On antibiotics and Hydrocortisone per primary service.   - On pressors.     Heart Failure:    - History of diastolic heart failure.  EF normal.    Hypernatremia:  - Continue free water replacement D5W 75 ml/hour    Hypokalemia:  - Prn replacement per protocol.       Overall prognosis poor  Case d/w ICU team  Plans for extubation today

## 2020-10-08 LAB
BLOOD BANK DISPENSE STATUS: NORMAL
BLOOD BANK PRODUCT CODE: NORMAL
BPU ID: NORMAL
DESCRIPTION BLOOD BANK: NORMAL

## 2020-10-08 NOTE — DISCHARGE SUMMARY
Hospital Medicine Discharge/ Death Summary    Patient ID: Marilee Rivera      Patient's PCP: No primary care provider on file. Admit Date: 9/22/2020      Date of Death: 10/7/2020      Admitting Physician: Chandrakant Parker MD     Discharge Physician: Karol David MD     Discharge Diagnoses: Active Hospital Problems    Diagnosis    Cardiac arrest [facility]) [I46.9]    Cardiopulmonary arrest with successful resuscitation ([facility] [I46.9]    Aspiration into airway [T17.908A]    Hypernatremia [E87.0]    Thrombocytopenia ([facility] [D69.6]    Acute encephalopathy [G93.40]    Acute respiratory failure with hypoxia and hypercapnia (HCC) [J96.01, J96.02]    Pulmonary infiltrates [R91.8]    Acute pancreatitis without infection or necrosis [K85.90]    LINDA (acute kidney injury) ([facility] [N17.9]    Hyperkalemia [E87.5]    Hypothyroidism [E03.9]    Septic shock ([facility] [A41.9, R65.21]    Hypothermia [T68. XXXA]    Class 2 obesity in adult [E66.9]    Suspected sleep apnea [R29.818]       The patient was seen and examined on day of discharge and this discharge summary is in conjunction with any daily progress note from day of discharge. Hospital Course:     Acute metabolic encephalopathy: Likely multifactorial due to septic shock, hyperkalemia, hypothermia, ? Myxedema coma.  Non verbal at baseline per report.  CT head was nonacute.  Stroke work-up negative in ER.  Neuro recs appreciated. Supportive care for acute morbidities as stated below        Septic shock: met SIRS criteria with tachycardia, tachypnea, hypothermia on arrival in ER. Initial concern for pneumonia as cause. Pneumonia ruled out per pulm. Empiric abx d/rich. Off pressors. BP stable. D/rich steroids. Continue to hold home BP meds    Clinically resolved. Attempt to wean off pressure support.     Acute on chronic resp failure:likely due to acute pulm edema and bibasilar atelectasis/ hypoventilation. Received IV lasix. CXR today reviewed.  Continue diuresis as needed per pulm. Continue 02 and wean as tolerated. Patient is currently on BiPAP, continue to monitor. Pulmonary following. Patient was intubated on 10/1/2020 and nighttime. Currently on ventilator. Pulmonary following.     Hypothyroidism: TSH elevated at 7.4, FT3, FT4  normal. Given decreased responsiveness from baseline and hypothermia concern for possible myxedema coma.  recieved IV synthroid - started on PO on . repeat TFTs in 4-6 weeks      Hypothermia: Likely due to sepsis, hypothyroidism. Improved with karuna hugger . Temp normalized      Hyperkalemia: likely due to LINDA ,  potassium supplements he is on chronically and lisinopril. Treated with improvement. Held K supplements and lisinopril. Resolved. Nephro assisting     LINDA: likely due to vol depletion, compounded by use of ACEi and lasix. Culprit meds held. Nephro assisting. Cr unchanged. Monitor Cr and avoid nephrotoxins.    Likely due to ATN, clinically improving, nephrology input noted, monitor.        Acute pancreatitis: lipase was elevated with signs of early pancreatitis on CT. Unclear etiology. GI assisting. Continue NG tube to LIWS, NPO  . Start feeding gradually.        GERD : Continue PPI            Acute on Chronic diastolic CHF: EF 55 to 59% with G1DD on echo in 9/3/2019. Received IVF for sepsis, hypernatremia. Pulm edema on CXR. IV lasix given. Monitor vol status closely          Hypernatremia: improving with IVF. nephro managing .      Hypoglycemia: likely due to NPO status. BG's better since getting dextrose in IVF. Monitor with hypoglycemia protocol in place.       Thrombocytopenia: cause unclear. No overt bleeding. Heparin d/rich. HIT ab neg. Monitor       Overall prognosis guarded. Palliative care consulted. Patient  on 10/7/2020      Time Spent on discharge is more than 20 minutes in the examination, evaluation, counseling and review of medications and discharge plan.       Signed:    Maria A Gamble

## 2023-07-14 NOTE — PROGRESS NOTES
10/02/20 1604   Vent Information   Skin Assessment Clean, dry, & intact   Vent Type 840   Vent Mode AC/VC+   Vt Ordered 420 mL   Rate Set 22 bmp   FiO2  100 %   SpO2 (!) 88 %   SpO2/FiO2 ratio 88   Sensitivity 3   PEEP/CPAP 12   I Time/ I Time % 0.9 s   Humidification Source HME   Vent Patient Data   Peak Inspiratory Pressure 44 cmH2O   Mean Airway Pressure 22 cmH20   Rate Measured 22 br/min   Vt Exhaled 450 mL   Minute Volume 9.8 Liters   I:E Ratio 1:2   Plateau Pressure 40 VVG29   Cough/Sputum   Cough Non-productive   Spontaneous Breathing Trial (SBT) RT Doc   Pulse 109   Breath Sounds   Right Upper Lobe Diminished   Right Middle Lobe Diminished   Right Lower Lobe Diminished   Left Upper Lobe Diminished   Left Lower Lobe Diminished   Additional Respiratory  Assessments   Resp 22   $End Tidal CO2 26   Alarm Settings   High Pressure Alarm 50 cmH2O   Low Minute Volume Alarm 2 L/min   Apnea (secs) 20 secs   High Respiratory Rate 40 br/min   Low Exhaled Vt  200 mL   ETT (adult)   Placement Date/Time: 10/01/20 2100   Preoxygenation: Yes  Type: Cuffed  Tube Size: 7.5 mm  Laryngoscope: GlideScope  Blade Size: 3  Location: Oral  Insertion attempts: 1  Placement Verified By[[email or web address]] Auscultation;Capnometry  Secured at: 24 cm  Placed By: L. ..    Secured at 24 cm   Measured From Lips   ET Placement Left   Secured By Commercial tube wilkins   Site Condition Dry   Cuff Pressure 40 cm H2O Yes

## (undated) DEVICE — SMARTGOWN SURGICAL GOWN, XL: Brand: CONVERTORS

## (undated) DEVICE — LINER,SOFT,SUCTION CANISTER,1500CC: Brand: MEDLINE

## (undated) DEVICE — SOLUTION IV IRRIG 500ML 0.9% SODIUM CHL 2F7123

## (undated) DEVICE — BIT DRL L330MM DIA4.2MM CALIB 100MM 3 FLUT QUIK CPL

## (undated) DEVICE — GLOVE ORANGE PI 8   MSG9080

## (undated) DEVICE — TUBING, SUCTION, 3/16" X 6', STRAIGHT: Brand: MEDLINE

## (undated) DEVICE — SMARTGOWN BREATHABLE SURGICAL GOWN: Brand: CONVERTORS

## (undated) DEVICE — SINGLE USE BIOPSY VALVE MAJ-210: Brand: SINGLE USE BIOPSY VALVE (STERILE)

## (undated) DEVICE — SYRINGE MED 50ML LUERSLIP TIP

## (undated) DEVICE — COVER,MAYO STAND,STERILE: Brand: MEDLINE

## (undated) DEVICE — GLOVE ORANGE PI 7 1/2   MSG9075

## (undated) DEVICE — SYRINGE MED 10ML SLIP TIP BLNT FILL AND LUERLOCK DISP

## (undated) DEVICE — PACK PROCEDURE SURG HIP PIN TROCHANTERIC FEM NAIL CDS

## (undated) DEVICE — YANKAUER,BULB TIP,W/O VENT,RIGID,STERILE: Brand: MEDLINE

## (undated) DEVICE — Z DISCONTINUED USE 2429233 DRESSING FOAM W10XL10CM 5 LAYR SELF ADH VERSATILE SAFETAC

## (undated) DEVICE — SUTURE MCRYL SZ 2-0 L18IN ABSRB VLT L36MM CT-1 1/2 CIR Y739D

## (undated) DEVICE — CANNULA,OXY,ADULT,SUPERSOFT,W/7'TUB,SC: Brand: MEDLINE INDUSTRIES, INC.

## (undated) DEVICE — TRAP,MUCUS SPECIMEN, 80CC: Brand: MEDLINE

## (undated) DEVICE — ELECTRODE,RADIOTRANSLUCENT,FOAM,3PK: Brand: MEDLINE

## (undated) DEVICE — BOWL MED M 16OZ PLAS CAP GRAD

## (undated) DEVICE — SINGLE USE SUCTION VALVE MAJ-209: Brand: SINGLE USE SUCTION VALVE (STERILE)

## (undated) DEVICE — Z DISCONTINUED PER MEDLINE USE 2752023 DRESSING FOAM W7.62XL7.62CM SIL ADH WTRPRF FLM BK SQ SHP

## (undated) DEVICE — SUTURE MCRYL SZ 0 L18IN ABSRB VLT L36MM CT-1 1/2 CIR Y740D

## (undated) DEVICE — BIT DRL L L266MM DIA16MM FEM FLX CANN QUIK CPL

## (undated) DEVICE — CONMED SCOPE SAVER BITE BLOCK, 20X27 MM: Brand: SCOPE SAVER

## (undated) DEVICE — FRAME EYEWR PROTCT ASST CLR DISP SAFEVIEW

## (undated) DEVICE — ROD RM 3X950 MM W/ BALL TIP SS STRL

## (undated) DEVICE — TUBING, SUCTION, 3/16" X 12', STRAIGHT: Brand: MEDLINE

## (undated) DEVICE — GUIDEWIRE ORTH L400MM DIA3.2MM FOR TFN

## (undated) DEVICE — PENCIL SMK EVAC ALL IN 1 DSGN ENH VISIBILITY IMPROVED AIR

## (undated) DEVICE — DRESSING FOAM W10XL20CM ANTIMIC SELF ADH SAFETAC TECHNOLOGY